# Patient Record
Sex: MALE | Race: WHITE | Employment: OTHER | URBAN - METROPOLITAN AREA
[De-identification: names, ages, dates, MRNs, and addresses within clinical notes are randomized per-mention and may not be internally consistent; named-entity substitution may affect disease eponyms.]

---

## 2017-01-03 ENCOUNTER — NURSE ONLY (OUTPATIENT)
Dept: DERMATOLOGY | Age: 57
End: 2017-01-03

## 2017-01-03 DIAGNOSIS — L28.1 PRURIGO NODULARIS: Primary | ICD-10-CM

## 2017-01-03 PROCEDURE — 96900 ACTINOTHERAPY UV LIGHT: CPT | Performed by: DERMATOLOGY

## 2017-01-04 ENCOUNTER — TELEPHONE (OUTPATIENT)
Dept: DERMATOLOGY | Age: 57
End: 2017-01-04

## 2017-01-18 ENCOUNTER — NURSE ONLY (OUTPATIENT)
Dept: DERMATOLOGY | Age: 57
End: 2017-01-18

## 2017-01-18 DIAGNOSIS — L28.1 PRURIGO NODULARIS: Primary | ICD-10-CM

## 2017-01-18 PROCEDURE — 96900 ACTINOTHERAPY UV LIGHT: CPT | Performed by: DERMATOLOGY

## 2017-01-20 ENCOUNTER — NURSE ONLY (OUTPATIENT)
Dept: DERMATOLOGY | Age: 57
End: 2017-01-20

## 2017-01-20 DIAGNOSIS — L28.1 PRURIGO NODULARIS: Primary | ICD-10-CM

## 2017-01-20 PROCEDURE — 96900 ACTINOTHERAPY UV LIGHT: CPT | Performed by: DERMATOLOGY

## 2017-01-23 ENCOUNTER — NURSE ONLY (OUTPATIENT)
Dept: DERMATOLOGY | Age: 57
End: 2017-01-23

## 2017-01-23 DIAGNOSIS — L28.1 PRURIGO NODULARIS: Primary | ICD-10-CM

## 2017-01-23 PROCEDURE — 96900 ACTINOTHERAPY UV LIGHT: CPT | Performed by: DERMATOLOGY

## 2017-01-27 ENCOUNTER — NURSE ONLY (OUTPATIENT)
Dept: DERMATOLOGY | Age: 57
End: 2017-01-27

## 2017-01-27 DIAGNOSIS — L28.1 PRURIGO NODULARIS: Primary | ICD-10-CM

## 2017-01-30 ENCOUNTER — TELEPHONE (OUTPATIENT)
Dept: DERMATOLOGY | Age: 57
End: 2017-01-30

## 2017-02-09 ENCOUNTER — NURSE ONLY (OUTPATIENT)
Dept: DERMATOLOGY | Age: 57
End: 2017-02-09

## 2017-02-09 ENCOUNTER — OFFICE VISIT (OUTPATIENT)
Dept: DERMATOLOGY | Age: 57
End: 2017-02-09

## 2017-02-09 DIAGNOSIS — L29.9 PRURITIC CONDITION: ICD-10-CM

## 2017-02-09 DIAGNOSIS — L28.1 PRURIGO NODULARIS: Primary | ICD-10-CM

## 2017-02-09 PROCEDURE — 99213 OFFICE O/P EST LOW 20 MIN: CPT | Performed by: DERMATOLOGY

## 2017-02-09 PROCEDURE — 96900 ACTINOTHERAPY UV LIGHT: CPT | Performed by: DERMATOLOGY

## 2017-02-13 ENCOUNTER — NURSE ONLY (OUTPATIENT)
Dept: DERMATOLOGY | Age: 57
End: 2017-02-13

## 2017-02-13 DIAGNOSIS — L28.1 PRURIGO NODULARIS: Primary | ICD-10-CM

## 2017-02-13 PROCEDURE — 96900 ACTINOTHERAPY UV LIGHT: CPT | Performed by: DERMATOLOGY

## 2017-02-15 ENCOUNTER — NURSE ONLY (OUTPATIENT)
Dept: DERMATOLOGY | Age: 57
End: 2017-02-15

## 2017-02-15 DIAGNOSIS — L28.1 PRURIGO NODULARIS: Primary | ICD-10-CM

## 2017-02-15 PROCEDURE — 96900 ACTINOTHERAPY UV LIGHT: CPT | Performed by: DERMATOLOGY

## 2017-02-17 ENCOUNTER — NURSE ONLY (OUTPATIENT)
Dept: DERMATOLOGY | Age: 57
End: 2017-02-17

## 2017-02-17 DIAGNOSIS — L28.1 PRURIGO NODULARIS: Primary | ICD-10-CM

## 2017-02-17 PROCEDURE — 96900 ACTINOTHERAPY UV LIGHT: CPT | Performed by: DERMATOLOGY

## 2017-02-20 ENCOUNTER — NURSE ONLY (OUTPATIENT)
Dept: DERMATOLOGY | Age: 57
End: 2017-02-20

## 2017-02-20 DIAGNOSIS — L28.1 PRURIGO NODULARIS: Primary | ICD-10-CM

## 2017-02-20 PROCEDURE — 96900 ACTINOTHERAPY UV LIGHT: CPT | Performed by: DERMATOLOGY

## 2017-02-22 ENCOUNTER — NURSE ONLY (OUTPATIENT)
Dept: DERMATOLOGY | Age: 57
End: 2017-02-22

## 2017-02-22 DIAGNOSIS — L28.1 PRURIGO NODULARIS: Primary | ICD-10-CM

## 2017-02-22 PROCEDURE — 96900 ACTINOTHERAPY UV LIGHT: CPT | Performed by: DERMATOLOGY

## 2017-02-24 ENCOUNTER — NURSE ONLY (OUTPATIENT)
Dept: DERMATOLOGY | Age: 57
End: 2017-02-24

## 2017-02-24 DIAGNOSIS — L28.1 PRURIGO NODULARIS: Primary | ICD-10-CM

## 2017-02-24 PROCEDURE — 96900 ACTINOTHERAPY UV LIGHT: CPT | Performed by: DERMATOLOGY

## 2017-02-27 ENCOUNTER — NURSE ONLY (OUTPATIENT)
Dept: DERMATOLOGY | Age: 57
End: 2017-02-27

## 2017-02-27 DIAGNOSIS — L28.1 PRURIGO NODULARIS: Primary | ICD-10-CM

## 2017-02-27 PROCEDURE — 96900 ACTINOTHERAPY UV LIGHT: CPT | Performed by: DERMATOLOGY

## 2017-03-06 ENCOUNTER — NURSE ONLY (OUTPATIENT)
Dept: DERMATOLOGY | Age: 57
End: 2017-03-06

## 2017-03-06 DIAGNOSIS — L28.1 PRURIGO NODULARIS: Primary | ICD-10-CM

## 2017-03-06 PROCEDURE — 96900 ACTINOTHERAPY UV LIGHT: CPT | Performed by: DERMATOLOGY

## 2017-03-08 ENCOUNTER — NURSE ONLY (OUTPATIENT)
Dept: DERMATOLOGY | Age: 57
End: 2017-03-08

## 2017-03-08 DIAGNOSIS — L28.1 PRURIGO NODULARIS: Primary | ICD-10-CM

## 2017-03-08 PROCEDURE — 96900 ACTINOTHERAPY UV LIGHT: CPT | Performed by: DERMATOLOGY

## 2017-03-10 ENCOUNTER — NURSE ONLY (OUTPATIENT)
Dept: DERMATOLOGY | Age: 57
End: 2017-03-10

## 2017-03-10 DIAGNOSIS — L28.1 PRURIGO NODULARIS: Primary | ICD-10-CM

## 2017-03-10 PROCEDURE — 96900 ACTINOTHERAPY UV LIGHT: CPT | Performed by: DERMATOLOGY

## 2017-03-15 ENCOUNTER — NURSE ONLY (OUTPATIENT)
Dept: DERMATOLOGY | Age: 57
End: 2017-03-15

## 2017-03-15 ENCOUNTER — TELEPHONE (OUTPATIENT)
Dept: DERMATOLOGY | Age: 57
End: 2017-03-15

## 2017-03-15 DIAGNOSIS — L30.9 DERMATITIS: Primary | ICD-10-CM

## 2017-03-15 PROCEDURE — 96900 ACTINOTHERAPY UV LIGHT: CPT | Performed by: DERMATOLOGY

## 2017-03-22 ENCOUNTER — NURSE ONLY (OUTPATIENT)
Dept: DERMATOLOGY | Age: 57
End: 2017-03-22

## 2017-03-22 DIAGNOSIS — L28.1 PRURIGO NODULARIS: Primary | ICD-10-CM

## 2017-03-22 PROCEDURE — 96900 ACTINOTHERAPY UV LIGHT: CPT | Performed by: DERMATOLOGY

## 2017-03-27 ENCOUNTER — NURSE ONLY (OUTPATIENT)
Dept: DERMATOLOGY | Age: 57
End: 2017-03-27

## 2017-03-27 DIAGNOSIS — L28.1 PRURIGO NODULARIS: Primary | ICD-10-CM

## 2017-03-27 PROCEDURE — 96900 ACTINOTHERAPY UV LIGHT: CPT | Performed by: DERMATOLOGY

## 2017-03-31 ENCOUNTER — NURSE ONLY (OUTPATIENT)
Dept: DERMATOLOGY | Age: 57
End: 2017-03-31

## 2017-03-31 DIAGNOSIS — L28.1 PRURIGO NODULARIS: Primary | ICD-10-CM

## 2017-03-31 PROCEDURE — 96900 ACTINOTHERAPY UV LIGHT: CPT | Performed by: DERMATOLOGY

## 2017-04-03 ENCOUNTER — NURSE ONLY (OUTPATIENT)
Dept: DERMATOLOGY | Age: 57
End: 2017-04-03

## 2017-04-03 DIAGNOSIS — L28.1 PRURIGO NODULARIS: Primary | ICD-10-CM

## 2017-04-03 PROCEDURE — 96900 ACTINOTHERAPY UV LIGHT: CPT | Performed by: DERMATOLOGY

## 2017-04-05 ENCOUNTER — NURSE ONLY (OUTPATIENT)
Dept: DERMATOLOGY | Age: 57
End: 2017-04-05

## 2017-04-05 DIAGNOSIS — L28.1 PRURIGO NODULARIS: Primary | ICD-10-CM

## 2017-04-05 PROCEDURE — 96900 ACTINOTHERAPY UV LIGHT: CPT | Performed by: DERMATOLOGY

## 2017-04-12 ENCOUNTER — NURSE ONLY (OUTPATIENT)
Dept: DERMATOLOGY | Age: 57
End: 2017-04-12

## 2017-04-12 DIAGNOSIS — L28.1 PRURIGO NODULARIS: Primary | ICD-10-CM

## 2017-04-12 PROCEDURE — 96900 ACTINOTHERAPY UV LIGHT: CPT | Performed by: DERMATOLOGY

## 2017-04-17 ENCOUNTER — NURSE ONLY (OUTPATIENT)
Dept: DERMATOLOGY | Age: 57
End: 2017-04-17

## 2017-04-17 DIAGNOSIS — L30.9 DERMATITIS: Primary | ICD-10-CM

## 2017-04-17 PROCEDURE — 96900 ACTINOTHERAPY UV LIGHT: CPT | Performed by: DERMATOLOGY

## 2017-04-24 ENCOUNTER — NURSE ONLY (OUTPATIENT)
Dept: DERMATOLOGY | Age: 57
End: 2017-04-24

## 2017-04-24 DIAGNOSIS — L28.1 PRURIGO NODULARIS: Primary | ICD-10-CM

## 2017-04-24 PROCEDURE — 96900 ACTINOTHERAPY UV LIGHT: CPT | Performed by: DERMATOLOGY

## 2017-04-26 ENCOUNTER — NURSE ONLY (OUTPATIENT)
Dept: DERMATOLOGY | Age: 57
End: 2017-04-26

## 2017-04-26 DIAGNOSIS — L28.1 PRURIGO NODULARIS: Primary | ICD-10-CM

## 2017-04-26 PROCEDURE — 96900 ACTINOTHERAPY UV LIGHT: CPT | Performed by: DERMATOLOGY

## 2017-05-08 ENCOUNTER — NURSE ONLY (OUTPATIENT)
Dept: DERMATOLOGY | Age: 57
End: 2017-05-08

## 2017-05-08 DIAGNOSIS — L28.1 PRURIGO NODULARIS: Primary | ICD-10-CM

## 2017-05-08 PROCEDURE — 96900 ACTINOTHERAPY UV LIGHT: CPT | Performed by: DERMATOLOGY

## 2017-05-10 ENCOUNTER — NURSE ONLY (OUTPATIENT)
Dept: DERMATOLOGY | Age: 57
End: 2017-05-10

## 2017-05-10 DIAGNOSIS — L28.1 PRURIGO NODULARIS: Primary | ICD-10-CM

## 2017-05-10 PROCEDURE — 96900 ACTINOTHERAPY UV LIGHT: CPT | Performed by: DERMATOLOGY

## 2017-05-12 ENCOUNTER — NURSE ONLY (OUTPATIENT)
Dept: DERMATOLOGY | Age: 57
End: 2017-05-12

## 2017-05-12 DIAGNOSIS — L28.1 PRURIGO NODULARIS: Primary | ICD-10-CM

## 2017-05-12 PROCEDURE — 96900 ACTINOTHERAPY UV LIGHT: CPT | Performed by: DERMATOLOGY

## 2017-05-15 ENCOUNTER — OFFICE VISIT (OUTPATIENT)
Dept: DERMATOLOGY | Age: 57
End: 2017-05-15

## 2017-05-15 DIAGNOSIS — L28.1 PRURIGO NODULARIS: Primary | ICD-10-CM

## 2017-05-15 DIAGNOSIS — L29.9 ITCH: ICD-10-CM

## 2017-05-15 PROCEDURE — 99213 OFFICE O/P EST LOW 20 MIN: CPT | Performed by: DERMATOLOGY

## 2017-05-22 ENCOUNTER — NURSE ONLY (OUTPATIENT)
Dept: DERMATOLOGY | Age: 57
End: 2017-05-22

## 2017-05-22 DIAGNOSIS — L28.1 PRURIGO NODULARIS: Primary | ICD-10-CM

## 2017-05-22 PROCEDURE — 96900 ACTINOTHERAPY UV LIGHT: CPT | Performed by: DERMATOLOGY

## 2017-05-30 ENCOUNTER — NURSE ONLY (OUTPATIENT)
Dept: DERMATOLOGY | Age: 57
End: 2017-05-30

## 2017-05-30 DIAGNOSIS — L28.1 PRURIGO NODULARIS: Primary | ICD-10-CM

## 2017-05-30 PROCEDURE — 96900 ACTINOTHERAPY UV LIGHT: CPT | Performed by: DERMATOLOGY

## 2017-06-05 ENCOUNTER — NURSE ONLY (OUTPATIENT)
Dept: DERMATOLOGY | Age: 57
End: 2017-06-05

## 2017-06-05 DIAGNOSIS — L28.1 PRURIGO NODULARIS: Primary | ICD-10-CM

## 2017-06-05 PROCEDURE — 96900 ACTINOTHERAPY UV LIGHT: CPT | Performed by: DERMATOLOGY

## 2017-06-07 ENCOUNTER — NURSE ONLY (OUTPATIENT)
Dept: DERMATOLOGY | Age: 57
End: 2017-06-07

## 2017-06-07 DIAGNOSIS — L28.1 PRURIGO NODULARIS: Primary | ICD-10-CM

## 2017-06-07 PROCEDURE — 96900 ACTINOTHERAPY UV LIGHT: CPT | Performed by: DERMATOLOGY

## 2017-06-14 ENCOUNTER — NURSE ONLY (OUTPATIENT)
Dept: DERMATOLOGY | Age: 57
End: 2017-06-14

## 2017-06-14 DIAGNOSIS — L28.1 PRURIGO NODULARIS: Primary | ICD-10-CM

## 2017-06-14 PROCEDURE — 96900 ACTINOTHERAPY UV LIGHT: CPT | Performed by: DERMATOLOGY

## 2017-06-16 ENCOUNTER — NURSE ONLY (OUTPATIENT)
Dept: DERMATOLOGY | Age: 57
End: 2017-06-16

## 2017-06-16 DIAGNOSIS — L28.1 PRURIGO NODULARIS: Primary | ICD-10-CM

## 2017-06-16 PROCEDURE — 96900 ACTINOTHERAPY UV LIGHT: CPT | Performed by: DERMATOLOGY

## 2017-06-19 ENCOUNTER — NURSE ONLY (OUTPATIENT)
Dept: DERMATOLOGY | Age: 57
End: 2017-06-19

## 2017-06-19 DIAGNOSIS — L28.1 PRURIGO NODULARIS: Primary | ICD-10-CM

## 2017-06-19 PROCEDURE — 96900 ACTINOTHERAPY UV LIGHT: CPT | Performed by: DERMATOLOGY

## 2017-06-26 ENCOUNTER — NURSE ONLY (OUTPATIENT)
Dept: DERMATOLOGY | Age: 57
End: 2017-06-26

## 2017-06-26 DIAGNOSIS — L28.1 PRURIGO NODULARIS: Primary | ICD-10-CM

## 2017-06-26 PROCEDURE — 96900 ACTINOTHERAPY UV LIGHT: CPT | Performed by: DERMATOLOGY

## 2017-07-07 ENCOUNTER — NURSE ONLY (OUTPATIENT)
Dept: DERMATOLOGY | Age: 57
End: 2017-07-07

## 2017-07-07 DIAGNOSIS — L28.1 PRURIGO NODULARIS: Primary | ICD-10-CM

## 2017-07-07 PROCEDURE — 96900 ACTINOTHERAPY UV LIGHT: CPT | Performed by: DERMATOLOGY

## 2017-07-10 ENCOUNTER — NURSE ONLY (OUTPATIENT)
Dept: DERMATOLOGY | Age: 57
End: 2017-07-10

## 2017-07-10 DIAGNOSIS — L28.1 PRURIGO NODULARIS: Primary | ICD-10-CM

## 2017-07-10 PROCEDURE — 96900 ACTINOTHERAPY UV LIGHT: CPT | Performed by: DERMATOLOGY

## 2017-07-12 ENCOUNTER — NURSE ONLY (OUTPATIENT)
Dept: DERMATOLOGY | Age: 57
End: 2017-07-12

## 2017-07-12 DIAGNOSIS — L28.1 PRURIGO NODULARIS: Primary | ICD-10-CM

## 2017-07-12 PROCEDURE — 96900 ACTINOTHERAPY UV LIGHT: CPT | Performed by: DERMATOLOGY

## 2017-07-24 ENCOUNTER — NURSE ONLY (OUTPATIENT)
Dept: DERMATOLOGY | Age: 57
End: 2017-07-24

## 2017-07-24 DIAGNOSIS — L28.1 PRURIGO NODULARIS: Primary | ICD-10-CM

## 2017-07-24 PROCEDURE — 96900 ACTINOTHERAPY UV LIGHT: CPT | Performed by: DERMATOLOGY

## 2017-07-26 ENCOUNTER — NURSE ONLY (OUTPATIENT)
Dept: DERMATOLOGY | Age: 57
End: 2017-07-26

## 2017-07-26 DIAGNOSIS — L28.1 PRURIGO NODULARIS: Primary | ICD-10-CM

## 2017-07-26 PROCEDURE — 96900 ACTINOTHERAPY UV LIGHT: CPT | Performed by: DERMATOLOGY

## 2017-08-02 ENCOUNTER — NURSE ONLY (OUTPATIENT)
Dept: DERMATOLOGY | Age: 57
End: 2017-08-02

## 2017-08-02 DIAGNOSIS — L28.1 PRURIGO NODULARIS: Primary | ICD-10-CM

## 2017-08-02 PROCEDURE — 96900 ACTINOTHERAPY UV LIGHT: CPT | Performed by: DERMATOLOGY

## 2017-08-09 ENCOUNTER — NURSE ONLY (OUTPATIENT)
Dept: DERMATOLOGY | Age: 57
End: 2017-08-09

## 2017-08-09 DIAGNOSIS — L28.1 PRURIGO NODULARIS: Primary | ICD-10-CM

## 2017-08-09 PROCEDURE — 96900 ACTINOTHERAPY UV LIGHT: CPT | Performed by: DERMATOLOGY

## 2017-08-11 ENCOUNTER — NURSE ONLY (OUTPATIENT)
Dept: DERMATOLOGY | Age: 57
End: 2017-08-11

## 2017-08-11 DIAGNOSIS — L28.1 PRURIGO NODULARIS: Primary | ICD-10-CM

## 2017-08-11 PROCEDURE — 96900 ACTINOTHERAPY UV LIGHT: CPT | Performed by: DERMATOLOGY

## 2017-08-16 ENCOUNTER — NURSE ONLY (OUTPATIENT)
Dept: DERMATOLOGY | Age: 57
End: 2017-08-16

## 2017-08-16 DIAGNOSIS — L28.1 PRURIGO NODULARIS: Primary | ICD-10-CM

## 2017-08-16 PROCEDURE — 96900 ACTINOTHERAPY UV LIGHT: CPT | Performed by: DERMATOLOGY

## 2017-08-25 ENCOUNTER — NURSE ONLY (OUTPATIENT)
Dept: DERMATOLOGY | Age: 57
End: 2017-08-25

## 2017-08-25 DIAGNOSIS — L28.1 PRURIGO NODULARIS: Primary | ICD-10-CM

## 2017-08-25 PROCEDURE — 96900 ACTINOTHERAPY UV LIGHT: CPT | Performed by: DERMATOLOGY

## 2017-09-06 ENCOUNTER — NURSE ONLY (OUTPATIENT)
Dept: DERMATOLOGY | Age: 57
End: 2017-09-06

## 2017-09-06 DIAGNOSIS — L28.1 PRURIGO NODULARIS: Primary | ICD-10-CM

## 2017-09-06 PROCEDURE — 96900 ACTINOTHERAPY UV LIGHT: CPT | Performed by: DERMATOLOGY

## 2017-09-08 ENCOUNTER — NURSE ONLY (OUTPATIENT)
Dept: DERMATOLOGY | Age: 57
End: 2017-09-08

## 2017-09-08 DIAGNOSIS — L28.1 PRURIGO NODULARIS: Primary | ICD-10-CM

## 2017-09-08 PROCEDURE — 96900 ACTINOTHERAPY UV LIGHT: CPT | Performed by: DERMATOLOGY

## 2017-09-13 ENCOUNTER — NURSE ONLY (OUTPATIENT)
Dept: DERMATOLOGY | Age: 57
End: 2017-09-13

## 2017-09-13 ENCOUNTER — OFFICE VISIT (OUTPATIENT)
Dept: DERMATOLOGY | Age: 57
End: 2017-09-13

## 2017-09-13 DIAGNOSIS — L28.1 PRURIGO NODULARIS: Primary | ICD-10-CM

## 2017-09-13 DIAGNOSIS — L29.9 PRURITIC CONDITION: ICD-10-CM

## 2017-09-13 PROCEDURE — 96900 ACTINOTHERAPY UV LIGHT: CPT | Performed by: DERMATOLOGY

## 2017-09-13 PROCEDURE — 11900 INJECT SKIN LESIONS </W 7: CPT | Performed by: DERMATOLOGY

## 2017-09-13 PROCEDURE — 99213 OFFICE O/P EST LOW 20 MIN: CPT | Performed by: DERMATOLOGY

## 2017-09-13 RX ORDER — TRIAMCINOLONE ACETONIDE 40 MG/ML
40 INJECTION, SUSPENSION INTRA-ARTICULAR; INTRAMUSCULAR ONCE
Status: COMPLETED | OUTPATIENT
Start: 2017-09-13 | End: 2017-09-13

## 2017-09-13 RX ADMIN — TRIAMCINOLONE ACETONIDE 40 MG: 40 INJECTION, SUSPENSION INTRA-ARTICULAR; INTRAMUSCULAR at 11:05

## 2017-10-04 ENCOUNTER — NURSE ONLY (OUTPATIENT)
Dept: DERMATOLOGY | Age: 57
End: 2017-10-04

## 2017-10-04 DIAGNOSIS — L28.1 PRURIGO NODULARIS: Primary | ICD-10-CM

## 2017-10-04 PROCEDURE — 96900 ACTINOTHERAPY UV LIGHT: CPT | Performed by: DERMATOLOGY

## 2017-10-04 NOTE — PROGRESS NOTES
Pt here for NBUVB treatment, tolerating treatments well, no signs of burning. Pt shields face. Decrease dose per protocol.

## 2017-10-06 ENCOUNTER — NURSE ONLY (OUTPATIENT)
Dept: DERMATOLOGY | Age: 57
End: 2017-10-06

## 2017-10-06 DIAGNOSIS — L28.1 PRURIGO NODULARIS: Primary | ICD-10-CM

## 2017-10-06 PROCEDURE — 96900 ACTINOTHERAPY UV LIGHT: CPT | Performed by: DERMATOLOGY

## 2017-10-06 PROCEDURE — 99024 POSTOP FOLLOW-UP VISIT: CPT | Performed by: DERMATOLOGY

## 2017-10-11 ENCOUNTER — NURSE ONLY (OUTPATIENT)
Dept: DERMATOLOGY | Age: 57
End: 2017-10-11

## 2017-10-11 DIAGNOSIS — L28.1 PRURIGO NODULARIS: Primary | ICD-10-CM

## 2017-10-11 PROCEDURE — 96900 ACTINOTHERAPY UV LIGHT: CPT | Performed by: DERMATOLOGY

## 2017-10-11 NOTE — PROGRESS NOTES
Pt here for NBUVB treatment, tolerating treatments well, no signs of burning. Pt shields face, groin and abdomen. Hold dose per protocol.

## 2017-10-16 ENCOUNTER — NURSE ONLY (OUTPATIENT)
Dept: DERMATOLOGY | Age: 57
End: 2017-10-16

## 2017-10-16 DIAGNOSIS — L28.1 PRURIGO NODULARIS: Primary | ICD-10-CM

## 2017-10-16 PROCEDURE — 96900 ACTINOTHERAPY UV LIGHT: CPT | Performed by: DERMATOLOGY

## 2017-10-16 NOTE — PROGRESS NOTES
Pt here for NBUVB treatment, tolerating treatments well, no signs of burning. Pt shields face. Hold dose per protocol.

## 2017-10-27 ENCOUNTER — NURSE ONLY (OUTPATIENT)
Dept: DERMATOLOGY | Age: 57
End: 2017-10-27

## 2017-10-27 DIAGNOSIS — L28.1 PRURIGO NODULARIS: Primary | ICD-10-CM

## 2017-10-27 PROCEDURE — 96900 ACTINOTHERAPY UV LIGHT: CPT | Performed by: DERMATOLOGY

## 2017-11-01 ENCOUNTER — NURSE ONLY (OUTPATIENT)
Dept: DERMATOLOGY | Age: 57
End: 2017-11-01

## 2017-11-01 DIAGNOSIS — L28.1 PRURIGO NODULARIS: Primary | ICD-10-CM

## 2017-11-01 PROCEDURE — 96900 ACTINOTHERAPY UV LIGHT: CPT | Performed by: DERMATOLOGY

## 2017-11-01 NOTE — PROGRESS NOTES
Pt here for NBUVB treatment, tolerating treatments well, no signs of burning. Pt shields face, abdomen, and genitals. Increase dose per Dr. Antione Jane due to pt flaring. Pt was encouraged to come three times a week as ordered to help the treatments be more effective as well as increasing the time.  Pt expressed understanding and states he will come three times a week

## 2017-11-03 ENCOUNTER — NURSE ONLY (OUTPATIENT)
Dept: DERMATOLOGY | Age: 57
End: 2017-11-03

## 2017-11-03 DIAGNOSIS — L28.1 PRURIGO NODULARIS: Primary | ICD-10-CM

## 2017-11-03 PROCEDURE — 96900 ACTINOTHERAPY UV LIGHT: CPT | Performed by: DERMATOLOGY

## 2017-11-03 NOTE — PROGRESS NOTES
Pt here for NBUVB treatment, tolerating treatments well, no signs of burning. Pt shields face. Increase dose per protocol.

## 2017-11-06 ENCOUNTER — NURSE ONLY (OUTPATIENT)
Dept: DERMATOLOGY | Age: 57
End: 2017-11-06

## 2017-11-06 DIAGNOSIS — L28.1 PRURIGO NODULARIS: Primary | ICD-10-CM

## 2017-11-06 PROCEDURE — 96900 ACTINOTHERAPY UV LIGHT: CPT | Performed by: DERMATOLOGY

## 2017-11-06 NOTE — PROGRESS NOTES
Pt here for NBUVB treatment, tolerating treatments well, no signs of burning. Pt shields face. Increased dose per protocol.

## 2017-11-10 ENCOUNTER — NURSE ONLY (OUTPATIENT)
Dept: DERMATOLOGY | Age: 57
End: 2017-11-10

## 2017-11-10 DIAGNOSIS — L28.1 PRURIGO NODULARIS: Primary | ICD-10-CM

## 2017-11-10 PROCEDURE — 96900 ACTINOTHERAPY UV LIGHT: CPT | Performed by: DERMATOLOGY

## 2017-11-13 ENCOUNTER — NURSE ONLY (OUTPATIENT)
Dept: DERMATOLOGY | Age: 57
End: 2017-11-13

## 2017-11-13 DIAGNOSIS — L28.1 PRURIGO NODULARIS: Primary | ICD-10-CM

## 2017-11-13 PROCEDURE — 96900 ACTINOTHERAPY UV LIGHT: CPT | Performed by: DERMATOLOGY

## 2017-11-13 NOTE — PROGRESS NOTES
Pt here for NBUVB treatment, tolerating treatments well, no signs of burning. Pt shields face, groin and abdomen. Increase dose per protocol.

## 2017-11-17 ENCOUNTER — NURSE ONLY (OUTPATIENT)
Dept: DERMATOLOGY | Age: 57
End: 2017-11-17

## 2017-11-17 DIAGNOSIS — L28.1 PRURIGO NODULARIS: Primary | ICD-10-CM

## 2017-11-17 PROCEDURE — 96900 ACTINOTHERAPY UV LIGHT: CPT | Performed by: DERMATOLOGY

## 2017-11-20 ENCOUNTER — NURSE ONLY (OUTPATIENT)
Dept: DERMATOLOGY | Age: 57
End: 2017-11-20

## 2017-11-20 DIAGNOSIS — L28.1 PRURIGO NODULARIS: Primary | ICD-10-CM

## 2017-11-20 PROCEDURE — 96900 ACTINOTHERAPY UV LIGHT: CPT | Performed by: DERMATOLOGY

## 2017-11-22 ENCOUNTER — NURSE ONLY (OUTPATIENT)
Dept: DERMATOLOGY | Age: 57
End: 2017-11-22

## 2017-11-22 DIAGNOSIS — L28.1 PRURIGO NODULARIS: Primary | ICD-10-CM

## 2017-11-22 PROCEDURE — 96900 ACTINOTHERAPY UV LIGHT: CPT | Performed by: DERMATOLOGY

## 2017-11-29 ENCOUNTER — NURSE ONLY (OUTPATIENT)
Dept: DERMATOLOGY | Age: 57
End: 2017-11-29

## 2017-11-29 DIAGNOSIS — L28.1 PRURIGO NODULARIS: Primary | ICD-10-CM

## 2017-11-29 PROCEDURE — 96900 ACTINOTHERAPY UV LIGHT: CPT | Performed by: DERMATOLOGY

## 2017-12-01 ENCOUNTER — NURSE ONLY (OUTPATIENT)
Dept: DERMATOLOGY | Age: 57
End: 2017-12-01

## 2017-12-01 DIAGNOSIS — L28.1 PRURIGO NODULARIS: Primary | ICD-10-CM

## 2017-12-01 PROCEDURE — 96900 ACTINOTHERAPY UV LIGHT: CPT | Performed by: DERMATOLOGY

## 2017-12-04 ENCOUNTER — NURSE ONLY (OUTPATIENT)
Dept: DERMATOLOGY | Age: 57
End: 2017-12-04

## 2017-12-04 DIAGNOSIS — L28.1 PRURIGO NODULARIS: Primary | ICD-10-CM

## 2017-12-04 PROCEDURE — 96900 ACTINOTHERAPY UV LIGHT: CPT | Performed by: DERMATOLOGY

## 2017-12-06 ENCOUNTER — NURSE ONLY (OUTPATIENT)
Dept: DERMATOLOGY | Age: 57
End: 2017-12-06

## 2017-12-06 DIAGNOSIS — L28.1 PRURIGO NODULARIS: Primary | ICD-10-CM

## 2017-12-06 PROCEDURE — 96900 ACTINOTHERAPY UV LIGHT: CPT | Performed by: DERMATOLOGY

## 2017-12-11 ENCOUNTER — NURSE ONLY (OUTPATIENT)
Dept: DERMATOLOGY | Age: 57
End: 2017-12-11

## 2017-12-11 ENCOUNTER — OFFICE VISIT (OUTPATIENT)
Dept: DERMATOLOGY | Age: 57
End: 2017-12-11

## 2017-12-11 DIAGNOSIS — L29.9 ITCH: ICD-10-CM

## 2017-12-11 DIAGNOSIS — L28.1 PRURIGO NODULARIS: Primary | ICD-10-CM

## 2017-12-11 PROCEDURE — 11901 INJECT SKIN LESIONS >7: CPT | Performed by: DERMATOLOGY

## 2017-12-11 PROCEDURE — G8427 DOCREV CUR MEDS BY ELIG CLIN: HCPCS | Performed by: DERMATOLOGY

## 2017-12-11 PROCEDURE — 99213 OFFICE O/P EST LOW 20 MIN: CPT | Performed by: DERMATOLOGY

## 2017-12-11 PROCEDURE — 96900 ACTINOTHERAPY UV LIGHT: CPT | Performed by: DERMATOLOGY

## 2017-12-11 PROCEDURE — 3017F COLORECTAL CA SCREEN DOC REV: CPT | Performed by: DERMATOLOGY

## 2017-12-11 PROCEDURE — G8484 FLU IMMUNIZE NO ADMIN: HCPCS | Performed by: DERMATOLOGY

## 2017-12-11 PROCEDURE — G8421 BMI NOT CALCULATED: HCPCS | Performed by: DERMATOLOGY

## 2017-12-11 PROCEDURE — 1036F TOBACCO NON-USER: CPT | Performed by: DERMATOLOGY

## 2017-12-11 RX ORDER — CLOBETASOL PROPIONATE 0.5 MG/G
OINTMENT TOPICAL
Qty: 120 G | Refills: 6 | Status: SHIPPED | OUTPATIENT
Start: 2017-12-11 | End: 2018-05-02 | Stop reason: SDUPTHER

## 2017-12-11 NOTE — PROGRESS NOTES
[facility]) Dermatology  Glenny Scales M.D.  [phone]       Khalif Fish  1960    65 y.o. male     Date of Visit: 12/11/2017    Chief Complaint:   Chief Complaint   Patient presents with    Follow-up     UVB        I was asked to see this patient by Dr. Diaz ref. provider found. History of Present Illness:  1. Patient presents today for follow-up of narrowband UVB phototherapy. Currently being treated 2 times per week. Has not had difficulty with burning. Upper back and shoulders flaring-worse since his last visit with multiple open excoriated papules. Had previously been well controlled. Is using clobetasol with minimal improvement. Review of Systems:  Constitutional: Reports general sense of well-being       Past Medical History, Surgical History, Family History, Medications and Allergies reviewed. Social History:   Social History     Social History    Marital status: Single     Spouse name: N/A    Number of children: N/A    Years of education: N/A     Occupational History    Not on file. Social History Main Topics    Smoking status: Former Smoker    Smokeless tobacco: Never Used    Alcohol use No    Drug use: Yes     Types: Marijuana    Sexual activity: No     Other Topics Concern    Not on file     Social History Narrative    No narrative on file       Physical Examination       -General: Well-appearing, NAD  Limited exam-upper torso multiple open excoriated papules. Old scarring. Assessment and Plan     1. Prurigo nodularis - -IL kenalog 10 mg/ml; total 1.5 ml to 19 lesion(s). Edu re: atrophy, dyspigmentation. .Phototherapy Orders for NBUVB:     Frequency of treatments: 3 times per week.     Dose: Start at current dose     Increase by: 5% each treatment     Extra to the extremities: No      Shielding: Face, groin     Max dose: 1500 mJ/cm2     2. Itch - Continue narrowband UVB phototherapy. Recheck 6-8 weeks-intralesional Kenalog if not improved.   Refill of

## 2017-12-18 ENCOUNTER — NURSE ONLY (OUTPATIENT)
Dept: DERMATOLOGY | Age: 57
End: 2017-12-18

## 2017-12-18 ENCOUNTER — TELEPHONE (OUTPATIENT)
Dept: DERMATOLOGY | Age: 57
End: 2017-12-18

## 2017-12-18 DIAGNOSIS — L40.9 PSORIASIS: Primary | ICD-10-CM

## 2017-12-18 PROCEDURE — 96900 ACTINOTHERAPY UV LIGHT: CPT | Performed by: DERMATOLOGY

## 2017-12-18 NOTE — PROGRESS NOTES
Pt here for NBUVB treatment, tolerating treatments well, no signs of burning. Pt shields face and groin. Increase dose per protocol.

## 2017-12-20 ENCOUNTER — NURSE ONLY (OUTPATIENT)
Dept: DERMATOLOGY | Age: 57
End: 2017-12-20

## 2017-12-20 ENCOUNTER — OFFICE VISIT (OUTPATIENT)
Dept: DERMATOLOGY | Age: 57
End: 2017-12-20

## 2017-12-20 DIAGNOSIS — L28.1 PRURIGO NODULARIS: Primary | ICD-10-CM

## 2017-12-20 DIAGNOSIS — M25.522 LEFT ELBOW PAIN: Primary | ICD-10-CM

## 2017-12-20 PROCEDURE — G8427 DOCREV CUR MEDS BY ELIG CLIN: HCPCS | Performed by: DERMATOLOGY

## 2017-12-20 PROCEDURE — 3017F COLORECTAL CA SCREEN DOC REV: CPT | Performed by: DERMATOLOGY

## 2017-12-20 PROCEDURE — 96900 ACTINOTHERAPY UV LIGHT: CPT | Performed by: DERMATOLOGY

## 2017-12-20 PROCEDURE — G8484 FLU IMMUNIZE NO ADMIN: HCPCS | Performed by: DERMATOLOGY

## 2017-12-20 PROCEDURE — 1036F TOBACCO NON-USER: CPT | Performed by: DERMATOLOGY

## 2017-12-20 PROCEDURE — 99212 OFFICE O/P EST SF 10 MIN: CPT | Performed by: DERMATOLOGY

## 2017-12-20 PROCEDURE — G8421 BMI NOT CALCULATED: HCPCS | Performed by: DERMATOLOGY

## 2017-12-20 NOTE — PROGRESS NOTES
[facility]) Dermatology  Jeannine Cameron M.D.  [phone]       Jose Christianson  1960    65 y.o. male     Date of Visit: 12/20/2017    Chief Complaint:   Chief Complaint   Patient presents with    Skin Lesion     left elbow        I was asked to see this patient by Dr. Diaz ref. provider found. History of Present Illness:  1. Patient presents today for evaluation of pain in his left elbow. Developed about 4 days ago. Tender. Edematous. No preceding injury. Review of Systems:  Constitutional: Reports general sense of well-being       Past Medical History, Surgical History, Family History, Medications and Allergies reviewed. Social History:   Social History     Social History    Marital status: Single     Spouse name: N/A    Number of children: N/A    Years of education: N/A     Occupational History    Not on file. Social History Main Topics    Smoking status: Former Smoker    Smokeless tobacco: Never Used    Alcohol use No    Drug use: Yes     Types: Marijuana    Sexual activity: No     Other Topics Concern    Not on file     Social History Narrative    No narrative on file       Physical Examination       -General: Well-appearing, NAD  1. Erythematous, tender, edematous left elbow. Does seem to be a joint effusion. Mild erythema over the joint-no other skin rash or involvement    Assessment and Plan     1. Left elbow pain -Recommended he see his primary care doctor for further evaluation and diagnosis.

## 2017-12-20 NOTE — Clinical Note
Dr. Doug Peter-  He came to me for elbow pain- I rec that he see you, hope that is OK!   Nico Mix (dermatology)

## 2018-01-05 ENCOUNTER — NURSE ONLY (OUTPATIENT)
Dept: DERMATOLOGY | Age: 58
End: 2018-01-05

## 2018-01-05 DIAGNOSIS — L28.1 PRURIGO NODULARIS: Primary | ICD-10-CM

## 2018-01-05 PROCEDURE — 96900 ACTINOTHERAPY UV LIGHT: CPT | Performed by: DERMATOLOGY

## 2018-01-10 ENCOUNTER — NURSE ONLY (OUTPATIENT)
Dept: DERMATOLOGY | Age: 58
End: 2018-01-10

## 2018-01-10 DIAGNOSIS — L28.1 PRURIGO NODULARIS: Primary | ICD-10-CM

## 2018-01-10 PROCEDURE — 96900 ACTINOTHERAPY UV LIGHT: CPT | Performed by: DERMATOLOGY

## 2018-01-17 ENCOUNTER — NURSE ONLY (OUTPATIENT)
Dept: DERMATOLOGY | Age: 58
End: 2018-01-17

## 2018-01-17 DIAGNOSIS — L28.1 PRURIGO NODULARIS: Primary | ICD-10-CM

## 2018-01-17 PROCEDURE — 96900 ACTINOTHERAPY UV LIGHT: CPT | Performed by: DERMATOLOGY

## 2018-01-19 ENCOUNTER — NURSE ONLY (OUTPATIENT)
Dept: DERMATOLOGY | Age: 58
End: 2018-01-19

## 2018-01-19 DIAGNOSIS — L28.1 PRURIGO NODULARIS: Primary | ICD-10-CM

## 2018-01-19 PROCEDURE — 96900 ACTINOTHERAPY UV LIGHT: CPT | Performed by: DERMATOLOGY

## 2018-01-22 ENCOUNTER — NURSE ONLY (OUTPATIENT)
Dept: DERMATOLOGY | Age: 58
End: 2018-01-22

## 2018-01-22 DIAGNOSIS — L28.1 PRURIGO NODULARIS: Primary | ICD-10-CM

## 2018-01-22 PROCEDURE — 96900 ACTINOTHERAPY UV LIGHT: CPT | Performed by: DERMATOLOGY

## 2018-01-24 ENCOUNTER — NURSE ONLY (OUTPATIENT)
Dept: DERMATOLOGY | Age: 58
End: 2018-01-24

## 2018-01-24 DIAGNOSIS — L28.1 PRURIGO NODULARIS: Primary | ICD-10-CM

## 2018-01-24 PROCEDURE — 96900 ACTINOTHERAPY UV LIGHT: CPT | Performed by: DERMATOLOGY

## 2018-01-26 ENCOUNTER — NURSE ONLY (OUTPATIENT)
Dept: DERMATOLOGY | Age: 58
End: 2018-01-26

## 2018-01-26 DIAGNOSIS — L28.1 PRURIGO NODULARIS: Primary | ICD-10-CM

## 2018-01-26 PROCEDURE — 96900 ACTINOTHERAPY UV LIGHT: CPT | Performed by: DERMATOLOGY

## 2018-01-29 ENCOUNTER — NURSE ONLY (OUTPATIENT)
Dept: DERMATOLOGY | Age: 58
End: 2018-01-29

## 2018-01-29 DIAGNOSIS — L28.1 PRURIGO NODULARIS: Primary | ICD-10-CM

## 2018-01-29 PROCEDURE — 96900 ACTINOTHERAPY UV LIGHT: CPT | Performed by: DERMATOLOGY

## 2018-02-12 ENCOUNTER — NURSE ONLY (OUTPATIENT)
Dept: DERMATOLOGY | Age: 58
End: 2018-02-12

## 2018-02-12 DIAGNOSIS — L28.1 PRURIGO NODULARIS: Primary | ICD-10-CM

## 2018-02-12 PROCEDURE — 96900 ACTINOTHERAPY UV LIGHT: CPT | Performed by: DERMATOLOGY

## 2018-02-14 ENCOUNTER — NURSE ONLY (OUTPATIENT)
Dept: DERMATOLOGY | Age: 58
End: 2018-02-14

## 2018-02-14 DIAGNOSIS — L28.1 PRURIGO NODULARIS: Primary | ICD-10-CM

## 2018-02-14 PROCEDURE — 96900 ACTINOTHERAPY UV LIGHT: CPT | Performed by: DERMATOLOGY

## 2018-02-19 ENCOUNTER — NURSE ONLY (OUTPATIENT)
Dept: DERMATOLOGY | Age: 58
End: 2018-02-19

## 2018-02-19 DIAGNOSIS — L28.1 PRURIGO NODULARIS: Primary | ICD-10-CM

## 2018-02-19 PROCEDURE — 96900 ACTINOTHERAPY UV LIGHT: CPT | Performed by: DERMATOLOGY

## 2018-02-26 ENCOUNTER — NURSE ONLY (OUTPATIENT)
Dept: DERMATOLOGY | Age: 58
End: 2018-02-26

## 2018-02-26 DIAGNOSIS — L28.1 PRURIGO NODULARIS: Primary | ICD-10-CM

## 2018-02-26 PROCEDURE — 96900 ACTINOTHERAPY UV LIGHT: CPT | Performed by: DERMATOLOGY

## 2018-03-02 ENCOUNTER — NURSE ONLY (OUTPATIENT)
Dept: DERMATOLOGY | Age: 58
End: 2018-03-02

## 2018-03-02 DIAGNOSIS — L28.1 PRURIGO NODULARIS: Primary | ICD-10-CM

## 2018-03-02 PROCEDURE — 96900 ACTINOTHERAPY UV LIGHT: CPT | Performed by: DERMATOLOGY

## 2018-03-07 ENCOUNTER — NURSE ONLY (OUTPATIENT)
Dept: DERMATOLOGY | Age: 58
End: 2018-03-07

## 2018-03-07 DIAGNOSIS — L28.1 PRURIGO NODULARIS: Primary | ICD-10-CM

## 2018-03-07 PROCEDURE — 96900 ACTINOTHERAPY UV LIGHT: CPT | Performed by: DERMATOLOGY

## 2018-03-12 ENCOUNTER — OFFICE VISIT (OUTPATIENT)
Dept: DERMATOLOGY | Age: 58
End: 2018-03-12

## 2018-03-12 ENCOUNTER — NURSE ONLY (OUTPATIENT)
Dept: DERMATOLOGY | Age: 58
End: 2018-03-12

## 2018-03-12 DIAGNOSIS — L29.9 PRURITIC CONDITION: ICD-10-CM

## 2018-03-12 DIAGNOSIS — L28.1 PRURIGO NODULARIS: Primary | ICD-10-CM

## 2018-03-12 PROCEDURE — 1036F TOBACCO NON-USER: CPT | Performed by: DERMATOLOGY

## 2018-03-12 PROCEDURE — G8484 FLU IMMUNIZE NO ADMIN: HCPCS | Performed by: DERMATOLOGY

## 2018-03-12 PROCEDURE — G8421 BMI NOT CALCULATED: HCPCS | Performed by: DERMATOLOGY

## 2018-03-12 PROCEDURE — 96900 ACTINOTHERAPY UV LIGHT: CPT | Performed by: DERMATOLOGY

## 2018-03-12 PROCEDURE — 3017F COLORECTAL CA SCREEN DOC REV: CPT | Performed by: DERMATOLOGY

## 2018-03-12 PROCEDURE — G8427 DOCREV CUR MEDS BY ELIG CLIN: HCPCS | Performed by: DERMATOLOGY

## 2018-03-12 PROCEDURE — 99213 OFFICE O/P EST LOW 20 MIN: CPT | Performed by: DERMATOLOGY

## 2018-03-12 RX ORDER — PREDNISONE 10 MG/1
TABLET ORAL
Qty: 50 TABLET | Refills: 0 | Status: SHIPPED | OUTPATIENT
Start: 2018-03-12 | End: 2019-01-23

## 2018-03-12 NOTE — PROGRESS NOTES
[address]  Dermatology  Eduin Lucas M.D.  [phone]       Cristian Ni  1960    65 y.o. male     Date of Visit: 3/12/2018    Chief Complaint:   Chief Complaint   Patient presents with    Follow-up     UVB        I was asked to see this patient by Dr. Diaz ref. provider found. History of Present Illness:  1. Patient presents today for follow-up of pruritus and prurigo nodules. Had been fairly well controlled although had developed a few new prurigo nodules that were treated with intralesional Kenalog at his last visit. States that over the last few weeks as developed widespread involvement with more severe pruritus. Keeping him up at night. Multiple open sores. Much worse than he had been previously. Still undergoing narrowband UVB phototherapy 2 times per week. Using clobetasol ointment with minimal improvement. Started Benadryl. Review of Systems:  Constitutional: Reports general sense of well-being       Past Medical History, Surgical History, Family History, Medications and Allergies reviewed. Social History:   Social History     Social History    Marital status: Single     Spouse name: N/A    Number of children: N/A    Years of education: N/A     Occupational History    Not on file. Social History Main Topics    Smoking status: Former Smoker    Smokeless tobacco: Never Used    Alcohol use No    Drug use: Yes     Types: Marijuana    Sexual activity: No     Other Topics Concern    Not on file     Social History Narrative    No narrative on file       Physical Examination       -General: Well-appearing, NAD  Multiple open excoriations over his shoulders, upper back, lower legs. Old healing scars. Assessment and Plan     1. Prurigo nodularis -Flaring. 2. Pruritic condition -flaring. Prednisone taper 40 mg, 30 mg, 20 mg, 10 mg with 5 days of each. Reviewed side effects and contraindications.   Increase narrowband UVB phototherapy to 3 times per week until we can

## 2018-03-12 NOTE — Clinical Note
Increase narrowband UVB phototherapy to 3 times per week, current dose and increase at 5% as you have been doing

## 2018-03-14 ENCOUNTER — TELEPHONE (OUTPATIENT)
Dept: DERMATOLOGY | Age: 58
End: 2018-03-14

## 2018-03-14 NOTE — TELEPHONE ENCOUNTER
Spoke with patient- feeling hyperactive on prednisone, difficulty sleeping. Blood pressure within normal limits although elevated for patient. Heart rate elevated. No focal signs of infection-cough, sore throat, difficulty urinating. Would like to stay on prednisone because he has noticed good improvement of his pruritus. Reduce prednisone dose down to 20 mg and take early in the morning to see if this helps with sleep. Add Benadryl 50 mg p.o. q.h.s. He will let me know next week how he is doing.

## 2018-03-14 NOTE — TELEPHONE ENCOUNTER
Rojas Bishop left a voicemail stating that he is having a reaction to the prednisone. He said he can't sleep, he has been up for 19 hours, his BP is 131/96 and his heart rate is 103, he has a low temp of 99.

## 2018-03-16 ENCOUNTER — NURSE ONLY (OUTPATIENT)
Dept: DERMATOLOGY | Age: 58
End: 2018-03-16

## 2018-03-16 DIAGNOSIS — L28.1 PRURIGO NODULARIS: Primary | ICD-10-CM

## 2018-03-16 PROCEDURE — 96900 ACTINOTHERAPY UV LIGHT: CPT | Performed by: DERMATOLOGY

## 2018-03-19 ENCOUNTER — NURSE ONLY (OUTPATIENT)
Dept: DERMATOLOGY | Age: 58
End: 2018-03-19

## 2018-03-19 DIAGNOSIS — L28.1 PRURIGO NODULARIS: Primary | ICD-10-CM

## 2018-03-19 PROCEDURE — 96900 ACTINOTHERAPY UV LIGHT: CPT | Performed by: DERMATOLOGY

## 2018-03-30 ENCOUNTER — NURSE ONLY (OUTPATIENT)
Dept: DERMATOLOGY | Age: 58
End: 2018-03-30

## 2018-03-30 DIAGNOSIS — L28.1 PRURIGO NODULARIS: Primary | ICD-10-CM

## 2018-03-30 PROCEDURE — 96900 ACTINOTHERAPY UV LIGHT: CPT | Performed by: DERMATOLOGY

## 2018-04-02 ENCOUNTER — NURSE ONLY (OUTPATIENT)
Dept: DERMATOLOGY | Age: 58
End: 2018-04-02

## 2018-04-02 DIAGNOSIS — L28.1 PRURIGO NODULARIS: Primary | ICD-10-CM

## 2018-04-02 PROCEDURE — 96900 ACTINOTHERAPY UV LIGHT: CPT | Performed by: DERMATOLOGY

## 2018-04-04 ENCOUNTER — NURSE ONLY (OUTPATIENT)
Dept: DERMATOLOGY | Age: 58
End: 2018-04-04

## 2018-04-04 DIAGNOSIS — L28.1 PRURIGO NODULARIS: Primary | ICD-10-CM

## 2018-04-04 PROCEDURE — 96900 ACTINOTHERAPY UV LIGHT: CPT | Performed by: DERMATOLOGY

## 2018-04-13 ENCOUNTER — NURSE ONLY (OUTPATIENT)
Dept: DERMATOLOGY | Age: 58
End: 2018-04-13

## 2018-04-13 DIAGNOSIS — L28.1 PRURIGO NODULARIS: Primary | ICD-10-CM

## 2018-04-13 PROCEDURE — 96900 ACTINOTHERAPY UV LIGHT: CPT | Performed by: DERMATOLOGY

## 2018-04-18 ENCOUNTER — NURSE ONLY (OUTPATIENT)
Dept: DERMATOLOGY | Age: 58
End: 2018-04-18

## 2018-04-18 DIAGNOSIS — L28.1 PRURIGO NODULARIS: Primary | ICD-10-CM

## 2018-04-18 PROCEDURE — 96900 ACTINOTHERAPY UV LIGHT: CPT | Performed by: DERMATOLOGY

## 2018-04-23 ENCOUNTER — NURSE ONLY (OUTPATIENT)
Dept: DERMATOLOGY | Age: 58
End: 2018-04-23

## 2018-04-23 DIAGNOSIS — L28.1 PRURIGO NODULARIS: Primary | ICD-10-CM

## 2018-04-23 PROCEDURE — 96900 ACTINOTHERAPY UV LIGHT: CPT | Performed by: DERMATOLOGY

## 2018-04-30 ENCOUNTER — NURSE ONLY (OUTPATIENT)
Dept: DERMATOLOGY | Age: 58
End: 2018-04-30

## 2018-04-30 DIAGNOSIS — L28.1 PRURIGO NODULARIS: Primary | ICD-10-CM

## 2018-04-30 PROCEDURE — 96900 ACTINOTHERAPY UV LIGHT: CPT | Performed by: DERMATOLOGY

## 2018-05-02 ENCOUNTER — OFFICE VISIT (OUTPATIENT)
Dept: DERMATOLOGY | Age: 58
End: 2018-05-02

## 2018-05-02 ENCOUNTER — NURSE ONLY (OUTPATIENT)
Dept: DERMATOLOGY | Age: 58
End: 2018-05-02

## 2018-05-02 DIAGNOSIS — L28.1 PRURIGO NODULARIS: Primary | ICD-10-CM

## 2018-05-02 DIAGNOSIS — L29.9 ITCH: ICD-10-CM

## 2018-05-02 PROCEDURE — 3017F COLORECTAL CA SCREEN DOC REV: CPT | Performed by: DERMATOLOGY

## 2018-05-02 PROCEDURE — G8427 DOCREV CUR MEDS BY ELIG CLIN: HCPCS | Performed by: DERMATOLOGY

## 2018-05-02 PROCEDURE — 96900 ACTINOTHERAPY UV LIGHT: CPT | Performed by: DERMATOLOGY

## 2018-05-02 PROCEDURE — 11901 INJECT SKIN LESIONS >7: CPT | Performed by: DERMATOLOGY

## 2018-05-02 PROCEDURE — 1036F TOBACCO NON-USER: CPT | Performed by: DERMATOLOGY

## 2018-05-02 PROCEDURE — G8421 BMI NOT CALCULATED: HCPCS | Performed by: DERMATOLOGY

## 2018-05-02 PROCEDURE — 99213 OFFICE O/P EST LOW 20 MIN: CPT | Performed by: DERMATOLOGY

## 2018-05-02 RX ORDER — CLOBETASOL PROPIONATE 0.5 MG/G
OINTMENT TOPICAL
Qty: 120 G | Refills: 6 | Status: SHIPPED | OUTPATIENT
Start: 2018-05-02 | End: 2021-06-08

## 2018-05-02 RX ORDER — TRIAMCINOLONE ACETONIDE 40 MG/ML
40 INJECTION, SUSPENSION INTRA-ARTICULAR; INTRAMUSCULAR ONCE
Status: COMPLETED | OUTPATIENT
Start: 2018-05-02 | End: 2018-05-02

## 2018-05-02 RX ADMIN — TRIAMCINOLONE ACETONIDE 40 MG: 40 INJECTION, SUSPENSION INTRA-ARTICULAR; INTRAMUSCULAR at 14:23

## 2018-05-04 ENCOUNTER — NURSE ONLY (OUTPATIENT)
Dept: DERMATOLOGY | Age: 58
End: 2018-05-04

## 2018-05-04 DIAGNOSIS — L28.1 PRURIGO NODULARIS: Primary | ICD-10-CM

## 2018-05-04 PROCEDURE — 96900 ACTINOTHERAPY UV LIGHT: CPT | Performed by: DERMATOLOGY

## 2018-05-14 ENCOUNTER — NURSE ONLY (OUTPATIENT)
Dept: DERMATOLOGY | Age: 58
End: 2018-05-14

## 2018-05-14 DIAGNOSIS — L28.1 PRURIGO NODULARIS: Primary | ICD-10-CM

## 2018-05-14 PROCEDURE — 96900 ACTINOTHERAPY UV LIGHT: CPT | Performed by: DERMATOLOGY

## 2018-05-21 ENCOUNTER — NURSE ONLY (OUTPATIENT)
Dept: DERMATOLOGY | Age: 58
End: 2018-05-21

## 2018-05-21 DIAGNOSIS — L28.1 PRURIGO NODULARIS: Primary | ICD-10-CM

## 2018-05-21 PROCEDURE — 96900 ACTINOTHERAPY UV LIGHT: CPT | Performed by: DERMATOLOGY

## 2018-05-30 ENCOUNTER — NURSE ONLY (OUTPATIENT)
Dept: DERMATOLOGY | Age: 58
End: 2018-05-30

## 2018-05-30 DIAGNOSIS — L28.1 PRURIGO NODULARIS: Primary | ICD-10-CM

## 2018-05-30 PROCEDURE — 96900 ACTINOTHERAPY UV LIGHT: CPT | Performed by: DERMATOLOGY

## 2018-06-06 ENCOUNTER — NURSE ONLY (OUTPATIENT)
Dept: DERMATOLOGY | Age: 58
End: 2018-06-06

## 2018-06-06 DIAGNOSIS — L28.1 PRURIGO NODULARIS: Primary | ICD-10-CM

## 2018-06-06 PROCEDURE — 96900 ACTINOTHERAPY UV LIGHT: CPT | Performed by: DERMATOLOGY

## 2018-06-13 ENCOUNTER — NURSE ONLY (OUTPATIENT)
Dept: DERMATOLOGY | Age: 58
End: 2018-06-13

## 2018-06-13 DIAGNOSIS — L28.1 PRURIGO NODULARIS: Primary | ICD-10-CM

## 2018-06-13 PROCEDURE — 96900 ACTINOTHERAPY UV LIGHT: CPT | Performed by: DERMATOLOGY

## 2018-06-22 ENCOUNTER — NURSE ONLY (OUTPATIENT)
Dept: DERMATOLOGY | Age: 58
End: 2018-06-22

## 2018-06-22 DIAGNOSIS — L28.1 PRURIGO NODULARIS: Primary | ICD-10-CM

## 2018-06-22 PROCEDURE — 96900 ACTINOTHERAPY UV LIGHT: CPT | Performed by: DERMATOLOGY

## 2018-06-27 ENCOUNTER — NURSE ONLY (OUTPATIENT)
Dept: DERMATOLOGY | Age: 58
End: 2018-06-27

## 2018-06-27 DIAGNOSIS — L28.1 PRURIGO NODULARIS: Primary | ICD-10-CM

## 2018-06-27 PROCEDURE — 96900 ACTINOTHERAPY UV LIGHT: CPT | Performed by: DERMATOLOGY

## 2018-07-18 ENCOUNTER — NURSE ONLY (OUTPATIENT)
Dept: DERMATOLOGY | Age: 58
End: 2018-07-18

## 2018-07-18 DIAGNOSIS — L28.1 PRURIGO NODULARIS: Primary | ICD-10-CM

## 2018-07-18 PROCEDURE — 96900 ACTINOTHERAPY UV LIGHT: CPT | Performed by: DERMATOLOGY

## 2018-07-23 ENCOUNTER — NURSE ONLY (OUTPATIENT)
Dept: DERMATOLOGY | Age: 58
End: 2018-07-23

## 2018-07-23 DIAGNOSIS — L28.1 PRURIGO NODULARIS: Primary | ICD-10-CM

## 2018-07-23 PROCEDURE — 96900 ACTINOTHERAPY UV LIGHT: CPT | Performed by: DERMATOLOGY

## 2018-07-27 ENCOUNTER — NURSE ONLY (OUTPATIENT)
Dept: DERMATOLOGY | Age: 58
End: 2018-07-27

## 2018-07-27 DIAGNOSIS — L28.1 PRURIGO NODULARIS: Primary | ICD-10-CM

## 2018-07-27 PROCEDURE — 96900 ACTINOTHERAPY UV LIGHT: CPT | Performed by: DERMATOLOGY

## 2018-07-30 ENCOUNTER — NURSE ONLY (OUTPATIENT)
Dept: DERMATOLOGY | Age: 58
End: 2018-07-30

## 2018-07-30 DIAGNOSIS — L28.1 PRURIGO NODULARIS: Primary | ICD-10-CM

## 2018-07-30 PROCEDURE — 96900 ACTINOTHERAPY UV LIGHT: CPT | Performed by: DERMATOLOGY

## 2018-08-06 ENCOUNTER — NURSE ONLY (OUTPATIENT)
Dept: DERMATOLOGY | Age: 58
End: 2018-08-06

## 2018-08-06 DIAGNOSIS — L28.1 PRURIGO NODULARIS: Primary | ICD-10-CM

## 2018-08-06 PROCEDURE — 96900 ACTINOTHERAPY UV LIGHT: CPT | Performed by: DERMATOLOGY

## 2018-08-08 ENCOUNTER — NURSE ONLY (OUTPATIENT)
Dept: DERMATOLOGY | Age: 58
End: 2018-08-08

## 2018-08-08 DIAGNOSIS — L28.1 PRURIGO NODULARIS: Primary | ICD-10-CM

## 2018-08-08 PROCEDURE — 96900 ACTINOTHERAPY UV LIGHT: CPT | Performed by: DERMATOLOGY

## 2018-08-15 ENCOUNTER — NURSE ONLY (OUTPATIENT)
Dept: DERMATOLOGY | Age: 58
End: 2018-08-15

## 2018-08-15 DIAGNOSIS — L28.1 PRURIGO NODULARIS: Primary | ICD-10-CM

## 2018-08-15 PROCEDURE — 96900 ACTINOTHERAPY UV LIGHT: CPT | Performed by: DERMATOLOGY

## 2018-08-20 ENCOUNTER — NURSE ONLY (OUTPATIENT)
Dept: DERMATOLOGY | Age: 58
End: 2018-08-20

## 2018-08-20 DIAGNOSIS — L28.1 PRURIGO NODULARIS: Primary | ICD-10-CM

## 2018-08-20 PROCEDURE — 96900 ACTINOTHERAPY UV LIGHT: CPT | Performed by: DERMATOLOGY

## 2018-08-20 NOTE — PROGRESS NOTES
Pt here for NBUVB treatment, tolerating treatments well, no signs of burning. Pt shields face, groin, abdomen. Increase dose per protocol.

## 2018-08-27 ENCOUNTER — NURSE ONLY (OUTPATIENT)
Dept: DERMATOLOGY | Age: 58
End: 2018-08-27

## 2018-08-27 DIAGNOSIS — L28.1 PRURIGO NODULARIS: Primary | ICD-10-CM

## 2018-08-27 PROCEDURE — 96900 ACTINOTHERAPY UV LIGHT: CPT | Performed by: DERMATOLOGY

## 2018-08-29 ENCOUNTER — NURSE ONLY (OUTPATIENT)
Dept: DERMATOLOGY | Age: 58
End: 2018-08-29

## 2018-08-29 DIAGNOSIS — L28.1 PRURIGO NODULARIS: Primary | ICD-10-CM

## 2018-08-29 PROCEDURE — 96900 ACTINOTHERAPY UV LIGHT: CPT | Performed by: DERMATOLOGY

## 2018-09-12 ENCOUNTER — NURSE ONLY (OUTPATIENT)
Dept: DERMATOLOGY | Age: 58
End: 2018-09-12

## 2018-09-12 DIAGNOSIS — L28.1 PRURIGO NODULARIS: Primary | ICD-10-CM

## 2018-09-12 PROCEDURE — 96900 ACTINOTHERAPY UV LIGHT: CPT | Performed by: DERMATOLOGY

## 2018-09-14 ENCOUNTER — NURSE ONLY (OUTPATIENT)
Dept: DERMATOLOGY | Age: 58
End: 2018-09-14

## 2018-09-14 DIAGNOSIS — L28.1 PRURIGO NODULARIS: Primary | ICD-10-CM

## 2018-09-14 PROCEDURE — 96900 ACTINOTHERAPY UV LIGHT: CPT | Performed by: DERMATOLOGY

## 2018-10-01 ENCOUNTER — OFFICE VISIT (OUTPATIENT)
Dept: DERMATOLOGY | Age: 58
End: 2018-10-01
Payer: MEDICAID

## 2018-10-01 ENCOUNTER — NURSE ONLY (OUTPATIENT)
Dept: DERMATOLOGY | Age: 58
End: 2018-10-01
Payer: MEDICAID

## 2018-10-01 DIAGNOSIS — L28.1 PRURIGO NODULARIS: Primary | ICD-10-CM

## 2018-10-01 DIAGNOSIS — L57.0 KERATOSIS, ACTINIC: ICD-10-CM

## 2018-10-01 DIAGNOSIS — B35.3 TINEA PEDIS OF BOTH FEET: ICD-10-CM

## 2018-10-01 PROCEDURE — G8421 BMI NOT CALCULATED: HCPCS | Performed by: DERMATOLOGY

## 2018-10-01 PROCEDURE — 99213 OFFICE O/P EST LOW 20 MIN: CPT | Performed by: DERMATOLOGY

## 2018-10-01 PROCEDURE — 1036F TOBACCO NON-USER: CPT | Performed by: DERMATOLOGY

## 2018-10-01 PROCEDURE — G8427 DOCREV CUR MEDS BY ELIG CLIN: HCPCS | Performed by: DERMATOLOGY

## 2018-10-01 PROCEDURE — 3017F COLORECTAL CA SCREEN DOC REV: CPT | Performed by: DERMATOLOGY

## 2018-10-01 PROCEDURE — G8484 FLU IMMUNIZE NO ADMIN: HCPCS | Performed by: DERMATOLOGY

## 2018-10-01 PROCEDURE — 17000 DESTRUCT PREMALG LESION: CPT | Performed by: DERMATOLOGY

## 2018-10-01 PROCEDURE — 96900 ACTINOTHERAPY UV LIGHT: CPT | Performed by: DERMATOLOGY

## 2018-10-04 ENCOUNTER — TELEPHONE (OUTPATIENT)
Dept: DERMATOLOGY | Age: 58
End: 2018-10-04

## 2018-10-05 ENCOUNTER — NURSE ONLY (OUTPATIENT)
Dept: DERMATOLOGY | Age: 58
End: 2018-10-05
Payer: MEDICAID

## 2018-10-05 DIAGNOSIS — L40.9 PSORIASIS: ICD-10-CM

## 2018-10-05 PROCEDURE — 96900 ACTINOTHERAPY UV LIGHT: CPT | Performed by: DERMATOLOGY

## 2018-10-17 ENCOUNTER — NURSE ONLY (OUTPATIENT)
Dept: DERMATOLOGY | Age: 58
End: 2018-10-17
Payer: MEDICAID

## 2018-10-17 DIAGNOSIS — L28.1 PRURIGO NODULARIS: Primary | ICD-10-CM

## 2018-10-17 PROCEDURE — 96900 ACTINOTHERAPY UV LIGHT: CPT | Performed by: DERMATOLOGY

## 2018-10-19 ENCOUNTER — NURSE ONLY (OUTPATIENT)
Dept: DERMATOLOGY | Age: 58
End: 2018-10-19
Payer: MEDICAID

## 2018-10-19 DIAGNOSIS — L28.1 PRURIGO NODULARIS: Primary | ICD-10-CM

## 2018-10-19 PROCEDURE — 96900 ACTINOTHERAPY UV LIGHT: CPT | Performed by: DERMATOLOGY

## 2018-10-31 ENCOUNTER — NURSE ONLY (OUTPATIENT)
Dept: DERMATOLOGY | Age: 58
End: 2018-10-31
Payer: MEDICAID

## 2018-10-31 DIAGNOSIS — L28.1 PRURIGO NODULARIS: Primary | ICD-10-CM

## 2018-10-31 PROCEDURE — 96900 ACTINOTHERAPY UV LIGHT: CPT | Performed by: DERMATOLOGY

## 2018-11-02 ENCOUNTER — NURSE ONLY (OUTPATIENT)
Dept: DERMATOLOGY | Age: 58
End: 2018-11-02
Payer: MEDICAID

## 2018-11-02 DIAGNOSIS — L28.1 PRURIGO NODULARIS: Primary | ICD-10-CM

## 2018-11-02 PROCEDURE — 96900 ACTINOTHERAPY UV LIGHT: CPT | Performed by: DERMATOLOGY

## 2018-11-02 NOTE — PROGRESS NOTES
Pt here for NBUVB treatment, tolerating treatments well, no signs of burning. Pt shields face and groin. Hold dose per protocol.
Pupils equal, round and reactive to light

## 2018-11-09 ENCOUNTER — NURSE ONLY (OUTPATIENT)
Dept: DERMATOLOGY | Age: 58
End: 2018-11-09
Payer: MEDICAID

## 2018-11-09 DIAGNOSIS — L28.1 PRURIGO NODULARIS: Primary | ICD-10-CM

## 2018-11-09 PROCEDURE — 96900 ACTINOTHERAPY UV LIGHT: CPT | Performed by: DERMATOLOGY

## 2018-11-14 ENCOUNTER — NURSE ONLY (OUTPATIENT)
Dept: DERMATOLOGY | Age: 58
End: 2018-11-14
Payer: MEDICAID

## 2018-11-14 DIAGNOSIS — L28.1 PRURIGO NODULARIS: Primary | ICD-10-CM

## 2018-11-14 PROCEDURE — 96900 ACTINOTHERAPY UV LIGHT: CPT | Performed by: DERMATOLOGY

## 2018-11-19 ENCOUNTER — NURSE ONLY (OUTPATIENT)
Dept: DERMATOLOGY | Age: 58
End: 2018-11-19
Payer: MEDICAID

## 2018-11-19 DIAGNOSIS — L28.1 PRURIGO NODULARIS: Primary | ICD-10-CM

## 2018-11-19 PROCEDURE — 96900 ACTINOTHERAPY UV LIGHT: CPT | Performed by: DERMATOLOGY

## 2018-11-21 ENCOUNTER — NURSE ONLY (OUTPATIENT)
Dept: DERMATOLOGY | Age: 58
End: 2018-11-21
Payer: MEDICAID

## 2018-11-21 DIAGNOSIS — L28.1 PRURIGO NODULARIS: Primary | ICD-10-CM

## 2018-11-21 PROCEDURE — 96900 ACTINOTHERAPY UV LIGHT: CPT | Performed by: DERMATOLOGY

## 2018-12-03 ENCOUNTER — NURSE ONLY (OUTPATIENT)
Dept: DERMATOLOGY | Age: 58
End: 2018-12-03
Payer: MEDICAID

## 2018-12-03 DIAGNOSIS — L28.1 PRURIGO NODULARIS: Primary | ICD-10-CM

## 2018-12-03 PROCEDURE — 96900 ACTINOTHERAPY UV LIGHT: CPT | Performed by: DERMATOLOGY

## 2018-12-07 ENCOUNTER — NURSE ONLY (OUTPATIENT)
Dept: DERMATOLOGY | Age: 58
End: 2018-12-07
Payer: MEDICAID

## 2018-12-07 DIAGNOSIS — L40.9 PSORIASIS: ICD-10-CM

## 2018-12-07 PROCEDURE — 96900 ACTINOTHERAPY UV LIGHT: CPT | Performed by: DERMATOLOGY

## 2018-12-12 ENCOUNTER — NURSE ONLY (OUTPATIENT)
Dept: DERMATOLOGY | Age: 58
End: 2018-12-12
Payer: MEDICAID

## 2018-12-12 DIAGNOSIS — L28.1 PRURIGO NODULARIS: Primary | ICD-10-CM

## 2018-12-12 PROCEDURE — 96900 ACTINOTHERAPY UV LIGHT: CPT | Performed by: DERMATOLOGY

## 2018-12-14 NOTE — PROGRESS NOTES
Pt here for NBUVB treatment, tolerating treatments well, no signs of burning. Pt shields face. Hold dose per protocol. (0) independent

## 2018-12-17 ENCOUNTER — NURSE ONLY (OUTPATIENT)
Dept: DERMATOLOGY | Age: 58
End: 2018-12-17
Payer: MEDICAID

## 2018-12-17 DIAGNOSIS — L28.1 PRURIGO NODULARIS: Primary | ICD-10-CM

## 2018-12-17 PROCEDURE — 96900 ACTINOTHERAPY UV LIGHT: CPT | Performed by: DERMATOLOGY

## 2019-01-02 ENCOUNTER — NURSE ONLY (OUTPATIENT)
Dept: DERMATOLOGY | Age: 59
End: 2019-01-02
Payer: MEDICAID

## 2019-01-02 ENCOUNTER — OFFICE VISIT (OUTPATIENT)
Dept: DERMATOLOGY | Age: 59
End: 2019-01-02
Payer: MEDICAID

## 2019-01-02 DIAGNOSIS — L28.1 PRURIGO NODULARIS: Primary | ICD-10-CM

## 2019-01-02 DIAGNOSIS — L29.9 PRURITIC CONDITION: ICD-10-CM

## 2019-01-02 PROCEDURE — 3017F COLORECTAL CA SCREEN DOC REV: CPT | Performed by: DERMATOLOGY

## 2019-01-02 PROCEDURE — G8421 BMI NOT CALCULATED: HCPCS | Performed by: DERMATOLOGY

## 2019-01-02 PROCEDURE — 1036F TOBACCO NON-USER: CPT | Performed by: DERMATOLOGY

## 2019-01-02 PROCEDURE — 99213 OFFICE O/P EST LOW 20 MIN: CPT | Performed by: DERMATOLOGY

## 2019-01-02 PROCEDURE — 96900 ACTINOTHERAPY UV LIGHT: CPT | Performed by: DERMATOLOGY

## 2019-01-02 PROCEDURE — G8427 DOCREV CUR MEDS BY ELIG CLIN: HCPCS | Performed by: DERMATOLOGY

## 2019-01-02 PROCEDURE — G8484 FLU IMMUNIZE NO ADMIN: HCPCS | Performed by: DERMATOLOGY

## 2019-01-14 ENCOUNTER — NURSE ONLY (OUTPATIENT)
Dept: DERMATOLOGY | Age: 59
End: 2019-01-14
Payer: MEDICAID

## 2019-01-14 DIAGNOSIS — L28.1 PRURIGO NODULARIS: Primary | ICD-10-CM

## 2019-01-14 PROCEDURE — 96900 ACTINOTHERAPY UV LIGHT: CPT | Performed by: DERMATOLOGY

## 2019-01-16 ENCOUNTER — NURSE ONLY (OUTPATIENT)
Dept: DERMATOLOGY | Age: 59
End: 2019-01-16
Payer: MEDICAID

## 2019-01-16 DIAGNOSIS — L28.1 PRURIGO NODULARIS: Primary | ICD-10-CM

## 2019-01-16 PROCEDURE — 96900 ACTINOTHERAPY UV LIGHT: CPT | Performed by: DERMATOLOGY

## 2019-01-23 ENCOUNTER — OFFICE VISIT (OUTPATIENT)
Dept: DERMATOLOGY | Age: 59
End: 2019-01-23
Payer: MEDICAID

## 2019-01-23 ENCOUNTER — NURSE ONLY (OUTPATIENT)
Dept: DERMATOLOGY | Age: 59
End: 2019-01-23
Payer: MEDICAID

## 2019-01-23 DIAGNOSIS — L28.1 PRURIGO NODULARIS: Primary | ICD-10-CM

## 2019-01-23 DIAGNOSIS — L29.9 PRURITIC CONDITION: ICD-10-CM

## 2019-01-23 PROCEDURE — 99213 OFFICE O/P EST LOW 20 MIN: CPT | Performed by: DERMATOLOGY

## 2019-01-23 PROCEDURE — G8427 DOCREV CUR MEDS BY ELIG CLIN: HCPCS | Performed by: DERMATOLOGY

## 2019-01-23 PROCEDURE — 3017F COLORECTAL CA SCREEN DOC REV: CPT | Performed by: DERMATOLOGY

## 2019-01-23 PROCEDURE — G8484 FLU IMMUNIZE NO ADMIN: HCPCS | Performed by: DERMATOLOGY

## 2019-01-23 PROCEDURE — 96900 ACTINOTHERAPY UV LIGHT: CPT | Performed by: DERMATOLOGY

## 2019-01-23 PROCEDURE — 1036F TOBACCO NON-USER: CPT | Performed by: DERMATOLOGY

## 2019-01-23 PROCEDURE — G8421 BMI NOT CALCULATED: HCPCS | Performed by: DERMATOLOGY

## 2019-01-23 RX ORDER — PREDNISONE 10 MG/1
TABLET ORAL
Qty: 16 TABLET | Refills: 0 | Status: SHIPPED | OUTPATIENT
Start: 2019-01-23 | End: 2019-04-29 | Stop reason: SDUPTHER

## 2019-02-04 ENCOUNTER — NURSE ONLY (OUTPATIENT)
Dept: DERMATOLOGY | Age: 59
End: 2019-02-04
Payer: MEDICAID

## 2019-02-04 DIAGNOSIS — L28.1 PRURIGO NODULARIS: Primary | ICD-10-CM

## 2019-02-04 PROCEDURE — 96900 ACTINOTHERAPY UV LIGHT: CPT | Performed by: DERMATOLOGY

## 2019-02-11 ENCOUNTER — NURSE ONLY (OUTPATIENT)
Dept: DERMATOLOGY | Age: 59
End: 2019-02-11
Payer: MEDICAID

## 2019-02-11 DIAGNOSIS — L28.1 PRURIGO NODULARIS: Primary | ICD-10-CM

## 2019-02-11 PROCEDURE — 96900 ACTINOTHERAPY UV LIGHT: CPT | Performed by: DERMATOLOGY

## 2019-02-13 ENCOUNTER — NURSE ONLY (OUTPATIENT)
Dept: DERMATOLOGY | Age: 59
End: 2019-02-13
Payer: MEDICAID

## 2019-02-13 DIAGNOSIS — L28.1 PRURIGO NODULARIS: Primary | ICD-10-CM

## 2019-02-13 PROCEDURE — 96900 ACTINOTHERAPY UV LIGHT: CPT | Performed by: DERMATOLOGY

## 2019-02-18 ENCOUNTER — NURSE ONLY (OUTPATIENT)
Dept: DERMATOLOGY | Age: 59
End: 2019-02-18
Payer: MEDICAID

## 2019-02-18 DIAGNOSIS — L28.1 PRURIGO NODULARIS: Primary | ICD-10-CM

## 2019-02-18 PROCEDURE — 96900 ACTINOTHERAPY UV LIGHT: CPT | Performed by: DERMATOLOGY

## 2019-02-25 ENCOUNTER — NURSE ONLY (OUTPATIENT)
Dept: DERMATOLOGY | Age: 59
End: 2019-02-25
Payer: MEDICAID

## 2019-02-25 DIAGNOSIS — L28.1 PRURIGO NODULARIS: Primary | ICD-10-CM

## 2019-02-25 PROCEDURE — 96900 ACTINOTHERAPY UV LIGHT: CPT | Performed by: DERMATOLOGY

## 2019-03-01 ENCOUNTER — NURSE ONLY (OUTPATIENT)
Dept: DERMATOLOGY | Age: 59
End: 2019-03-01
Payer: MEDICAID

## 2019-03-01 DIAGNOSIS — L28.1 PRURIGO NODULARIS: Primary | ICD-10-CM

## 2019-03-01 PROCEDURE — 96900 ACTINOTHERAPY UV LIGHT: CPT | Performed by: DERMATOLOGY

## 2019-04-01 ENCOUNTER — OFFICE VISIT (OUTPATIENT)
Dept: DERMATOLOGY | Age: 59
End: 2019-04-01
Payer: MEDICAID

## 2019-04-01 ENCOUNTER — NURSE ONLY (OUTPATIENT)
Dept: DERMATOLOGY | Age: 59
End: 2019-04-01
Payer: MEDICAID

## 2019-04-01 DIAGNOSIS — L28.1 PRURIGO NODULARIS: Primary | ICD-10-CM

## 2019-04-01 PROCEDURE — G8427 DOCREV CUR MEDS BY ELIG CLIN: HCPCS | Performed by: DERMATOLOGY

## 2019-04-01 PROCEDURE — 1036F TOBACCO NON-USER: CPT | Performed by: DERMATOLOGY

## 2019-04-01 PROCEDURE — 3017F COLORECTAL CA SCREEN DOC REV: CPT | Performed by: DERMATOLOGY

## 2019-04-01 PROCEDURE — 96900 ACTINOTHERAPY UV LIGHT: CPT | Performed by: DERMATOLOGY

## 2019-04-01 PROCEDURE — G8421 BMI NOT CALCULATED: HCPCS | Performed by: DERMATOLOGY

## 2019-04-01 PROCEDURE — 99213 OFFICE O/P EST LOW 20 MIN: CPT | Performed by: DERMATOLOGY

## 2019-04-01 NOTE — PROGRESS NOTES
[facility]) Dermatology  Margaret Rajput M.D.  [phone]       [facility]  1960    63 y.o. male     Date of Visit: 4/1/2019    Chief Complaint:   Chief Complaint   Patient presents with    Follow-up     UVB         I was asked to see this patient by Dr. Diaz ref. provider found. History of Present Illness:  1. Patient presents today for follow-up of pruritus/prurigo nodules. He states that his polycythemia vera is still being managed by phlebotomy at the [place]. Has newly been diagnosed with heart failure. Has had an extensive medical workup with multiple doctors appointments and his girlfriend is also being treated with chemotherapy for cancer-has not been able to come for light therapy in the last month. That he's flaring on his shoulders and arms. Using clobetasol topically and covering new areas of involvement with a Band-Aid to prevent excoriation. Notes that this does result in healing. It is also taking Zyrtec, which he tolerates without sedation. Did take prednisone after his last appointment-tolerates 10 mg but he breaks 10 mg into half when he wakes up in the morning and half at lunch time. This was helpful with his pruritus. He does think that he would be able to come light therapy 2 days per week and is interested in proceeding. Review of Systems:  Constitutional: Reports general sense of well-being       Past Medical History, Surgical History, Family History, Medications and Allergies reviewed.     Social History:   Social History     Socioeconomic History    Marital status: Single     Spouse name: Not on file    Number of children: Not on file    Years of education: Not on file    Highest education level: Not on file   Occupational History    Not on file   Social Needs    Financial resource strain: Not on file    Food insecurity:     Worry: Not on file     Inability: Not on file    Transportation needs:     Medical: Not on file     Non-medical: Not on file   Tobacco Use  Smoking status: Former Smoker    Smokeless tobacco: Never Used   Substance and Sexual Activity    Alcohol use: No    Drug use: Yes     Types: Marijuana    Sexual activity: Never   Lifestyle    Physical activity:     Days per week: Not on file     Minutes per session: Not on file    Stress: Not on file   Relationships    Social connections:     Talks on phone: Not on file     Gets together: Not on file     Attends Tenriism service: Not on file     Active member of club or organization: Not on file     Attends meetings of clubs or organizations: Not on file     Relationship status: Not on file    Intimate partner violence:     Fear of current or ex partner: Not on file     Emotionally abused: Not on file     Physically abused: Not on file     Forced sexual activity: Not on file   Other Topics Concern    Not on file   Social History Narrative    Not on file       Physical Examination       -General: Well-appearing, NAD  1. Multiple open excoriations over his arms, shoulders. Assessment and Plan     1. Prurigo nodularis /pruritus-restart narrowband UVB phototherapy but reduce dose down to previous dose as he has not had treatment in the last month. Increase by 5% as tolerated. Continue other medical evaluation-treatment of polycythemia vera may also help pruritus. Continue clobetasol and Zyrtec. Phototherapy Orders for NBUVB:    Frequency of treatments: 2 times per week.     Dose: Start at last tolerated dose without erythema-end of January    Increase by: 5% as tolerated    Extra to the extremities: No     Shielding: Face    Max dose: 2000 mJ/cm2

## 2019-04-01 NOTE — PROGRESS NOTES
Pt here for NBUVB treatment, tolerating treatments well, no signs of burning. Pt shields face.   Reduced dose per Dr. Esperanza Kaminski

## 2019-04-05 ENCOUNTER — NURSE ONLY (OUTPATIENT)
Dept: DERMATOLOGY | Age: 59
End: 2019-04-05
Payer: MEDICAID

## 2019-04-05 DIAGNOSIS — L28.1 PRURIGO NODULARIS: Primary | ICD-10-CM

## 2019-04-05 PROCEDURE — 96900 ACTINOTHERAPY UV LIGHT: CPT | Performed by: DERMATOLOGY

## 2019-04-08 ENCOUNTER — NURSE ONLY (OUTPATIENT)
Dept: DERMATOLOGY | Age: 59
End: 2019-04-08
Payer: MEDICAID

## 2019-04-08 DIAGNOSIS — L28.1 PRURIGO NODULARIS: Primary | ICD-10-CM

## 2019-04-08 PROCEDURE — 96900 ACTINOTHERAPY UV LIGHT: CPT | Performed by: DERMATOLOGY

## 2019-04-12 ENCOUNTER — NURSE ONLY (OUTPATIENT)
Dept: DERMATOLOGY | Age: 59
End: 2019-04-12
Payer: MEDICAID

## 2019-04-12 DIAGNOSIS — L28.1 PRURIGO NODULARIS: Primary | ICD-10-CM

## 2019-04-12 PROCEDURE — 96900 ACTINOTHERAPY UV LIGHT: CPT | Performed by: DERMATOLOGY

## 2019-04-15 ENCOUNTER — NURSE ONLY (OUTPATIENT)
Dept: DERMATOLOGY | Age: 59
End: 2019-04-15
Payer: MEDICAID

## 2019-04-15 DIAGNOSIS — L28.1 PRURIGO NODULARIS: Primary | ICD-10-CM

## 2019-04-15 PROCEDURE — 96900 ACTINOTHERAPY UV LIGHT: CPT | Performed by: DERMATOLOGY

## 2019-04-29 ENCOUNTER — TELEPHONE (OUTPATIENT)
Dept: DERMATOLOGY | Age: 59
End: 2019-04-29

## 2019-04-29 RX ORDER — PREDNISONE 10 MG/1
TABLET ORAL
Qty: 16 TABLET | Refills: 0 | Status: SHIPPED | OUTPATIENT
Start: 2019-04-29 | End: 2019-06-05 | Stop reason: SDUPTHER

## 2019-04-29 NOTE — TELEPHONE ENCOUNTER
Voicemail message received 4/29/19    Leanne, patient missed our call, he has cleared his voicemail out and would like us to try to call him back.

## 2019-04-29 NOTE — TELEPHONE ENCOUNTER
Pt c/b [phone]  Pt states:   - having a bad break out   - asking refill medication  Medication Prednisone   [address]  Please call to discuss thanks

## 2019-04-30 ENCOUNTER — NURSE ONLY (OUTPATIENT)
Dept: DERMATOLOGY | Age: 59
End: 2019-04-30
Payer: MEDICAID

## 2019-04-30 DIAGNOSIS — L28.1 PRURIGO NODULARIS: Primary | ICD-10-CM

## 2019-04-30 PROCEDURE — 96900 ACTINOTHERAPY UV LIGHT: CPT | Performed by: DERMATOLOGY

## 2019-05-03 ENCOUNTER — NURSE ONLY (OUTPATIENT)
Dept: DERMATOLOGY | Age: 59
End: 2019-05-03
Payer: MEDICAID

## 2019-05-03 DIAGNOSIS — L28.1 PRURIGO NODULARIS: Primary | ICD-10-CM

## 2019-05-03 PROCEDURE — 96900 ACTINOTHERAPY UV LIGHT: CPT | Performed by: DERMATOLOGY

## 2019-05-03 NOTE — PROGRESS NOTES
Pt here for NBUVB treatment, tolerating treatments well, no signs of burning. Pt shields face. Held dose due re calibration.

## 2019-05-10 ENCOUNTER — NURSE ONLY (OUTPATIENT)
Dept: DERMATOLOGY | Age: 59
End: 2019-05-10
Payer: MEDICAID

## 2019-05-10 DIAGNOSIS — L28.1 PRURIGO NODULARIS: Primary | ICD-10-CM

## 2019-05-10 PROCEDURE — 96900 ACTINOTHERAPY UV LIGHT: CPT | Performed by: DERMATOLOGY

## 2019-05-10 NOTE — PROGRESS NOTES
Pt here for NBUVB treatment, tolerating treatments well, no signs of burning. Pt shields face. Held dose per protocol.

## 2019-05-17 ENCOUNTER — NURSE ONLY (OUTPATIENT)
Dept: DERMATOLOGY | Age: 59
End: 2019-05-17
Payer: MEDICAID

## 2019-05-17 DIAGNOSIS — L28.1 PRURIGO NODULARIS: Primary | ICD-10-CM

## 2019-05-17 PROCEDURE — 96900 ACTINOTHERAPY UV LIGHT: CPT | Performed by: DERMATOLOGY

## 2019-05-31 ENCOUNTER — NURSE ONLY (OUTPATIENT)
Dept: DERMATOLOGY | Age: 59
End: 2019-05-31
Payer: MEDICAID

## 2019-05-31 DIAGNOSIS — L28.1 PRURIGO NODULARIS: Primary | ICD-10-CM

## 2019-05-31 PROCEDURE — 96900 ACTINOTHERAPY UV LIGHT: CPT | Performed by: DERMATOLOGY

## 2019-05-31 NOTE — PATIENT INSTRUCTIONS
Pt here for NBUVB treatment, tolerating treatments well, no signs of burning. Pt shields face. 352 mj/cm2 dose per protocol.

## 2019-06-05 ENCOUNTER — NURSE ONLY (OUTPATIENT)
Dept: DERMATOLOGY | Age: 59
End: 2019-06-05
Payer: MEDICAID

## 2019-06-05 ENCOUNTER — OFFICE VISIT (OUTPATIENT)
Dept: DERMATOLOGY | Age: 59
End: 2019-06-05
Payer: MEDICAID

## 2019-06-05 DIAGNOSIS — L28.1 PRURIGO NODULARIS: Primary | ICD-10-CM

## 2019-06-05 DIAGNOSIS — L29.9 GENERALIZED PRURITUS: ICD-10-CM

## 2019-06-05 PROCEDURE — 3017F COLORECTAL CA SCREEN DOC REV: CPT | Performed by: DERMATOLOGY

## 2019-06-05 PROCEDURE — G8421 BMI NOT CALCULATED: HCPCS | Performed by: DERMATOLOGY

## 2019-06-05 PROCEDURE — 99213 OFFICE O/P EST LOW 20 MIN: CPT | Performed by: DERMATOLOGY

## 2019-06-05 PROCEDURE — 96900 ACTINOTHERAPY UV LIGHT: CPT | Performed by: DERMATOLOGY

## 2019-06-05 PROCEDURE — 1036F TOBACCO NON-USER: CPT | Performed by: DERMATOLOGY

## 2019-06-05 PROCEDURE — G8427 DOCREV CUR MEDS BY ELIG CLIN: HCPCS | Performed by: DERMATOLOGY

## 2019-06-05 RX ORDER — PREDNISONE 10 MG/1
TABLET ORAL
Qty: 16 TABLET | Refills: 0 | Status: SHIPPED | OUTPATIENT
Start: 2019-06-05 | End: 2019-09-11 | Stop reason: ALTCHOICE

## 2019-06-05 NOTE — PROGRESS NOTES
[facility]) Dermatology  Lucero Mcghee M.D.  [phone]       Sarah Bolton  1960    63 y.o. male     Date of Visit: 6/5/2019    Chief Complaint:   Chief Complaint   Patient presents with    Follow-up     UVB f/u        I was asked to see this patient by Dr. Diaz ref. provider found. History of Present Illness:  1. Patient presents today for follow-up of generalized pruritus/prurigo nodules-still being followed at the [facility] for polycythemia vera. Is still being managed with phlebotomy. Has had an extensive cardiac and pulmonary workup. Has had quite a bit of stress recently-his girlfriend was diagnosed with pancreatic cancer, underwent a Whipple, and has recently developed a recurrence with metastatic disease. She is on chemotherapy. He called because he was flaring about one month ago-was given prednisone 10 mg p.o. q. day for 16 days. Also was on prednisone in January. Is normally on narrowband UVB phototherapy-did calm 2 times per week in April but reduced down to 1 time per week in May due to his girlfriend illness. Has noted that he flares when he goes down to 1 day per week. States that he improved after prednisone and is not to pruritic currently although has multiple open areas of excoriations over his upper back and shoulders. Previous treatments have included intralesional Kenalog, oral prednisone-tolerates only 10 mg per day due to flare of anxiety, narrowband UVB phototherapy, multiple topicals. Review of Systems:  Constitutional: Reports general sense of well-being       Past Medical History, Surgical History, Family History, Medications and Allergies reviewed.     Social History:   Social History     Socioeconomic History    Marital status: Single     Spouse name: Not on file    Number of children: Not on file    Years of education: Not on file    Highest education level: Not on file   Occupational History    Not on file   Social Needs    Financial resource strain: Not on file    Food insecurity:     Worry: Not on file     Inability: Not on file    Transportation needs:     Medical: Not on file     Non-medical: Not on file   Tobacco Use    Smoking status: Former Smoker    Smokeless tobacco: Never Used   Substance and Sexual Activity    Alcohol use: No    Drug use: Yes     Types: Marijuana    Sexual activity: Never   Lifestyle    Physical activity:     Days per week: Not on file     Minutes per session: Not on file    Stress: Not on file   Relationships    Social connections:     Talks on phone: Not on file     Gets together: Not on file     Attends Confucianism service: Not on file     Active member of club or organization: Not on file     Attends meetings of clubs or organizations: Not on file     Relationship status: Not on file    Intimate partner violence:     Fear of current or ex partner: Not on file     Emotionally abused: Not on file     Physically abused: Not on file     Forced sexual activity: Not on file   Other Topics Concern    Not on file   Social History Narrative    Not on file       Physical Examination       -General: Well-appearing, NAD  Well-developed well-nourished. Multiple open excoriated papules and raised papules with intact skin markings over his chest, shoulders, upper back. Remainder of his torso relatively clear with more scattered lesions over his arms. Legs not examined      Assessment and Plan     1. Prurigo nodularis - clobetasol ointment b.i.d. p.r.n. flares. Keep areas of involvement covered to avoid scratching. I did give him a prescription for prednisone 10 mg p.o. q. day for 2 weeks if he has a more severe flare given that he is unable to attend narrowband UVB phototherapy regularly due to his girlfriend illness    2. Generalized pruritus - Phototherapy Orders for NBUVB:    Frequency of treatments: 3 times per week-as tolerated per patient's schedule     Dose:Continue current dose     Increase by: 5% each treatment.     Extra to

## 2019-06-10 ENCOUNTER — NURSE ONLY (OUTPATIENT)
Dept: DERMATOLOGY | Age: 59
End: 2019-06-10
Payer: MEDICAID

## 2019-06-10 DIAGNOSIS — L28.1 PRURIGO NODULARIS: Primary | ICD-10-CM

## 2019-06-10 PROCEDURE — 96900 ACTINOTHERAPY UV LIGHT: CPT | Performed by: DERMATOLOGY

## 2019-06-21 ENCOUNTER — NURSE ONLY (OUTPATIENT)
Dept: DERMATOLOGY | Age: 59
End: 2019-06-21
Payer: MEDICAID

## 2019-06-21 DIAGNOSIS — L28.1 PRURIGO NODULARIS: Primary | ICD-10-CM

## 2019-06-21 PROCEDURE — 96900 ACTINOTHERAPY UV LIGHT: CPT | Performed by: DERMATOLOGY

## 2019-06-21 NOTE — PROGRESS NOTES
Pt here for NBUVB treatment, tolerating treatments well, no signs of burning. Pt shields face. Decreased dose per protocol.

## 2019-06-24 ENCOUNTER — NURSE ONLY (OUTPATIENT)
Dept: DERMATOLOGY | Age: 59
End: 2019-06-24
Payer: MEDICAID

## 2019-06-24 DIAGNOSIS — L29.9 PRURITIC CONDITION: Primary | ICD-10-CM

## 2019-06-24 PROCEDURE — 96900 ACTINOTHERAPY UV LIGHT: CPT | Performed by: DERMATOLOGY

## 2019-06-24 NOTE — PROGRESS NOTES
Pt here for NBUVB treatment, tolerating treatments well, no signs of burning. Pt shields face. Icreased dose per protocol.

## 2019-07-02 ENCOUNTER — NURSE ONLY (OUTPATIENT)
Dept: DERMATOLOGY | Age: 59
End: 2019-07-02
Payer: MEDICAID

## 2019-07-02 ENCOUNTER — TELEPHONE (OUTPATIENT)
Dept: DERMATOLOGY | Age: 59
End: 2019-07-02

## 2019-07-02 DIAGNOSIS — L29.9 PRURITIC CONDITION: Primary | ICD-10-CM

## 2019-07-02 PROCEDURE — 96900 ACTINOTHERAPY UV LIGHT: CPT | Performed by: DERMATOLOGY

## 2019-08-19 ENCOUNTER — NURSE ONLY (OUTPATIENT)
Dept: DERMATOLOGY | Age: 59
End: 2019-08-19
Payer: MEDICAID

## 2019-08-19 DIAGNOSIS — L28.1 PRURIGO NODULARIS: Primary | ICD-10-CM

## 2019-08-19 PROCEDURE — 96900 ACTINOTHERAPY UV LIGHT: CPT | Performed by: DERMATOLOGY

## 2019-08-23 ENCOUNTER — NURSE ONLY (OUTPATIENT)
Dept: DERMATOLOGY | Age: 59
End: 2019-08-23
Payer: MEDICAID

## 2019-08-23 ENCOUNTER — TELEPHONE (OUTPATIENT)
Dept: DERMATOLOGY | Age: 59
End: 2019-08-23

## 2019-08-23 DIAGNOSIS — L28.1 PRURIGO NODULARIS: Primary | ICD-10-CM

## 2019-08-23 PROCEDURE — 96900 ACTINOTHERAPY UV LIGHT: CPT | Performed by: DERMATOLOGY

## 2019-08-23 NOTE — TELEPHONE ENCOUNTER
Patient here for UVB therapy today and states he has bilateral open sores with clear secretions, has MD appoint scheduled 9/11/2019 @ 2:45, but would like to be seen sooner if possible.

## 2019-09-06 ENCOUNTER — NURSE ONLY (OUTPATIENT)
Dept: DERMATOLOGY | Age: 59
End: 2019-09-06
Payer: MEDICAID

## 2019-09-06 DIAGNOSIS — L28.1 PRURIGO NODULARIS: Primary | ICD-10-CM

## 2019-09-06 PROCEDURE — 96900 ACTINOTHERAPY UV LIGHT: CPT | Performed by: DERMATOLOGY

## 2019-09-06 NOTE — PROGRESS NOTES
Pt here for NBUVB treatment, tolerating treatments well, no signs of burning. Pt shields face. Decreased dose due to missed treatments per protocol.

## 2019-09-11 ENCOUNTER — OFFICE VISIT (OUTPATIENT)
Dept: DERMATOLOGY | Age: 59
End: 2019-09-11
Payer: MEDICAID

## 2019-09-11 DIAGNOSIS — L28.1 PRURIGO NODULARIS: ICD-10-CM

## 2019-09-11 DIAGNOSIS — L73.9 FOLLICULITIS: Primary | ICD-10-CM

## 2019-09-11 DIAGNOSIS — L29.9 GENERALIZED PRURITUS: ICD-10-CM

## 2019-09-11 PROCEDURE — 1036F TOBACCO NON-USER: CPT | Performed by: DERMATOLOGY

## 2019-09-11 PROCEDURE — 3017F COLORECTAL CA SCREEN DOC REV: CPT | Performed by: DERMATOLOGY

## 2019-09-11 PROCEDURE — G8427 DOCREV CUR MEDS BY ELIG CLIN: HCPCS | Performed by: DERMATOLOGY

## 2019-09-11 PROCEDURE — 99213 OFFICE O/P EST LOW 20 MIN: CPT | Performed by: DERMATOLOGY

## 2019-09-11 PROCEDURE — G8421 BMI NOT CALCULATED: HCPCS | Performed by: DERMATOLOGY

## 2019-09-11 RX ORDER — DOXYCYCLINE HYCLATE 100 MG
TABLET ORAL
Qty: 28 TABLET | Refills: 0 | Status: SHIPPED | OUTPATIENT
Start: 2019-09-11 | End: 2019-09-17

## 2019-09-14 ENCOUNTER — HOSPITAL ENCOUNTER (EMERGENCY)
Age: 59
Discharge: HOME | End: 2019-09-15
Payer: MEDICAID

## 2019-09-14 ENCOUNTER — APPOINTMENT (OUTPATIENT)
Dept: GENERAL RADIOLOGY | Age: 59
End: 2019-09-14
Payer: MEDICAID

## 2019-09-14 DIAGNOSIS — R07.9 CHEST PAIN, UNSPECIFIED TYPE: Primary | ICD-10-CM

## 2019-09-14 DIAGNOSIS — F41.9 ANXIETY DISORDER, UNSPECIFIED TYPE: ICD-10-CM

## 2019-09-14 LAB
A/G RATIO: 1.1 (ref 1.1–2.2)
ALBUMIN SERPL-MCNC: 3.7 G/DL (ref 3.4–5)
ALP BLD-CCNC: 55 U/L (ref 40–129)
ALT SERPL-CCNC: 14 U/L (ref 10–40)
ANION GAP SERPL CALCULATED.3IONS-SCNC: 15 MMOL/L (ref 3–16)
AST SERPL-CCNC: 21 U/L (ref 15–37)
BASOPHILS ABSOLUTE: 0.1 K/UL (ref 0–0.2)
BASOPHILS RELATIVE PERCENT: 0.9 %
BILIRUB SERPL-MCNC: 0.8 MG/DL (ref 0–1)
BUN BLDV-MCNC: 10 MG/DL (ref 7–20)
CALCIUM SERPL-MCNC: 9 MG/DL (ref 8.3–10.6)
CHLORIDE BLD-SCNC: 100 MMOL/L (ref 99–110)
CO2: 20 MMOL/L (ref 21–32)
CREAT SERPL-MCNC: 0.8 MG/DL (ref 0.9–1.3)
EOSINOPHILS ABSOLUTE: 0.2 K/UL (ref 0–0.6)
EOSINOPHILS RELATIVE PERCENT: 2.5 %
GFR AFRICAN AMERICAN: >60
GFR NON-AFRICAN AMERICAN: >60
GLOBULIN: 3.3 G/DL
GLUCOSE BLD-MCNC: 150 MG/DL (ref 70–99)
HCT VFR BLD CALC: 50.8 % (ref 40.5–52.5)
HEMOGLOBIN: 17.1 G/DL (ref 13.5–17.5)
LYMPHOCYTES ABSOLUTE: 1.4 K/UL (ref 1–5.1)
LYMPHOCYTES RELATIVE PERCENT: 15.7 %
MCH RBC QN AUTO: 30.8 PG (ref 26–34)
MCHC RBC AUTO-ENTMCNC: 33.7 G/DL (ref 31–36)
MCV RBC AUTO: 91.3 FL (ref 80–100)
MONOCYTES ABSOLUTE: 0.8 K/UL (ref 0–1.3)
MONOCYTES RELATIVE PERCENT: 9.7 %
NEUTROPHILS ABSOLUTE: 6.2 K/UL (ref 1.7–7.7)
NEUTROPHILS RELATIVE PERCENT: 71.2 %
PDW BLD-RTO: 15.6 % (ref 12.4–15.4)
PLATELET # BLD: 196 K/UL (ref 135–450)
PMV BLD AUTO: 8.4 FL (ref 5–10.5)
POTASSIUM SERPL-SCNC: 3.8 MMOL/L (ref 3.5–5.1)
RBC # BLD: 5.56 M/UL (ref 4.2–5.9)
SODIUM BLD-SCNC: 135 MMOL/L (ref 136–145)
TOTAL PROTEIN: 7 G/DL (ref 6.4–8.2)
TROPONIN: <0.01 NG/ML
WBC # BLD: 8.7 K/UL (ref 4–11)

## 2019-09-14 PROCEDURE — 99285 EMERGENCY DEPT VISIT HI MDM: CPT

## 2019-09-14 PROCEDURE — 93005 ELECTROCARDIOGRAM TRACING: CPT | Performed by: EMERGENCY MEDICINE

## 2019-09-14 PROCEDURE — 71046 X-RAY EXAM CHEST 2 VIEWS: CPT

## 2019-09-14 PROCEDURE — 85379 FIBRIN DEGRADATION QUANT: CPT

## 2019-09-14 PROCEDURE — 36415 COLL VENOUS BLD VENIPUNCTURE: CPT

## 2019-09-14 PROCEDURE — 83880 ASSAY OF NATRIURETIC PEPTIDE: CPT

## 2019-09-14 PROCEDURE — 84484 ASSAY OF TROPONIN QUANT: CPT

## 2019-09-14 PROCEDURE — 85025 COMPLETE CBC W/AUTO DIFF WBC: CPT

## 2019-09-14 PROCEDURE — 6370000000 HC RX 637 (ALT 250 FOR IP): Performed by: PHYSICIAN ASSISTANT

## 2019-09-14 PROCEDURE — 80053 COMPREHEN METABOLIC PANEL: CPT

## 2019-09-14 RX ORDER — ASPIRIN 81 MG/1
81 TABLET, CHEWABLE ORAL ONCE
Status: COMPLETED | OUTPATIENT
Start: 2019-09-14 | End: 2019-09-14

## 2019-09-14 RX ADMIN — ASPIRIN 81 MG 81 MG: 81 TABLET ORAL at 22:58

## 2019-09-14 ASSESSMENT — PAIN SCALES - GENERAL
PAINLEVEL_OUTOF10: 0
PAINLEVEL_OUTOF10: 4

## 2019-09-14 ASSESSMENT — PAIN DESCRIPTION - ORIENTATION: ORIENTATION: RIGHT

## 2019-09-14 ASSESSMENT — PAIN DESCRIPTION - ONSET: ONSET: ON-GOING

## 2019-09-14 ASSESSMENT — PAIN DESCRIPTION - PROGRESSION: CLINICAL_PROGRESSION: NOT CHANGED

## 2019-09-14 ASSESSMENT — PAIN DESCRIPTION - LOCATION: LOCATION: CHEST

## 2019-09-14 ASSESSMENT — PAIN DESCRIPTION - DESCRIPTORS: DESCRIPTORS: SHARP

## 2019-09-14 ASSESSMENT — PAIN - FUNCTIONAL ASSESSMENT: PAIN_FUNCTIONAL_ASSESSMENT: ACTIVITIES ARE NOT PREVENTED

## 2019-09-14 ASSESSMENT — PAIN DESCRIPTION - FREQUENCY: FREQUENCY: CONTINUOUS

## 2019-09-14 ASSESSMENT — PAIN DESCRIPTION - PAIN TYPE: TYPE: ACUTE PAIN

## 2019-09-15 ENCOUNTER — APPOINTMENT (OUTPATIENT)
Dept: CT IMAGING | Age: 59
End: 2019-09-15
Payer: MEDICAID

## 2019-09-15 VITALS
WEIGHT: 161.38 LBS | TEMPERATURE: 98.2 F | RESPIRATION RATE: 11 BRPM | HEIGHT: 73 IN | DIASTOLIC BLOOD PRESSURE: 90 MMHG | BODY MASS INDEX: 21.39 KG/M2 | HEART RATE: 64 BPM | SYSTOLIC BLOOD PRESSURE: 134 MMHG | OXYGEN SATURATION: 98 %

## 2019-09-15 LAB
D DIMER: 286 NG/ML DDU (ref 0–229)
EKG ATRIAL RATE: 326 BPM
EKG DIAGNOSIS: NORMAL
EKG Q-T INTERVAL: 392 MS
EKG QRS DURATION: 70 MS
EKG QTC CALCULATION (BAZETT): 435 MS
EKG R AXIS: -26 DEGREES
EKG T AXIS: 46 DEGREES
EKG VENTRICULAR RATE: 74 BPM
GRAM STAIN RESULT: NORMAL
PRO-BNP: 212 PG/ML (ref 0–124)
WOUND/ABSCESS: NORMAL

## 2019-09-15 PROCEDURE — 93010 ELECTROCARDIOGRAM REPORT: CPT | Performed by: INTERNAL MEDICINE

## 2019-09-15 PROCEDURE — 71260 CT THORAX DX C+: CPT

## 2019-09-15 PROCEDURE — 6360000004 HC RX CONTRAST MEDICATION: Performed by: PHYSICIAN ASSISTANT

## 2019-09-15 RX ADMIN — IOPAMIDOL 75 ML: 755 INJECTION, SOLUTION INTRAVENOUS at 00:25

## 2019-09-15 ASSESSMENT — PAIN SCALES - GENERAL: PAINLEVEL_OUTOF10: 0

## 2019-09-15 NOTE — ED PROVIDER NOTES
resulting in shortness of breath and hard to breathe. Patient took a Xanax at 9 PM and symptoms gradually resolved. He is asymptomatic here in the emergency department. I did obtain laboratory studies do to the right chest pain and difficulty with breathing with known history of lung cancer and family history of PE.  D-dimer elevated. Chest CTA negative. The patient's EKG showed no acute pathology. The patient troponin not elevated. The patient's CBC and CMP are normal with exception of a slightly elevated blood sugar 150. The patient and family all expressed understanding of the diagnosis and the treatment plan. Patient [facility] FINAL IMPRESSION      1. Chest pain, unspecified type    2. Anxiety disorder, unspecified type          DISPOSITION/PLAN   DISPOSITION Decision To Discharge 09/15/2019 12:50:17 AM      PATIENT REFERREDTO:  [facility]    Schedule an appointment as soon as possible for a visit in 3 days      [facility]  [address] [facility]  [phone]  Go to   If symptoms worsen      DISCHARGE MEDICATIONS:  New Prescriptions    No medications on file       DISCONTINUED MEDICATIONS:  Discontinued Medications    No medications on file              (Please note that portions ofthis note were completed with a voice recognition program.  Efforts were made to edit the dictations but occasionally words are mis-transcribed. )    Cesario Burgos PA-C (electronically signed)           Cesario Burgos PA-C  09/15/19 [id number]

## 2019-09-17 ENCOUNTER — TELEPHONE (OUTPATIENT)
Dept: DERMATOLOGY | Age: 59
End: 2019-09-17

## 2019-10-14 ENCOUNTER — HOSPITAL ENCOUNTER (EMERGENCY)
Age: 59
Discharge: HOME | End: 2019-10-14
Payer: MEDICAID

## 2019-10-14 ENCOUNTER — APPOINTMENT (OUTPATIENT)
Dept: GENERAL RADIOLOGY | Age: 59
End: 2019-10-14
Payer: MEDICAID

## 2019-10-14 VITALS
RESPIRATION RATE: 18 BRPM | BODY MASS INDEX: 21.81 KG/M2 | OXYGEN SATURATION: 96 % | SYSTOLIC BLOOD PRESSURE: 111 MMHG | WEIGHT: 165.34 LBS | DIASTOLIC BLOOD PRESSURE: 81 MMHG | TEMPERATURE: 97.1 F | HEART RATE: 90 BPM

## 2019-10-14 DIAGNOSIS — S93.601A SPRAIN OF RIGHT FOOT, INITIAL ENCOUNTER: Primary | ICD-10-CM

## 2019-10-14 PROCEDURE — 73630 X-RAY EXAM OF FOOT: CPT

## 2019-10-14 PROCEDURE — 99283 EMERGENCY DEPT VISIT LOW MDM: CPT

## 2019-10-14 PROCEDURE — 73610 X-RAY EXAM OF ANKLE: CPT

## 2019-10-14 ASSESSMENT — PAIN SCALES - GENERAL
PAINLEVEL_OUTOF10: 8
PAINLEVEL_OUTOF10: 8

## 2019-10-14 ASSESSMENT — PAIN DESCRIPTION - ORIENTATION: ORIENTATION: RIGHT

## 2019-10-14 ASSESSMENT — PAIN DESCRIPTION - PAIN TYPE: TYPE: ACUTE PAIN

## 2019-10-14 ASSESSMENT — PAIN DESCRIPTION - PROGRESSION: CLINICAL_PROGRESSION: NOT CHANGED

## 2019-10-14 ASSESSMENT — PAIN DESCRIPTION - ONSET: ONSET: ON-GOING

## 2019-10-14 ASSESSMENT — PAIN DESCRIPTION - DESCRIPTORS: DESCRIPTORS: SHARP;CONSTANT

## 2019-10-14 ASSESSMENT — PAIN DESCRIPTION - LOCATION: LOCATION: FOOT

## 2019-10-14 ASSESSMENT — ENCOUNTER SYMPTOMS
VOMITING: 0
NAUSEA: 0

## 2019-10-14 ASSESSMENT — PAIN DESCRIPTION - FREQUENCY: FREQUENCY: CONTINUOUS

## 2019-11-18 ENCOUNTER — OFFICE VISIT (OUTPATIENT)
Dept: DERMATOLOGY | Age: 59
End: 2019-11-18
Payer: MEDICAID

## 2019-11-18 DIAGNOSIS — L29.9 PRURITIC CONDITION: Primary | ICD-10-CM

## 2019-11-18 PROCEDURE — 3017F COLORECTAL CA SCREEN DOC REV: CPT | Performed by: DERMATOLOGY

## 2019-11-18 PROCEDURE — 99213 OFFICE O/P EST LOW 20 MIN: CPT | Performed by: DERMATOLOGY

## 2019-11-18 PROCEDURE — G8420 CALC BMI NORM PARAMETERS: HCPCS | Performed by: DERMATOLOGY

## 2019-11-18 PROCEDURE — 1036F TOBACCO NON-USER: CPT | Performed by: DERMATOLOGY

## 2019-11-18 PROCEDURE — G8427 DOCREV CUR MEDS BY ELIG CLIN: HCPCS | Performed by: DERMATOLOGY

## 2019-11-18 PROCEDURE — G8484 FLU IMMUNIZE NO ADMIN: HCPCS | Performed by: DERMATOLOGY

## 2020-07-19 ENCOUNTER — APPOINTMENT (OUTPATIENT)
Dept: CT IMAGING | Age: 60
End: 2020-07-19
Payer: MEDICAID

## 2020-07-19 ENCOUNTER — APPOINTMENT (OUTPATIENT)
Dept: GENERAL RADIOLOGY | Age: 60
End: 2020-07-19
Payer: MEDICAID

## 2020-07-19 ENCOUNTER — HOSPITAL ENCOUNTER (EMERGENCY)
Age: 60
Discharge: HOME | End: 2020-07-19
Payer: MEDICAID

## 2020-07-19 VITALS
SYSTOLIC BLOOD PRESSURE: 136 MMHG | HEIGHT: 72 IN | DIASTOLIC BLOOD PRESSURE: 99 MMHG | HEART RATE: 85 BPM | TEMPERATURE: 98.1 F | OXYGEN SATURATION: 96 % | RESPIRATION RATE: 16 BRPM | BODY MASS INDEX: 22.5 KG/M2 | WEIGHT: 166.13 LBS

## 2020-07-19 PROCEDURE — 72100 X-RAY EXAM L-S SPINE 2/3 VWS: CPT

## 2020-07-19 PROCEDURE — 73552 X-RAY EXAM OF FEMUR 2/>: CPT

## 2020-07-19 PROCEDURE — 72192 CT PELVIS W/O DYE: CPT

## 2020-07-19 PROCEDURE — 6370000000 HC RX 637 (ALT 250 FOR IP): Performed by: NURSE PRACTITIONER

## 2020-07-19 PROCEDURE — 73560 X-RAY EXAM OF KNEE 1 OR 2: CPT

## 2020-07-19 PROCEDURE — 96372 THER/PROPH/DIAG INJ SC/IM: CPT

## 2020-07-19 PROCEDURE — 6360000002 HC RX W HCPCS: Performed by: NURSE PRACTITIONER

## 2020-07-19 PROCEDURE — 99283 EMERGENCY DEPT VISIT LOW MDM: CPT

## 2020-07-19 RX ORDER — MORPHINE SULFATE 10 MG/ML
6 INJECTION, SOLUTION INTRAMUSCULAR; INTRAVENOUS ONCE
Status: COMPLETED | OUTPATIENT
Start: 2020-07-19 | End: 2020-07-19

## 2020-07-19 RX ORDER — METHOCARBAMOL 500 MG/1
1000 TABLET, FILM COATED ORAL ONCE
Status: COMPLETED | OUTPATIENT
Start: 2020-07-19 | End: 2020-07-19

## 2020-07-19 RX ORDER — HYDROCODONE BITARTRATE AND ACETAMINOPHEN 5; 325 MG/1; MG/1
1 TABLET ORAL EVERY 6 HOURS PRN
Qty: 20 TABLET | Refills: 0 | Status: SHIPPED | OUTPATIENT
Start: 2020-07-19 | End: 2020-07-24

## 2020-07-19 RX ORDER — ACETAMINOPHEN 500 MG
1000 TABLET ORAL ONCE
Status: COMPLETED | OUTPATIENT
Start: 2020-07-19 | End: 2020-07-19

## 2020-07-19 RX ORDER — METHOCARBAMOL 500 MG/1
500 TABLET, FILM COATED ORAL 4 TIMES DAILY
Qty: 40 TABLET | Refills: 0 | Status: SHIPPED | OUTPATIENT
Start: 2020-07-19 | End: 2020-07-29

## 2020-07-19 RX ADMIN — METHOCARBAMOL TABLETS 1000 MG: 500 TABLET, COATED ORAL at 20:51

## 2020-07-19 RX ADMIN — MORPHINE SULFATE 6 MG: 10 INJECTION, SOLUTION INTRAMUSCULAR; INTRAVENOUS at 22:19

## 2020-07-19 RX ADMIN — ACETAMINOPHEN 1000 MG: 500 TABLET, FILM COATED ORAL at 20:51

## 2020-07-19 ASSESSMENT — PAIN SCALES - GENERAL
PAINLEVEL_OUTOF10: 10
PAINLEVEL_OUTOF10: 5
PAINLEVEL_OUTOF10: 10

## 2020-07-19 ASSESSMENT — PAIN DESCRIPTION - DESCRIPTORS
DESCRIPTORS_3: ACHING;SHARP
DESCRIPTORS_3: ACHING;SHARP
DESCRIPTORS: ACHING
DESCRIPTORS: ACHING;SHARP
DESCRIPTORS_2: ACHING;SHARP
DESCRIPTORS_4: ACHING;SHARP
DESCRIPTORS: SHARP;ACHING
DESCRIPTORS_2: ACHING;SHARP
DESCRIPTORS_4: ACHING;SHARP

## 2020-07-19 ASSESSMENT — PAIN DESCRIPTION - INTENSITY
RATING_3: 5
RATING_2: 6
RATING_2: 6
RATING_3: 5
RATING_4: 7
RATING_4: 7

## 2020-07-19 ASSESSMENT — PAIN DESCRIPTION - PROGRESSION
CLINICAL_PROGRESSION: GRADUALLY IMPROVING
CLINICAL_PROGRESSION_3: GRADUALLY WORSENING
CLINICAL_PROGRESSION: NOT CHANGED
CLINICAL_PROGRESSION_4: GRADUALLY WORSENING
CLINICAL_PROGRESSION_2: NOT CHANGED
CLINICAL_PROGRESSION_4: NOT CHANGED
CLINICAL_PROGRESSION_3: NOT CHANGED
CLINICAL_PROGRESSION_2: GRADUALLY WORSENING
CLINICAL_PROGRESSION: GRADUALLY WORSENING

## 2020-07-19 ASSESSMENT — PAIN DESCRIPTION - FREQUENCY
FREQUENCY: CONTINUOUS
FREQUENCY: CONTINUOUS
FREQUENCY_4: CONTINUOUS
FREQUENCY: CONTINUOUS
FREQUENCY_4: CONTINUOUS

## 2020-07-19 ASSESSMENT — PAIN DESCRIPTION - PAIN TYPE
TYPE_2: ACUTE PAIN
TYPE: ACUTE PAIN
TYPE_3: ACUTE PAIN
TYPE: ACUTE PAIN
TYPE_4: ACUTE PAIN
TYPE: ACUTE PAIN
TYPE_3: ACUTE PAIN
TYPE_4: ACUTE PAIN
TYPE_2: ACUTE PAIN

## 2020-07-19 ASSESSMENT — PAIN DESCRIPTION - ORIENTATION
ORIENTATION: RIGHT
ORIENTATION: RIGHT
ORIENTATION_2: RIGHT
ORIENTATION: RIGHT
ORIENTATION_3: LEFT
ORIENTATION_2: RIGHT
ORIENTATION_3: LEFT
ORIENTATION_4: MID;LOWER
ORIENTATION_4: MID;LOWER

## 2020-07-19 ASSESSMENT — PAIN DESCRIPTION - LOCATION
LOCATION_3: KNEE
LOCATION_2: LEG
LOCATION: GROIN
LOCATION_2: LEG
LOCATION_4: BACK
LOCATION: GROIN
LOCATION_4: BACK
LOCATION: GROIN
LOCATION_3: KNEE

## 2020-07-19 ASSESSMENT — ENCOUNTER SYMPTOMS
GASTROINTESTINAL NEGATIVE: 1
RESPIRATORY NEGATIVE: 1

## 2020-07-19 ASSESSMENT — PAIN DESCRIPTION - ONSET
ONSET: ON-GOING
ONSET_2: SUDDEN
ONSET_4: ON-GOING
ONSET: SUDDEN
ONSET_3: SUDDEN
ONSET_3: ON-GOING
ONSET: ON-GOING
ONSET_4: SUDDEN
ONSET_2: SUDDEN

## 2020-07-19 ASSESSMENT — PAIN - FUNCTIONAL ASSESSMENT
PAIN_FUNCTIONAL_ASSESSMENT: PREVENTS OR INTERFERES SOME ACTIVE ACTIVITIES AND ADLS

## 2020-07-19 ASSESSMENT — PAIN DESCRIPTION - DURATION
DURATION_3: CONTINUOUS
DURATION_2: CONTINUOUS
DURATION_3: CONTINUOUS
DURATION_2: CONTINUOUS

## 2020-07-20 ENCOUNTER — TELEPHONE (OUTPATIENT)
Dept: ORTHOPEDIC SURGERY | Age: 60
End: 2020-07-20

## 2020-07-20 NOTE — TELEPHONE ENCOUNTER
PATIENT WAS SEEN @ [facility] ER/ FX R INFERIOR PUBIC RAMUS. NEED APPOINTMENT ASAP / REF TO DR. Nickie Bond

## 2020-07-20 NOTE — TELEPHONE ENCOUNTER
LISSETTE for patient I was calling to schedule him an appt with Dr. Dany Anne for consult following ER visit.

## 2020-07-20 NOTE — ED PROVIDER NOTES
[facility]  eMERGENCY dEPARTMENT eNCOUnter    Pt Name: @@  MRN: [id number]  Birthdate1960  Date of evaluation: 7/19/2020  Provider: CHRISTY Pepe CNP     Evaluated by the advanced practice provider    [address] [place] [address]       Chief Complaint   Patient presents with    Knee Pain     pt states a 500lb tire fell on his lower body c/o groin, right leg, lower back and left knee pain     Groin Pain         HISTORY OF PRESENT ILLNESS  (Location/Symptom, Timing/Onset, Context/Setting, Quality, Duration, Modifying Factors, Severity.)   Alex Agudelo is a 62 y.o. male who presents to the emergencydepartment complaining of right-sided pelvic pain, left femur pain, bilateral knee pain. Patient states that he was removing an industrial sized tire himself. Weighs anywhere between 500 to 600 pounds. He states it popped off piece of equipment that he was working on before he expected it. When it rolled off it knocked him to the ground. He states his knees got, tangled up underneath him and now he has right-sided pelvic pain, feels like his groin has been strained and pain in both knees and left upper leg. Patient reports that it took him about 15 minutes to wiggle out from underneath it on his own. He was able to get up and walk but he states it is now becoming painful to do so. He has some chronic back pain issues and he feels like this is flared his back pain. Incident occurred approximately an hour prior to arrival.      Nursing Notes were reviewed and I agree. REVIEW OF SYSTEMS    (2-9 systems for level 4, 10 or more for level 5)     Review of Systems   Constitutional: Positive for activity change. HENT: Negative. Respiratory: Negative. Cardiovascular: Negative. Gastrointestinal: Negative. Musculoskeletal: Positive for arthralgias. Neurological: Negative. Hematological: Negative. Psychiatric/Behavioral: Negative.     All other systems reviewed and are negative. Except as noted above the remainder of the review of systems was reviewed and negative. PAST MEDICAL HISTORY         Diagnosis Date    Anxiety     Cancer ([facility] [address]     Chronic back pain     Erectile dysfunction     Headache(784.0)     Osteoarthritis     Restless legs syndrome     Urinary incontinence        SURGICAL HISTORY           Procedure Laterality Date    TONSILLECTOMY Bilateral        CURRENT MEDICATIONS       Discharge Medication List as of 7/19/2020 10:56 PM      CONTINUE these medications which have NOT CHANGED    Details   econazole nitrate 1 % cream Apply twice daily to feet, Disp-85 g, R-4, Normal      clobetasol (TEMOVATE) 0.05 % ointment Apply to back twice daily for 3-4 weeks, Disp-120 g, R-6, Normal      ALPRAZolam (XANAX) 0.5 MG tablet Take 0.5 mg by mouth nightly as needed for Sleep (PRN as needed per PT)      ondansetron (ZOFRAN-ODT) 4 MG disintegrating tablet Take 4 mg by mouth every 8 hours as needed for Nausea or Vomiting.      terazosin (HYTRIN) 1 MG capsule Take 1 mg by mouth nightly. ALLERGIES     Zoloft [sertraline hcl] and Doxycycline    FAMILY HISTORY           Problem Relation Age of Onset    Heart Disease Mother     High Blood Pressure Mother     Heart Disease Father     High Blood Pressure Father      Family Status   Relation Name Status    Mother  (Not Specified)    Father  (Not Specified)        SOCIAL HISTORY      reports that he has quit smoking. He has never used smokeless tobacco. He reports current drug use. Drug: Marijuana. He reports that he does not drink alcohol. PHYSICAL EXAM    (up to 7 for level 4, 8 or more for level 5)      ED Triage Vitals [07/19/20 2002]   BP Temp Temp Source Pulse Resp SpO2 Height Weight   (!) 132/99 98 °F (36.7 °C) Oral 102 18 100 % 6' (1.829 m) 166 lb 2 oz (75.4 kg)       Physical Exam  Vitals signs and nursing note reviewed.    Constitutional:       General: He is not in acute distress. Appearance: Normal appearance. He is not ill-appearing, toxic-appearing or diaphoretic. HENT:      Head: Normocephalic. Mouth/Throat:      Mouth: Mucous membranes are moist.      Pharynx: Oropharynx is clear. Eyes:      Extraocular Movements: Extraocular movements intact. Pupils: Pupils are equal, round, and reactive to light. Cardiovascular:      Rate and Rhythm: Normal rate. Pulses: Normal pulses. Femoral pulses are 2+ on the right side and 2+ on the left side. Dorsalis pedis pulses are 2+ on the right side and 2+ on the left side. Pulmonary:      Effort: Pulmonary effort is normal.   Musculoskeletal:      Cervical back: Normal.      Thoracic back: Normal.      Lumbar back: Normal.        Legs:    Skin:     General: Skin is warm. Capillary Refill: Capillary refill takes less than 2 seconds. Neurological:      General: No focal deficit present. Mental Status: He is alert and oriented to person, place, and time. DIAGNOSTIC RESULTS     RADIOLOGY:   Non-plain film images such as CT, Ultrasound and MRI are read by the radiologist. Plain radiographic images are visualized and preliminarily interpreted by CHRISTY Santiago CNP with the below findings:        Interpretation per the Radiologist below, if available at the time of this note:    CT PELVIS WO CONTRAST Additional Contrast? None   Final Result   Comminuted mildly displaced right inferior pubic ramus fracture. XR LUMBAR SPINE (2-3 VIEWS)   Final Result   Chronic L1 compression fracture. No acute osseous abnormality appreciated. XR KNEE RIGHT (1-2 VIEWS)   Final Result   1. No acute osseous abnormality of the right knee. 2. Remote MCL sprain. 3. Chronic Osgood-Schlatter disease. XR KNEE LEFT (1-2 VIEWS)   Final Result   1. No acute osseous abnormality of the left femur. 2. No acute osseous abnormality of the left knee.          XR FEMUR LEFT (MIN 2 VIEWS)   Final Result   1. No acute osseous abnormality of the left femur. 2. No acute osseous abnormality of the left knee. LABS:  Labs Reviewed - No data to display    All other labs were within normal range or not returned as of this dictation. EMERGENCY DEPARTMENT COURSE and DIFFERENTIAL DIAGNOSIS/MDM:   Vitals:    Vitals:    07/19/20 2216 07/19/20 2232 07/19/20 2259 07/19/20 2300   BP: (!) 132/97 (!) 150/103 (!) 136/99 (!) 136/99   Pulse:    85   Resp:    16   Temp:    98.1 °F (36.7 °C)   TempSrc:    Oral   SpO2:    96%   Weight:       Height:         Medications   acetaminophen (TYLENOL) tablet 1,000 mg (1,000 mg Oral Given 7/19/20 2051)   methocarbamol (ROBAXIN) tablet 1,000 mg (1,000 mg Oral Given 7/19/20 2051)   morphine injection 6 mg (6 mg Intramuscular Given 7/19/20 2219)       MDM    Differential diagnosis: Right groin strain, right groin contusion, hip contusion bilaterally, bilateral knee contusion, knee strain sprain bilaterally, lumbar strain    Patient was seen and evaluated per myself.  [facility] present available for consultation as needed. On clinical exam the patient is hemodynamically stable awake alert and oriented. No evidence of neurological deficits. No evidence of cauda equina. There is no shortening or angulation of lower extremities. He has tenderness over the right iliac crest.  Symphysis pubis is midline without crepitance or step-offs. He has tenderness in the left quadricep without evidence of deformity. He has range of motion of both knees flexion extension however pain is present medially and laterally. Patellas are midline without laxity. There are no open wounds. Positive strong bilateral femoral and dorsalis pedis pulses. Abdomen is nontender. No evidence of diastases. No evidence of palpable masses. No evidence of ecchymosis. Thoracic and lumbar spine is midline without crepitance or step-offs.   Some mild tenderness of the lumbar spine.  Upper extremities and chest without injury. Lumbar series indicates a chronic L1 compression fracture. No new changes. Bilateral knee x-rays negative for any evidence of acute bony abnormality. Osgood-Rivers noted syndrome noted. 2300: CT pelvis indicates a closed comminuted mildly displaced right inferior pubic rami fracture. I consulted with Dr. Kristie Marcum, orthopedic. Reviewed the diagnostic study results. This is a nonoperative scenario, weightbearing as tolerated. Follow-up outpatient basis. Patient can be safely discharged home. He was given additional pain medication here in the emergency department. He will be given a prescription for a walker but he thinks that he has 1 at home. He will follow-up with orthopedics. He will be provided pain medication and muscle relaxants. Plan of care and diagnostic study results were discussed with this patient fully. He verbalized understanding and he is discharged home in good condition. Pain level reported at discharge 5/10. This is an improvement from 10/10 on arrival.  Patient's blood pressure at discharge 136/99, pulse 85. This dictation was performed with a verbal recognition program (DRAGON) and it was checked for errors. It is possible that there are still dictated errors within this office note. If so, please bring any errors to my attention for an addendum. All efforts were made to ensure that this office note is accurate. PROCEDURES:  Procedures    FINAL IMPRESSION      1.  Closed fracture of right inferior pubic ramus, initial encounter ([facility] [address]    2. Closed compression fracture of first lumbar vertebra, initial encounter [facility])          DISPOSITION/PLAN   DISPOSITION        PATIENT REFERRED TO:  Jeffery Reid MD  [address] [address]  [phone]    Schedule an appointment as soon as possible for a visit       Melodie Fung MD  [address] [address]  [phone]    Schedule an appointment as soon as possible for a visit in 1 day  orthopedic referral      DISCHARGE MEDICATIONS:  Discharge Medication List as of 7/19/2020 10:56 PM      START taking these medications    Details   methocarbamol (ROBAXIN) 500 MG tablet Take 1 tablet by mouth 4 times daily for 10 days, Disp-40 tablet,R-0Print      HYDROcodone-acetaminophen (NORCO) 5-325 MG per tablet Take 1 tablet by mouth every 6 hours as needed for Pain for up to 5 days. Intended supply: 5 days.  Take lowest dose possible to manage pain, Disp-20 tablet,R-0Print             (Please note that portions of this note werecompleted with a voice recognition program.  Efforts were made to edit the dictations but occasionally words are mis-transcribed.)    Sallie Morris, CHRISTY - CNP Fredi Lesch, APRN - CNP  07/19/20 0947       Fredi Lesch, APRN - RODRIGUE  07/22/20 4423

## 2020-07-21 ENCOUNTER — OFFICE VISIT (OUTPATIENT)
Dept: ORTHOPEDIC SURGERY | Age: 60
End: 2020-07-21
Payer: MEDICAID

## 2020-07-21 VITALS — BODY MASS INDEX: 22.48 KG/M2 | RESPIRATION RATE: 16 BRPM | WEIGHT: 166 LBS | HEIGHT: 72 IN | TEMPERATURE: 98.1 F

## 2020-07-21 PROCEDURE — G8427 DOCREV CUR MEDS BY ELIG CLIN: HCPCS | Performed by: ORTHOPAEDIC SURGERY

## 2020-07-21 PROCEDURE — 99203 OFFICE O/P NEW LOW 30 MIN: CPT | Performed by: ORTHOPAEDIC SURGERY

## 2020-07-21 PROCEDURE — 3017F COLORECTAL CA SCREEN DOC REV: CPT | Performed by: ORTHOPAEDIC SURGERY

## 2020-07-21 PROCEDURE — G8420 CALC BMI NORM PARAMETERS: HCPCS | Performed by: ORTHOPAEDIC SURGERY

## 2020-07-21 PROCEDURE — 1036F TOBACCO NON-USER: CPT | Performed by: ORTHOPAEDIC SURGERY

## 2020-07-21 RX ORDER — ONDANSETRON 4 MG/1
4 TABLET, FILM COATED ORAL EVERY 6 HOURS PRN
Qty: 20 TABLET | Refills: 1 | Status: SHIPPED | OUTPATIENT
Start: 2020-07-21 | End: 2021-06-08

## 2020-07-21 RX ORDER — METHYLPREDNISOLONE 4 MG/1
TABLET ORAL
Qty: 1 KIT | Refills: 0 | Status: SHIPPED | OUTPATIENT
Start: 2020-07-21 | End: 2020-07-27

## 2020-07-21 NOTE — PROGRESS NOTES
ORTHOPAEDIC NEW PATIENT NOTE    Chief Complaint   Patient presents with    Fracture     fx from ed DOI 7-       HPI  62 y.o. male seen for evaluation of right pelvic injury    Onset 2 days ago 7/19/2020  Injury/trauma - tractor tire rolled on top of him   >500 lbs  History of symptoms - hx of spine compression fx L1  Pain is located right buttock region  Worse with pressure, WB  Better with rest, pain meds  Associated with N/A    Denies N/T    I have reviewed and discussed the below pain assessment findings with the patient. Pain Assessment  Location of Pain: Pelvis  Location Modifiers: Right  Severity of Pain: 10  Quality of Pain: Sharp, Aching  Duration of Pain: Persistent  Frequency of Pain: Constant  Date Pain First Started: 07/19/20  Aggravating Factors: Stairs, Walking, Standing, Squatting  Limiting Behavior: Yes  Relieving Factors: Other (Comment)  Result of Injury: Yes  Work-Related Injury: No  Are there other pain locations you wish to document?: No    Review of Systems  I have read over the ROS from the Patient History Form dated on 7/21/2020  Pertinent positives include skin condition, back pain, squamous cell  Rest of 13 point ROS otherwise negative except per HPI, and scanned into the patient's chart under the Media tab. Allergies   Allergen Reactions    Zoloft [Sertraline Hcl]      diarrhea and stomach distress per PT    Doxycycline Rash        Current Outpatient Medications   Medication Sig Dispense Refill    ondansetron (ZOFRAN) 4 MG tablet Take 1 tablet by mouth every 6 hours as needed for Nausea or Vomiting 20 tablet 1    methylPREDNISolone (MEDROL, CALVIN,) 4 MG tablet Take as directed on kit 1 kit 0    methocarbamol (ROBAXIN) 500 MG tablet Take 1 tablet by mouth 4 times daily for 10 days 40 tablet 0    HYDROcodone-acetaminophen (NORCO) 5-325 MG per tablet Take 1 tablet by mouth every 6 hours as needed for Pain for up to 5 days. Intended supply: 5 days.  Take lowest dose possible to manage pain 20 tablet 0    econazole nitrate 1 % cream Apply twice daily to feet 85 g 4    clobetasol (TEMOVATE) 0.05 % ointment Apply to back twice daily for 3-4 weeks 120 g 6    ALPRAZolam (XANAX) 0.5 MG tablet Take 0.5 mg by mouth nightly as needed for Sleep (PRN as needed per PT)      ondansetron (ZOFRAN-ODT) 4 MG disintegrating tablet Take 4 mg by mouth every 8 hours as needed for Nausea or Vomiting.  terazosin (HYTRIN) 1 MG capsule Take 1 mg by mouth nightly. No current facility-administered medications for this visit.         Past Medical History:   Diagnosis Date    Anxiety     Cancer ([facility] [address]     Chronic back pain     Erectile dysfunction     Headache(784.0)     Osteoarthritis     Restless legs syndrome     Urinary incontinence         Past Surgical History:   Procedure Laterality Date    TONSILLECTOMY Bilateral        Family History   Problem Relation Age of Onset    Heart Disease Mother     High Blood Pressure Mother     Heart Disease Father     High Blood Pressure Father        Social History     Socioeconomic History    Marital status: Single     Spouse name: Not on file    Number of children: Not on file    Years of education: Not on file    Highest education level: Not on file   Occupational History    Not on file   Social Needs    Financial resource strain: Not on file    Food insecurity     Worry: Not on file     Inability: Not on file    Transportation needs     Medical: Not on file     Non-medical: Not on file   Tobacco Use    Smoking status: Former Smoker    Smokeless tobacco: Never Used   Substance and Sexual Activity    Alcohol use: No    Drug use: Yes     Types: Marijuana    Sexual activity: Never   Lifestyle    Physical activity     Days per week: Not on file     Minutes per session: Not on file    Stress: Not on file   Relationships    Social connections     Talks on phone: Not on file     Gets together: Not on file     Attends Spiritism service: Not on file     Active member of club or organization: Not on file     Attends meetings of clubs or organizations: Not on file     Relationship status: Not on file    Intimate partner violence     Fear of current or ex partner: Not on file     Emotionally abused: Not on file     Physically abused: Not on file     Forced sexual activity: Not on file   Other Topics Concern    Not on file   Social History Narrative    Not on file        Vitals:    07/21/20 0804   Resp: 16   Temp: 98.1 °F (36.7 °C)   TempSrc: Temporal   Weight: 166 lb (75.3 kg)   Height: 6' (1.829 m)       Physical Exam  Constitutional - well-groomed, well-nourished, Body mass index is 22.51 kg/m². Psychiatric - pleasant,  normal mood & affect, oriented to place, person, and situation  Cardiovascular - RRR, negative peripheral edema, no varicosities, dorsalis pedis pulse 2+  Respiratory - respirations unlabored, on room air  Gastrointestinal - abdomen soft NDNT  Skin - no rashes, wounds, or lesions seen  Neck/Spine - no radicular pain with SLR; no TTP cervical, thoracic, lumbar spine  Neurological - SILT SP/DP/T/sural/saphenous nerve distributions; EHL/TA/GS intact  Pelvis - no TTP hips   Negative log roll bilaterally   Stable to AP and lateral compression  Knees - no effusion bilaterally   Full ROM    Imaging:  Images were personally reviewed by myself and discussed with the patient  Narrative    EXAMINATION:    CT OF THE PELVIS WITHOUT CONTRAST 7/19/2020 8:36 pm         TECHNIQUE:    CT of the pelvis was performed without the administration of intravenous    contrast.  Multiplanar reformatted images are provided for review.  Dose    modulation, iterative reconstruction, and/or weight based adjustment of the    mA/kV was utilized to reduce the radiation dose to as low as reasonably    achievable.         COMPARISON:    None.         HISTORY:    ORDERING SYSTEM PROVIDED HISTORY: injury    TECHNOLOGIST PROVIDED HISTORY:    Reason for exam:->injury Additional Contrast?->None    Reason for Exam: (pt states a 500lb tire fell on his lower body c/o groin,    right leg, lower back and left knee pain )    Acuity: Acute         FINDINGS:    Bones: Comminuted mildly displaced right inferior pubic ramus fracture.  No    correlate right pubic ring or acetabular fracture identified.  Remaining    pelvis and hips are intact.  Intact lower lumbar spine.         Soft tissues: Mild swelling along the soft tissues and musculature    surrounding the fracture.  No pelvic hematoma or free fluid.  Colonic    diverticulosis.           Impression    Comminuted mildly displaced right inferior pubic ramus fracture.             Narrative    EXAMINATION:    2 XRAY VIEWS OF THE LEFT KNEE; 4 XRAY VIEWS OF THE LEFT FEMUR         7/19/2020 8:25 pm         COMPARISON:    None.         HISTORY:    ORDERING SYSTEM PROVIDED HISTORY: injury    TECHNOLOGIST PROVIDED HISTORY:    Reason for exam:->injury    Reason for Exam: injury    Acuity: Acute    Type of Exam: Initial         Initial evaluation for acute traumatic pain in the left lower extremity         FINDINGS:    Left femur: No acute fracture or dislocation.  No acute periosteal reaction. Joint alignment and joint spaces are maintained.  No erosions are present.         Left knee: No acute fracture or dislocation.  Joint spaces are maintained. No joint effusion or erosion is present.              Impression    1. No acute osseous abnormality of the left femur. 2. No acute osseous abnormality of the left knee.           Impression    1. No acute osseous abnormality of the right knee. 2. Remote MCL sprain. 3. Chronic Osgood-Schlatter disease.             Assessment & Plan:  62 y.o. male who presents with    Diagnosis Orders   1. Closed fracture of right inferior pubic ramus, initial encounter ([facility] [address]         No orders of the defined types were placed in this encounter.     Reviewed injury and imaging with the patient    Injuries could've been more severe given injury mechanism  Luckily, only right inferior pubic ramus fracture    Conservative treatment  Rest, ice, activity modification  Advance RLE WBAT  Symptoms should gradually improve over the next couple of weeks    He reports pain med given to him in ED makes him nauseous  zofran and medrol rxs given to him    Ambar Rodriguez

## 2021-05-30 ENCOUNTER — HOSPITAL ENCOUNTER (EMERGENCY)
Age: 61
Discharge: HOME | End: 2021-05-30
Attending: EMERGENCY MEDICINE
Payer: MEDICAID

## 2021-05-30 ENCOUNTER — APPOINTMENT (OUTPATIENT)
Dept: CT IMAGING | Age: 61
End: 2021-05-30
Payer: MEDICAID

## 2021-05-30 VITALS
HEART RATE: 100 BPM | WEIGHT: 148.37 LBS | HEIGHT: 72 IN | OXYGEN SATURATION: 96 % | DIASTOLIC BLOOD PRESSURE: 67 MMHG | RESPIRATION RATE: 18 BRPM | SYSTOLIC BLOOD PRESSURE: 112 MMHG | TEMPERATURE: 98.3 F | BODY MASS INDEX: 20.1 KG/M2

## 2021-05-30 DIAGNOSIS — K57.32 RECTOSIGMOID DIVERTICULITIS: Primary | ICD-10-CM

## 2021-05-30 DIAGNOSIS — K80.20 CALCULUS OF GALLBLADDER WITHOUT CHOLECYSTITIS WITHOUT OBSTRUCTION: ICD-10-CM

## 2021-05-30 LAB
A/G RATIO: 1.2 (ref 1.1–2.2)
ALBUMIN SERPL-MCNC: 3.8 G/DL (ref 3.4–5)
ALP BLD-CCNC: 56 U/L (ref 40–129)
ALT SERPL-CCNC: 18 U/L (ref 10–40)
ANION GAP SERPL CALCULATED.3IONS-SCNC: 12 MMOL/L (ref 3–16)
AST SERPL-CCNC: 22 U/L (ref 15–37)
BASOPHILS ABSOLUTE: 0 K/UL (ref 0–0.2)
BASOPHILS RELATIVE PERCENT: 0.3 %
BILIRUB SERPL-MCNC: 1.2 MG/DL (ref 0–1)
BILIRUBIN URINE: NEGATIVE
BLOOD, URINE: NEGATIVE
BUN BLDV-MCNC: 9 MG/DL (ref 7–20)
CALCIUM SERPL-MCNC: 8.7 MG/DL (ref 8.3–10.6)
CHLORIDE BLD-SCNC: 98 MMOL/L (ref 99–110)
CLARITY: CLEAR
CO2: 22 MMOL/L (ref 21–32)
COLOR: ABNORMAL
CREAT SERPL-MCNC: 0.9 MG/DL (ref 0.9–1.3)
EOSINOPHILS ABSOLUTE: 0 K/UL (ref 0–0.6)
EOSINOPHILS RELATIVE PERCENT: 0.1 %
EPITHELIAL CELLS, UA: 1 /HPF (ref 0–5)
GFR AFRICAN AMERICAN: >60
GFR NON-AFRICAN AMERICAN: >60
GLOBULIN: 3.2 G/DL
GLUCOSE BLD-MCNC: 179 MG/DL (ref 70–99)
GLUCOSE URINE: NEGATIVE MG/DL
HCT VFR BLD CALC: 55.6 % (ref 40.5–52.5)
HEMOGLOBIN: 18.5 G/DL (ref 13.5–17.5)
HYALINE CASTS: 2 /LPF (ref 0–8)
KETONES, URINE: NEGATIVE MG/DL
LACTIC ACID: 1.4 MMOL/L (ref 0.4–2)
LEUKOCYTE ESTERASE, URINE: NEGATIVE
LYMPHOCYTES ABSOLUTE: 0.7 K/UL (ref 1–5.1)
LYMPHOCYTES RELATIVE PERCENT: 4.1 %
MCH RBC QN AUTO: 31.8 PG (ref 26–34)
MCHC RBC AUTO-ENTMCNC: 33.2 G/DL (ref 31–36)
MCV RBC AUTO: 95.8 FL (ref 80–100)
MICROSCOPIC EXAMINATION: YES
MONOCYTES ABSOLUTE: 1.1 K/UL (ref 0–1.3)
MONOCYTES RELATIVE PERCENT: 6.6 %
NEUTROPHILS ABSOLUTE: 15.1 K/UL (ref 1.7–7.7)
NEUTROPHILS RELATIVE PERCENT: 88.9 %
NITRITE, URINE: NEGATIVE
PDW BLD-RTO: 15 % (ref 12.4–15.4)
PH UA: 7 (ref 5–8)
PLATELET # BLD: 142 K/UL (ref 135–450)
PMV BLD AUTO: 8.3 FL (ref 5–10.5)
POTASSIUM SERPL-SCNC: 4 MMOL/L (ref 3.5–5.1)
PROTEIN UA: ABNORMAL MG/DL
RBC # BLD: 5.8 M/UL (ref 4.2–5.9)
RBC UA: 1 /HPF (ref 0–4)
SARS-COV-2, NAAT: NOT DETECTED
SODIUM BLD-SCNC: 132 MMOL/L (ref 136–145)
SPECIFIC GRAVITY UA: 1.01 (ref 1–1.03)
TOTAL PROTEIN: 7 G/DL (ref 6.4–8.2)
URINE REFLEX TO CULTURE: ABNORMAL
URINE TYPE: ABNORMAL
UROBILINOGEN, URINE: 1 E.U./DL
WBC # BLD: 16.9 K/UL (ref 4–11)
WBC UA: 1 /HPF (ref 0–5)

## 2021-05-30 PROCEDURE — 36415 COLL VENOUS BLD VENIPUNCTURE: CPT

## 2021-05-30 PROCEDURE — 74177 CT ABD & PELVIS W/CONTRAST: CPT

## 2021-05-30 PROCEDURE — 87635 SARS-COV-2 COVID-19 AMP PRB: CPT

## 2021-05-30 PROCEDURE — 85025 COMPLETE CBC W/AUTO DIFF WBC: CPT

## 2021-05-30 PROCEDURE — 99285 EMERGENCY DEPT VISIT HI MDM: CPT

## 2021-05-30 PROCEDURE — 87040 BLOOD CULTURE FOR BACTERIA: CPT

## 2021-05-30 PROCEDURE — 80053 COMPREHEN METABOLIC PANEL: CPT

## 2021-05-30 PROCEDURE — 6360000004 HC RX CONTRAST MEDICATION: Performed by: NURSE PRACTITIONER

## 2021-05-30 PROCEDURE — 81001 URINALYSIS AUTO W/SCOPE: CPT

## 2021-05-30 PROCEDURE — 83605 ASSAY OF LACTIC ACID: CPT

## 2021-05-30 PROCEDURE — 2580000003 HC RX 258: Performed by: NURSE PRACTITIONER

## 2021-05-30 PROCEDURE — 6370000000 HC RX 637 (ALT 250 FOR IP): Performed by: NURSE PRACTITIONER

## 2021-05-30 RX ORDER — AMOXICILLIN AND CLAVULANATE POTASSIUM 875; 125 MG/1; MG/1
1 TABLET, FILM COATED ORAL 2 TIMES DAILY
Qty: 20 TABLET | Refills: 0 | Status: ON HOLD | OUTPATIENT
Start: 2021-05-30 | End: 2021-06-13 | Stop reason: HOSPADM

## 2021-05-30 RX ORDER — ONDANSETRON 4 MG/1
4 TABLET, ORALLY DISINTEGRATING ORAL ONCE
Status: COMPLETED | OUTPATIENT
Start: 2021-05-30 | End: 2021-05-30

## 2021-05-30 RX ORDER — AMOXICILLIN AND CLAVULANATE POTASSIUM 875; 125 MG/1; MG/1
1 TABLET, FILM COATED ORAL 2 TIMES DAILY
Qty: 14 TABLET | Refills: 0 | Status: SHIPPED | OUTPATIENT
Start: 2021-05-30 | End: 2021-05-30 | Stop reason: SDUPTHER

## 2021-05-30 RX ORDER — AMOXICILLIN AND CLAVULANATE POTASSIUM 875; 125 MG/1; MG/1
1 TABLET, FILM COATED ORAL ONCE
Status: COMPLETED | OUTPATIENT
Start: 2021-05-30 | End: 2021-05-30

## 2021-05-30 RX ORDER — ONDANSETRON 4 MG/1
4-8 TABLET, ORALLY DISINTEGRATING ORAL EVERY 12 HOURS PRN
Qty: 12 TABLET | Refills: 0 | Status: SHIPPED | OUTPATIENT
Start: 2021-05-30

## 2021-05-30 RX ORDER — 0.9 % SODIUM CHLORIDE 0.9 %
1000 INTRAVENOUS SOLUTION INTRAVENOUS ONCE
Status: COMPLETED | OUTPATIENT
Start: 2021-05-30 | End: 2021-05-30

## 2021-05-30 RX ORDER — HYDROCODONE BITARTRATE AND ACETAMINOPHEN 5; 325 MG/1; MG/1
1 TABLET ORAL EVERY 8 HOURS PRN
Qty: 9 TABLET | Refills: 0 | Status: SHIPPED | OUTPATIENT
Start: 2021-05-30 | End: 2021-06-02

## 2021-05-30 RX ADMIN — IOPAMIDOL 75 ML: 755 INJECTION, SOLUTION INTRAVENOUS at 15:50

## 2021-05-30 RX ADMIN — ONDANSETRON 4 MG: 4 TABLET, ORALLY DISINTEGRATING ORAL at 17:50

## 2021-05-30 RX ADMIN — AMOXICILLIN AND CLAVULANATE POTASSIUM 1 TABLET: 875; 125 TABLET, FILM COATED ORAL at 17:50

## 2021-05-30 RX ADMIN — SODIUM CHLORIDE 1000 ML: 9 INJECTION, SOLUTION INTRAVENOUS at 16:04

## 2021-05-30 ASSESSMENT — PAIN SCALES - GENERAL
PAINLEVEL_OUTOF10: 3
PAINLEVEL_OUTOF10: 3
PAINLEVEL_OUTOF10: 0

## 2021-05-30 ASSESSMENT — PAIN DESCRIPTION - LOCATION: LOCATION: GROIN

## 2021-05-30 NOTE — ED PROVIDER NOTES
[facility] Emergency Department    CHIEF COMPLAINT  Fever (pt states that he started with muscle aches all over his body and then started running a fever. pt. states that he is having \"serious fatigue\" and then started having pain in the left side of his groin. pt. states that it is very painful to urinate or have BM. )      SHARED SERVICE VISIT  I have seen and evaluated this patient with my supervising physician, Dr. Valerie Garduno. HISTORY OF PRESENT ILLNESS  Michael Knowles is a 62 y.o. male who presents to the ED complaining of a 1 day history of nausea, vomiting x2, fever (102.5 last p.m.), anorexia, chills, sweats, fatigue, muscle aches, groin pain, and difficulty with urination/decreased output and bowel movements. He denies neck pain/stiffness, testicular pain, chest pain, dizziness, presyncope, shortness of breath, cough, back pain, or other complaints. No other complaints, modifying factors or associated symptoms. Nursing notes reviewed.    Past Medical History:   Diagnosis Date    Anxiety     Cancer ([facility] [address]     Chronic back pain     Erectile dysfunction     Headache(784.0)     Osteoarthritis     Restless legs syndrome     Urinary incontinence      Past Surgical History:   Procedure Laterality Date    TONSILLECTOMY Bilateral      Family History   Problem Relation Age of Onset    Heart Disease Mother     High Blood Pressure Mother     Heart Disease Father     High Blood Pressure Father      Social History     Socioeconomic History    Marital status: Single     Spouse name: Not on file    Number of children: Not on file    Years of education: Not on file    Highest education level: Not on file   Occupational History    Not on file   Tobacco Use    Smoking status: Former Smoker    Smokeless tobacco: Never Used   Vaping Use    Vaping Use: Never used   Substance and Sexual Activity    Alcohol use: No    Drug use: Yes     Types: Marijuana    Sexual activity: Never Other Topics Concern    Not on file   Social History Narrative    Not on file     Social Determinants of Health     Financial Resource Strain:     Difficulty of Paying Living Expenses:    Food Insecurity:     Worried About Running Out of Food in the Last Year:     [address] in the Last Year:    Transportation Needs:     Lack of Transportation (Medical):  Lack of Transportation (Non-Medical):    Physical Activity:     Days of Exercise per Week:     Minutes of Exercise per Session:    Stress:     Feeling of Stress :    Social Connections:     Frequency of Communication with Friends and Family:     Frequency of Social Gatherings with Friends and Family:     Attends Gnosticist Services:     Active Member of Clubs or Organizations:     Attends Club or Organization Meetings:     Marital Status:    Intimate Partner Violence:     Fear of Current or Ex-Partner:     Emotionally Abused:     Physically Abused:     Sexually Abused:      No current facility-administered medications for this encounter. Current Outpatient Medications   Medication Sig Dispense Refill    ondansetron (ZOFRAN) 4 MG tablet Take 1 tablet by mouth every 6 hours as needed for Nausea or Vomiting 20 tablet 1    econazole nitrate 1 % cream Apply twice daily to feet 85 g 4    clobetasol (TEMOVATE) 0.05 % ointment Apply to back twice daily for 3-4 weeks 120 g 6    ALPRAZolam (XANAX) 0.5 MG tablet Take 0.5 mg by mouth nightly as needed for Sleep (PRN as needed per PT)      ondansetron (ZOFRAN-ODT) 4 MG disintegrating tablet Take 4 mg by mouth every 8 hours as needed for Nausea or Vomiting.  terazosin (HYTRIN) 1 MG capsule Take 1 mg by mouth nightly.        Allergies   Allergen Reactions    Zoloft [Sertraline Hcl]      diarrhea and stomach distress per PT    Doxycycline Rash       REVIEW OF SYSTEMS  10 systems reviewed, pertinent positives per HPI otherwise noted to be negative    PHYSICAL EXAM  /67   Pulse 100 Temp 98.3 °F (36.8 °C)   Resp 18   Ht 6' (1.829 m)   Wt 148 lb 5.9 oz (67.3 kg)   SpO2 96%   BMI 20.12 kg/m²   GENERAL APPEARANCE: Awake and alert. Cooperative.  + Distress. Non-- toxic in appearance. HEAD: Normocephalic. Atraumatic. EYES: PERRL. EOM's grossly intact. ENT: Mucous membranes are moist.   NECK: Supple. Full ROM. There is no tenderness upon palpation. HEART: RRR. No murmurs, rubs, or gallops.  + S1-S2  LUNGS: Respirations unlabored. CTAB. Good air exchange. Speaking comfortably in full sentences. ABDOMEN: Soft. Non-distended.  + Lower abdominal/suprapubic tenderness > on left. No guarding or rebound.  + Bowel sounds x 4 quadrants. No masses. No organomegaly. EXTREMITIES: No peripheral edema. Moves all extremities equally. All extremities neurovascularly intact. SKIN: Warm and dry. No acute rashes. NEUROLOGICAL: Alert and oriented. CN's 2-12 intact. No gross facial drooping. Strength 5/5, sensation intact. PSYCHIATRIC: Normal mood and affect. RADIOLOGY  No results found. ED COURSE  Patient did not require analgesia while in emergency department. Triage vitals stable but hypotensive at 112/67 mmHg. A septic workup was initiated including blood culture #1, blood culture #2, lactic acid, urinalysis reflex to culture, microscopic urinalysis, COVID-19-rapid, CBC, CMP  CT abdomen pelvis. Labs Ordered:  I have reviewed and interpreted all of the currently available lab results from this visit:  Results for orders placed or performed during the hospital encounter of 05/30/21   COVID-19, Rapid    Specimen: Nasopharyngeal Swab   Result Value Ref Range    SARS-CoV-2, NAAT Not Detected Not Detected   Culture, Blood 1    Specimen: Blood   Result Value Ref Range    Blood Culture, Routine       No Growth to date. Any change in status will be called. Culture, Blood 2    Specimen: Blood   Result Value Ref Range    Culture, Blood 2       No Growth to date.   Any change in status will be called.    CBC Auto Differential   Result Value Ref Range    WBC 16.9 (H) 4.0 - 11.0 K/uL    RBC 5.80 4.20 - 5.90 M/uL    Hemoglobin 18.5 (H) 13.5 - 17.5 g/dL    Hematocrit 55.6 (H) 40.5 - 52.5 %    MCV 95.8 80.0 - 100.0 fL    MCH 31.8 26.0 - 34.0 pg    MCHC 33.2 31.0 - 36.0 g/dL    RDW 15.0 12.4 - 15.4 %    Platelets 357 394 - 309 K/uL    MPV 8.3 5.0 - 10.5 fL    Neutrophils % 88.9 %    Lymphocytes % 4.1 %    Monocytes % 6.6 %    Eosinophils % 0.1 %    Basophils % 0.3 %    Neutrophils Absolute 15.1 (H) 1.7 - 7.7 K/uL    Lymphocytes Absolute 0.7 (L) 1.0 - 5.1 K/uL    Monocytes Absolute 1.1 0.0 - 1.3 K/uL    Eosinophils Absolute 0.0 0.0 - 0.6 K/uL    Basophils Absolute 0.0 0.0 - 0.2 K/uL   Comprehensive Metabolic Panel   Result Value Ref Range    Sodium 132 (L) 136 - 145 mmol/L    Potassium 4.0 3.5 - 5.1 mmol/L    Chloride 98 (L) 99 - 110 mmol/L    CO2 22 21 - 32 mmol/L    Anion Gap 12 3 - 16    Glucose 179 (H) 70 - 99 mg/dL    BUN 9 7 - 20 mg/dL    CREATININE 0.9 0.9 - 1.3 mg/dL    GFR Non-African American >60 >60    GFR African American >60 >60    Calcium 8.7 8.3 - 10.6 mg/dL    Total Protein 7.0 6.4 - 8.2 g/dL    Albumin 3.8 3.4 - 5.0 g/dL    Albumin/Globulin Ratio 1.2 1.1 - 2.2    Total Bilirubin 1.2 (H) 0.0 - 1.0 mg/dL    Alkaline Phosphatase 56 40 - 129 U/L    ALT 18 10 - 40 U/L    AST 22 15 - 37 U/L    Globulin 3.2 g/dL   Urinalysis Reflex to Culture    Specimen: Urine, clean catch   Result Value Ref Range    Color, UA DK YELLOW Straw/Yellow    Clarity, UA Clear Clear    Glucose, Ur Negative Negative mg/dL    Bilirubin Urine Negative Negative    Ketones, Urine Negative Negative mg/dL    Specific Gravity, UA 1.012 1.005 - 1.030    Blood, Urine Negative Negative    pH, UA 7.0 5.0 - 8.0    Protein, UA TRACE (A) Negative mg/dL    Urobilinogen, Urine 1.0 <2.0 E.U./dL    Nitrite, Urine Negative Negative    Leukocyte Esterase, Urine Negative Negative    Microscopic Examination YES     Urine Type Other Peritoneum/Retroperitoneum: There is no free air, free fluid or intraperitoneal inflammatory change. There are several mildly enlarged celiac axis/lopez hepatis lymph nodes ranging in size up to 2.4 cm in diameter. Bones/Soft Tissues: There is no acute fracture or aggressive osseous lesion. 1. Mild acute uncomplicated rectosigmoid diverticulitis. 2. Cholelithiasis. 3. Pancreatic atrophy versus partial agenesis. 4. Mild nonspecific celiac axis/lopez hepatis lymph node enlargement. Short-term CT follow-up in 3-4 months and/or PET scan recommended for further evaluation. Reevaluation:  Status post hydration patient endorses that he feels much better. He is resting comfortably on stretcher with eyes open with stable vital signs. A discussion was had with Mr. Martinez regarding rectosigmoid diverticulitis, calculus of gallbladder, and intention to discharge. Risk management discussed and shared decision making had with patient and/or surrogate. All questions were answered. Patient will follow up with his PCP and gastroenterology for further evaluation/treatment. All questions answered. Patient will return to ED for new/worsening symptoms. Patient is agreeable with this plan. Patient was sent home with a prescription for Augmentin, Norco, and Zofran. CRITICAL CARE TIME  0 minutes of critical care time spent not including separately billable procedures. MDM  Patient presents to the emergency department with fever, fatigue, nausea, and vomiting.   Alternate diagnoses are less likely based on history and physical. Considered kidney stone, pyelonephritis, UTI, appendicitis, bowel obstruction, hernia, gastritis/gastroenteritis, pancreatitis, cholecystitis, hepatitis, constipation, IBS, IBD, sepsis, COVID-19, among others are less likely based on history and physical.          Work-up reveals:  (Abnormal labs and imaging noted otherwise  normal)    Blood culture #1: Pending    Blood culture #2: Pending    Lactic acid: 1.2    Urinalysis reflex to culture: Protein trace, otherwise unremarkable    Microscopic urinalysis: COVID-19, rapid: Not detected    CMP: Mild hyponatremia at 132 and [email or web address] He was hydrated with a liter of normal saline for repletion and induction of diuresis following IV contrast; glucose 179, total bilirubin 1.2, otherwise unremarkable    CBC: There is leukocytosis with [email or web address], no anemia with hemoglobin 18.5, hematocrit 55.6, neutrophils absolute at 15.1, lymphocytes absolute 0.7, otherwise unremarkable    CT abdomen pelvis: Mild acute uncomplicated rectosigmoid diverticulitis. Cholelithiasis. Pancreatic atrophy versus partial agenesis. Mild nonspecific celiac axis/lopez hepatitis lymph node enlargement. Short-term CT follow-up in 3-4 months and/or PET scan recommended for further evaluation. Interventions: Patient was given a liter bolus of normal saline to replete sodium and chloride, address for soft blood pressure, and induce diuresis; he was given Zofran for nausea, and received his first dose of Augmentin for diverticulitis while here in the emergency department. Patient is not septic and is tolerating p.o. Therefore:    My attending physician and I feel that the patient is safe and appropriate for discharge to home with close outpatient follow-up with PCP and gastroenterology in the next 1-2 days. Patient will be treated with Augmentin, Norco, and Zofran and is instructed to return to the emergency department immediately for new or worsening symptoms including, but not limited to, inability to tolerate food or drink, developing fever, chills, sweats, seeing blood in your vomit or stool, increased pain, inability to urinate, increased abdominal pain, or other concerns. Patient verbalizes understanding and is agreeable with plan for discharge and follow-up.         Clinical Impression:  Rectosigmoid diverticulitis  Calculus of gallbladder without cholecystitis without obstruction      DISPOSITION  Discharged        Radha Maravilla CNP   Acute Care [facility]    This chart was created using Dragon dictation. Every effort was made by myself to ensure accuracy, however due to limitations of this technology errors may be present.       Hannah Sanchez, CHRISTY - CNP  06/01/21 [address] [facility], CHRISTY - CNP  06/01/21 [id number]

## 2021-05-31 NOTE — ED PROVIDER NOTES
I independently performed a history and physical on Tammy. All diagnostic, treatment, and disposition decisions were made by myself in conjunction with the advanced practice provider. Briefly, this is a 62 y.o. male here for abdominal pain, most severe in left lower quadrant. Associated with muscle aches and subjective fevers. Patient also reports pain with bowel movements and urination. On exam, patient afebrile and nontoxic. No distress. Heart RRR. Lungs CTAB. Abdomen soft, nondistended, mild tenderness to palpation in LLQ, no rebound, no rigidity, no guarding. No RLQ tenderness, negative Rovsing. No upper abdominal tenderness. Screenings            MDM    Patient afebrile and nontoxic. No distress, overall he is well appearing. No peritoneal signs on abdominal exam, no focal findings to suggest acute cholecystitis or appendicitis and clinically felt highly unlikely. UA not indicative of infection. CT scan with acute diverticulitis, no evidence of obstruction, abscess or perforation. Patient's tachycardia resolved with IV fluids, heart rate in the 80's on my evaluation. Tolerating PO fluids and medications in the ED. Shared decision making held with patient regarding risks versus benefits of admission, patient is felt appropriate for initial outpatient management and he is agreeable to this plan. Feels comfortable returning to home and will be able to return easily should symptoms worsen. Will start on Augmentin. Importance of close PCP follow up and strict return precautions discussed.      Patient Referrals:  Paige Ventura MD  [address] [facility]  [address] [facility] [phone]    In 1 day      [facility] Emergency Department  [address] [address]  [phone]  Go to   As needed    Charlotte Reyes, [address] [place] [address]  [phone]    Call in 2 days        Discharge Medications:  Discharge Medication List as of 5/30/2021  5:46 PM      START taking these medications    Details   HYDROcodone-acetaminophen (NORCO) 5-325 MG per tablet Take 1 tablet by mouth every 8 hours as needed for Pain (For severe pain) for up to 3 days. Intended supply: 3 days. Take lowest dose possible to manage pain, Disp-9 tablet, R-0Print      amoxicillin-clavulanate (AUGMENTIN) 875-125 MG per tablet Take 1 tablet by mouth 2 times daily for 10 days, Disp-14 tablet, R-0Print             FINAL IMPRESSION  1. Rectosigmoid diverticulitis    2. Calculus of gallbladder without cholecystitis without obstruction            For further details of [facility] emergency department encounter, please see documentation by advanced practice provider, Rick Casiano NP.     Pedro Porter DO (electronically signed)  Attending Emergency Physician       Pedro Porter DO  05/31/21 2184

## 2021-06-03 LAB
BLOOD CULTURE, ROUTINE: NORMAL
CULTURE, BLOOD 2: NORMAL

## 2021-06-08 ENCOUNTER — HOSPITAL ENCOUNTER (INPATIENT)
Age: 61
LOS: 5 days | Discharge: HOME | DRG: 244 | End: 2021-06-13
Attending: SURGERY | Admitting: SURGERY
Payer: MEDICAID

## 2021-06-08 ENCOUNTER — APPOINTMENT (OUTPATIENT)
Dept: CT IMAGING | Age: 61
DRG: 244 | End: 2021-06-08
Payer: MEDICAID

## 2021-06-08 ENCOUNTER — APPOINTMENT (OUTPATIENT)
Dept: ULTRASOUND IMAGING | Age: 61
DRG: 244 | End: 2021-06-08
Payer: MEDICAID

## 2021-06-08 DIAGNOSIS — K57.80 DIVERTICULITIS OF INTESTINE WITH ABSCESS WITHOUT BLEEDING, UNSPECIFIED PART OF INTESTINAL TRACT: Primary | ICD-10-CM

## 2021-06-08 PROBLEM — K57.92 DIVERTICULITIS: Status: ACTIVE | Noted: 2021-06-08

## 2021-06-08 LAB
A/G RATIO: 0.9 (ref 1.1–2.2)
ALBUMIN SERPL-MCNC: 3.2 G/DL (ref 3.4–5)
ALP BLD-CCNC: 185 U/L (ref 40–129)
ALT SERPL-CCNC: 31 U/L (ref 10–40)
ANION GAP SERPL CALCULATED.3IONS-SCNC: 6 MMOL/L (ref 3–16)
AST SERPL-CCNC: 41 U/L (ref 15–37)
BASOPHILS ABSOLUTE: 0.1 K/UL (ref 0–0.2)
BASOPHILS RELATIVE PERCENT: 0.9 %
BILIRUB SERPL-MCNC: 0.6 MG/DL (ref 0–1)
BILIRUBIN URINE: NEGATIVE
BLOOD, URINE: NEGATIVE
BUN BLDV-MCNC: 7 MG/DL (ref 7–20)
CALCIUM SERPL-MCNC: 8.5 MG/DL (ref 8.3–10.6)
CHLORIDE BLD-SCNC: 95 MMOL/L (ref 99–110)
CLARITY: CLEAR
CO2: 29 MMOL/L (ref 21–32)
COLOR: ABNORMAL
CREAT SERPL-MCNC: 0.8 MG/DL (ref 0.8–1.3)
EOSINOPHILS ABSOLUTE: 0.1 K/UL (ref 0–0.6)
EOSINOPHILS RELATIVE PERCENT: 0.8 %
EPITHELIAL CELLS, UA: 1 /HPF (ref 0–5)
GFR AFRICAN AMERICAN: >60
GFR NON-AFRICAN AMERICAN: >60
GLOBULIN: 3.6 G/DL
GLUCOSE BLD-MCNC: 102 MG/DL (ref 70–99)
GLUCOSE URINE: NEGATIVE MG/DL
HCT VFR BLD CALC: 49 % (ref 40.5–52.5)
HEMOGLOBIN: 16.6 G/DL (ref 13.5–17.5)
HYALINE CASTS: 10 /LPF (ref 0–8)
KETONES, URINE: NEGATIVE MG/DL
LEUKOCYTE ESTERASE, URINE: NEGATIVE
LIPASE: 15 U/L (ref 13–60)
LYMPHOCYTES ABSOLUTE: 1.1 K/UL (ref 1–5.1)
LYMPHOCYTES RELATIVE PERCENT: 7 %
MCH RBC QN AUTO: 32.3 PG (ref 26–34)
MCHC RBC AUTO-ENTMCNC: 33.8 G/DL (ref 31–36)
MCV RBC AUTO: 95.5 FL (ref 80–100)
MICROSCOPIC EXAMINATION: YES
MONOCYTES ABSOLUTE: 1.4 K/UL (ref 0–1.3)
MONOCYTES RELATIVE PERCENT: 9.2 %
NEUTROPHILS ABSOLUTE: 12.3 K/UL (ref 1.7–7.7)
NEUTROPHILS RELATIVE PERCENT: 82.1 %
NITRITE, URINE: NEGATIVE
PDW BLD-RTO: 14.5 % (ref 12.4–15.4)
PH UA: 6.5 (ref 5–8)
PLATELET # BLD: 349 K/UL (ref 135–450)
PMV BLD AUTO: 7.6 FL (ref 5–10.5)
POTASSIUM REFLEX MAGNESIUM: 3.9 MMOL/L (ref 3.5–5.1)
PROTEIN UA: ABNORMAL MG/DL
RBC # BLD: 5.13 M/UL (ref 4.2–5.9)
RBC UA: 1 /HPF (ref 0–4)
SODIUM BLD-SCNC: 130 MMOL/L (ref 136–145)
SPECIFIC GRAVITY UA: 1.01 (ref 1–1.03)
TOTAL PROTEIN: 6.8 G/DL (ref 6.4–8.2)
URINE REFLEX TO CULTURE: ABNORMAL
URINE TYPE: ABNORMAL
UROBILINOGEN, URINE: 1 E.U./DL
WBC # BLD: 15 K/UL (ref 4–11)
WBC UA: 2 /HPF (ref 0–5)

## 2021-06-08 PROCEDURE — 99222 1ST HOSP IP/OBS MODERATE 55: CPT | Performed by: SURGERY

## 2021-06-08 PROCEDURE — 6360000002 HC RX W HCPCS: Performed by: NURSE PRACTITIONER

## 2021-06-08 PROCEDURE — 94150 VITAL CAPACITY TEST: CPT

## 2021-06-08 PROCEDURE — 36415 COLL VENOUS BLD VENIPUNCTURE: CPT

## 2021-06-08 PROCEDURE — 80053 COMPREHEN METABOLIC PANEL: CPT

## 2021-06-08 PROCEDURE — 6360000004 HC RX CONTRAST MEDICATION: Performed by: PHYSICIAN ASSISTANT

## 2021-06-08 PROCEDURE — 96374 THER/PROPH/DIAG INJ IV PUSH: CPT

## 2021-06-08 PROCEDURE — 76870 US EXAM SCROTUM: CPT

## 2021-06-08 PROCEDURE — 83690 ASSAY OF LIPASE: CPT

## 2021-06-08 PROCEDURE — 74177 CT ABD & PELVIS W/CONTRAST: CPT

## 2021-06-08 PROCEDURE — 85025 COMPLETE CBC W/AUTO DIFF WBC: CPT

## 2021-06-08 PROCEDURE — 96375 TX/PRO/DX INJ NEW DRUG ADDON: CPT

## 2021-06-08 PROCEDURE — 81001 URINALYSIS AUTO W/SCOPE: CPT

## 2021-06-08 PROCEDURE — C9113 INJ PANTOPRAZOLE SODIUM, VIA: HCPCS | Performed by: NURSE PRACTITIONER

## 2021-06-08 PROCEDURE — 1200000000 HC SEMI PRIVATE

## 2021-06-08 PROCEDURE — 2580000003 HC RX 258: Performed by: PHYSICIAN ASSISTANT

## 2021-06-08 PROCEDURE — 2580000003 HC RX 258: Performed by: NURSE PRACTITIONER

## 2021-06-08 PROCEDURE — 99284 EMERGENCY DEPT VISIT MOD MDM: CPT

## 2021-06-08 PROCEDURE — 6370000000 HC RX 637 (ALT 250 FOR IP): Performed by: NURSE PRACTITIONER

## 2021-06-08 PROCEDURE — 6360000002 HC RX W HCPCS: Performed by: PHYSICIAN ASSISTANT

## 2021-06-08 RX ORDER — ACETAMINOPHEN 325 MG/1
650 TABLET ORAL EVERY 4 HOURS PRN
Status: DISCONTINUED | OUTPATIENT
Start: 2021-06-08 | End: 2021-06-13 | Stop reason: HOSPADM

## 2021-06-08 RX ORDER — ALPRAZOLAM 0.5 MG/1
0.5 TABLET ORAL NIGHTLY PRN
Status: DISCONTINUED | OUTPATIENT
Start: 2021-06-08 | End: 2021-06-13 | Stop reason: HOSPADM

## 2021-06-08 RX ORDER — LEVOTHYROXINE SODIUM 0.03 MG/1
25 TABLET ORAL DAILY
COMMUNITY

## 2021-06-08 RX ORDER — ONDANSETRON 2 MG/ML
4 INJECTION INTRAMUSCULAR; INTRAVENOUS ONCE
Status: COMPLETED | OUTPATIENT
Start: 2021-06-08 | End: 2021-06-08

## 2021-06-08 RX ORDER — ONDANSETRON 4 MG/1
4 TABLET, ORALLY DISINTEGRATING ORAL EVERY 8 HOURS PRN
Status: DISCONTINUED | OUTPATIENT
Start: 2021-06-08 | End: 2021-06-13 | Stop reason: HOSPADM

## 2021-06-08 RX ORDER — OXYCODONE HYDROCHLORIDE 5 MG/1
5 TABLET ORAL EVERY 4 HOURS PRN
Status: DISCONTINUED | OUTPATIENT
Start: 2021-06-08 | End: 2021-06-13 | Stop reason: HOSPADM

## 2021-06-08 RX ORDER — PANTOPRAZOLE SODIUM 40 MG/10ML
40 INJECTION, POWDER, LYOPHILIZED, FOR SOLUTION INTRAVENOUS DAILY
Status: DISCONTINUED | OUTPATIENT
Start: 2021-06-08 | End: 2021-06-13 | Stop reason: HOSPADM

## 2021-06-08 RX ORDER — POTASSIUM CHLORIDE 20 MEQ/1
40 TABLET, EXTENDED RELEASE ORAL PRN
Status: DISCONTINUED | OUTPATIENT
Start: 2021-06-08 | End: 2021-06-13 | Stop reason: HOSPADM

## 2021-06-08 RX ORDER — SODIUM CHLORIDE 9 MG/ML
10 INJECTION INTRAVENOUS DAILY
Status: DISCONTINUED | OUTPATIENT
Start: 2021-06-08 | End: 2021-06-13 | Stop reason: HOSPADM

## 2021-06-08 RX ORDER — MORPHINE SULFATE 2 MG/ML
2 INJECTION, SOLUTION INTRAMUSCULAR; INTRAVENOUS
Status: DISCONTINUED | OUTPATIENT
Start: 2021-06-08 | End: 2021-06-13 | Stop reason: HOSPADM

## 2021-06-08 RX ORDER — METFORMIN HYDROCHLORIDE 500 MG/1
500 TABLET, EXTENDED RELEASE ORAL
COMMUNITY
End: 2021-06-08

## 2021-06-08 RX ORDER — SODIUM CHLORIDE 9 MG/ML
25 INJECTION, SOLUTION INTRAVENOUS PRN
Status: DISCONTINUED | OUTPATIENT
Start: 2021-06-08 | End: 2021-06-13 | Stop reason: HOSPADM

## 2021-06-08 RX ORDER — OXYCODONE HYDROCHLORIDE 10 MG/1
10 TABLET ORAL EVERY 4 HOURS PRN
Status: DISCONTINUED | OUTPATIENT
Start: 2021-06-08 | End: 2021-06-13 | Stop reason: HOSPADM

## 2021-06-08 RX ORDER — POTASSIUM CHLORIDE 7.45 MG/ML
10 INJECTION INTRAVENOUS PRN
Status: DISCONTINUED | OUTPATIENT
Start: 2021-06-08 | End: 2021-06-13 | Stop reason: HOSPADM

## 2021-06-08 RX ORDER — ONDANSETRON 2 MG/ML
4 INJECTION INTRAMUSCULAR; INTRAVENOUS EVERY 6 HOURS PRN
Status: DISCONTINUED | OUTPATIENT
Start: 2021-06-08 | End: 2021-06-13 | Stop reason: HOSPADM

## 2021-06-08 RX ORDER — DOXAZOSIN 2 MG/1
1 TABLET ORAL DAILY
Status: DISCONTINUED | OUTPATIENT
Start: 2021-06-08 | End: 2021-06-08

## 2021-06-08 RX ORDER — 0.9 % SODIUM CHLORIDE 0.9 %
1000 INTRAVENOUS SOLUTION INTRAVENOUS ONCE
Status: COMPLETED | OUTPATIENT
Start: 2021-06-08 | End: 2021-06-08

## 2021-06-08 RX ORDER — ATORVASTATIN CALCIUM 20 MG/1
20 TABLET, FILM COATED ORAL DAILY
COMMUNITY
End: 2021-06-08

## 2021-06-08 RX ORDER — MORPHINE SULFATE 4 MG/ML
4 INJECTION, SOLUTION INTRAMUSCULAR; INTRAVENOUS
Status: DISCONTINUED | OUTPATIENT
Start: 2021-06-08 | End: 2021-06-13 | Stop reason: HOSPADM

## 2021-06-08 RX ORDER — MORPHINE SULFATE 4 MG/ML
4 INJECTION, SOLUTION INTRAMUSCULAR; INTRAVENOUS ONCE
Status: COMPLETED | OUTPATIENT
Start: 2021-06-08 | End: 2021-06-08

## 2021-06-08 RX ORDER — SODIUM CHLORIDE 9 MG/ML
INJECTION, SOLUTION INTRAVENOUS CONTINUOUS
Status: DISCONTINUED | OUTPATIENT
Start: 2021-06-08 | End: 2021-06-13 | Stop reason: HOSPADM

## 2021-06-08 RX ORDER — SODIUM CHLORIDE 0.9 % (FLUSH) 0.9 %
5-40 SYRINGE (ML) INJECTION EVERY 12 HOURS SCHEDULED
Status: DISCONTINUED | OUTPATIENT
Start: 2021-06-08 | End: 2021-06-13 | Stop reason: HOSPADM

## 2021-06-08 RX ORDER — SODIUM CHLORIDE 0.9 % (FLUSH) 0.9 %
10 SYRINGE (ML) INJECTION PRN
Status: DISCONTINUED | OUTPATIENT
Start: 2021-06-08 | End: 2021-06-13 | Stop reason: HOSPADM

## 2021-06-08 RX ORDER — ALBUTEROL SULFATE 90 UG/1
2 AEROSOL, METERED RESPIRATORY (INHALATION) 4 TIMES DAILY PRN
COMMUNITY

## 2021-06-08 RX ADMIN — IOPAMIDOL 75 ML: 755 INJECTION, SOLUTION INTRAVENOUS at 12:48

## 2021-06-08 RX ADMIN — PIPERACILLIN AND TAZOBACTAM 3375 MG: 3; .375 INJECTION, POWDER, LYOPHILIZED, FOR SOLUTION INTRAVENOUS at 14:34

## 2021-06-08 RX ADMIN — Medication 10 ML: at 20:00

## 2021-06-08 RX ADMIN — SODIUM CHLORIDE 1000 ML: 9 INJECTION, SOLUTION INTRAVENOUS at 13:05

## 2021-06-08 RX ADMIN — PIPERACILLIN AND TAZOBACTAM 3375 MG: 3; .375 INJECTION, POWDER, LYOPHILIZED, FOR SOLUTION INTRAVENOUS at 20:00

## 2021-06-08 RX ADMIN — SODIUM CHLORIDE: 9 INJECTION, SOLUTION INTRAVENOUS at 16:18

## 2021-06-08 RX ADMIN — ONDANSETRON 4 MG: 2 INJECTION INTRAMUSCULAR; INTRAVENOUS at 13:06

## 2021-06-08 RX ADMIN — OXYCODONE HYDROCHLORIDE 10 MG: 10 TABLET ORAL at 19:59

## 2021-06-08 RX ADMIN — Medication 10 ML: at 16:20

## 2021-06-08 RX ADMIN — ENOXAPARIN SODIUM 40 MG: 40 INJECTION SUBCUTANEOUS at 20:00

## 2021-06-08 RX ADMIN — MORPHINE SULFATE 4 MG: 4 INJECTION, SOLUTION INTRAMUSCULAR; INTRAVENOUS at 13:07

## 2021-06-08 RX ADMIN — PANTOPRAZOLE SODIUM 40 MG: 40 INJECTION, POWDER, FOR SOLUTION INTRAVENOUS at 19:59

## 2021-06-08 ASSESSMENT — PAIN DESCRIPTION - ORIENTATION
ORIENTATION: LEFT;LOWER
ORIENTATION: MID

## 2021-06-08 ASSESSMENT — ENCOUNTER SYMPTOMS
NAUSEA: 1
BACK PAIN: 0
FACIAL SWELLING: 0
EYE REDNESS: 0
ABDOMINAL PAIN: 1
SHORTNESS OF BREATH: 0
VOMITING: 1
APNEA: 0
EYE DISCHARGE: 0
CHOKING: 0

## 2021-06-08 ASSESSMENT — PAIN DESCRIPTION - PAIN TYPE
TYPE: ACUTE PAIN

## 2021-06-08 ASSESSMENT — PAIN - FUNCTIONAL ASSESSMENT: PAIN_FUNCTIONAL_ASSESSMENT: ACTIVITIES ARE NOT PREVENTED

## 2021-06-08 ASSESSMENT — PAIN DESCRIPTION - LOCATION
LOCATION: ABDOMEN

## 2021-06-08 ASSESSMENT — PAIN SCALES - GENERAL
PAINLEVEL_OUTOF10: 8
PAINLEVEL_OUTOF10: 0
PAINLEVEL_OUTOF10: 2
PAINLEVEL_OUTOF10: 8
PAINLEVEL_OUTOF10: 2
PAINLEVEL_OUTOF10: 10

## 2021-06-08 ASSESSMENT — PAIN DESCRIPTION - FREQUENCY: FREQUENCY: CONTINUOUS

## 2021-06-08 ASSESSMENT — PAIN DESCRIPTION - PROGRESSION: CLINICAL_PROGRESSION: NOT CHANGED

## 2021-06-08 ASSESSMENT — PAIN DESCRIPTION - DESCRIPTORS
DESCRIPTORS: SHARP
DESCRIPTORS: ACHING
DESCRIPTORS: ACHING;CRAMPING;DISCOMFORT
DESCRIPTORS: ACHING

## 2021-06-08 ASSESSMENT — PAIN DESCRIPTION - ONSET: ONSET: ON-GOING

## 2021-06-08 NOTE — ED TRIAGE NOTES
Pt arrives to the ER via EMS with complaints of lower left abdominal pain. Pt states it started on 5/29 when he was diagnosed with gall stones. Pt states he tried to follow up with specialist but unable to get a hold of them. Pt states the pain has not decreased since then. Pt has been unable to eat for 10 days, occasionally having a protein shake. Pt has 20 gauge IV in L forearm, 500mL of fluids given in route. NAD noted, respirations even and easy, skin warm and dry.

## 2021-06-08 NOTE — H&P
[address] Surgery        [phone]        Surgery History and Physical      Pt Name: Louie Hebert  MRN: [id number]  YOB: 1960  Date of evaluation: 6/8/2021  Primary Care Physician: Torres Giraldo MD    Chief Complaint   Patient presents with    Abdominal Pain     LLQ, started on 5/29, was diagnosed with gall stones, states pain has not got any better, unable to get into specialist he states        Assessment/Plan   Acute diverticulitis with intramural abscess  -admit for IV antibiotics, IVF  -serial abdominal exams, monitor WBC 15.   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 Presented to ED 5/30 with c/o groin pain. Workup at that time showed mild acute uncomplicated rectosigmoid diverticulitis, and incidentally cholelithiasis. Was sent home on augmentin, without improvement in testicular or LLQ and pelvic abdominal pain and when he called his PCP they advised to come to ER. Repeat CT today showed worsening of his diverticulitis with several small intramural fluid collections suspicious for abscess without perforation. US scrotum and testes unremarkable. Last BM loose last 1-2 days. Poor PO intake x 10 days, able to tolerate liquids. Colonoscopy 4-5 years ago, done at [place]. Hx of feeding tube d/t squamous cell at tongue base. Past Medical History   has a past medical history of Anxiety, Cancer ([address] Chronic back pain, Erectile dysfunction, Headache(784.0), Osteoarthritis, Restless legs syndrome, and Urinary incontinence. Past Surgical History   has a past surgical history that includes Tonsillectomy (Bilateral). Medications  Prior to Admission medications    Medication Sig Start Date End Date Taking?  Authorizing Provider   ondansetron (ZOFRAN ODT) 4 MG disintegrating tablet Take 1-2 tablets by mouth every 12 hours as needed for Nausea May Sub regular tablet (non-ODT) if insurance does not cover ODT. 5/30/21   [place], APRN - CNP   amoxicillin-clavulanate (AUGMENTIN) 875-125 MG per tablet Take 1 tablet by mouth 2 times daily for 10 days 5/30/21 6/9/21  [facility] CNP   ondansetron (ZOFRAN) 4 MG tablet Take 1 tablet by mouth every 6 hours as needed for Nausea or Vomiting 7/21/20   Hortencia Ponce MD   econazole nitrate 1 % cream Apply twice daily to feet 10/1/18   Dottie Ye MD   clobetasol (TEMOVATE) 0.05 % ointment Apply to back twice daily for 3-4 weeks 5/2/18   Dottie Ye MD   ALPRAZolam Sourav Mix) 0.5 MG tablet Take 0.5 mg by mouth nightly as needed for Sleep (PRN as needed per PT)    Historical Provider, MD   terazosin (HYTRIN) 1 MG capsule Take 1 mg by mouth nightly. Historical Provider, MD    Scheduled Meds:  Continuous Infusions:  PRN Meds:. Allergies  is allergic to zoloft [sertraline hcl] and doxycycline. Family History  Reviewed, non contribtory  family history includes Heart Disease in his father and mother; High Blood Pressure in his father and mother. Social History  Reviewed, non contributory   reports that he has quit smoking. He has never used smokeless tobacco. He reports current drug use. Drug: Marijuana. He reports that he does not drink alcohol. Review of Systems:  12 point review of systems was reviewed and negative except for that listed in HPI    OBJECTIVE:   VITALS:  oral temperature is 97.8 °F (36.6 °C). His blood pressure is 138/83 and his pulse is 97. His respiration is 22 and oxygen saturation is 93%. CONSTITUTIONAL: Alert and oriented times 3, no acute distress and cooperative to examination with proper mood and affect. SKIN: Skin color, texture, turgor normal. No rashes or lesions. LYMPH: no cervical nodes, no inguinal nodes  HEENT: Head is normocephalic, atraumatic. EOMI, PERRLA.   NECK: Supple, symmetrical, trachea midline, no adenopathy, thyroid symmetric, not enlarged and no tenderness, skin normal.  CHEST/LUNGS: chest symmetric with normal A/P diameter, normal respiratory rate and rhythm, lungs clear to auscultation without wheezes, rales or rhonchi. No accessory muscle use. CARDIOVASCULAR: Heart sounds are normal.  Regular rate and rhythm without murmur, gallop or rub. ABDOMEN: Soft, moderate LLQ and suprapubic tenderness without peritonitis. RECTAL: deferred, not clinically indicated. Non distended. Scar c/w old feeding tube site mid upper abdomen. No hernias or masses. NEUROLOGIC: There are no focalizing motor or sensory deficits. CN II-XII are grossly intact. EXTREMITIES: no cyanosis, no clubbing and no edema. LABS:     Recent Labs     06/08/21  1208 06/08/21  1223   WBC 15.0*  --    HGB 16.6  --    HCT 49.0  --      --    *  --    K 3.9  --    CL 95*  --    CO2 29  --    BUN 7  --    CREATININE 0.8  --    CALCIUM 8.5  --    AST 41*  --    ALT 31  --    BILITOT 0.6  --    NITRU  --  Negative   COLORU  --  DK YELLOW     Recent Labs     06/08/21  1208   ALKPHOS 185*   ALT 31   AST 41*   BILITOT 0.6   LABALBU 3.2*   LIPASE 15.0         RADIOLOGY:   I have personally reviewed the following films:  US SCROTUM AND TESTICLES   Final Result   Unremarkable testicular ultrasound with normal Doppler flow. CT ABDOMEN PELVIS W IV CONTRAST Additional Contrast? None   Preliminary Result   1. Progressive inflammatory changes in the rectosigmoid colon, likely   diverticulitis. Several small rounded intramural abscess collections are   suspected. No evidence of perforation. 2. Cholelithiasis with gallbladder wall thickening and mild infiltration of   the pericholecystic fat. Findings are similar on the previous study. Consider sonographic correlation. 3. No other acute findings within the abdomen or pelvis. Stable adenopathy   in the upper abdomen as outlined previously.       RECOMMENDATIONS:   Recommend continued imaging follow-up of the colonic pathology. Enteric   contrast is recommended for any follow-up imaging studies. Thank you for the interesting evaluation. Further recommendations to follow. EDUCATION  Patient educated about the following as pertinent to medical/surgical problems: Disease Process, Medications, Smoking Cessation, Oxygenation, Incentive Spirometry and Deep Breath and Cough, Diabetes, Hyperlipidemia, Smoking Cessation, Nutrition, Exercise and Hypertension    Electronically signed by CHRISTY Ibrahim CNP on 6/8/2021 at 2:07 PM    The note may have been completed using Dragon voice recognition transcription. Every effort was made to ensure accuracy; however, inadvertent transcription errors may be present despite my best efforts to edit errors. Agree with above note. The patient was personally seen and examined. Josesito Santos is a 61 yo male with hx of squamous cell carcinoma of the tongue and hx of PEG who presents with a 2 week history of lower abdominal and left testicular pain. He came to the ED on 5/30 and was found to have uncomplicated sigmoid diverticulitis. He was started on augmentin as an outpatient. He has continued to have fevers with subsequent worsening of his lower abdominal pain. No nausea or vomiting, but he has only had liquids for the past few days. Had a colonoscopy within the past 5 years at the [place] per the patient.     NAD, appears stated age  Normal respiratory effort, no accessory muscle use  Abd soft, ND, minimal LLQ tenderness, no peritonitis  Ext: no c/c/e    WBC 15  Cr 0.8    CT abd/pelvis personally reviewed, showing rectosigmoid diverticulitis with intramural abscesses     A/P: 61 yo male with persistent sigmoid diverticulitis, intramural abscesses    Patient failed outpatient oral antibiotics  Will admit for IV antibiotics  Hopefully will be able to avoid surgery  Will need outpatient colonoscopy in 6-8 weeks    Selvin Chambers MD

## 2021-06-08 NOTE — PROGRESS NOTES
Pt alert and oriented. No c/o pain at this time. Pt enc to cough, deep breath, and call for assistance when needed. Call light with in reach. Will cont to monitor. Electronically signed by Masha Wolf RN on 6/8/2021 at 4:42 PM

## 2021-06-08 NOTE — ED PROVIDER NOTES
1 tablet by mouth 2 times daily for 10 days    ATORVASTATIN (LIPITOR) 20 MG TABLET    Take 20 mg by mouth daily    LEVOTHYROXINE (SYNTHROID) 25 MCG TABLET    Take 25 mcg by mouth Daily    METFORMIN (GLUCOPHAGE-XR) 500 MG EXTENDED RELEASE TABLET    Take 500 mg by mouth daily (with breakfast)    ONDANSETRON (ZOFRAN ODT) 4 MG DISINTEGRATING TABLET    Take 1-2 tablets by mouth every 12 hours as needed for Nausea May Sub regular tablet (non-ODT) if insurance does not cover ODT. Review of Systems:  (2-9 systems needed)  Review of Systems   Constitutional: Negative for chills and fever. HENT: Negative for congestion, facial swelling and sneezing. Eyes: Negative for discharge and redness. Respiratory: Negative for apnea, choking and shortness of breath. Cardiovascular: Negative for chest pain. Gastrointestinal: Positive for abdominal pain, nausea and vomiting. Genitourinary: Negative for dysuria. Musculoskeletal: Negative for back pain, neck pain and neck stiffness. Neurological: Negative for dizziness, tremors, seizures and headaches. All other systems reviewed and are negative. Physical Exam:  Physical Exam  Vitals and nursing note reviewed. Constitutional:       Appearance: He is well-developed. He is not diaphoretic. HENT:      Head: Normocephalic and atraumatic. Nose: Nose normal.      Mouth/Throat:      Mouth: Mucous membranes are moist.      Pharynx: Oropharynx is clear. Eyes:      General:         Right eye: No discharge. Left eye: No discharge. Extraocular Movements: Extraocular movements intact. Conjunctiva/sclera: Conjunctivae normal.      Pupils: Pupils are equal, round, and reactive to light. Cardiovascular:      Rate and Rhythm: Normal rate and regular rhythm. Heart sounds: Normal heart sounds. No murmur heard. No friction rub. No gallop. Pulmonary:      Effort: Pulmonary effort is normal. No respiratory distress.       Breath sounds: Normal breath sounds. No wheezing or rales. Chest:      Chest wall: No tenderness. Abdominal:      General: Abdomen is flat. Bowel sounds are normal. There is no distension. Palpations: Abdomen is soft. There is no mass. Tenderness: There is abdominal tenderness. There is no guarding or rebound. Musculoskeletal:         General: Normal range of motion. Cervical back: Normal range of motion and neck supple. Skin:     General: Skin is warm and dry. Neurological:      Mental Status: He is alert and oriented to person, place, and time.    Psychiatric:         Behavior: Behavior normal.         MEDICAL DECISION MAKING    Vitals:    Vitals:    06/08/21 1316 06/08/21 1332 06/08/21 1346 06/08/21 1401   BP: 132/83 138/83 122/78 125/77   Pulse: 92 97 101 97   Resp: 20 22 24 21   Temp:       TempSrc:       SpO2: 93% 93% 94% 90%       LABS:  Labs Reviewed   CBC WITH AUTO DIFFERENTIAL - Abnormal; Notable for the following components:       Result Value    WBC 15.0 (*)     Neutrophils Absolute 12.3 (*)     Monocytes Absolute 1.4 (*)     All other components within normal limits    Narrative:     Performed at:  [facility]  [address] [facility] [address] [address]   Phone [phone]   COMPREHENSIVE METABOLIC PANEL W/ REFLEX TO MG FOR LOW K - Abnormal; Notable for the following components:    Sodium 130 (*)     Chloride 95 (*)     Glucose 102 (*)     Albumin 3.2 (*)     Albumin/Globulin Ratio 0.9 (*)     Alkaline Phosphatase 185 (*)     AST 41 (*)     All other components within normal limits    Narrative:     Performed at:  [facility]  [address] [facility] [address] [address]   Phone [phone]   URINE RT REFLEX TO CULTURE - Abnormal; Notable for the following components:    Protein, UA TRACE (*)     All other components within normal limits    Narrative:     Performed at:  [facility]  [place] [address] abdomen and pelvis was performed with the administration of intravenous contrast. Multiplanar reformatted images are provided for review. Dose modulation, iterative reconstruction, and/or weight based adjustment of the mA/kV was utilized to reduce the radiation dose to as low as reasonably achievable. COMPARISON: None. HISTORY: ORDERING SYSTEM PROVIDED HISTORY: abd tenderness/lower TECHNOLOGIST PROVIDED HISTORY: Additional Contrast?->None Reason for exam:->abd tenderness/lower Decision Support Exception - unselect if not a suspected or confirmed emergency medical condition->Emergency Medical Condition (MA) Reason for Exam: LLQ pain, fever, fatigue. tongue cancer/rad/chomo Acuity: Acute Type of Exam: Initial FINDINGS: Lower Chest: There is no consolidation or effusion. Organs: The liver exhibits mild diffuse fatty infiltration. Multiple tiny gallstones are present within the gallbladder. There is severe atrophy and or agenesis of the body/tail of the pancreas. The remainder of the solid abdominal organs are unremarkable. GI/Bowel: There is no bowel dilatation, there are extensive diverticular changes demonstrated throughout the rectosigmoid region with a short segment of abnormal circumferential wall thickening and mild surrounding inflammatory change within the rectosigmoid junction. There is no bowel dilatation or obstruction. The appendix is normal. Pelvis: The bladder and prostate are unremarkable. Peritoneum/Retroperitoneum: There is no free air, free fluid or intraperitoneal inflammatory change. There are several mildly enlarged celiac axis/lopez hepatis lymph nodes ranging in size up to 2.4 cm in diameter. Bones/Soft Tissues: There is no acute fracture or aggressive osseous lesion. 1. Mild acute uncomplicated rectosigmoid diverticulitis. 2. Cholelithiasis. 3. Pancreatic atrophy versus partial agenesis. 4. Mild nonspecific celiac axis/lopez hepatis lymph node enlargement.  Short-term CT follow-up in the Augmentin is gotten worse. I did place a call out to general surgery. Spoke with Karina Diaz the midlevel for the surgical group who states she wanted me to start him on Zosyn and she will place orders for admission. Discussed this with the patient he was okay with being admitted. He will be admitted in stable condition. The patient tolerated their visit well. I evaluated the patient. The physician was available for consultation as needed. The patient and / or the family were informed of the results of any tests, a time was given to answer questions, a plan was proposed and they agreed with plan. CLINICAL IMPRESSION:  1. Diverticulitis of intestine with abscess without bleeding, unspecified part of intestinal tract        DISPOSITION Decision To Admit 06/08/2021 02:28:50 PM      PATIENT REFERRED TO:  No follow-up provider specified. DISCHARGE MEDICATIONS:  New Prescriptions    No medications on file       DISCONTINUED MEDICATIONS:  Discontinued Medications    CLOBETASOL (TEMOVATE) 0.05 % OINTMENT    Apply to back twice daily for 3-4 weeks    ECONAZOLE NITRATE 1 % CREAM    Apply twice daily to feet    ONDANSETRON (ZOFRAN) 4 MG TABLET    Take 1 tablet by mouth every 6 hours as needed for Nausea or Vomiting    TERAZOSIN (HYTRIN) 1 MG CAPSULE    Take 1 mg by mouth nightly.               (Please note the MDM and HPI sections of this note were completed with a voice recognition program.  Efforts were made to edit the dictations but occasionally words are mis-transcribed.)    Electronically signed, Lorine Nissen, PA-C,          Lorine Nissen, PA-C  06/08/21 6880

## 2021-06-08 NOTE — ED NOTES
Bed: B-10  Expected date: 6/8/21  Expected time: 11:09 AM  Means of arrival: [facility]  Comments:  [id number] Tay Phillips RN  06/08/21 1116

## 2021-06-08 NOTE — PROGRESS NOTES
Medication Reconciliation    List of medications patient is currently taking is complete. Source of information: 1. Conversation with patient at bedside                                      2. EPIC records                                       3. OARRS report     Allergies  Zoloft [sertraline hcl] and Doxycycline     Notes regarding home medications:   1. Patient received his morning home medications prior to arrival to the emergency department today.   2. Patient no longer takes Atorvastatin or Metformin - removed from medication list.    Te Snell [facility], PharmD, BCPS  6/8/2021 2:37 PM

## 2021-06-08 NOTE — Clinical Note
Patient Class: Inpatient [101]   REQUIRED: Diagnosis: Diverticulitis [313595]   Estimated Length of Stay: Estimated stay of more than 2 midnights   Admitting Provider: Magdalena Mustafa [[id number]]

## 2021-06-09 LAB
ANION GAP SERPL CALCULATED.3IONS-SCNC: 13 MMOL/L (ref 3–16)
BASOPHILS ABSOLUTE: 0.1 K/UL (ref 0–0.2)
BASOPHILS RELATIVE PERCENT: 0.7 %
BUN BLDV-MCNC: 5 MG/DL (ref 7–20)
CALCIUM SERPL-MCNC: 8 MG/DL (ref 8.3–10.6)
CHLORIDE BLD-SCNC: 100 MMOL/L (ref 99–110)
CO2: 23 MMOL/L (ref 21–32)
CREAT SERPL-MCNC: 0.7 MG/DL (ref 0.8–1.3)
EOSINOPHILS ABSOLUTE: 0.2 K/UL (ref 0–0.6)
EOSINOPHILS RELATIVE PERCENT: 1.3 %
GFR AFRICAN AMERICAN: >60
GFR NON-AFRICAN AMERICAN: >60
GLUCOSE BLD-MCNC: 108 MG/DL (ref 70–99)
HCT VFR BLD CALC: 47 % (ref 40.5–52.5)
HEMOGLOBIN: 15.8 G/DL (ref 13.5–17.5)
LYMPHOCYTES ABSOLUTE: 1.2 K/UL (ref 1–5.1)
LYMPHOCYTES RELATIVE PERCENT: 8.6 %
MCH RBC QN AUTO: 32.2 PG (ref 26–34)
MCHC RBC AUTO-ENTMCNC: 33.7 G/DL (ref 31–36)
MCV RBC AUTO: 95.6 FL (ref 80–100)
MONOCYTES ABSOLUTE: 1.2 K/UL (ref 0–1.3)
MONOCYTES RELATIVE PERCENT: 8.7 %
NEUTROPHILS ABSOLUTE: 11.4 K/UL (ref 1.7–7.7)
NEUTROPHILS RELATIVE PERCENT: 80.7 %
PDW BLD-RTO: 14.5 % (ref 12.4–15.4)
PLATELET # BLD: 370 K/UL (ref 135–450)
PMV BLD AUTO: 7.8 FL (ref 5–10.5)
POTASSIUM REFLEX MAGNESIUM: 4.4 MMOL/L (ref 3.5–5.1)
RBC # BLD: 4.91 M/UL (ref 4.2–5.9)
SODIUM BLD-SCNC: 136 MMOL/L (ref 136–145)
WBC # BLD: 14.1 K/UL (ref 4–11)

## 2021-06-09 PROCEDURE — 6370000000 HC RX 637 (ALT 250 FOR IP): Performed by: SPECIALIST

## 2021-06-09 PROCEDURE — 6360000002 HC RX W HCPCS: Performed by: SPECIALIST

## 2021-06-09 PROCEDURE — 99232 SBSQ HOSP IP/OBS MODERATE 35: CPT | Performed by: SURGERY

## 2021-06-09 PROCEDURE — 2500000003 HC RX 250 WO HCPCS: Performed by: SPECIALIST

## 2021-06-09 PROCEDURE — 2580000003 HC RX 258: Performed by: NURSE PRACTITIONER

## 2021-06-09 PROCEDURE — 94761 N-INVAS EAR/PLS OXIMETRY MLT: CPT

## 2021-06-09 PROCEDURE — 1200000000 HC SEMI PRIVATE

## 2021-06-09 PROCEDURE — 6360000002 HC RX W HCPCS: Performed by: NURSE PRACTITIONER

## 2021-06-09 PROCEDURE — 6370000000 HC RX 637 (ALT 250 FOR IP): Performed by: NURSE PRACTITIONER

## 2021-06-09 PROCEDURE — APPNB30 APP NON BILLABLE TIME 0-30 MINS: Performed by: PHYSICIAN ASSISTANT

## 2021-06-09 PROCEDURE — 80048 BASIC METABOLIC PNL TOTAL CA: CPT

## 2021-06-09 PROCEDURE — 85025 COMPLETE CBC W/AUTO DIFF WBC: CPT

## 2021-06-09 PROCEDURE — APPSS15 APP SPLIT SHARED TIME 0-15 MINUTES: Performed by: PHYSICIAN ASSISTANT

## 2021-06-09 PROCEDURE — C9113 INJ PANTOPRAZOLE SODIUM, VIA: HCPCS | Performed by: NURSE PRACTITIONER

## 2021-06-09 RX ORDER — ATORVASTATIN CALCIUM 20 MG/1
20 TABLET, FILM COATED ORAL DAILY
Status: DISCONTINUED | OUTPATIENT
Start: 2021-06-09 | End: 2021-06-13 | Stop reason: HOSPADM

## 2021-06-09 RX ORDER — LEVOTHYROXINE SODIUM 0.03 MG/1
25 TABLET ORAL DAILY
Status: DISCONTINUED | OUTPATIENT
Start: 2021-06-09 | End: 2021-06-13 | Stop reason: HOSPADM

## 2021-06-09 RX ORDER — POLYETHYLENE GLYCOL 3350 17 G/17G
17 POWDER, FOR SOLUTION ORAL DAILY
Status: DISCONTINUED | OUTPATIENT
Start: 2021-06-09 | End: 2021-06-13 | Stop reason: HOSPADM

## 2021-06-09 RX ORDER — ALPRAZOLAM 0.5 MG/1
0.5 TABLET ORAL 2 TIMES DAILY PRN
Status: DISCONTINUED | OUTPATIENT
Start: 2021-06-09 | End: 2021-06-13 | Stop reason: HOSPADM

## 2021-06-09 RX ORDER — ALBUTEROL SULFATE 90 UG/1
2 AEROSOL, METERED RESPIRATORY (INHALATION) 4 TIMES DAILY PRN
Status: DISCONTINUED | OUTPATIENT
Start: 2021-06-09 | End: 2021-06-13 | Stop reason: HOSPADM

## 2021-06-09 RX ORDER — CIPROFLOXACIN 2 MG/ML
400 INJECTION, SOLUTION INTRAVENOUS EVERY 12 HOURS
Status: DISCONTINUED | OUTPATIENT
Start: 2021-06-09 | End: 2021-06-13 | Stop reason: HOSPADM

## 2021-06-09 RX ORDER — DOCUSATE SODIUM 100 MG/1
100 CAPSULE, LIQUID FILLED ORAL 2 TIMES DAILY
Status: DISCONTINUED | OUTPATIENT
Start: 2021-06-09 | End: 2021-06-13 | Stop reason: HOSPADM

## 2021-06-09 RX ADMIN — LEVOTHYROXINE SODIUM 25 MCG: 0.03 TABLET ORAL at 08:41

## 2021-06-09 RX ADMIN — OXYCODONE HYDROCHLORIDE 10 MG: 10 TABLET ORAL at 22:00

## 2021-06-09 RX ADMIN — ACETAMINOPHEN 650 MG: 325 TABLET ORAL at 22:00

## 2021-06-09 RX ADMIN — SODIUM CHLORIDE: 9 INJECTION, SOLUTION INTRAVENOUS at 02:18

## 2021-06-09 RX ADMIN — SODIUM CHLORIDE: 9 INJECTION, SOLUTION INTRAVENOUS at 16:27

## 2021-06-09 RX ADMIN — ENOXAPARIN SODIUM 40 MG: 40 INJECTION SUBCUTANEOUS at 22:01

## 2021-06-09 RX ADMIN — Medication 10 ML: at 08:42

## 2021-06-09 RX ADMIN — MORPHINE SULFATE 4 MG: 4 INJECTION, SOLUTION INTRAMUSCULAR; INTRAVENOUS at 08:41

## 2021-06-09 RX ADMIN — CIPROFLOXACIN 400 MG: 2 INJECTION, SOLUTION INTRAVENOUS at 10:18

## 2021-06-09 RX ADMIN — METRONIDAZOLE 500 MG: 500 INJECTION, SOLUTION INTRAVENOUS at 16:19

## 2021-06-09 RX ADMIN — OXYCODONE HYDROCHLORIDE 10 MG: 10 TABLET ORAL at 16:21

## 2021-06-09 RX ADMIN — OXYCODONE HYDROCHLORIDE 10 MG: 10 TABLET ORAL at 02:17

## 2021-06-09 RX ADMIN — DOCUSATE SODIUM 100 MG: 100 CAPSULE, LIQUID FILLED ORAL at 22:01

## 2021-06-09 RX ADMIN — PANTOPRAZOLE SODIUM 40 MG: 40 INJECTION, POWDER, FOR SOLUTION INTRAVENOUS at 08:41

## 2021-06-09 RX ADMIN — METRONIDAZOLE 500 MG: 500 INJECTION, SOLUTION INTRAVENOUS at 23:37

## 2021-06-09 RX ADMIN — METRONIDAZOLE 500 MG: 500 INJECTION, SOLUTION INTRAVENOUS at 08:41

## 2021-06-09 RX ADMIN — MORPHINE SULFATE 4 MG: 4 INJECTION, SOLUTION INTRAMUSCULAR; INTRAVENOUS at 04:58

## 2021-06-09 RX ADMIN — ATORVASTATIN CALCIUM 20 MG: 20 TABLET, FILM COATED ORAL at 08:41

## 2021-06-09 RX ADMIN — PIPERACILLIN AND TAZOBACTAM 3375 MG: 3; .375 INJECTION, POWDER, LYOPHILIZED, FOR SOLUTION INTRAVENOUS at 04:52

## 2021-06-09 RX ADMIN — CIPROFLOXACIN 400 MG: 2 INJECTION, SOLUTION INTRAVENOUS at 22:00

## 2021-06-09 ASSESSMENT — PAIN DESCRIPTION - ORIENTATION
ORIENTATION: MID
ORIENTATION: MID
ORIENTATION: MID;LOWER

## 2021-06-09 ASSESSMENT — PAIN DESCRIPTION - PAIN TYPE
TYPE: ACUTE PAIN

## 2021-06-09 ASSESSMENT — PAIN DESCRIPTION - LOCATION
LOCATION: ABDOMEN
LOCATION: ABDOMEN
LOCATION: ABDOMEN;GROIN
LOCATION: ABDOMEN
LOCATION: ABDOMEN

## 2021-06-09 ASSESSMENT — PAIN DESCRIPTION - FREQUENCY
FREQUENCY: CONTINUOUS

## 2021-06-09 ASSESSMENT — PAIN SCALES - GENERAL
PAINLEVEL_OUTOF10: 8
PAINLEVEL_OUTOF10: 8
PAINLEVEL_OUTOF10: 7
PAINLEVEL_OUTOF10: 5
PAINLEVEL_OUTOF10: 8
PAINLEVEL_OUTOF10: 9

## 2021-06-09 ASSESSMENT — PAIN DESCRIPTION - ONSET
ONSET: ON-GOING

## 2021-06-09 ASSESSMENT — PAIN DESCRIPTION - PROGRESSION
CLINICAL_PROGRESSION: NOT CHANGED

## 2021-06-09 ASSESSMENT — PAIN DESCRIPTION - DESCRIPTORS
DESCRIPTORS: ACHING;CRAMPING;DISCOMFORT
DESCRIPTORS: ACHING;DISCOMFORT
DESCRIPTORS: ACHING;DISCOMFORT;CRAMPING

## 2021-06-09 ASSESSMENT — PAIN - FUNCTIONAL ASSESSMENT
PAIN_FUNCTIONAL_ASSESSMENT: ACTIVITIES ARE NOT PREVENTED

## 2021-06-09 NOTE — PROGRESS NOTES
General and Vascular Surgery                                                           Daily Progress Note                                                             Sandor Haas PA-C     Pt Name: Mariana Ba Record Number: [id number]  Date of Birth 1960   Today's Date: 6/9/2021    Chief Complaint: Abdominal pain    ASSESSMENT/PLAN  1. LLQ abdominal pain slightly improved. He denies having or ever having RUQ pain (gallstones were seen on the CT, but not causing any problems at this time)  2. Acute diverticulitis with intramural abscess  3. +flatulence. Denies any BM's  4. Denies any nausea or emesis  5. Leukocytosis improving: WBC count 15.0 --> 14.1  6. IV antibiotics, IVF  7. Tolerating a clear liquid diet. Will advance to full liquids as tolerated     EDUCATION  Patient educated about their illness/diagnosis, stated above, and all questions answered. We discussed the importance of nutrition, medications they are taking, and healthy lifestyle. [address] has improved from yesterday. Pain is well controlled. He has no nausea and no vomiting. He has passed flatus and has not had a bowel movement. He is tolerating thin liquids. Current activity is ad edmundo    OBJECTIVE  VITALS:  height is 6' (1.829 m) and weight is 140 lb 3.4 oz (63.6 kg). His oral temperature is 99.6 °F (37.6 °C). His blood pressure is 105/75 and his pulse is 88. His respiration is 18 and oxygen saturation is 96%. VITALS:  /75   Pulse 88   Temp 99.6 °F (37.6 °C) (Oral)   Resp 18   Ht 6' (1.829 m)   Wt 140 lb 3.4 oz (63.6 kg)   SpO2 96%   BMI 19.02 kg/m²   GENERAL: alert, cooperative, no distress  ABDOMEN: tenderness present- LLQ,  without rebound and guarding and distention present-minimal  I/O last 3 completed shifts: In: 3153.8 [P.O.:360; I.V.:1693.8; IV Piggyback:1100]  Out: -   No intake/output data recorded.     LABS  Recent Labs

## 2021-06-09 NOTE — H&P
H & P dictated  [phone]  Acute L L Q abd pain,  Acute diverticulitis, with bowell wall abscess,  Cholelithiasis,  Leucocytosis,  Hx of throat ca in remission for 10 yrs,  On Ciprol,Flagyl, cont home med, DVT profilaxis,  Surgery consult done,.\dr Karina Diaz

## 2021-06-09 NOTE — H&P
[address] Victorina Thomas [address]                              HISTORY AND PHYSICAL    PATIENT NAME: Thong Galloway                       :        1960  MED REC NO:   [id number]                          ROOM:       [id number]  ACCOUNT NO:   [de-identified]                           ADMIT DATE: 2021  PROVIDER:     Eve Anders MD    ATTENDING PROVIDER:  Eve Anders MD    CHIEF COMPLAINT:  Severe left lower quadrant abdominal pain. HISTORY OF PRESENT ILLNESS:  This 36-year-old male patient started  having abdominal pain, initially went to the [facility] and was  started on Augmentin, had some kind of improvement. However, his pain  is getting worse. The patient also had some right groin pain and left  lower quadrant abdominal pain has gotten worse. The patient called the  office and I recommended to come to the emergency room for further  evaluation and treatment. The patient was found to have acute  diverticulitis with a low-grade temperature. The patient started on  antibiotic therapy, was seen also by Surgery. Ultrasound of the scrotum  was unremarkable. PAST MEDICAL HISTORY:  Significant for throat cancer, status post XRT,  in remission since , now is 10 years in remission. The patient also  has chronic anxiety, hyperlipidemia. ALLERGIES:  ZOLOFT and DOXYCYCLINE. MEDICATIONS:  See the reconciliation sheet. No current facility-administered medications for this encounter.      Current Outpatient Medications   Medication Sig Dispense Refill    ciprofloxacin (CIPRO) 500 MG tablet Take 1 tablet by mouth 2 times daily for 10 days 20 tablet 0    metroNIDAZOLE (FLAGYL) 500 MG tablet Take 1 tablet by mouth 3 times daily for 10 days 30 tablet 0    pantoprazole (PROTONIX) 40 MG tablet Take 1 tablet by mouth every morning (before breakfast) 30 tablet 1    albuterol sulfate HFA (VENTOLIN HFA) 108 (90 Base) MCG/ACT inhaler Inhale 2 puffs into the lungs 4 times daily as needed for Wheezing or Shortness of Breath      levothyroxine (SYNTHROID) 25 MCG tablet Take 25 mcg by mouth Daily      ondansetron (ZOFRAN ODT) 4 MG disintegrating tablet Take 1-2 tablets by mouth every 12 hours as needed for Nausea May Sub regular tablet (non-ODT) if insurance does not cover ODT. 12 tablet 0    ALPRAZolam (XANAX) 0.5 MG tablet Take 0.5 mg by mouth 2 times daily as needed for Sleep or Anxiety. FAMILY HISTORY:  Noncontributory. SOCIAL HISTORY:  The patient is , has children. History of  smoking. No drinking. REVIEW OF SYSTEMS:  No headache. No visual symptom. No swallowing  problem. No ear pain. No sore throat. No chest pain. No palpitation. No nausea or vomiting. Had some severe left lower quadrant abdominal  pain. Some groin pain which has improved. No chills but has low-grade  temperature, is also better. No leg swelling. No blood in the stool. PHYSICAL EXAMINATION:  VITAL SIGNS:  The patient's blood pressure was 127/73, respiratory rate  was 24, pulse 90, temperature 97.8, the patient weighed 142 pounds,  saturation was 95 on room air. GENERAL:  The patient is alert and oriented, well developed, well  nourished, not in apparent distress. SKIN:  Warm and dry. HEENT:  Head:  Normocephalic, atraumatic. Pupils are equal, both sides,  reactive to light and accommodation. Ears, Nose, and Throat: Within  normal limits. Mucous membranes are moist.  NECK:  Supple. No JVD. No lymphadenopathy. No thyromegaly. LUNGS:  Clear bilaterally. No wheezing. HEART:  S1, S2.  Regular rhythm. ABDOMEN:  Left lower quadrant abdominal pain. Bowel sounds present. No  guarding. No testicular mass. EXTREMITIES:  No edema. No cyanosis. No clubbing. LABORATORY STUDIES:  WBC count was 14.1, hemoglobin 15.8, hematocrit 47,  platelet count 226.   Sodium was 130, potassium 3.9, chloride 95, CO2 of 29, BUN 7, creatinine 0.8, glucose was 102. The patient's LFTs were  essentially normal.    The patient had CT scan of the abdomen which showed rectosigmoid area  progressive inflammatory changes, diverticulitis, small intramural  abscess, cholelithiasis with some gallbladder wall thickening. No  pericholecystic fluid. Pelvis was fine. No adenopathy. ASSESSMENT:  Acute abdominal pain, acute diverticulitis with small bowel  abscess, cholelithiasis, anxiety, history of throat cancer,  leukocytosis. PLAN:  Consider changing antibiotic to Cipro and Flagyl. The patient  was seen by Surgery. We are going to follow up the ultrasound later. Resume some home medication. Followup labs. The patient is on DVT  prophylaxis also. I spent more than 30 minutes on face-to-face evaluation with the  patient. I discussed the management and findings with the patient and  nursing staff.         Refugio Mcclure MD    D: 06/09/2021 7:38:06       T: 06/09/2021 12:25:28     [email or web address]  Job#: [id number]     Doc#: [id number]    CC:

## 2021-06-09 NOTE — PROGRESS NOTES
Pt alert and oriented. Pt c/o pain 8/10 at this time. Pt medicated per orders. Pt enc to cough, deep breath, and call for assistance when needed. Fall precautions in place. Call light with in reach. Will cont to monitor.

## 2021-06-09 NOTE — CARE COORDINATION
INITIAL CASE MANAGEMENT ASSESSMENT    Reviewed chart, met with patient to assess possible discharge needs. Explained Case Management role/services. Living Situation: confirmed address, lives with his significant other Hiwot, [address]    ADLs: independent     DME: none    PT/OT Recs: n/a     Active Services: none reported     Transportation: active , family to transport home at [place]      Medications: Pharmacy: [facility] in [facility], no issues obtaining or taking medications     PCP: confirmed Alexandria Garcia MD     PLAN/COMMENTS: Plan is home with family, denies discharge needs    SW/CM provided contact information for patient or family to call with any questions. SW/CM will follow and assist as needed.     Nickie HIRSCH, LISW-S, Social Work  [phone]    Electronically signed by JOYCELYN Gamble, LSW on 6/9/2021 at 10:41 AM

## 2021-06-10 PROBLEM — E44.0 MODERATE MALNUTRITION (HCC): Chronic | Status: ACTIVE | Noted: 2021-06-10

## 2021-06-10 LAB
ANION GAP SERPL CALCULATED.3IONS-SCNC: 8 MMOL/L (ref 3–16)
BUN BLDV-MCNC: 6 MG/DL (ref 7–20)
CALCIUM SERPL-MCNC: 8 MG/DL (ref 8.3–10.6)
CHLORIDE BLD-SCNC: 100 MMOL/L (ref 99–110)
CO2: 27 MMOL/L (ref 21–32)
CREAT SERPL-MCNC: 0.7 MG/DL (ref 0.8–1.3)
GFR AFRICAN AMERICAN: >60
GFR NON-AFRICAN AMERICAN: >60
GLUCOSE BLD-MCNC: 144 MG/DL (ref 70–99)
HCT VFR BLD CALC: 44.7 % (ref 40.5–52.5)
HEMOGLOBIN: 15 G/DL (ref 13.5–17.5)
MCH RBC QN AUTO: 32.2 PG (ref 26–34)
MCHC RBC AUTO-ENTMCNC: 33.6 G/DL (ref 31–36)
MCV RBC AUTO: 95.9 FL (ref 80–100)
PDW BLD-RTO: 14.5 % (ref 12.4–15.4)
PLATELET # BLD: 389 K/UL (ref 135–450)
PMV BLD AUTO: 8 FL (ref 5–10.5)
POTASSIUM SERPL-SCNC: 3.9 MMOL/L (ref 3.5–5.1)
RBC # BLD: 4.67 M/UL (ref 4.2–5.9)
SODIUM BLD-SCNC: 135 MMOL/L (ref 136–145)
WBC # BLD: 10.5 K/UL (ref 4–11)

## 2021-06-10 PROCEDURE — C9113 INJ PANTOPRAZOLE SODIUM, VIA: HCPCS | Performed by: NURSE PRACTITIONER

## 2021-06-10 PROCEDURE — 97530 THERAPEUTIC ACTIVITIES: CPT

## 2021-06-10 PROCEDURE — 6360000002 HC RX W HCPCS: Performed by: NURSE PRACTITIONER

## 2021-06-10 PROCEDURE — 85027 COMPLETE CBC AUTOMATED: CPT

## 2021-06-10 PROCEDURE — 6360000002 HC RX W HCPCS: Performed by: SPECIALIST

## 2021-06-10 PROCEDURE — 6370000000 HC RX 637 (ALT 250 FOR IP): Performed by: NURSE PRACTITIONER

## 2021-06-10 PROCEDURE — 80048 BASIC METABOLIC PNL TOTAL CA: CPT

## 2021-06-10 PROCEDURE — 99232 SBSQ HOSP IP/OBS MODERATE 35: CPT | Performed by: SURGERY

## 2021-06-10 PROCEDURE — 36591 DRAW BLOOD OFF VENOUS DEVICE: CPT

## 2021-06-10 PROCEDURE — 2580000003 HC RX 258: Performed by: NURSE PRACTITIONER

## 2021-06-10 PROCEDURE — 6370000000 HC RX 637 (ALT 250 FOR IP): Performed by: SPECIALIST

## 2021-06-10 PROCEDURE — 2500000003 HC RX 250 WO HCPCS: Performed by: SPECIALIST

## 2021-06-10 PROCEDURE — 97161 PT EVAL LOW COMPLEX 20 MIN: CPT

## 2021-06-10 PROCEDURE — 1200000000 HC SEMI PRIVATE

## 2021-06-10 PROCEDURE — 94760 N-INVAS EAR/PLS OXIMETRY 1: CPT

## 2021-06-10 PROCEDURE — 97166 OT EVAL MOD COMPLEX 45 MIN: CPT

## 2021-06-10 RX ADMIN — OXYCODONE HYDROCHLORIDE 10 MG: 10 TABLET ORAL at 09:49

## 2021-06-10 RX ADMIN — ONDANSETRON 4 MG: 2 INJECTION INTRAMUSCULAR; INTRAVENOUS at 23:29

## 2021-06-10 RX ADMIN — METRONIDAZOLE 500 MG: 500 INJECTION, SOLUTION INTRAVENOUS at 23:32

## 2021-06-10 RX ADMIN — METRONIDAZOLE 500 MG: 500 INJECTION, SOLUTION INTRAVENOUS at 08:38

## 2021-06-10 RX ADMIN — ENOXAPARIN SODIUM 40 MG: 40 INJECTION SUBCUTANEOUS at 19:50

## 2021-06-10 RX ADMIN — MORPHINE SULFATE 4 MG: 4 INJECTION, SOLUTION INTRAMUSCULAR; INTRAVENOUS at 17:55

## 2021-06-10 RX ADMIN — LEVOTHYROXINE SODIUM 25 MCG: 0.03 TABLET ORAL at 06:23

## 2021-06-10 RX ADMIN — PANTOPRAZOLE SODIUM 40 MG: 40 INJECTION, POWDER, FOR SOLUTION INTRAVENOUS at 08:38

## 2021-06-10 RX ADMIN — CIPROFLOXACIN 400 MG: 2 INJECTION, SOLUTION INTRAVENOUS at 08:38

## 2021-06-10 RX ADMIN — OXYCODONE HYDROCHLORIDE 10 MG: 10 TABLET ORAL at 04:46

## 2021-06-10 RX ADMIN — OXYCODONE HYDROCHLORIDE 10 MG: 10 TABLET ORAL at 15:58

## 2021-06-10 RX ADMIN — ATORVASTATIN CALCIUM 20 MG: 20 TABLET, FILM COATED ORAL at 08:38

## 2021-06-10 RX ADMIN — METRONIDAZOLE 500 MG: 500 INJECTION, SOLUTION INTRAVENOUS at 15:56

## 2021-06-10 RX ADMIN — CIPROFLOXACIN 400 MG: 2 INJECTION, SOLUTION INTRAVENOUS at 19:48

## 2021-06-10 RX ADMIN — ACETAMINOPHEN 650 MG: 325 TABLET ORAL at 23:36

## 2021-06-10 RX ADMIN — SODIUM CHLORIDE: 9 INJECTION, SOLUTION INTRAVENOUS at 04:47

## 2021-06-10 RX ADMIN — Medication 10 ML: at 08:40

## 2021-06-10 ASSESSMENT — PAIN DESCRIPTION - PROGRESSION
CLINICAL_PROGRESSION: GRADUALLY WORSENING
CLINICAL_PROGRESSION: NOT CHANGED
CLINICAL_PROGRESSION: GRADUALLY IMPROVING
CLINICAL_PROGRESSION: NOT CHANGED

## 2021-06-10 ASSESSMENT — PAIN SCALES - GENERAL
PAINLEVEL_OUTOF10: 8
PAINLEVEL_OUTOF10: 3
PAINLEVEL_OUTOF10: 8
PAINLEVEL_OUTOF10: 8
PAINLEVEL_OUTOF10: 7
PAINLEVEL_OUTOF10: 0
PAINLEVEL_OUTOF10: 3
PAINLEVEL_OUTOF10: 4

## 2021-06-10 ASSESSMENT — PAIN - FUNCTIONAL ASSESSMENT
PAIN_FUNCTIONAL_ASSESSMENT: PREVENTS OR INTERFERES SOME ACTIVE ACTIVITIES AND ADLS
PAIN_FUNCTIONAL_ASSESSMENT: ACTIVITIES ARE NOT PREVENTED
PAIN_FUNCTIONAL_ASSESSMENT: PREVENTS OR INTERFERES SOME ACTIVE ACTIVITIES AND ADLS

## 2021-06-10 ASSESSMENT — PAIN DESCRIPTION - PAIN TYPE
TYPE: ACUTE PAIN

## 2021-06-10 ASSESSMENT — PAIN DESCRIPTION - ORIENTATION
ORIENTATION: LOWER;MID
ORIENTATION: LOWER;MID
ORIENTATION: MID;LOWER
ORIENTATION: ANTERIOR
ORIENTATION: MID;LOWER
ORIENTATION: LOWER;MID

## 2021-06-10 ASSESSMENT — PAIN DESCRIPTION - FREQUENCY
FREQUENCY: CONTINUOUS
FREQUENCY: INTERMITTENT

## 2021-06-10 ASSESSMENT — PAIN DESCRIPTION - ONSET
ONSET: ON-GOING
ONSET: AWAKENED FROM SLEEP

## 2021-06-10 ASSESSMENT — PAIN DESCRIPTION - LOCATION
LOCATION: HEAD
LOCATION: ABDOMEN;GROIN
LOCATION: ABDOMEN;GROIN
LOCATION: ABDOMEN

## 2021-06-10 ASSESSMENT — PAIN DESCRIPTION - DESCRIPTORS
DESCRIPTORS: ACHING;DISCOMFORT
DESCRIPTORS: HEADACHE
DESCRIPTORS: ACHING;DISCOMFORT

## 2021-06-10 NOTE — PROGRESS NOTES
Physical Therapy    Facility/Department: [address] MED SURG  Initial Assessment and Discharge    NAME: Nicole Massey  : 1960  MRN: [id number]    Date of Service: 6/10/2021    Discharge Recommendations:  Home independently        Assessment   Assessment: Pt is 62 y.o. male who presented to the ED on 21 with c/o groin pain, found to have mild acute uncomplicated rectosigmoid diverticulitis, and incidentally cholelithiasis. Pt came back on 21 with worsening pain. Pt currently functioning at his independent baseline. Anticipate return home independently. Prognosis: Good  Decision Making: Low Complexity  History: see below  Exam: see below  Clinical Presentation: stable  PT Education: PT Role;Plan of Care  No Skilled PT: Independent with functional mobility   REQUIRES PT FOLLOW UP: No  Activity Tolerance  Activity Tolerance: Patient Tolerated treatment well       Patient Diagnosis(es): The encounter diagnosis was Diverticulitis of intestine with abscess without bleeding, unspecified part of intestinal tract. has a past medical history of Anxiety, Cancer ([address] Chronic back pain, Erectile dysfunction, Headache(784.0), Osteoarthritis, Restless legs syndrome, and Urinary incontinence. has a past surgical history that includes Tonsillectomy (Bilateral). Restrictions  Restrictions/Precautions  Restrictions/Precautions: Up Ad Radha     Vision/Hearing  Vision: Within Functional Limits  Hearing: Within functional limits       Subjective  General  Chart Reviewed: Yes  Patient assessed for rehabilitation services?: Yes  Additional Pertinent Hx: Pt is 62 y.o. male who presented to the ED on 21 with c/o groin pain, found to have mild acute uncomplicated rectosigmoid diverticulitis, and incidentally cholelithiasis. Pt came back on 21 with worsening pain.   Response To Previous Treatment: Not applicable  Family / Caregiver Present: No  Referring Practitioner: Rasta Fung MD  Referral Date : 06/10/21  Diagnosis: diverticulitis  Follows Commands: Within Functional Limits  Subjective  Subjective: Pt is agreeable to PT. Reports pain 5/10. Orientation  Orientation  Overall Orientation Status: Within Functional Limits     Social/Functional History  Social/Functional History  Lives With: Friend(s)  Type of Home: House  Home Layout: Two level, Laundry in basement  Home Access: Stairs to enter with rails  Entrance Stairs - Number of Steps: 5  Entrance Stairs - Rails: Left  Bathroom Shower/Tub: Tub/Shower unit  Bathroom Toilet: Standard  Bathroom Equipment: Grab bars in shower  Home Equipment: 4 wheeled walker  ADL Assistance: Independent  Homemaking Assistance: Independent  Ambulation Assistance: Independent  Transfer Assistance: Independent  Active : Yes  Mode of Transportation: Car  Occupation: On disability  Leisure & Hobbies: ride motorcycle; mess around in garage     [address]  Overall Cognitive Status: WFL    Objective  Strength RLE  Strength RLE: WNL  Strength LLE  Strength LLE: WNL  Motor Control  Gross Motor?: WFL     Bed mobility  Supine to Sit: Independent  Sit to Supine: Unable to assess (in recliner at end of session)  Comment: Linens changed per pt request  Transfers  Sit to Stand: Independent  Stand to sit:  Independent  Ambulation  Ambulation?: Yes  Ambulation 1  Surface: level tile  Device: No Device  Assistance: Independent  Gait Deviations: None  Distance: 40'  Comments: pt managing IV pole  Stairs/Curb  Stairs?: No     Balance  Posture: Good  Sitting - Static: Good  Sitting - Dynamic: Good  Standing - Static: Good  Standing - Dynamic: Good        Plan   Safety Devices  Type of devices: Call light within reach, Left in chair, Nurse notified      AM-PAC Score  AM-PAC Inpatient Mobility Raw Score : 24 (06/10/21 0944)  AM-PAC Inpatient T-Scale Score : 61.14 (06/10/21 0944)  Mobility Inpatient CMS 0-100% Score: 0 (06/10/21 0944)  Mobility Inpatient CMS G-Code Modifier : [facility] (06/10/21 0944)            Therapy Time   Individual Concurrent Group Co-treatment   Time In 0915         Time Out 0935         Minutes 20         Timed Code Treatment Minutes: 10 Minutes       Margoth Patterson PT

## 2021-06-10 NOTE — PROGRESS NOTES
Patient alert and oriented, VSS, sitting up in bed finishing breakfast. Patient states he has not had anything to eat in several days and this was the first meal, patient states he has tolerated well so far. Patient denies n/v, diarrhea, SOB, states pain in OK right now 3/10 in his abdomen. Patient states no BM in a long time, states he is passing gas and feels he will have a BM today, refused glycolax and colace. Patient denies further needs at this time. Bed in lowest position, non-slip socks on. Patient is UAL and tolerating well, call light within reach. Will continue to monitor pt needs.

## 2021-06-10 NOTE — PROGRESS NOTES
General and Vascular Surgery                                                           Daily Progress Note                                                             Rajat Welch MD     Pt Name: Mariana Ba Record Number: [id number]  Date of Birth 1960   Today's Date: 6/10/2021    Chief Complaint: Abdominal pain    ASSESSMENT/PLAN  1. LLQ abdominal pain slightly improved. He denies having or ever having RUQ pain (gallstones were seen on the CT, but not causing any problems at this time)  2. Acute diverticulitis with intramural abscess  3. Tolerating full liquids. +flatulence. Denies any BM's. Advance to low fiber diet  4. Denies any nausea or emesis  5. Leukocytosis improving: WBC count 15.0 --> 14.1->10.5  6. IV antibiotics, IVF       EDUCATION  Patient educated about their illness/diagnosis, stated above, and all questions answered. We discussed the importance of nutrition, medications they are taking, and healthy lifestyle. [address] has improved from yesterday. Pain is well controlled. He has no nausea and no vomiting. He has passed flatus and has not had a bowel movement. He is tolerating full liquids. Current activity is ad edmundo    OBJECTIVE  VITALS:  height is 6' (1.829 m) and weight is 143 lb 8.3 oz (65.1 kg). His oral temperature is 98.8 °F (37.1 °C). His blood pressure is 119/80 and his pulse is 86. His respiration is 18 and oxygen saturation is 95%. VITALS:  /80   Pulse 86   Temp 98.8 °F (37.1 °C) (Oral)   Resp 18   Ht 6' (1.829 m)   Wt 143 lb 8.3 oz (65.1 kg)   SpO2 95%   BMI 19.46 kg/m²   GENERAL: alert, cooperative, no distress  PULMONARY: normal respiratory effort, no accessory muscle use  CARDIAC: RRR  ABDOMEN: soft, minimal suprapubic tenderness, ND  EXT: no c/c/e  I/O last 3 completed shifts: In: 2150.4 [I.V.:2150.4]  Out: -   No intake/output data recorded.     LABS  Recent Labs 06/08/21  1208 06/08/21  1223 06/10/21  0935   WBC 15.0*  --  10.5   HGB 16.6  --  15.0   HCT 49.0  --  44.7     --  389   *  --  135*   K 3.9  --  3.9   CL 95*  --  100   CO2 29  --  27   BUN 7  --  6*   CREATININE 0.8  --  0.7*   CALCIUM 8.5  --  8.0*   AST 41*  --   --    ALT 31  --   --    BILITOT 0.6  --   --    NITRU  --  Negative  --    COLORU  --  DK YELLOW  --      CBC:   Lab Results   Component Value Date    WBC 10.5 06/10/2021    RBC 4.67 06/10/2021    HGB 15.0 06/10/2021    HCT 44.7 06/10/2021    MCV 95.9 06/10/2021    MCH 32.2 06/10/2021    MCHC 33.6 06/10/2021    RDW 14.5 06/10/2021     06/10/2021    MPV 8.0 06/10/2021     CMP:    Lab Results   Component Value Date     06/10/2021    K 3.9 06/10/2021    K 4.4 06/09/2021     06/10/2021    CO2 27 06/10/2021    BUN 6 06/10/2021    CREATININE 0.7 06/10/2021    GFRAA >60 06/10/2021    AGRATIO 0.9 06/08/2021    LABGLOM >60 06/10/2021    GLUCOSE 144 06/10/2021    PROT 6.8 06/08/2021    LABALBU 3.2 06/08/2021    CALCIUM 8.0 06/10/2021    BILITOT 0.6 06/08/2021    ALKPHOS 185 06/08/2021    AST 41 06/08/2021    ALT 31 06/08/2021         Daniela Bishop MD  Electronically signed 6/10/2021 at 12:10 PM

## 2021-06-10 NOTE — PROGRESS NOTES
Occupational Therapy   Occupational Therapy Initial Assessment/Discharge  Date: 6/10/2021   Patient Name: Flavia Castillo  MRN: [id number]     : 1960    Date of Service: 6/10/2021    Discharge Recommendations:  Home with assist PRN     Flavia Castillo scored a 24/24 on the  [address] form. At this time, no further OT is recommended upon discharge due to pt nearing baseline. Recommend patient returns to prior setting with prior services. Assessment   Assessment: Pt is a 61 yo M admitted w/ acute diverticulitis. PTA, pt lives w/ his S/O and was completely independent. This date, pt appears to be functioning near his baseline. He completed bed mobility, fxl transfers, and fxl mobility independently. Anticipate he would be independent w/ ADLs. Will d/c pt from acute OT at this time. Anticipate safe return home w/ assist PRN when medically stable. Prognosis: Good  Decision Making: Low Complexity  History: see above  Exam: fxl transfers, fxl mob, bed mobility, ADLs  Assistance / Modification: none  OT Education: OT Role  REQUIRES OT FOLLOW UP: No  Activity Tolerance  Activity Tolerance: Patient Tolerated treatment well  Safety Devices  Safety Devices in place: Yes  Type of devices: Left in chair;Call light within reach           Patient Diagnosis(es): The encounter diagnosis was Diverticulitis of intestine with abscess without bleeding, unspecified part of intestinal tract. has a past medical history of Anxiety, Cancer ([address] Chronic back pain, Erectile dysfunction, Headache(784.0), Osteoarthritis, Restless legs syndrome, and Urinary incontinence. has a past surgical history that includes Tonsillectomy (Bilateral).            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 Presented to ED 5/30 with c/o groin pain. Workup at that time showed mild acute uncomplicated rectosigmoid diverticulitis, and incidentally cholelithiasis. Was sent home on augmentin, without improvement in testicular or LLQ and pelvic abdominal pain and when he called his PCP they advised to come to ER. Repeat CT today showed worsening of his diverticulitis with several small intramural fluid collections suspicious for abscess without perforation. US scrotum and testes unremarkable. Last BM loose last 1-2 days. Poor PO intake x 10 days, able to tolerate liquids. Colonoscopy 4-5 years ago, done at [place]. Hx of feeding tube d/t squamous cell at tongue base. \"  Family / Caregiver Present: No  Referring Practitioner: Rasta Fung MD  Diagnosis: Acute diverticulitis with intramural abscess  Subjective  Subjective: Pt met b/s for OT eval/tx w/ PT. Pt in bed, agreeable to therapy. Pt denied pain  Pain Assessment  Pain Assessment: 0-10  Pain Level: 3  Patient's Stated Pain Goal: No pain  Pain Type: Acute pain  Pain Location: Abdomen;Groin  Pain Orientation: Mid;Lower  Pain Descriptors: Aching;Discomfort  Pain Frequency: Continuous  Pain Onset: On-going  Clinical Progression: Not changed  Functional Pain Assessment: Activities are not prevented  Non-Pharmaceutical Pain Intervention(s): Repositioned; Rest  Vital Signs  Resp: 18  Oxygen Therapy  SpO2: 95 %  Pulse Oximeter Device Mode: Intermittent  Pulse Oximeter Device Location: Finger  O2 Device: None (Room air)  Social/Functional History  Social/Functional History  Lives With: Friend(s)  Type of Home: House  Home Layout: Two level, Laundry in basement  Home Access: Stairs to enter with rails  Entrance Stairs - Number of Steps: 5  Entrance Stairs - Rails: Left  Bathroom Shower/Tub: Tub/Shower unit  Bathroom Toilet: Standard  Bathroom Equipment: Grab bars in shower  Home Equipment: 4 wheeled walker  ADL Assistance: Independent  Homemaking Assistance: Independent  Ambulation Assistance: Independent  Transfer Assistance: Independent  Active : Yes  Mode of Transportation: Car  Occupation: On [address]: ride motorcycle; mess around in garage       Objective   Vision: Within Functional Limits  Hearing: Within functional limits    Orientation  Overall Orientation Status: Within Functional Limits     Balance  Sitting Balance: Independent  Standing Balance: Independent  Functional Mobility  Functional - Mobility Device: No device (Pt pushed IV pole)  Activity: Other  Assist Level: Independent  Functional Mobility Comments: no LOB  ADL  Additional Comments: Pt declined ADLs, reports he is waiting to take a shower. Anticipate he would be independent based on ROM, strength, endurance this session  Tone RUE  RUE Tone: Normotonic  Tone LUE  LUE Tone: Normotonic  Coordination  Movements Are Fluid And Coordinated: Yes     Bed mobility  Supine to Sit: Independent  Transfers  Sit to stand: Independent  Stand to sit:  Independent     Cognition  Overall Cognitive Status: WFL                 LUE AROM (degrees)  LUE AROM : WFL  Left Hand AROM (degrees)  Left Hand AROM: WFL  RUE AROM (degrees)  RUE AROM : WFL  Right Hand AROM (degrees)  Right Hand AROM: WFL  LUE Strength  Gross LUE Strength: WFL  RUE Strength  Gross RUE Strength: WFL        Plan   Plan  Times per week: none, d/c from OT      AM-PAC Score  AM-PAC Inpatient Daily Activity Raw Score: 24 (06/10/21 0939)  AM-PAC Inpatient ADL T-Scale Score : 57.54 (06/10/21 0939)  ADL Inpatient CMS 0-100% Score: 0 (06/10/21 0939)  ADL Inpatient CMS G-Code Modifier : [facility] (06/10/21 4417)    Goals  Short term goals  Time Frame for Short term goals: No acute care OT goals identified, pt at baseline function  Patient Goals   Patient goals : to go home       Therapy Time   Individual Concurrent Group Co-treatment   Time In 0915         Time Out 9812 Minutes [address] [place], [place] [address]

## 2021-06-10 NOTE — PROGRESS NOTES
Department of Internal Medicine  General Internal Medicine  Attending Progress Note      SUBJECTIVE:  Pt is doing better, less abd pain,but still present, had low grade temp.     OBJECTIVE      Medications    Current Facility-Administered Medications: albuterol sulfate  (90 Base) MCG/ACT inhaler 2 puff, 2 puff, Inhalation, 4x Daily PRN  atorvastatin (LIPITOR) tablet 20 mg, 20 mg, Oral, Daily  levothyroxine (SYNTHROID) tablet 25 mcg, 25 mcg, Oral, Daily  ciprofloxacin (CIPRO) IVPB 400 mg, 400 mg, Intravenous, Q12H  metronidazole (FLAGYL) 500 mg in NaCl 100 mL IVPB premix, 500 mg, Intravenous, Q8H  ALPRAZolam (XANAX) tablet 0.5 mg, 0.5 mg, Oral, BID PRN  docusate sodium (COLACE) capsule 100 mg, 100 mg, Oral, BID  polyethylene glycol (GLYCOLAX) packet 17 g, 17 g, Oral, Daily  ALPRAZolam (XANAX) tablet 0.5 mg, 0.5 mg, Oral, Nightly PRN  0.9 % sodium chloride infusion, , Intravenous, Continuous  sodium chloride flush 0.9 % injection 5-40 mL, 5-40 mL, Intravenous, 2 times per day  sodium chloride flush 0.9 % injection 10 mL, 10 mL, Intravenous, PRN  0.9 % sodium chloride infusion, 25 mL, Intravenous, PRN  potassium chloride (KLOR-CON M) extended release tablet 40 mEq, 40 mEq, Oral, PRN **OR** potassium bicarb-citric acid (EFFER-K) effervescent tablet 40 mEq, 40 mEq, Oral, PRN **OR** potassium chloride 10 mEq/100 mL IVPB (Peripheral Line), 10 mEq, Intravenous, PRN  morphine (PF) injection 2 mg, 2 mg, Intravenous, Q2H PRN **OR** morphine (PF) injection 4 mg, 4 mg, Intravenous, Q2H PRN  pantoprazole (PROTONIX) injection 40 mg, 40 mg, Intravenous, Daily **AND** sodium chloride (PF) 0.9 % injection 10 mL, 10 mL, Intravenous, Daily  ondansetron (ZOFRAN-ODT) disintegrating tablet 4 mg, 4 mg, Oral, Q8H PRN **OR** ondansetron (ZOFRAN) injection 4 mg, 4 mg, Intravenous, Q6H PRN  enoxaparin (LOVENOX) injection 40 mg, 40 mg, Subcutaneous, Nightly  oxyCODONE (ROXICODONE) immediate release tablet 5 mg, 5 mg, Oral, Q4H PRN **OR** oxyCODONE HCl (OXY-IR) immediate release tablet 10 mg, 10 mg, Oral, Q4H PRN  acetaminophen (TYLENOL) tablet 650 mg, 650 mg, Oral, Q4H PRN  Physical    VITALS:  /80   Pulse 86   Temp 98.8 °F (37.1 °C) (Oral)   Resp 18   Ht 6' (1.829 m)   Wt 143 lb 8.3 oz (65.1 kg)   SpO2 95%   BMI 19.46 kg/m²   CONSTITUTIONAL:  awake, alert, cooperative, no apparent distress, and appears stated age  LUNGS:  No increased work of breathing, good air exchange, clear to auscultation bilaterally, no crackles or wheezing  CARDIOVASCULAR:  Normal apical impulse, regular rate and rhythm, normal S1 and S2, no S3 or S4, and no murmur noted  ABDOMEN:  Mild-mod L L Q abd pain,  + BS  MUSCULOSKELETAL:  There is no redness, warmth, or swelling of the joints. Full range of motion noted. Motor strength is 5 out of 5 all extremities bilaterally. Tone is normal.  Data    CBC:   Lab Results   Component Value Date    WBC 14.1 06/09/2021    RBC 4.91 06/09/2021    HGB 15.8 06/09/2021    HCT 47.0 06/09/2021    MCV 95.6 06/09/2021    MCH 32.2 06/09/2021    MCHC 33.7 06/09/2021    RDW 14.5 06/09/2021     06/09/2021    MPV 7.8 06/09/2021     BMP:    Lab Results   Component Value Date     06/09/2021    K 4.4 06/09/2021     06/09/2021    CO2 23 06/09/2021    BUN 5 06/09/2021    LABALBU 3.2 06/08/2021    CREATININE 0.7 06/09/2021    CALCIUM 8.0 06/09/2021    GFRAA >60 06/09/2021    LABGLOM >60 06/09/2021    GLUCOSE 108 06/09/2021       ASSESSMENT AND PLAN      Active Problems:     Acute L L Q abd pain,  Acute diverticulitis, with bowell wall abscess, cont antibiotic therapy, f/u labs, f/u by surgery, no surgical intervention so far.   Cholelithiasis, stable,   Leucocytosis, improving,  Hx of throat ca in remission for 10 yrs,  On Ciprol,Flagyl, cont home med, DVT profilaxis,  Advanced diet, PT/OT  Surgery consult done,.\dr Oh Blocker

## 2021-06-10 NOTE — PLAN OF CARE
Problem: Falls - Risk of:  Goal: Will remain free from falls  Description: Will remain free from falls  6/10/2021 1100 by Jordin Bonilla RN  Outcome: Ongoing  6/10/2021 0217 by Marlena Bianchi RN  Outcome: Ongoing  Goal: Absence of physical injury  Description: Absence of physical injury  6/10/2021 1100 by Jordin Bonilla RN  Outcome: Ongoing  6/10/2021 0217 by Marlena Bianchi RN  Outcome: Ongoing     Problem: Pain:  Description: Pain management should include both nonpharmacologic and pharmacologic interventions.   Goal: Pain level will decrease  Description: Pain level will decrease  6/10/2021 1100 by Jordin Bonilla RN  Outcome: Ongoing  6/10/2021 0217 by Marlena Bianchi RN  Outcome: Ongoing  Goal: Control of acute pain  Description: Control of acute pain  6/10/2021 1100 by Jordin Bonilla RN  Outcome: Ongoing  6/10/2021 0217 by Marlena Bianchi RN  Outcome: Ongoing  Goal: Control of chronic pain  Description: Control of chronic pain  6/10/2021 1100 by Jordin Bonilla RN  Outcome: Ongoing  6/10/2021 0217 by Marlena Bianchi RN  Outcome: Ongoing

## 2021-06-10 NOTE — PROGRESS NOTES
Comprehensive Nutrition Assessment    Type and Reason for Visit:  Initial, Positive Nutrition Screen    Nutrition Recommendations/Plan:   - Continue low fiber diet  - Continue Ensure Enlive TID  - Monitor tolerance to diet advancement     Nutrition Assessment:  Positive nutrition screen. Pt with moderate malnutrition status. RD observed wasting in the clavicals and orbital region. No significant wt loss over the past year per EMR. Pt reports poor appetite. Pt tolerating full liquid diet with no n/v. Changed to low fiber today although pt has not tried yet. Receiving Ensure Enlive TID. Pt states liking ONS and drinking them daily at home. Will continue ONS and monitor for tolerance to diet advancement. Malnutrition Assessment:  Malnutrition Status: Moderate malnutrition    Context:  Chronic Illness     Findings of the 6 clinical characteristics of malnutrition:  Energy Intake:  Mild decrease in energy intake (Comment)  Weight Loss:  No significant weight loss     Body Fat Loss:  1 - Mild body fat loss Orbital   Muscle Mass Loss:  1 - Mild muscle mass loss Clavicles (pectoralis & deltoids)  Fluid Accumulation:  No significant fluid accumulation     Strength:  Not Performed    Estimated Daily Nutrient Needs:  Energy (kcal):  8129-8512 (25-30kcal/65kg); Weight Used for Energy Requirements:  Current     Protein (g):  78-98 (1.2-1.5g/65kg); Weight Used for Protein Requirements:  Current        Fluid (ml/day): 1 ml/kcal      Nutrition Related Findings:  +BM 5/29. No edema. +5L. Wounds:  None       Current Nutrition Therapies:    Adult Oral Nutrition Supplement; Standard High Calorie/High Protein Oral Supplement  ADULT DIET; Regular; Low Fiber    Anthropometric Measures:  · Height: 6' (182.9 cm)  · Current Body Weight: 143 lb (64.9 kg)   · Admission Body Weight: 142 lb (64.4 kg)    · Ideal Body Weight: 178 lbs; % Ideal Body Weight 80.3 %   · BMI: 19.4  · BMI Categories: Normal Weight (BMI 18.5-24. 9) Nutrition Diagnosis:   · Moderate malnutrition related to inadequate protein-energy intake as evidenced by mild muscle loss, mild loss of subcutaneous fat, poor intake prior to admission    Nutrition Interventions:   Food and/or Nutrient Delivery:  Continue Current Diet, Continue Oral Nutrition Supplement  Nutrition Education/Counseling:  Education not indicated   Coordination of Nutrition Care:  Continue to monitor while inpatient    Goals: Tolerate diet with intakes greater than 50%       Nutrition Monitoring and Evaluation:   Behavioral-Environmental Outcomes:  None Identified   Food/Nutrient Intake Outcomes:  Food and Nutrient Intake, Diet Advancement/Tolerance, Supplement Intake  Physical Signs/Symptoms Outcomes:  GI Status, Constipation, Nausea or Vomiting, Nutrition Focused Physical Findings, Weight     Discharge Planning:     Too soon to determine     Electronically signed by Katya Ling RD, LD on 6/10/21 at 2:09 PM EDT    Contact: [phone]

## 2021-06-11 LAB
ANION GAP SERPL CALCULATED.3IONS-SCNC: 8 MMOL/L (ref 3–16)
BASOPHILS ABSOLUTE: 0.1 K/UL (ref 0–0.2)
BASOPHILS RELATIVE PERCENT: 0.9 %
BUN BLDV-MCNC: 4 MG/DL (ref 7–20)
CALCIUM SERPL-MCNC: 7.5 MG/DL (ref 8.3–10.6)
CHLORIDE BLD-SCNC: 101 MMOL/L (ref 99–110)
CO2: 27 MMOL/L (ref 21–32)
CREAT SERPL-MCNC: 0.6 MG/DL (ref 0.8–1.3)
EOSINOPHILS ABSOLUTE: 0.2 K/UL (ref 0–0.6)
EOSINOPHILS RELATIVE PERCENT: 2 %
GFR AFRICAN AMERICAN: >60
GFR NON-AFRICAN AMERICAN: >60
GLUCOSE BLD-MCNC: 136 MG/DL (ref 70–99)
HCT VFR BLD CALC: 43.2 % (ref 40.5–52.5)
HEMOGLOBIN: 14.7 G/DL (ref 13.5–17.5)
LYMPHOCYTES ABSOLUTE: 0.9 K/UL (ref 1–5.1)
LYMPHOCYTES RELATIVE PERCENT: 10.8 %
MAGNESIUM: 1.8 MG/DL (ref 1.8–2.4)
MCH RBC QN AUTO: 32.2 PG (ref 26–34)
MCHC RBC AUTO-ENTMCNC: 34 G/DL (ref 31–36)
MCV RBC AUTO: 94.7 FL (ref 80–100)
MONOCYTES ABSOLUTE: 1.2 K/UL (ref 0–1.3)
MONOCYTES RELATIVE PERCENT: 14.2 %
NEUTROPHILS ABSOLUTE: 6.1 K/UL (ref 1.7–7.7)
NEUTROPHILS RELATIVE PERCENT: 72.1 %
PDW BLD-RTO: 14.2 % (ref 12.4–15.4)
PHOSPHORUS: 2.3 MG/DL (ref 2.5–4.9)
PLATELET # BLD: 399 K/UL (ref 135–450)
PMV BLD AUTO: 7.3 FL (ref 5–10.5)
POTASSIUM SERPL-SCNC: 3.6 MMOL/L (ref 3.5–5.1)
RBC # BLD: 4.56 M/UL (ref 4.2–5.9)
SODIUM BLD-SCNC: 136 MMOL/L (ref 136–145)
WBC # BLD: 8.5 K/UL (ref 4–11)

## 2021-06-11 PROCEDURE — APPNB15 APP NON BILLABLE TIME 0-15 MINS: Performed by: NURSE PRACTITIONER

## 2021-06-11 PROCEDURE — 99232 SBSQ HOSP IP/OBS MODERATE 35: CPT | Performed by: SURGERY

## 2021-06-11 PROCEDURE — 6370000000 HC RX 637 (ALT 250 FOR IP): Performed by: NURSE PRACTITIONER

## 2021-06-11 PROCEDURE — 6360000002 HC RX W HCPCS: Performed by: SPECIALIST

## 2021-06-11 PROCEDURE — 2580000003 HC RX 258: Performed by: NURSE PRACTITIONER

## 2021-06-11 PROCEDURE — APPSS15 APP SPLIT SHARED TIME 0-15 MINUTES: Performed by: NURSE PRACTITIONER

## 2021-06-11 PROCEDURE — 84100 ASSAY OF PHOSPHORUS: CPT

## 2021-06-11 PROCEDURE — 6370000000 HC RX 637 (ALT 250 FOR IP): Performed by: SPECIALIST

## 2021-06-11 PROCEDURE — 83735 ASSAY OF MAGNESIUM: CPT

## 2021-06-11 PROCEDURE — 80048 BASIC METABOLIC PNL TOTAL CA: CPT

## 2021-06-11 PROCEDURE — 6360000002 HC RX W HCPCS: Performed by: NURSE PRACTITIONER

## 2021-06-11 PROCEDURE — 1200000000 HC SEMI PRIVATE

## 2021-06-11 PROCEDURE — C9113 INJ PANTOPRAZOLE SODIUM, VIA: HCPCS | Performed by: NURSE PRACTITIONER

## 2021-06-11 PROCEDURE — 2500000003 HC RX 250 WO HCPCS: Performed by: SPECIALIST

## 2021-06-11 PROCEDURE — 85025 COMPLETE CBC W/AUTO DIFF WBC: CPT

## 2021-06-11 RX ORDER — BUTALBITAL, ACETAMINOPHEN AND CAFFEINE 50; 325; 40 MG/1; MG/1; MG/1
1 TABLET ORAL EVERY 4 HOURS PRN
Status: DISCONTINUED | OUTPATIENT
Start: 2021-06-11 | End: 2021-06-13 | Stop reason: HOSPADM

## 2021-06-11 RX ADMIN — SODIUM CHLORIDE: 9 INJECTION, SOLUTION INTRAVENOUS at 06:07

## 2021-06-11 RX ADMIN — DOCUSATE SODIUM 100 MG: 100 CAPSULE, LIQUID FILLED ORAL at 20:49

## 2021-06-11 RX ADMIN — LEVOTHYROXINE SODIUM 25 MCG: 0.03 TABLET ORAL at 06:07

## 2021-06-11 RX ADMIN — Medication 10 ML: at 08:17

## 2021-06-11 RX ADMIN — PANTOPRAZOLE SODIUM 40 MG: 40 INJECTION, POWDER, FOR SOLUTION INTRAVENOUS at 08:15

## 2021-06-11 RX ADMIN — OXYCODONE HYDROCHLORIDE 10 MG: 10 TABLET ORAL at 08:22

## 2021-06-11 RX ADMIN — METRONIDAZOLE 500 MG: 500 INJECTION, SOLUTION INTRAVENOUS at 16:05

## 2021-06-11 RX ADMIN — ATORVASTATIN CALCIUM 20 MG: 20 TABLET, FILM COATED ORAL at 08:15

## 2021-06-11 RX ADMIN — CIPROFLOXACIN 400 MG: 2 INJECTION, SOLUTION INTRAVENOUS at 08:13

## 2021-06-11 RX ADMIN — OXYCODONE HYDROCHLORIDE 5 MG: 5 TABLET ORAL at 20:49

## 2021-06-11 RX ADMIN — ENOXAPARIN SODIUM 40 MG: 40 INJECTION SUBCUTANEOUS at 20:49

## 2021-06-11 RX ADMIN — METRONIDAZOLE 500 MG: 500 INJECTION, SOLUTION INTRAVENOUS at 09:33

## 2021-06-11 RX ADMIN — BUTALBITAL, ACETAMINOPHEN, AND CAFFEINE 1 TABLET: 50; 325; 40 TABLET ORAL at 21:56

## 2021-06-11 RX ADMIN — BUTALBITAL, ACETAMINOPHEN, AND CAFFEINE 1 TABLET: 50; 325; 40 TABLET ORAL at 12:27

## 2021-06-11 RX ADMIN — CIPROFLOXACIN 400 MG: 2 INJECTION, SOLUTION INTRAVENOUS at 20:49

## 2021-06-11 RX ADMIN — SODIUM CHLORIDE: 9 INJECTION, SOLUTION INTRAVENOUS at 20:49

## 2021-06-11 ASSESSMENT — PAIN DESCRIPTION - PROGRESSION
CLINICAL_PROGRESSION: NOT CHANGED

## 2021-06-11 ASSESSMENT — PAIN DESCRIPTION - PAIN TYPE
TYPE: ACUTE PAIN

## 2021-06-11 ASSESSMENT — PAIN - FUNCTIONAL ASSESSMENT
PAIN_FUNCTIONAL_ASSESSMENT: PREVENTS OR INTERFERES SOME ACTIVE ACTIVITIES AND ADLS

## 2021-06-11 ASSESSMENT — PAIN DESCRIPTION - DESCRIPTORS
DESCRIPTORS: HEADACHE
DESCRIPTORS: ACHING
DESCRIPTORS: HEADACHE
DESCRIPTORS: HEADACHE

## 2021-06-11 ASSESSMENT — PAIN SCALES - GENERAL
PAINLEVEL_OUTOF10: 9
PAINLEVEL_OUTOF10: 8
PAINLEVEL_OUTOF10: 6
PAINLEVEL_OUTOF10: 2

## 2021-06-11 ASSESSMENT — PAIN DESCRIPTION - LOCATION
LOCATION: HEAD
LOCATION: ABDOMEN;HEAD
LOCATION: HEAD
LOCATION: HEAD

## 2021-06-11 ASSESSMENT — PAIN DESCRIPTION - ONSET
ONSET: ON-GOING

## 2021-06-11 ASSESSMENT — PAIN DESCRIPTION - FREQUENCY
FREQUENCY: CONTINUOUS

## 2021-06-11 NOTE — PROGRESS NOTES
Medication counted and confirmed with patient at bedside with Shakira Samuel RN. 48 pink, oval tablets in 1 container. Labeled alprazolam 0.5 mg. Pharmacy and charge RN notified. Pt left in [facility] with pt's label on.

## 2021-06-11 NOTE — PLAN OF CARE
Problem: Falls - Risk of:  Goal: Will remain free from falls  6/11/2021 0045 by Sissy Boss RN  Outcome: Ongoing     Problem: Falls - Risk of:  Goal: Absence of physical injury  6/11/2021 0045 by Sissy Boss RN  Outcome: Ongoing     Problem: Pain:  Goal: Pain level will decrease  6/11/2021 0045 by Sissy Boss RN  Outcome: Ongoing     Problem: Pain:  Goal: Control of acute pain  6/11/2021 0045 by Sissy Boss RN  Outcome: Ongoing     Problem: Pain:  Goal: Control of chronic pain  6/11/2021 0045 by Sissy Boss RN  Outcome: Ongoing     Problem: Nutrition  Goal: Optimal nutrition therapy  6/11/2021 0045 by Sissy Boss RN  Outcome: Ongoing

## 2021-06-11 NOTE — PLAN OF CARE
Problem: Falls - Risk of:  Goal: Will remain free from falls  Description: Will remain free from falls  6/11/2021 1031 by Kirk Velez RN  Outcome: Ongoing  6/11/2021 0045 by Olga Broussard RN  Outcome: Ongoing  Goal: Absence of physical injury  Description: Absence of physical injury  6/11/2021 1031 by Kirk Velez RN  Outcome: Ongoing  6/11/2021 0045 by Olga Broussard RN  Outcome: Ongoing     Problem: Pain:  Description: Pain management should include both nonpharmacologic and pharmacologic interventions.   Goal: Pain level will decrease  Description: Pain level will decrease  6/11/2021 1031 by Kirk Velez RN  Outcome: Ongoing  6/11/2021 0045 by Olga Broussard RN  Outcome: Ongoing  Goal: Control of acute pain  Description: Control of acute pain  6/11/2021 1031 by Kirk Velez RN  Outcome: Ongoing  6/11/2021 0045 by Olga Broussard RN  Outcome: Ongoing  Goal: Control of chronic pain  Description: Control of chronic pain  6/11/2021 1031 by Kirk Velez RN  Outcome: Ongoing  6/11/2021 0045 by Olga Broussard RN  Outcome: Ongoing     Problem: Nutrition  Goal: Optimal nutrition therapy  6/11/2021 1031 by Kirk Velez RN  Outcome: Ongoing  6/11/2021 0045 by Olga Broussard RN  Outcome: Ongoing

## 2021-06-11 NOTE — PROGRESS NOTES
General and Vascular Surgery                                                           Daily Progress Note                                                             CHRISTY Barton - CNP     Pt Name: Mariana Frias [facility] Record Number: [id number]  Date of Birth 1960   Today's Date: 6/11/2021    Chief Complaint: Abdominal pain    ASSESSMENT/PLAN  Diverticulitis with intramural abscess  1. LLQ abdominal pain slightly improved. He denies having or ever having RUQ pain (gallstones were seen on the CT, but not causing any problems at this time)  2. Tolerating low fiber diet. +flatulence. Small BM 6/10.   3. Denies any nausea or emesis  4. Leukocytosis improving: WBC count 15.0 --> 14.1->10.5-->8.5  5. IV antibiotics, IVF       EDUCATION  Patient educated about their illness/diagnosis, stated above, and all questions answered. We discussed the importance of nutrition, medications they are taking, and healthy lifestyle. [address] is slowly improving. Left lower quadrant pain improved, but has a headache this morning. Pain is well controlled. He has no nausea and no vomiting. He has passed flatus and has had a bowel movement. He is tolerating low fiber diet. Current activity is ad edmundo    OBJECTIVE  VITALS:  height is 6' (1.829 m) and weight is 143 lb 8.3 oz (65.1 kg). His oral temperature is 97.9 °F (36.6 °C). His blood pressure is 123/84 and his pulse is 81. His respiration is 18 and oxygen saturation is 92%. VITALS:  /84   Pulse 81   Temp 97.9 °F (36.6 °C) (Oral)   Resp 18   Ht 6' (1.829 m)   Wt 143 lb 8.3 oz (65.1 kg)   SpO2 92%   BMI 19.46 kg/m²   GENERAL: alert, cooperative, no distress  PULMONARY: normal respiratory effort, no accessory muscle use  CARDIAC: RRR  ABDOMEN: soft, improving left lower quadrant tenderness without peritonitis, ND  EXT: no c/c/e  I/O last 3 completed shifts:   In: 2380 [P.O.:1080; I.V.:1000; IV Piggyback:300]  Out: 1000 [Urine:1000]  I/O this shift:  In: 1120 [P.O.:120; I.V.:1000]  Out: -     LABS  Recent Labs     06/11/21 0613   WBC 8.5   HGB 14.7   HCT 43.2         K 3.6      CO2 27   BUN 4*   CREATININE 0.6*   MG 1.80   PHOS 2.3*   CALCIUM 7.5*     CBC:   Lab Results   Component Value Date    WBC 8.5 06/11/2021    RBC 4.56 06/11/2021    HGB 14.7 06/11/2021    HCT 43.2 06/11/2021    MCV 94.7 06/11/2021    MCH 32.2 06/11/2021    MCHC 34.0 06/11/2021    RDW 14.2 06/11/2021     06/11/2021    MPV 7.3 06/11/2021     CMP:    Lab Results   Component Value Date     06/11/2021    K 3.6 06/11/2021    K 4.4 06/09/2021     06/11/2021    CO2 27 06/11/2021    BUN 4 06/11/2021    CREATININE 0.6 06/11/2021    GFRAA >60 06/11/2021    AGRATIO 0.9 06/08/2021    LABGLOM >60 06/11/2021    GLUCOSE 136 06/11/2021    PROT 6.8 06/08/2021    LABALBU 3.2 06/08/2021    CALCIUM 7.5 06/11/2021    BILITOT 0.6 06/08/2021    ALKPHOS 185 06/08/2021    AST 41 06/08/2021    ALT 31 06/08/2021         CHRISTY He - CNP  Electronically signed 6/11/2021 at 12:41 PM      Surgery Staff  I have examined this patient and read and agree with the note by CHRISTY Recio Asp from today. Slowly improving.   Mild pain    Mildly tender LLQ  Leukocytosis resolved    Improving diverticulitis with abscess  Continue abx  Diet as tolerated  Potential D/C over weekend if tolerates diet advancement    Electronically signed by Donald Tse MD on 6/11/2021 at 3:01 PM

## 2021-06-11 NOTE — PROGRESS NOTES
Department of Internal Medicine  General Internal Medicine  Attending Progress Note      SUBJECTIVE:  Pt is anxious, but doing OK, has some nausea and HA last night, better this am. Afebrile, tolerating diet OK,    OBJECTIVE      Medications    Current Facility-Administered Medications: albuterol sulfate  (90 Base) MCG/ACT inhaler 2 puff, 2 puff, Inhalation, 4x Daily PRN  atorvastatin (LIPITOR) tablet 20 mg, 20 mg, Oral, Daily  levothyroxine (SYNTHROID) tablet 25 mcg, 25 mcg, Oral, Daily  ciprofloxacin (CIPRO) IVPB 400 mg, 400 mg, Intravenous, Q12H  metronidazole (FLAGYL) 500 mg in NaCl 100 mL IVPB premix, 500 mg, Intravenous, Q8H  ALPRAZolam (XANAX) tablet 0.5 mg, 0.5 mg, Oral, BID PRN  docusate sodium (COLACE) capsule 100 mg, 100 mg, Oral, BID  polyethylene glycol (GLYCOLAX) packet 17 g, 17 g, Oral, Daily  ALPRAZolam (XANAX) tablet 0.5 mg, 0.5 mg, Oral, Nightly PRN  0.9 % sodium chloride infusion, , Intravenous, Continuous  sodium chloride flush 0.9 % injection 5-40 mL, 5-40 mL, Intravenous, 2 times per day  sodium chloride flush 0.9 % injection 10 mL, 10 mL, Intravenous, PRN  0.9 % sodium chloride infusion, 25 mL, Intravenous, PRN  potassium chloride (KLOR-CON M) extended release tablet 40 mEq, 40 mEq, Oral, PRN **OR** potassium bicarb-citric acid (EFFER-K) effervescent tablet 40 mEq, 40 mEq, Oral, PRN **OR** potassium chloride 10 mEq/100 mL IVPB (Peripheral Line), 10 mEq, Intravenous, PRN  morphine (PF) injection 2 mg, 2 mg, Intravenous, Q2H PRN **OR** morphine (PF) injection 4 mg, 4 mg, Intravenous, Q2H PRN  pantoprazole (PROTONIX) injection 40 mg, 40 mg, Intravenous, Daily **AND** sodium chloride (PF) 0.9 % injection 10 mL, 10 mL, Intravenous, Daily  ondansetron (ZOFRAN-ODT) disintegrating tablet 4 mg, 4 mg, Oral, Q8H PRN **OR** ondansetron (ZOFRAN) injection 4 mg, 4 mg, Intravenous, Q6H PRN  enoxaparin (LOVENOX) injection 40 mg, 40 mg, Subcutaneous, Nightly  oxyCODONE (ROXICODONE) immediate release tablet 5 mg, 5 mg, Oral, Q4H PRN **OR** oxyCODONE HCl (OXY-IR) immediate release tablet 10 mg, 10 mg, Oral, Q4H PRN  acetaminophen (TYLENOL) tablet 650 mg, 650 mg, Oral, Q4H PRN  Physical    VITALS:  /84   Pulse 81   Temp 97.9 °F (36.6 °C) (Oral)   Resp 18   Ht 6' (1.829 m)   Wt 143 lb 8.3 oz (65.1 kg)   SpO2 92%   BMI 19.46 kg/m²   CONSTITUTIONAL:  awake, alert, cooperative, no apparent distress, and appears stated age  LUNGS:  No increased work of breathing, good air exchange, clear to auscultation bilaterally, no crackles or wheezing  CARDIOVASCULAR:  Normal apical impulse, regular rate and rhythm, normal S1 and S2, no S3 or S4, and no murmur noted  ABDOMEN:  No scars, normal bowel sounds, soft, non-distended, non-tender, no masses palpated, no hepatosplenomegally  Data    CBC:   Lab Results   Component Value Date    WBC 8.5 06/11/2021    RBC 4.56 06/11/2021    HGB 14.7 06/11/2021    HCT 43.2 06/11/2021    MCV 94.7 06/11/2021    MCH 32.2 06/11/2021    MCHC 34.0 06/11/2021    RDW 14.2 06/11/2021     06/11/2021    MPV 7.3 06/11/2021     BMP:    Lab Results   Component Value Date     06/10/2021    K 3.9 06/10/2021    K 4.4 06/09/2021     06/10/2021    CO2 27 06/10/2021    BUN 6 06/10/2021    LABALBU 3.2 06/08/2021    CREATININE 0.7 06/10/2021    CALCIUM 8.0 06/10/2021    GFRAA >60 06/10/2021    LABGLOM >60 06/10/2021    GLUCOSE 144 06/10/2021       ASSESSMENT AND PLAN      Active Problems:     Acute L L Q abd pain,  Acute diverticulitis, with bowell wall abscess, cont antibiotic therapy, f/u labs, f/u by surgery, no surgical intervention so far.   Cholelithiasis, stable,   Leucocytosis, improving,  SIMONA on alprazolam,  Hx of throat ca in remission for 10 yrs,  On Ciprol,Flagyl, cont home med, DVT profilaxis,  Advanced diet, PT/OT  Surgery consult done,.\dr Ivan Bojorquez

## 2021-06-12 PROCEDURE — 1200000000 HC SEMI PRIVATE

## 2021-06-12 PROCEDURE — C9113 INJ PANTOPRAZOLE SODIUM, VIA: HCPCS | Performed by: NURSE PRACTITIONER

## 2021-06-12 PROCEDURE — 6370000000 HC RX 637 (ALT 250 FOR IP): Performed by: NURSE PRACTITIONER

## 2021-06-12 PROCEDURE — 94760 N-INVAS EAR/PLS OXIMETRY 1: CPT

## 2021-06-12 PROCEDURE — 99232 SBSQ HOSP IP/OBS MODERATE 35: CPT | Performed by: INTERNAL MEDICINE

## 2021-06-12 PROCEDURE — 2500000003 HC RX 250 WO HCPCS: Performed by: SPECIALIST

## 2021-06-12 PROCEDURE — 6360000002 HC RX W HCPCS: Performed by: SPECIALIST

## 2021-06-12 PROCEDURE — 6370000000 HC RX 637 (ALT 250 FOR IP): Performed by: SPECIALIST

## 2021-06-12 PROCEDURE — 6360000002 HC RX W HCPCS: Performed by: NURSE PRACTITIONER

## 2021-06-12 PROCEDURE — 99232 SBSQ HOSP IP/OBS MODERATE 35: CPT | Performed by: SURGERY

## 2021-06-12 PROCEDURE — 2580000003 HC RX 258: Performed by: NURSE PRACTITIONER

## 2021-06-12 RX ADMIN — Medication 10 ML: at 09:37

## 2021-06-12 RX ADMIN — ENOXAPARIN SODIUM 40 MG: 40 INJECTION SUBCUTANEOUS at 21:17

## 2021-06-12 RX ADMIN — CIPROFLOXACIN 400 MG: 2 INJECTION, SOLUTION INTRAVENOUS at 09:36

## 2021-06-12 RX ADMIN — ATORVASTATIN CALCIUM 20 MG: 20 TABLET, FILM COATED ORAL at 09:36

## 2021-06-12 RX ADMIN — METRONIDAZOLE 500 MG: 500 INJECTION, SOLUTION INTRAVENOUS at 23:58

## 2021-06-12 RX ADMIN — METRONIDAZOLE 500 MG: 500 INJECTION, SOLUTION INTRAVENOUS at 00:25

## 2021-06-12 RX ADMIN — DOCUSATE SODIUM 100 MG: 100 CAPSULE, LIQUID FILLED ORAL at 09:36

## 2021-06-12 RX ADMIN — ALPRAZOLAM 0.5 MG: 0.5 TABLET ORAL at 23:58

## 2021-06-12 RX ADMIN — METRONIDAZOLE 500 MG: 500 INJECTION, SOLUTION INTRAVENOUS at 16:29

## 2021-06-12 RX ADMIN — PANTOPRAZOLE SODIUM 40 MG: 40 INJECTION, POWDER, FOR SOLUTION INTRAVENOUS at 09:36

## 2021-06-12 RX ADMIN — METRONIDAZOLE 500 MG: 500 INJECTION, SOLUTION INTRAVENOUS at 11:33

## 2021-06-12 RX ADMIN — CIPROFLOXACIN 400 MG: 2 INJECTION, SOLUTION INTRAVENOUS at 21:17

## 2021-06-12 RX ADMIN — DOCUSATE SODIUM 100 MG: 100 CAPSULE, LIQUID FILLED ORAL at 21:17

## 2021-06-12 RX ADMIN — SODIUM CHLORIDE: 9 INJECTION, SOLUTION INTRAVENOUS at 21:17

## 2021-06-12 RX ADMIN — OXYCODONE HYDROCHLORIDE 10 MG: 10 TABLET ORAL at 04:13

## 2021-06-12 RX ADMIN — LEVOTHYROXINE SODIUM 25 MCG: 0.03 TABLET ORAL at 06:35

## 2021-06-12 RX ADMIN — POLYETHYLENE GLYCOL 3350 17 G: 17 POWDER, FOR SOLUTION ORAL at 09:37

## 2021-06-12 RX ADMIN — ALPRAZOLAM 0.5 MG: 0.5 TABLET ORAL at 00:29

## 2021-06-12 ASSESSMENT — PAIN SCALES - GENERAL
PAINLEVEL_OUTOF10: 0
PAINLEVEL_OUTOF10: 8
PAINLEVEL_OUTOF10: 0
PAINLEVEL_OUTOF10: 0

## 2021-06-12 ASSESSMENT — PAIN DESCRIPTION - PAIN TYPE: TYPE: ACUTE PAIN

## 2021-06-12 ASSESSMENT — PAIN DESCRIPTION - LOCATION: LOCATION: ABDOMEN;HEAD

## 2021-06-12 NOTE — PLAN OF CARE
Problem: Falls - Risk of:  Goal: Will remain free from falls  Description: Will remain free from falls  6/12/2021 1212 by Malik Bateman RN  Outcome: Ongoing  Note: Pt free from falls this shift. Non skid socks provided. Pt educated use of call light as needed for assistance. Call light always within reach. Pt able and agreeable to contact for safety appropriately. Problem: Falls - Risk of:  Goal: Absence of physical injury  Description: Absence of physical injury  6/12/2021 1212 by Malik Bateman RN  Outcome: Ongoing     Problem: Pain:  Goal: Pain level will decrease  Description: Pain level will decrease  6/12/2021 1212 by Malik Bateman RN  Outcome: Ongoing  Note: Pt able to express presence and absence of pain using numerical pain scale. Pt pain is managed by PRN analgesics as ordered by MD. Pain reassess after each interventions. Will continue to monitor as needed.      6/12/2021 0037 by Samantha De Luna RN  Outcome: Ongoing     Problem: Pain:  Goal: Control of acute pain  Description: Control of acute pain  6/12/2021 0037 by Samantha De Luna RN  Outcome: Ongoing     Problem: Nutrition  Goal: Optimal nutrition therapy  6/12/2021 1212 by Malik Bateman RN  Outcome: Ongoing  6/12/2021 0037 by Samantha De Luna RN  Outcome: Ongoing

## 2021-06-12 NOTE — PROGRESS NOTES
Pt awake in bed. Pt denies any abdominal pain, nausea and vomiting. Pt's scheduled meds are given as ordered. Pt denies other needs at present. Call light and item need in reach. Electronically signed by Court Lewis RN on 6/12/2021 at 12:12 PM

## 2021-06-12 NOTE — PROGRESS NOTES
Hospitalist Progress Note  6/12/2021 8:52 AM  Subjective:   Admit Date: 6/8/2021  PCP: Shelby Hanna MD  Interval History: pt feels better  Denies any other complaints  Chart reviewed. Diet: Adult Oral Nutrition Supplement; Standard High Calorie/High Protein Oral Supplement  ADULT DIET; Regular; Low Fiber  Medications:   Scheduled Meds:   atorvastatin  20 mg Oral Daily    levothyroxine  25 mcg Oral Daily    ciprofloxacin  400 mg Intravenous Q12H    metroNIDAZOLE  500 mg Intravenous Q8H    docusate sodium  100 mg Oral BID    polyethylene glycol  17 g Oral Daily    sodium chloride flush  5-40 mL Intravenous 2 times per day    pantoprazole  40 mg Intravenous Daily    And    sodium chloride (PF)  10 mL Intravenous Daily    enoxaparin  40 mg Subcutaneous Nightly     Continuous Infusions:   sodium chloride 50 mL/hr at 06/11/21 2049    sodium chloride       CBC:   Recent Labs     06/10/21  0935 06/11/21 0613   WBC 10.5 8.5   HGB 15.0 14.7    399     BMP:    Recent Labs     06/10/21  0935 06/11/21 0613   * 136   K 3.9 3.6    101   CO2 27 27   BUN 6* 4*   CREATININE 0.7* 0.6*   GLUCOSE 144* 136*     Hepatic: No results for input(s): AST, ALT, ALB, BILITOT, ALKPHOS in the last 72 hours. Troponin: No results for input(s): TROPONINI in the last 72 hours. BNP: No results for input(s): BNP in the last 72 hours. Lipids: No results for input(s): CHOL, HDL in the last 72 hours. Invalid input(s): LDLCALCU  INR: No results for input(s): INR in the last 72 hours. Objective:   Vitals: /70   Pulse 77   Temp 98.7 °F (37.1 °C) (Oral)   Resp 16   Ht 6' (1.829 m)   Wt 145 lb 8.1 oz (66 kg)   SpO2 95%   BMI 19.73 kg/m²   General appearance: alert,awake,oriented x 3 and cooperative with exam  HEENT: Head: Normal, normocephalic, atraumatic, anicteric, pupils are reactive to light. Dry mucous membrane.   Neck: no adenopathy, no carotid bruit, supple, symmetrical, trachea midline and thyroid not enlarged, symmetric, no tenderness/mass/nodules  Lungs: clear to auscultation bilaterally  Heart: regular rate and rhythm, S1, S2 normal, no murmur, click, rub or gallop  Abdomen: soft, non-tender; bowel sounds normal; no masses,  no organomegaly  Extremities: extremities normal, atraumatic, no cyanosis or edema  Neurologic: Mental status: Alert, oriented, thought content appropriate    Assessment and Plan:   1. Diverticulitis- on iv abx  2. Patient Active Problem List:     H/O tongue cancer     Rash and nonspecific skin eruption     Urinary incontinence     Elevated WBC count     Abdominal cramping     Mucous in stools     Diverticulitis     Moderate malnutrition (HCC)     Diverticulitis of intestine with abscess without bleeding    Pt is stable. Discussed with staff and pt about the plan. See the orders for further plan. Will proceed further acc to pt's progress.   Time spent with management of the pt is-20 minutes      Electronically signed by: Paris Rodriguez MD, on 6/12/2021, at 8:52 AM

## 2021-06-12 NOTE — PROGRESS NOTES
I.V.:1000]  Out: -   No intake/output data recorded. LABS  Recent Labs     06/11/21 0613   WBC 8.5   HGB 14.7   HCT 43.2         K 3.6      CO2 27   BUN 4*   CREATININE 0.6*   MG 1.80   PHOS 2.3*   CALCIUM 7.5*     CBC:   Lab Results   Component Value Date    WBC 8.5 06/11/2021    RBC 4.56 06/11/2021    HGB 14.7 06/11/2021    HCT 43.2 06/11/2021    MCV 94.7 06/11/2021    MCH 32.2 06/11/2021    MCHC 34.0 06/11/2021    RDW 14.2 06/11/2021     06/11/2021    MPV 7.3 06/11/2021     CMP:    Lab Results   Component Value Date     06/11/2021    K 3.6 06/11/2021    K 4.4 06/09/2021     06/11/2021    CO2 27 06/11/2021    BUN 4 06/11/2021    CREATININE 0.6 06/11/2021    GFRAA >60 06/11/2021    AGRATIO 0.9 06/08/2021    LABGLOM >60 06/11/2021    GLUCOSE 136 06/11/2021    PROT 6.8 06/08/2021    LABALBU 3.2 06/08/2021    CALCIUM 7.5 06/11/2021    BILITOT 0.6 06/08/2021    ALKPHOS 185 06/08/2021    AST 41 06/08/2021    ALT 31 06/08/2021         CHRISTY Long - CNP  Electronically signed 6/12/2021 at 8:46 AM      Surgery Staff  I have examined this patient and read and agree with the note by CHRISTY Jones from today.   Feeling better \"slowly but surely\"  Tolerated PO last night with minimal cramping    Mildly tender LLQ  Labs normalized    Diverticulitis with intramural abscess  Continue IV abx another day  Potential D/C tomorrow    Electronically signed by Daniel Valente MD on 6/12/2021 at 10:44 AM

## 2021-06-12 NOTE — PROGRESS NOTES
Physician Progress Note      PATIENT:               Lili Mcginnis  CSN #:                  [id number]  :                       1960  ADMIT DATE:       2021 11:18 AM  DISCH DATE:  RESPONDING  PROVIDER #:        Snehal Vegas MD          QUERY TEXT:    Patient admitted with diverticulitis. Per dietician, meets criteria for   moderate malnutrition. If possible, please document in progress notes and   discharge summary if you are evaluating and /or treating any of the following: The medical record reflects the following:  Risk Factors: acute on chronic illness  Clinical Indicators: Per dietician: Moderate malnutrition related to   inadequate protein-energy intake as evidenced by mild muscle loss, mild loss   of subcutaneous fat, poor intake prior to admission  ? Treatment: dietary consult, nutritional supplements    Thank you,  Joseph Flood RN, BSN, CCDS  [email or web address]  Options provided:  -- Protein calorie malnutrition mild  -- Protein calorie malnutrition moderate  -- Protein calorie malnutrition severe  -- Underweight with BMI 19.46  -- Other - I will add my own diagnosis  -- Disagree - Not applicable / Not valid  -- Disagree - Clinically unable to determine / Unknown  -- Refer to Clinical Documentation Reviewer    PROVIDER RESPONSE TEXT:    This patient has moderate protein calorie malnutrition.     Query created by: Haydee Castillo on 2021 10:09 AM      Electronically signed by:  Snehal Vegas MD 2021 8:33 PM

## 2021-06-12 NOTE — PROGRESS NOTES
Per pt, ate lunch and dinner with ease. Now feels very full, abdomen tender, no BM, active bowel sounds. Pain to abdomen exacerbated with ambulation. Headache still persisting, pt rated 9/10 on pain scale prior to giving prn medication, satisfied with pain control.

## 2021-06-13 VITALS
HEART RATE: 74 BPM | SYSTOLIC BLOOD PRESSURE: 134 MMHG | TEMPERATURE: 98.4 F | OXYGEN SATURATION: 94 % | WEIGHT: 143.52 LBS | HEIGHT: 72 IN | RESPIRATION RATE: 18 BRPM | DIASTOLIC BLOOD PRESSURE: 87 MMHG | BODY MASS INDEX: 19.44 KG/M2

## 2021-06-13 LAB
A/G RATIO: 0.8 (ref 1.1–2.2)
ALBUMIN SERPL-MCNC: 2.7 G/DL (ref 3.4–5)
ALP BLD-CCNC: 271 U/L (ref 40–129)
ALT SERPL-CCNC: 29 U/L (ref 10–40)
ANION GAP SERPL CALCULATED.3IONS-SCNC: 10 MMOL/L (ref 3–16)
AST SERPL-CCNC: 35 U/L (ref 15–37)
BILIRUB SERPL-MCNC: 0.4 MG/DL (ref 0–1)
BUN BLDV-MCNC: 3 MG/DL (ref 7–20)
CALCIUM SERPL-MCNC: 8.3 MG/DL (ref 8.3–10.6)
CHLORIDE BLD-SCNC: 101 MMOL/L (ref 99–110)
CO2: 26 MMOL/L (ref 21–32)
CREAT SERPL-MCNC: 0.6 MG/DL (ref 0.8–1.3)
GFR AFRICAN AMERICAN: >60
GFR NON-AFRICAN AMERICAN: >60
GLOBULIN: 3.6 G/DL
GLUCOSE BLD-MCNC: 133 MG/DL (ref 70–99)
HCT VFR BLD CALC: 46.7 % (ref 40.5–52.5)
HEMOGLOBIN: 15.6 G/DL (ref 13.5–17.5)
MCH RBC QN AUTO: 31.7 PG (ref 26–34)
MCHC RBC AUTO-ENTMCNC: 33.5 G/DL (ref 31–36)
MCV RBC AUTO: 94.8 FL (ref 80–100)
PDW BLD-RTO: 14.6 % (ref 12.4–15.4)
PLATELET # BLD: 493 K/UL (ref 135–450)
PMV BLD AUTO: 7.5 FL (ref 5–10.5)
POTASSIUM SERPL-SCNC: 4 MMOL/L (ref 3.5–5.1)
RBC # BLD: 4.92 M/UL (ref 4.2–5.9)
SODIUM BLD-SCNC: 137 MMOL/L (ref 136–145)
TOTAL PROTEIN: 6.3 G/DL (ref 6.4–8.2)
WBC # BLD: 6.8 K/UL (ref 4–11)

## 2021-06-13 PROCEDURE — 6360000002 HC RX W HCPCS: Performed by: NURSE PRACTITIONER

## 2021-06-13 PROCEDURE — 2500000003 HC RX 250 WO HCPCS: Performed by: SPECIALIST

## 2021-06-13 PROCEDURE — C9113 INJ PANTOPRAZOLE SODIUM, VIA: HCPCS | Performed by: NURSE PRACTITIONER

## 2021-06-13 PROCEDURE — 2580000003 HC RX 258: Performed by: NURSE PRACTITIONER

## 2021-06-13 PROCEDURE — 99232 SBSQ HOSP IP/OBS MODERATE 35: CPT | Performed by: SURGERY

## 2021-06-13 PROCEDURE — 94760 N-INVAS EAR/PLS OXIMETRY 1: CPT

## 2021-06-13 PROCEDURE — 6370000000 HC RX 637 (ALT 250 FOR IP): Performed by: SPECIALIST

## 2021-06-13 PROCEDURE — 99238 HOSP IP/OBS DSCHRG MGMT 30/<: CPT | Performed by: INTERNAL MEDICINE

## 2021-06-13 PROCEDURE — 6360000002 HC RX W HCPCS: Performed by: SPECIALIST

## 2021-06-13 PROCEDURE — 6370000000 HC RX 637 (ALT 250 FOR IP): Performed by: NURSE PRACTITIONER

## 2021-06-13 PROCEDURE — 80053 COMPREHEN METABOLIC PANEL: CPT

## 2021-06-13 PROCEDURE — 85027 COMPLETE CBC AUTOMATED: CPT

## 2021-06-13 RX ORDER — PANTOPRAZOLE SODIUM 40 MG/1
40 TABLET, DELAYED RELEASE ORAL
Qty: 30 TABLET | Refills: 1 | Status: SHIPPED | OUTPATIENT
Start: 2021-06-13 | End: 2021-08-05

## 2021-06-13 RX ORDER — CIPROFLOXACIN 500 MG/1
500 TABLET, FILM COATED ORAL 2 TIMES DAILY
Qty: 20 TABLET | Refills: 0 | Status: SHIPPED | OUTPATIENT
Start: 2021-06-13 | End: 2021-06-23

## 2021-06-13 RX ORDER — METRONIDAZOLE 500 MG/1
500 TABLET ORAL 3 TIMES DAILY
Qty: 30 TABLET | Refills: 0 | Status: SHIPPED | OUTPATIENT
Start: 2021-06-13 | End: 2021-06-23

## 2021-06-13 RX ADMIN — POLYETHYLENE GLYCOL 3350 17 G: 17 POWDER, FOR SOLUTION ORAL at 07:44

## 2021-06-13 RX ADMIN — ATORVASTATIN CALCIUM 20 MG: 20 TABLET, FILM COATED ORAL at 07:44

## 2021-06-13 RX ADMIN — LEVOTHYROXINE SODIUM 25 MCG: 0.03 TABLET ORAL at 06:11

## 2021-06-13 RX ADMIN — DOCUSATE SODIUM 100 MG: 100 CAPSULE, LIQUID FILLED ORAL at 07:44

## 2021-06-13 RX ADMIN — PANTOPRAZOLE SODIUM 40 MG: 40 INJECTION, POWDER, FOR SOLUTION INTRAVENOUS at 07:43

## 2021-06-13 RX ADMIN — METRONIDAZOLE 500 MG: 500 INJECTION, SOLUTION INTRAVENOUS at 07:44

## 2021-06-13 RX ADMIN — Medication 10 ML: at 07:44

## 2021-06-13 RX ADMIN — CIPROFLOXACIN 400 MG: 2 INJECTION, SOLUTION INTRAVENOUS at 08:50

## 2021-06-13 ASSESSMENT — PAIN SCALES - GENERAL: PAINLEVEL_OUTOF10: 0

## 2021-06-13 NOTE — PROGRESS NOTES
General and Vascular Surgery                                                           Daily Progress Note                                                             Daniel Valente MD     Pt Name: Mariana Ba Record Number: [id number]  Date of Birth 1960   Today's Date: 6/13/2021    Chief Complaint: Abdominal pain    ASSESSMENT/PLAN  Diverticulitis with intramural abscess  1. LLQ abdominal pain resolved  -Tolerating regular PO without pain  +BMs  -OK to D/C on Cipro and flagyl for another 10 days. F/u Dr. Delgadillo 2 weeks         [address] is improved and wants to go home. Denies pain. Tolerating PO with BM    OBJECTIVE  VITALS:  height is 6' (1.829 m) and weight is 143 lb 8.3 oz (65.1 kg). His oral temperature is 98.4 °F (36.9 °C). His blood pressure is 134/87 and his pulse is 74. His respiration is 18 and oxygen saturation is 94%. VITALS:  /87   Pulse 74   Temp 98.4 °F (36.9 °C) (Oral)   Resp 18   Ht 6' (1.829 m)   Wt 143 lb 8.3 oz (65.1 kg)   SpO2 94%   BMI 19.46 kg/m²   GENERAL: alert, cooperative, no distress  PULMONARY: normal respiratory effort, no accessory muscle use  CARDIAC: RRR  ABDOMEN: soft,NTND  EXT: no c/c/e  I/O last 3 completed shifts: In: 8307 [P.O.:1080; I.V.:10]  Out: -   No intake/output data recorded.     LABS  Recent Labs     06/11/21  0613 06/13/21  0630   WBC 8.5 6.8   HGB 14.7 15.6   HCT 43.2 46.7    493*    137   K 3.6 4.0    101   CO2 27 26   BUN 4* 3*   CREATININE 0.6* 0.6*   MG 1.80  --    PHOS 2.3*  --    CALCIUM 7.5* 8.3   AST  --  35   ALT  --  29   BILITOT  --  0.4     CBC:   Lab Results   Component Value Date    WBC 6.8 06/13/2021    RBC 4.92 06/13/2021    HGB 15.6 06/13/2021    HCT 46.7 06/13/2021    MCV 94.8 06/13/2021    MCH 31.7 06/13/2021    MCHC 33.5 06/13/2021    RDW 14.6 06/13/2021     06/13/2021    MPV 7.5 06/13/2021     CMP:    Lab Results Component Value Date     06/13/2021    K 4.0 06/13/2021    K 4.4 06/09/2021     06/13/2021    CO2 26 06/13/2021    BUN 3 06/13/2021    CREATININE 0.6 06/13/2021    GFRAA >60 06/13/2021    AGRATIO 0.8 06/13/2021    LABGLOM >60 06/13/2021    GLUCOSE 133 06/13/2021    PROT 6.3 06/13/2021    LABALBU 2.7 06/13/2021    CALCIUM 8.3 06/13/2021    BILITOT 0.4 06/13/2021    ALKPHOS 271 06/13/2021    AST 35 06/13/2021    ALT 29 06/13/2021         Bar Berumen MD  Electronically signed 6/13/2021 at 8:28 AM

## 2021-06-13 NOTE — PLAN OF CARE
Problem: Falls - Risk of:  Goal: Will remain free from falls  Description: Will remain free from falls  6/13/2021 1035 by Elizabeth Miranda RN  Outcome: Ongoing  Note: Pt free from falls this shift. Non skid socks provided. Pt educated on use of call light as needed for assistance. Call light always within reach. Pt able and agreeable to contact for safety appropriately. Problem: Pain:  Goal: Pain level will decrease  Description: Pain level will decrease  6/13/2021 1035 by Elizabeth Miranda RN  Outcome: Ongoing  Note: Pt able to express presence and absence of pain using numerical pain scale. Pt pain is managed by PRN analgesics as ordered by MD. Pain reassess after each interventions. Will continue to monitor as needed.      6/12/2021 8940 by Myrna Spencer RN  Outcome: Ongoing

## 2021-06-13 NOTE — PLAN OF CARE
Problem: Falls - Risk of:  Goal: Will remain free from falls  Description: Will remain free from falls  6/12/2021 2348 by Aura Mcclendon RN  Outcome: Ongoing    Goal: Absence of physical injury  Description: Absence of physical injury  6/12/2021 2348 by Aura Mcclendon RN  Outcome: Ongoing     Problem: Pain:  Goal: Pain level will decrease  Description: Pain level will decrease  6/12/2021 2348 by Aura Mcclendon RN  Outcome: Ongoing    Goal: Control of acute pain  Description: Control of acute pain  Outcome: Ongoing  Goal: Control of chronic pain  Description: Control of chronic pain  Outcome: Ongoing     Problem: Nutrition  Goal: Optimal nutrition therapy  6/12/2021 2348 by Aura Mcclendon RN  Outcome: Ongoing

## 2021-06-13 NOTE — PROGRESS NOTES
All verbal and written discharge instructions given the pt at discharge regarding new meds and follow up appointment. Pt verbalized understandings.  Pt left chest port de-accessed at discharged Electronically signed by Louise Vazquez RN on 6/13/2021 at 11:34 AM

## 2021-06-13 NOTE — PROGRESS NOTES
Pt denies any abdominal pain, nausea, and vomiting. Pt have had BMS. Pt tolerating general diet . Pt denies other needs. Call light in reach. Electronically signed by Virginia Magdaleno RN on 6/13/2021 at 7:51 AM

## 2021-06-14 ENCOUNTER — OFFICE VISIT (OUTPATIENT)
Dept: SURGERY | Age: 61
End: 2021-06-14

## 2021-06-14 VITALS
DIASTOLIC BLOOD PRESSURE: 70 MMHG | SYSTOLIC BLOOD PRESSURE: 110 MMHG | WEIGHT: 143 LBS | HEIGHT: 72 IN | BODY MASS INDEX: 19.37 KG/M2

## 2021-06-14 DIAGNOSIS — K57.92 DIVERTICULITIS: Primary | ICD-10-CM

## 2021-06-14 PROCEDURE — 99999 PR OFFICE/OUTPT VISIT,PROCEDURE ONLY: CPT | Performed by: SURGERY

## 2021-06-14 RX ORDER — OXYCODONE HYDROCHLORIDE 5 MG/1
5 TABLET ORAL EVERY 6 HOURS PRN
Qty: 12 TABLET | Refills: 0 | Status: SHIPPED | OUTPATIENT
Start: 2021-06-14 | End: 2021-06-17

## 2021-06-14 NOTE — DISCHARGE SUMMARY
Hospitalist Discharge Summary   6/13/2021 8:31 PM  Subjective:   Admit Date: 6/8/2021  PCP: Lev Nielsen MD  Interval History: pt is ready for the discharge  Feels better  rxed for acute diverticulitis  Rest of the events as in progress notes and chart  Denies any other complaints  Chart reviewed. Diet: No diet orders on file  Medications:   Scheduled Meds:  Continuous Infusions:  CBC:   Recent Labs     06/11/21 0613 06/13/21  0630   WBC 8.5 6.8   HGB 14.7 15.6    493*     BMP:    Recent Labs     06/11/21 0613 06/13/21  0630    137   K 3.6 4.0    101   CO2 27 26   BUN 4* 3*   CREATININE 0.6* 0.6*   GLUCOSE 136* 133*     Hepatic:   Recent Labs     06/13/21  0630   AST 35   ALT 29   BILITOT 0.4   ALKPHOS 271*     Troponin: No results for input(s): TROPONINI in the last 72 hours. BNP: No results for input(s): BNP in the last 72 hours. Lipids: No results for input(s): CHOL, HDL in the last 72 hours. Invalid input(s): LDLCALCU  INR: No results for input(s): INR in the last 72 hours. Orders Placed This Encounter   Procedures    CT ABDOMEN PELVIS W IV CONTRAST Additional Contrast? None     Standing Status:   Standing     Number of Occurrences:   1     Order Specific Question:   Additional Contrast?     Answer:   None     Order Specific Question:   Reason for exam:     Answer:   Left lower quadrant abdominal pain.   History of diverticulitis     Order Specific Question:   Decision Support Exception - unselect if not a suspected or confirmed emergency medical condition     Answer:   Emergency Medical Condition (MA) [1]    US SCROTUM AND TESTICLES           Standing Status:   Standing     Number of Occurrences:   1     Order Specific Question:   Reason for exam:     Answer:   pain and swelling    CBC Auto Differential     Standing Status:   Standing     Number of Occurrences:   1    Comprehensive Metabolic Panel w/ Reflex to MG     Standing Status:   Standing     Number of Occurrences: 1    Lipase     Standing Status:   Standing     Number of Occurrences:   1    Urine, reflex to culture     Standing Status:   Standing     Number of Occurrences:   1    Microscopic Urinalysis     Standing Status:   Standing     Number of Occurrences:   1    Basic Metabolic Panel w/ Reflex to MG     Standing Status:   Standing     Number of Occurrences:   1    CBC auto differential     Standing Status:   Standing     Number of Occurrences:   1    Basic Metabolic Panel     Standing Status:   Standing     Number of Occurrences:   1    CBC     Standing Status:   Standing     Number of Occurrences:   1    CBC Auto Differential     Standing Status:   Standing     Number of Occurrences:   1    Basic Metabolic Panel     Standing Status:   Standing     Number of Occurrences:   1    Magnesium     Standing Status:   Standing     Number of Occurrences:   1    Phosphorus     Standing Status:   Standing     Number of Occurrences:   1    Comprehensive Metabolic Panel     Standing Status:   Standing     Number of Occurrences:   1    CBC     Standing Status:   Standing     Number of Occurrences:   1    Inpatient consult to General Surgery     Standing Status:   Standing     Number of Occurrences:   1     Order Specific Question:   Reason for Consult?      Answer:   Consult for diverticulitis with abscess    OT eval and treat     Standing Status:   Standing     Number of Occurrences:   1    PT eval and treat     Standing Status:   Standing     Number of Occurrences:   1    PATIENT STATUS (FROM ED OR OR/PROCEDURAL) Inpatient     Standing Status:   Standing     Number of Occurrences:   1     Order Specific Question:   Patient Class     Answer:   Inpatient [101]     Order Specific Question:   REQUIRED: Diagnosis     Answer:   Diverticulitis [944034]     Order Specific Question:   Estimated Length of Stay     Answer:   Estimated stay of more than 2 midnights     Order Specific Question:   Admitting Provider     Answer: Thomas Stern [[id number]]    Discharge patient     Standing Status:   Standing     Number of Occurrences:   1     Order Specific Question:   Discharge Disposition     Answer:   Home       No current facility-administered medications for this encounter. Current Outpatient Medications   Medication Sig Dispense Refill    ciprofloxacin (CIPRO) 500 MG tablet Take 1 tablet by mouth 2 times daily for 10 days 20 tablet 0    metroNIDAZOLE (FLAGYL) 500 MG tablet Take 1 tablet by mouth 3 times daily for 10 days 30 tablet 0    pantoprazole (PROTONIX) 40 MG tablet Take 1 tablet by mouth every morning (before breakfast) 30 tablet 1    albuterol sulfate HFA (VENTOLIN HFA) 108 (90 Base) MCG/ACT inhaler Inhale 2 puffs into the lungs 4 times daily as needed for Wheezing or Shortness of Breath      levothyroxine (SYNTHROID) 25 MCG tablet Take 25 mcg by mouth Daily      ondansetron (ZOFRAN ODT) 4 MG disintegrating tablet Take 1-2 tablets by mouth every 12 hours as needed for Nausea May Sub regular tablet (non-ODT) if insurance does not cover ODT. 12 tablet 0    ALPRAZolam (XANAX) 0.5 MG tablet Take 0.5 mg by mouth 2 times daily as needed for Sleep or Anxiety.           Orders Placed This Encounter   Medications    0.9 % sodium chloride bolus    ondansetron (ZOFRAN) injection 4 mg    morphine (PF) injection 4 mg    iopamidol (ISOVUE-370) 76 % injection 75 mL    DISCONTD: ALPRAZolam (XANAX) tablet 0.5 mg    DISCONTD: doxazosin (CARDURA) tablet 1 mg    DISCONTD: 0.9 % sodium chloride infusion    DISCONTD: sodium chloride flush 0.9 % injection 5-40 mL    DISCONTD: sodium chloride flush 0.9 % injection 10 mL    DISCONTD: 0.9 % sodium chloride infusion    DISCONTD: potassium chloride (KLOR-CON M) extended release tablet 40 mEq    DISCONTD: potassium bicarb-citric acid (EFFER-K) effervescent tablet 40 mEq    DISCONTD: potassium chloride 10 mEq/100 mL IVPB (Peripheral Line)    DISCONTD: morphine (PF) injection 2 mg    DISCONTD: morphine (PF) injection 4 mg    DISCONTD: pantoprazole (PROTONIX) injection 40 mg    DISCONTD: sodium chloride (PF) 0.9 % injection 10 mL    DISCONTD: ondansetron (ZOFRAN-ODT) disintegrating tablet 4 mg    DISCONTD: ondansetron (ZOFRAN) injection 4 mg    DISCONTD: enoxaparin (LOVENOX) injection 40 mg    DISCONTD: oxyCODONE (ROXICODONE) immediate release tablet 5 mg    DISCONTD: oxyCODONE HCl (OXY-IR) immediate release tablet 10 mg    DISCONTD: acetaminophen (TYLENOL) tablet 650 mg    DISCONTD: piperacillin-tazobactam (ZOSYN) 3,375 mg in dextrose 5 % 100 mL IVPB extended infusion (mini-bag)     Order Specific Question:   Antimicrobial Indications     Answer:   Intra-Abdominal Infection    piperacillin-tazobactam (ZOSYN) 3,375 mg in dextrose 5 % 50 mL IVPB (mini-bag)     Order Specific Question:   Antimicrobial Indications     Answer:   Intra-Abdominal Infection    DISCONTD: albuterol sulfate  (90 Base) MCG/ACT inhaler 2 puff     Order Specific Question:   Initiate RT Bronchodilator Protocol     Answer:    Yes    DISCONTD: atorvastatin (LIPITOR) tablet 20 mg    DISCONTD: levothyroxine (SYNTHROID) tablet 25 mcg    DISCONTD: ciprofloxacin (CIPRO) IVPB 400 mg     Order Specific Question:   Antimicrobial Indications     Answer:   Intra-Abdominal Infection    DISCONTD: metronidazole (FLAGYL) 500 mg in NaCl 100 mL IVPB premix     Order Specific Question:   Antimicrobial Indications     Answer:   Intra-Abdominal Infection    DISCONTD: ALPRAZolam (XANAX) tablet 0.5 mg    DISCONTD: docusate sodium (COLACE) capsule 100 mg    DISCONTD: polyethylene glycol (GLYCOLAX) packet 17 g    DISCONTD: butalbital-acetaminophen-caffeine (FIORICET, ESGIC) per tablet 1 tablet    DISCONTD: sodium phosphate 10.41 mmol in dextrose 5 % 250 mL IVPB    DISCONTD: sodium phosphate 20.82 mmol in dextrose 5 % 250 mL IVPB    ciprofloxacin (CIPRO) 500 MG tablet     Sig: Take 1 tablet by mouth 2 times daily for 10 days     Dispense:  20 tablet     Refill:  0    metroNIDAZOLE (FLAGYL) 500 MG tablet     Sig: Take 1 tablet by mouth 3 times daily for 10 days     Dispense:  30 tablet     Refill:  0    pantoprazole (PROTONIX) 40 MG tablet     Sig: Take 1 tablet by mouth every morning (before breakfast)     Dispense:  30 tablet     Refill:  1           Objective:   Vitals: /87   Pulse 74   Temp 98.4 °F (36.9 °C) (Oral)   Resp 18   Ht 6' (1.829 m)   Wt 143 lb 8.3 oz (65.1 kg)   SpO2 94%   BMI 19.46 kg/m²   General appearance: alert,awake,oriented x 3 and cooperative with exam  HEENT: Head: Normal, normocephalic, atraumatic, anicteric, pupils are reactive to light. Dry mucous membrane. Neck: no adenopathy, no carotid bruit, supple, symmetrical, trachea midline and thyroid not enlarged, symmetric, no tenderness/mass/nodules  Lungs: clear  Heart: regular rate and rhythm, S1, S2 normal, no murmur, click, rub or gallop  Abdomen: soft, non-tender; bowel sounds normal; no masses,  no organomegaly  Extremities: extremities normal, Neurologic: Mental status: Alert, oriented, thought content appropriate    Assessment and Plan:   Diverticulitis-d/c on po abx    Patient Active Problem List:     H/O tongue cancer     Rash and nonspecific skin eruption     Urinary incontinence     Elevated WBC count     Abdominal cramping     Mucous in stools     Diverticulitis     Moderate malnutrition (HCC)     Diverticulitis of intestine with abscess without bleeding    Pt is stable. Discussed with staff and pt about the plan. See the orders for further plan. Will proceed further acc to pt's progress. Time spent with management of the pt is- 20 mts  Follow up in office in 1 wk. See the [address] [place] and Med rec forms  Follow up with specialists as instructed by them. Advised the pt to call me in case of questions or worsening of symptoms. Pt voiced understanding of the instructions.   Condition and prognosis is guarded      Electronically signed by: Figueroa Stark MD, on 6/13/2021, at 8:31 PM

## 2021-06-14 NOTE — PROGRESS NOTES
Marilia Valencia (:  1960) is a 62 y.o. male,Established patient, here for evaluation of the following chief complaint(s):  New Patient (Pt is here today, referred by the ER, for LLQ pain. )         ASSESSMENT/PLAN:  1. Diverticulitis  -     oxyCODONE (ROXICODONE) 5 MG immediate release tablet; Take 1 tablet by mouth every 6 hours as needed for Pain for up to 3 days. Intended supply: 3 days. Take lowest dose possible to manage pain, Disp-12 tablet, R-0Print      Follow up with Dr. Taqueria Jack as scheduled         Subjective   SUBJECTIVE/OBJECTIVE:  HPI  Seen last week in the hospital for diverticulitis. Discharge on antibiotics yesterday. Was feeling well then had worsening pain last night. Feels OK today but wants pain medication in case pain comes back. No tenderness on exam today. If pain returns will need repeat CT scan. Review of Systems       Objective   Physical Exam       Electronically signed by Glynn Vaca MD on 2021 at 3:02 PM        An electronic signature was used to authenticate this note.     --Glynn Vaca MD

## 2021-06-28 ENCOUNTER — OFFICE VISIT (OUTPATIENT)
Dept: SURGERY | Age: 61
End: 2021-06-28
Payer: MEDICAID

## 2021-06-28 ENCOUNTER — HOSPITAL ENCOUNTER (OUTPATIENT)
Dept: CT IMAGING | Age: 61
Discharge: HOME | End: 2021-06-28
Payer: MEDICAID

## 2021-06-28 VITALS
DIASTOLIC BLOOD PRESSURE: 81 MMHG | BODY MASS INDEX: 19.39 KG/M2 | HEART RATE: 103 BPM | WEIGHT: 143 LBS | SYSTOLIC BLOOD PRESSURE: 100 MMHG

## 2021-06-28 DIAGNOSIS — K57.92 DIVERTICULITIS: ICD-10-CM

## 2021-06-28 DIAGNOSIS — K57.92 DIVERTICULITIS: Primary | ICD-10-CM

## 2021-06-28 PROCEDURE — 1111F DSCHRG MED/CURRENT MED MERGE: CPT | Performed by: SURGERY

## 2021-06-28 PROCEDURE — 1036F TOBACCO NON-USER: CPT | Performed by: SURGERY

## 2021-06-28 PROCEDURE — 3017F COLORECTAL CA SCREEN DOC REV: CPT | Performed by: SURGERY

## 2021-06-28 PROCEDURE — G8427 DOCREV CUR MEDS BY ELIG CLIN: HCPCS | Performed by: SURGERY

## 2021-06-28 PROCEDURE — G8420 CALC BMI NORM PARAMETERS: HCPCS | Performed by: SURGERY

## 2021-06-28 PROCEDURE — 74177 CT ABD & PELVIS W/CONTRAST: CPT

## 2021-06-28 PROCEDURE — 6360000004 HC RX CONTRAST MEDICATION: Performed by: SURGERY

## 2021-06-28 PROCEDURE — 99213 OFFICE O/P EST LOW 20 MIN: CPT | Performed by: SURGERY

## 2021-06-28 RX ORDER — CIPROFLOXACIN 500 MG/1
500 TABLET, FILM COATED ORAL 2 TIMES DAILY
Qty: 28 TABLET | Refills: 0 | Status: SHIPPED | OUTPATIENT
Start: 2021-06-28 | End: 2021-07-08 | Stop reason: SDUPTHER

## 2021-06-28 RX ORDER — METRONIDAZOLE 500 MG/1
500 TABLET ORAL 3 TIMES DAILY
Qty: 52 TABLET | Refills: 0 | Status: SHIPPED | OUTPATIENT
Start: 2021-06-28 | End: 2021-07-08 | Stop reason: SDUPTHER

## 2021-06-28 RX ORDER — HYDROCODONE BITARTRATE AND ACETAMINOPHEN 5; 325 MG/1; MG/1
1 TABLET ORAL EVERY 4 HOURS PRN
Qty: 15 TABLET | Refills: 0 | Status: SHIPPED | OUTPATIENT
Start: 2021-06-28 | End: 2021-07-05

## 2021-06-28 RX ORDER — GREEN TEA/HOODIA GORDONII 315-12.5MG
1 CAPSULE ORAL DAILY
Qty: 30 TABLET | Refills: 0 | Status: SHIPPED | OUTPATIENT
Start: 2021-06-28 | End: 2021-07-28

## 2021-06-28 RX ADMIN — IOPAMIDOL 75 ML: 755 INJECTION, SOLUTION INTRAVENOUS at 16:17

## 2021-07-08 ENCOUNTER — OFFICE VISIT (OUTPATIENT)
Dept: SURGERY | Age: 61
End: 2021-07-08
Payer: MEDICAID

## 2021-07-08 VITALS — HEART RATE: 84 BPM | SYSTOLIC BLOOD PRESSURE: 129 MMHG | DIASTOLIC BLOOD PRESSURE: 90 MMHG

## 2021-07-08 DIAGNOSIS — K57.92 DIVERTICULITIS: ICD-10-CM

## 2021-07-08 PROCEDURE — G8420 CALC BMI NORM PARAMETERS: HCPCS | Performed by: SURGERY

## 2021-07-08 PROCEDURE — 1111F DSCHRG MED/CURRENT MED MERGE: CPT | Performed by: SURGERY

## 2021-07-08 PROCEDURE — 99213 OFFICE O/P EST LOW 20 MIN: CPT | Performed by: SURGERY

## 2021-07-08 PROCEDURE — 3017F COLORECTAL CA SCREEN DOC REV: CPT | Performed by: SURGERY

## 2021-07-08 PROCEDURE — G8427 DOCREV CUR MEDS BY ELIG CLIN: HCPCS | Performed by: SURGERY

## 2021-07-08 PROCEDURE — 1036F TOBACCO NON-USER: CPT | Performed by: SURGERY

## 2021-07-08 RX ORDER — CIPROFLOXACIN 500 MG/1
500 TABLET, FILM COATED ORAL 2 TIMES DAILY
Qty: 28 TABLET | Refills: 0 | Status: SHIPPED | OUTPATIENT
Start: 2021-07-08 | End: 2021-07-22

## 2021-07-08 RX ORDER — METRONIDAZOLE 500 MG/1
500 TABLET ORAL 3 TIMES DAILY
Qty: 52 TABLET | Refills: 0 | Status: SHIPPED | OUTPATIENT
Start: 2021-07-08 | End: 2021-07-22

## 2021-07-08 NOTE — PROGRESS NOTES
Established Patient Visit    [address] [facility]  [place] [facility]   Panchito Hoang MD    [address] [facility], [address] [place] [address] Drive  [place]  Phone: [phone]  Fax: [phone]   YOB: 1960    Date of Visit:  2021    No ref. provider found  Ana Workman MD    Subjective:     Becca Brice presents for a follow-up of his hospital stay for sigmoid diverticulitis with intramural abscesses. Overall he has been doing OK until he stopped oral antibiotics a few days ago. He has had some low grade fevers and a return of lower abdominal pain. He is tolerating a diet and has been having bowel movements. Allergies   Allergen Reactions    Zoloft [Sertraline Hcl]      diarrhea and stomach distress per PT    Doxycycline Rash     Outpatient Medications Marked as Taking for the 21 encounter (Office Visit) with Jeferson Zafar MD   Medication Sig Dispense Refill    ciprofloxacin (CIPRO) 500 MG tablet Take 1 tablet by mouth 2 times daily for 14 days 28 tablet 0    metroNIDAZOLE (FLAGYL) 500 MG tablet Take 1 tablet by mouth 3 times daily for 14 days 52 tablet 0    [] HYDROcodone-acetaminophen (NORCO) 5-325 MG per tablet Take 1 tablet by mouth every 4 hours as needed for Pain for up to 7 days. Intended supply: 3 days.  Take lowest dose possible to manage pain 15 tablet 0    Probiotic Acidophilus (FLORANEX) TABS Take 1 tablet by mouth daily 30 tablet 0    nystatin (MYCOSTATIN) 059875 UNIT/ML suspension Take 5 mLs by mouth 4 times daily 1 Bottle 1    pantoprazole (PROTONIX) 40 MG tablet Take 1 tablet by mouth every morning (before breakfast) 30 tablet 1    albuterol sulfate HFA (VENTOLIN HFA) 108 (90 Base) MCG/ACT inhaler Inhale 2 puffs into the lungs 4 times daily as needed for Wheezing or Shortness of Breath      levothyroxine (SYNTHROID) 25 MCG tablet Take 25 mcg by mouth Daily      ondansetron (ZOFRAN ODT) 4 MG disintegrating tablet Take 1-2 tablets by mouth every 12 hours as needed for Nausea May Sub regular tablet (non-ODT) if insurance does not cover ODT. 12 tablet 0    ALPRAZolam (XANAX) 0.5 MG tablet Take 0.5 mg by mouth 2 times daily as needed for Sleep or Anxiety. Vitals:    06/28/21 1323   BP: 100/81   Pulse: 103   Weight: 143 lb (64.9 kg)     Body mass index is 19.39 kg/m². Wt Readings from Last 3 Encounters:   06/28/21 143 lb (64.9 kg)   06/14/21 143 lb (64.9 kg)   06/13/21 143 lb 8.3 oz (65.1 kg)     BP Readings from Last 3 Encounters:   06/28/21 100/81   06/14/21 110/70   06/13/21 134/87          Objective:    CONSTITUTIONAL:  awake, alert, no apparent distress    LUNGS:  Resp easy and unlabored  ABDOMEN:  soft, non-distended, minimal suprapubic tenderness, voluntary guarding absent, no masses palpated and hernia absent  MUSCULOSKELETAL: No edema  NEUROLOGIC:  Mental Status Exam:  Level of Alertness:   awake  Orientation:   person, place, time  SKIN: no erythema or induration          ASSESSMENT:     Diagnosis Orders   1. Diverticulitis  ciprofloxacin (CIPRO) 500 MG tablet    metroNIDAZOLE (FLAGYL) 500 MG tablet    CT ABDOMEN PELVIS W IV CONTRAST Additional Contrast? None    HYDROcodone-acetaminophen (NORCO) 5-325 MG per tablet    Probiotic Acidophilus (FLORANEX) TABS         PLAN:    Arnol Ledbetter is doing up until stopping his antibiotics a few days ago, but is now having worsening lower abdominal pain. I am concerned that his diverticulitis and abscesses may have returned. Will restart on cipro/flagyl and obtain a stat CT abd/pelvis in case he needs perc drainage of an abscess. Will give norco for pain. Follow up will depend on CT imaging.     Electronically signed by Kerry Espinosa MD

## 2021-07-08 NOTE — Clinical Note
Benjamin Velasco,    I am taking care of Kimberley Felix for sigmoid diverticulitis with intramural abscesses. He is slowly improving. Last colonoscopy was greater than 5 years ago at the [facility]. He would like to establish care with a GI physician outside of the [facility]. Would you mind scoping him in 4-6 weeks? Thank you for taking care of Mr. Martinez with me.     Adrián Infante

## 2021-07-08 NOTE — PROGRESS NOTES
Established Patient Visit    [address]  [place] [facility]   Yassine Duncan MD    [address]  [place]  Phone: [phone]  Fax: [phone]   YOB: 1960    Date of Visit:  7/8/2021    No ref. provider found  Blessing aWllace MD    Subjective:     Niranjan Reed presents for a follow-up of his sigmoid diverticulitis. He continued to have lower abdominal pain that was significant until a few days ago. Now it is about a 1/10. Overall he has been doing well since his last visit. He has been eating/drinking without difficulty. He has not had any nausea and vomiting. His bowels are back to normal. He denies any fevers or chills. Allergies   Allergen Reactions    Zoloft [Sertraline Hcl]      diarrhea and stomach distress per PT    Doxycycline Rash     Outpatient Medications Marked as Taking for the 7/8/21 encounter (Office Visit) with Enrico Trujillo MD   Medication Sig Dispense Refill    metroNIDAZOLE (FLAGYL) 500 MG tablet Take 1 tablet by mouth 3 times daily for 14 days 52 tablet 0    ciprofloxacin (CIPRO) 500 MG tablet Take 1 tablet by mouth 2 times daily for 14 days 28 tablet 0    Probiotic Acidophilus (FLORANEX) TABS Take 1 tablet by mouth daily 30 tablet 0    nystatin (MYCOSTATIN) 316461 UNIT/ML suspension Take 5 mLs by mouth 4 times daily 1 Bottle 1    pantoprazole (PROTONIX) 40 MG tablet Take 1 tablet by mouth every morning (before breakfast) 30 tablet 1    albuterol sulfate HFA (VENTOLIN HFA) 108 (90 Base) MCG/ACT inhaler Inhale 2 puffs into the lungs 4 times daily as needed for Wheezing or Shortness of Breath      levothyroxine (SYNTHROID) 25 MCG tablet Take 25 mcg by mouth Daily      ondansetron (ZOFRAN ODT) 4 MG disintegrating tablet Take 1-2 tablets by mouth every 12 hours as needed for Nausea May Sub regular tablet (non-ODT) if insurance does not cover ODT.  12 tablet 0    ALPRAZolam (XANAX) 0.5 MG tablet Take 0.5 mg by mouth 2 times daily as needed for Sleep or Anxiety. Vitals:    07/08/21 1351   BP: (!) 129/90   Pulse: 84     There is no height or weight on file to calculate BMI. Wt Readings from Last 3 Encounters:   06/28/21 143 lb (64.9 kg)   06/14/21 143 lb (64.9 kg)   06/13/21 143 lb 8.3 oz (65.1 kg)     BP Readings from Last 3 Encounters:   07/08/21 (!) 129/90   06/28/21 100/81   06/14/21 110/70          Objective:    CONSTITUTIONAL:  awake, alert, no apparent distress    LUNGS:  Resp easy and unlabored  ABDOMEN:  soft, non-distended, minimal suprapubic tenderness, voluntary guarding absent, no masses palpated and hernia absent  MUSCULOSKELETAL: No edema  NEUROLOGIC:  Mental Status Exam:  Level of Alertness:   awake  Orientation:   person, place, time  SKIN: no erythema or induration        Imaging:  CT from 6/28/21 personally reviewed, showing:  Impression   Persistent inflammatory changes involving the mid sigmoid colon, however some   of the inflammatory changes and stated intramural abscess or fluid has   regressed.  The presence of continued inflammation implies incomplete   response to conservative antibiotic management.  The possibility of   underlying neoplasm is also not excluded in this type situation.       Cholelithiasis with gallbladder wall thickening and calcification, which is   chronic.  This could predispose to gallbladder malignancy.  Recommend   gallbladder ultrasound. ASSESSMENT:     Diagnosis Orders   1. Diverticulitis  metroNIDAZOLE (FLAGYL) 500 MG tablet    ciprofloxacin (CIPRO) 500 MG tablet    SASHA - Felipa Carrion MD, Gastroenterology, [facility]         PLAN:    [facility] is doing better. Hopefully antibiotics are allowing his diverticulitis to resolve. As he is still having pain, will continue cipro/flagyl for an additional two weeks. .  Will plan on having him follow up in 2 weeks.   He will need a colonoscopy in 4-6 weeks. Referral made to Dr. Mae Branch of GI.     Electronically signed by Meghann Fish MD on 7/8/2021 at 2:17 PM

## 2021-07-26 ENCOUNTER — OFFICE VISIT (OUTPATIENT)
Dept: SURGERY | Age: 61
End: 2021-07-26
Payer: MEDICAID

## 2021-07-26 VITALS
SYSTOLIC BLOOD PRESSURE: 127 MMHG | DIASTOLIC BLOOD PRESSURE: 87 MMHG | BODY MASS INDEX: 19.12 KG/M2 | WEIGHT: 141 LBS | HEART RATE: 101 BPM

## 2021-07-26 DIAGNOSIS — K57.92 DIVERTICULITIS: Primary | ICD-10-CM

## 2021-07-26 PROCEDURE — G8427 DOCREV CUR MEDS BY ELIG CLIN: HCPCS | Performed by: SURGERY

## 2021-07-26 PROCEDURE — G8420 CALC BMI NORM PARAMETERS: HCPCS | Performed by: SURGERY

## 2021-07-26 PROCEDURE — 1036F TOBACCO NON-USER: CPT | Performed by: SURGERY

## 2021-07-26 PROCEDURE — 3017F COLORECTAL CA SCREEN DOC REV: CPT | Performed by: SURGERY

## 2021-07-26 PROCEDURE — 99213 OFFICE O/P EST LOW 20 MIN: CPT | Performed by: SURGERY

## 2021-07-26 NOTE — PROGRESS NOTES
Established Patient Visit    [address] [facility]  [place] [facility]   Magalys Feliciano MD    [address]  [place]   Phone: [phone]  Fax: [phone]   YOB: 1960    Date of Visit:  7/26/2021    No ref. provider found  Jacques Ahn MD    Subjective:     Kailyn Espinal presents for a follow-up of his sigmoid diverticulitis. Overall he has been doing well since his last visit. He has been eating/drinking without difficulty. He has not had any nausea and vomiting. His bowels are moving daily with minimal pain at the time which resolves quickly. He denies any fevers or chills. Allergies   Allergen Reactions    Zoloft [Sertraline Hcl]      diarrhea and stomach distress per PT    Doxycycline Rash     Outpatient Medications Marked as Taking for the 7/26/21 encounter (Office Visit) with Cecelia Villarreal MD   Medication Sig Dispense Refill    Probiotic Acidophilus (FLORANEX) TABS Take 1 tablet by mouth daily 30 tablet 0    nystatin (MYCOSTATIN) 820783 UNIT/ML suspension Take 5 mLs by mouth 4 times daily 1 Bottle 1    pantoprazole (PROTONIX) 40 MG tablet Take 1 tablet by mouth every morning (before breakfast) 30 tablet 1    albuterol sulfate HFA (VENTOLIN HFA) 108 (90 Base) MCG/ACT inhaler Inhale 2 puffs into the lungs 4 times daily as needed for Wheezing or Shortness of Breath      levothyroxine (SYNTHROID) 25 MCG tablet Take 25 mcg by mouth Daily      ondansetron (ZOFRAN ODT) 4 MG disintegrating tablet Take 1-2 tablets by mouth every 12 hours as needed for Nausea May Sub regular tablet (non-ODT) if insurance does not cover ODT. 12 tablet 0    ALPRAZolam (XANAX) 0.5 MG tablet Take 0.5 mg by mouth 2 times daily as needed for Sleep or Anxiety. Vitals:    07/26/21 1339   BP: 127/87   Pulse: 101   Weight: 141 lb (64 kg)     Body mass index is 19.12 kg/m².      Wt Readings from Last 3 Encounters: 07/26/21 141 lb (64 kg)   06/28/21 143 lb (64.9 kg)   06/14/21 143 lb (64.9 kg)     BP Readings from Last 3 Encounters:   07/26/21 127/87   07/08/21 (!) 129/90   06/28/21 100/81          Objective:    CONSTITUTIONAL:  awake, alert, no apparent distress    LUNGS:  Resp easy and unlabored  ABDOMEN:  soft, non-distended, non-tender, voluntary guarding absent, no masses palpated and hernia absent  MUSCULOSKELETAL: No edema  NEUROLOGIC:  Mental Status Exam:  Level of Alertness:   awake  Orientation:   person, place, time  SKIN: no erythema or induration          ASSESSMENT:     Diagnosis Orders   1. Diverticulitis           PLAN:    June Duque is doing well. His abdominal pain has resolved, and he has had no worsening symptoms since stopping his cipro/flagyl from 3 days ago. He has a colonoscopy set up for next month, which I think is safe to perform. Will have him follow up prn.     Electronically signed by rBent Navarro MD on 7/26/2021 at 1:57 PM

## 2021-07-26 NOTE — Clinical Note
Hi Dr. Becky Salinas,    I just saw Mr. Adrian Pierre in the office for his diverticulitis. His symptoms have resolved and he is doing very well. He is going to get a colonoscopy next month with Dr. Leila Dakins. He is going to follow up with me as needed. Thank you for allowing me to take care of Mr. Martinez with you.     Becky Salinas

## 2021-07-29 ENCOUNTER — TELEPHONE (OUTPATIENT)
Dept: SURGERY | Age: 61
End: 2021-07-29

## 2021-07-29 NOTE — TELEPHONE ENCOUNTER
Calling again for the third time to get results from his scan and also is having recurrence of pain. Call with results and to advise on pain.

## 2021-08-03 DIAGNOSIS — R30.0 DYSURIA: Primary | ICD-10-CM

## 2021-08-03 DIAGNOSIS — R10.30 LOWER ABDOMINAL PAIN: ICD-10-CM

## 2021-08-03 NOTE — TELEPHONE ENCOUNTER
Wife called again stating patient is in severe pain and still has not received a telephone call regarding test results. Had an U/S performed at Dr. Elizabeth Sher office last Monday. Patient's wife offered to take him to ED, but he currently refuses.     Please contact patient @ [phone]

## 2021-08-04 ENCOUNTER — TELEPHONE (OUTPATIENT)
Dept: SURGERY | Age: 61
End: 2021-08-04

## 2021-08-04 NOTE — LETTER
Surgery Scheduling Form:      DEMOGRAPHICS:                                                                                                         .    Patient Name:  Kim Everett  Patient :  1960   Patient SS#:      Patient Phone:  [phone] (home)  Alt. Patient Phone:                     [facility] [id number]    PCP:  Joya Price MD  Insurance:  Payor: [address] / Plan: [facility] [employer] / Product Type: *No Product type* / Insurance ID Number:    Payor/Plan Subscr  Sex Relation Sub. Ins. ID Effective Group Num   1. [address] 1960 Male Self [id number]                                   [address]         DIAGNOSIS & PROCEDURE:                                                                                       .    Diagnosis:   Acute cholelithiasis   Operation:  Laparoscopic Cholecystectomy with Cholangiogram, Possible Open   Location: [facility]   Surgeon:    North Wong MD        [facility] INFORMATION:                                                                                    .    Surgeon's Scheduling Instruction:  elective  Requested Date: 8/10/2021    OR Time:           Patient Arrival Time:   OR Time Required:  60  Minutes  Anesthesia:  General  Equipment:                                                             SA Required:  Yes     Status:  Outpatient           Standard C-Arm:  Yes   PAT Required:   Yes                               Best Time to Call:  PM   Patient Requested to see PCP for Pre-op H & P:  No   Special Comments:     COVID TEST AM OF SURGERY                    North Wong MD    21 at 5:63 PM        PRE-CERTIFICATION INFORMATION:                                                                           .    Procedure:       CPT Code Modifier          18044

## 2021-08-04 NOTE — TELEPHONE ENCOUNTER
Dr. Refugio Bonilla is reccomending the Gallbladder be removed, does he need an office visit or can you just schedule the SX? Call to advise.

## 2021-08-05 RX ORDER — SITAGLIPTIN 100 MG/1
100 TABLET, FILM COATED ORAL DAILY
COMMUNITY
Start: 2021-08-04

## 2021-08-05 NOTE — PROGRESS NOTES
please wear simple loose fitting clothing to the hospital.  Please do not bring valuables. Do not wear any make-up or nail polish on your fingers or toes      For your safety, please do not wear any jewelry or body piercing's on the day of surgery. All jewelry must be removed. If you have dentures, they will be removed before going to operating room. For your convenience, we will provide you with a container. If you wear contact lenses or glasses, they will be removed, please bring a case for them. If you have a living will and a durable power of  for healthcare, please bring in a copy. As part of our patient safety program to minimize surgical site infections, we ask you to do the following:    · Please notify your surgeon if you develop any illness between         now and the  day of your surgery. · This includes a cough, cold, fever, sore throat, nausea,         or vomiting, and diarrhea, etc.  ·  Please notify your surgeon if you experience dizziness, shortness         of breath or blurred vision between now and the time of your surgery. Do not shave your operative site 96 hours prior to surgery. For face and neck surgery, men may use an electric razor 48 hours   prior to surgery. You may shower the night before surgery or the morning of   your surgery with an antibacterial soap. You will need to bring a photo ID and insurance card    [facility] has an onsite pharmacy, would you like to utilize our pharmacy     If you will be staying overnight and use a C-pap machine, please bring   your C-pap to hospital     Our goal is to provide you with excellent care, therefore, visitors will be limited to two(2) in the room at a time so that we may focus on providing this care for you. Please contact pre-admission testing if you have any further questions.                  [facility] phone number:  [phone] PAT fax number:  [phone]  Please note these are generalized instructions for all surgical cases, you may be provided with more specific instructions according to your surgery. Preoperative Screening for Elective Surgery/Invasive Procedures While COVID-19 present in the community     Have you tested positive or have been told to self-isolate for COVID-19 like symptoms within the past 28 days? n   Do you currently have any of the following symptoms? n  o Fever >100.0 F or 99.9 F in immunocompromised patients?n  o New onset cough, shortness of breath or difficulty breathing?n  o New onset sore throat, myalgia (muscle aches and pains), headache, loss of taste/smell or diarrhea?n   Have you had a potential exposure to COVID-19 within the past 14 days by:  o Close contact with a confirmed case?n  o Close contact with a healthcare worker,  or essential infrastructure worker (grocery store, [employer], gas station, public utilities or transportation)? y  o Do you reside in a congregate setting such as; skilled nursing facility, adult home, correctional facility, homeless shelter or other institutional setting?n  o Have you had recent travel to a known COVID-19 hotspot?n    Indicate if the patient has a positive screen by answering yes to one or more of the above questions. Patients who test positive or screen positive prior to surgery or on the day of surgery should be evaluated in conjunction with the surgeon/proceduralist/anesthesiologist to determine the urgency of the procedure.

## 2021-08-09 ENCOUNTER — ANESTHESIA EVENT (OUTPATIENT)
Dept: OPERATING ROOM | Age: 61
End: 2021-08-09
Payer: MEDICAID

## 2021-08-10 ENCOUNTER — ANESTHESIA (OUTPATIENT)
Dept: OPERATING ROOM | Age: 61
End: 2021-08-10
Payer: MEDICAID

## 2021-08-10 ENCOUNTER — APPOINTMENT (OUTPATIENT)
Dept: GENERAL RADIOLOGY | Age: 61
End: 2021-08-10
Attending: SURGERY
Payer: MEDICAID

## 2021-08-10 ENCOUNTER — HOSPITAL ENCOUNTER (OUTPATIENT)
Age: 61
Setting detail: OBSERVATION
Discharge: HOME | End: 2021-08-11
Attending: SURGERY | Admitting: SURGERY
Payer: MEDICAID

## 2021-08-10 VITALS
OXYGEN SATURATION: 100 % | RESPIRATION RATE: 25 BRPM | TEMPERATURE: 95.4 F | SYSTOLIC BLOOD PRESSURE: 171 MMHG | DIASTOLIC BLOOD PRESSURE: 97 MMHG

## 2021-08-10 DIAGNOSIS — K81.2 ACUTE ON CHRONIC CHOLECYSTITIS: Primary | ICD-10-CM

## 2021-08-10 DIAGNOSIS — K80.80 BILIARY CALCULUS OF OTHER SITE WITHOUT OBSTRUCTION: ICD-10-CM

## 2021-08-10 DIAGNOSIS — K82.8 PORCELAIN GALLBLADDER: ICD-10-CM

## 2021-08-10 LAB
ANION GAP SERPL CALCULATED.3IONS-SCNC: 10 MMOL/L (ref 3–16)
BUN BLDV-MCNC: 12 MG/DL (ref 7–20)
CALCIUM SERPL-MCNC: 7.9 MG/DL (ref 8.3–10.6)
CHLORIDE BLD-SCNC: 98 MMOL/L (ref 99–110)
CO2: 23 MMOL/L (ref 21–32)
CREAT SERPL-MCNC: 0.9 MG/DL (ref 0.8–1.3)
GFR AFRICAN AMERICAN: >60
GFR NON-AFRICAN AMERICAN: >60
GLUCOSE BLD-MCNC: 154 MG/DL (ref 70–99)
GLUCOSE BLD-MCNC: 169 MG/DL (ref 70–99)
GLUCOSE BLD-MCNC: 330 MG/DL (ref 70–99)
GLUCOSE BLD-MCNC: 348 MG/DL (ref 70–99)
PERFORMED ON: ABNORMAL
POTASSIUM SERPL-SCNC: 5.2 MMOL/L (ref 3.5–5.1)
SODIUM BLD-SCNC: 131 MMOL/L (ref 136–145)

## 2021-08-10 PROCEDURE — 2580000003 HC RX 258: Performed by: ANESTHESIOLOGY

## 2021-08-10 PROCEDURE — 80048 BASIC METABOLIC PNL TOTAL CA: CPT

## 2021-08-10 PROCEDURE — 6370000000 HC RX 637 (ALT 250 FOR IP): Performed by: SURGERY

## 2021-08-10 PROCEDURE — 6360000002 HC RX W HCPCS: Performed by: ANESTHESIOLOGY

## 2021-08-10 PROCEDURE — 6360000002 HC RX W HCPCS: Performed by: SURGERY

## 2021-08-10 PROCEDURE — 6360000002 HC RX W HCPCS

## 2021-08-10 PROCEDURE — 2580000003 HC RX 258: Performed by: SURGERY

## 2021-08-10 PROCEDURE — 74300 X-RAY BILE DUCTS/PANCREAS: CPT

## 2021-08-10 PROCEDURE — 94640 AIRWAY INHALATION TREATMENT: CPT

## 2021-08-10 PROCEDURE — 47563 LAPARO CHOLECYSTECTOMY/GRAPH: CPT | Performed by: SURGERY

## 2021-08-10 PROCEDURE — 94761 N-INVAS EAR/PLS OXIMETRY MLT: CPT

## 2021-08-10 PROCEDURE — 3600000014 HC SURGERY LEVEL 4 ADDTL 15MIN: Performed by: SURGERY

## 2021-08-10 PROCEDURE — 2500000003 HC RX 250 WO HCPCS

## 2021-08-10 PROCEDURE — 6370000000 HC RX 637 (ALT 250 FOR IP): Performed by: ANESTHESIOLOGY

## 2021-08-10 PROCEDURE — G0378 HOSPITAL OBSERVATION PER HR: HCPCS

## 2021-08-10 PROCEDURE — 96372 THER/PROPH/DIAG INJ SC/IM: CPT

## 2021-08-10 PROCEDURE — 3600000004 HC SURGERY LEVEL 4 BASE: Performed by: SURGERY

## 2021-08-10 PROCEDURE — 3700000000 HC ANESTHESIA ATTENDED CARE: Performed by: SURGERY

## 2021-08-10 PROCEDURE — 88304 TISSUE EXAM BY PATHOLOGIST: CPT

## 2021-08-10 PROCEDURE — 7100000001 HC PACU RECOVERY - ADDTL 15 MIN: Performed by: SURGERY

## 2021-08-10 PROCEDURE — 2500000003 HC RX 250 WO HCPCS: Performed by: SURGERY

## 2021-08-10 PROCEDURE — 3700000001 HC ADD 15 MINUTES (ANESTHESIA): Performed by: SURGERY

## 2021-08-10 PROCEDURE — 6360000004 HC RX CONTRAST MEDICATION: Performed by: SURGERY

## 2021-08-10 PROCEDURE — 36415 COLL VENOUS BLD VENIPUNCTURE: CPT

## 2021-08-10 PROCEDURE — 7100000000 HC PACU RECOVERY - FIRST 15 MIN: Performed by: SURGERY

## 2021-08-10 PROCEDURE — 2709999900 HC NON-CHARGEABLE SUPPLY: Performed by: SURGERY

## 2021-08-10 RX ORDER — LIDOCAINE HYDROCHLORIDE 20 MG/ML
INJECTION, SOLUTION EPIDURAL; INFILTRATION; INTRACAUDAL; PERINEURAL PRN
Status: DISCONTINUED | OUTPATIENT
Start: 2021-08-10 | End: 2021-08-10 | Stop reason: SDUPTHER

## 2021-08-10 RX ORDER — ONDANSETRON 2 MG/ML
4 INJECTION INTRAMUSCULAR; INTRAVENOUS EVERY 6 HOURS PRN
Status: DISCONTINUED | OUTPATIENT
Start: 2021-08-10 | End: 2021-08-11 | Stop reason: HOSPADM

## 2021-08-10 RX ORDER — MAGNESIUM SULFATE 1 G/100ML
1000 INJECTION INTRAVENOUS PRN
Status: DISCONTINUED | OUTPATIENT
Start: 2021-08-10 | End: 2021-08-11 | Stop reason: HOSPADM

## 2021-08-10 RX ORDER — LORAZEPAM 2 MG/ML
INJECTION INTRAMUSCULAR
Status: COMPLETED
Start: 2021-08-10 | End: 2021-08-10

## 2021-08-10 RX ORDER — ACETAMINOPHEN 325 MG/1
650 TABLET ORAL EVERY 6 HOURS
Status: DISCONTINUED | OUTPATIENT
Start: 2021-08-10 | End: 2021-08-11 | Stop reason: HOSPADM

## 2021-08-10 RX ORDER — SODIUM CHLORIDE 0.9 % (FLUSH) 0.9 %
5-40 SYRINGE (ML) INJECTION PRN
Status: DISCONTINUED | OUTPATIENT
Start: 2021-08-10 | End: 2021-08-10 | Stop reason: HOSPADM

## 2021-08-10 RX ORDER — FENTANYL CITRATE 50 UG/ML
50 INJECTION, SOLUTION INTRAMUSCULAR; INTRAVENOUS EVERY 5 MIN PRN
Status: DISCONTINUED | OUTPATIENT
Start: 2021-08-10 | End: 2021-08-10 | Stop reason: HOSPADM

## 2021-08-10 RX ORDER — MIDAZOLAM HYDROCHLORIDE 1 MG/ML
INJECTION INTRAMUSCULAR; INTRAVENOUS PRN
Status: DISCONTINUED | OUTPATIENT
Start: 2021-08-10 | End: 2021-08-10 | Stop reason: SDUPTHER

## 2021-08-10 RX ORDER — FENTANYL CITRATE 50 UG/ML
25 INJECTION, SOLUTION INTRAMUSCULAR; INTRAVENOUS EVERY 5 MIN PRN
Status: DISCONTINUED | OUTPATIENT
Start: 2021-08-10 | End: 2021-08-10 | Stop reason: HOSPADM

## 2021-08-10 RX ORDER — SODIUM CHLORIDE 9 MG/ML
INJECTION, SOLUTION INTRAVENOUS CONTINUOUS
Status: DISCONTINUED | OUTPATIENT
Start: 2021-08-10 | End: 2021-08-11 | Stop reason: HOSPADM

## 2021-08-10 RX ORDER — ONDANSETRON 2 MG/ML
INJECTION INTRAMUSCULAR; INTRAVENOUS PRN
Status: DISCONTINUED | OUTPATIENT
Start: 2021-08-10 | End: 2021-08-10 | Stop reason: SDUPTHER

## 2021-08-10 RX ORDER — POTASSIUM CHLORIDE 7.45 MG/ML
10 INJECTION INTRAVENOUS PRN
Status: DISCONTINUED | OUTPATIENT
Start: 2021-08-10 | End: 2021-08-11 | Stop reason: HOSPADM

## 2021-08-10 RX ORDER — MORPHINE SULFATE 4 MG/ML
4 INJECTION, SOLUTION INTRAMUSCULAR; INTRAVENOUS
Status: DISCONTINUED | OUTPATIENT
Start: 2021-08-10 | End: 2021-08-11 | Stop reason: HOSPADM

## 2021-08-10 RX ORDER — IBUPROFEN 400 MG/1
400 TABLET ORAL 4 TIMES DAILY PRN
Qty: 100 TABLET | Refills: 0 | Status: SHIPPED | OUTPATIENT
Start: 2021-08-10

## 2021-08-10 RX ORDER — DEXTROSE, SODIUM CHLORIDE, AND POTASSIUM CHLORIDE 5; .45; .15 G/100ML; G/100ML; G/100ML
INJECTION INTRAVENOUS CONTINUOUS
Status: DISCONTINUED | OUTPATIENT
Start: 2021-08-10 | End: 2021-08-10

## 2021-08-10 RX ORDER — DEXAMETHASONE SODIUM PHOSPHATE 4 MG/ML
INJECTION, SOLUTION INTRA-ARTICULAR; INTRALESIONAL; INTRAMUSCULAR; INTRAVENOUS; SOFT TISSUE PRN
Status: DISCONTINUED | OUTPATIENT
Start: 2021-08-10 | End: 2021-08-10 | Stop reason: SDUPTHER

## 2021-08-10 RX ORDER — MEPERIDINE HYDROCHLORIDE 25 MG/ML
12.5 INJECTION INTRAMUSCULAR; INTRAVENOUS; SUBCUTANEOUS
Status: DISCONTINUED | OUTPATIENT
Start: 2021-08-10 | End: 2021-08-10 | Stop reason: HOSPADM

## 2021-08-10 RX ORDER — ROCURONIUM BROMIDE 10 MG/ML
INJECTION, SOLUTION INTRAVENOUS PRN
Status: DISCONTINUED | OUTPATIENT
Start: 2021-08-10 | End: 2021-08-10 | Stop reason: SDUPTHER

## 2021-08-10 RX ORDER — ALBUTEROL SULFATE 90 UG/1
2 AEROSOL, METERED RESPIRATORY (INHALATION) 4 TIMES DAILY PRN
Status: DISCONTINUED | OUTPATIENT
Start: 2021-08-10 | End: 2021-08-11 | Stop reason: HOSPADM

## 2021-08-10 RX ORDER — METOCLOPRAMIDE HYDROCHLORIDE 5 MG/ML
INJECTION INTRAMUSCULAR; INTRAVENOUS PRN
Status: DISCONTINUED | OUTPATIENT
Start: 2021-08-10 | End: 2021-08-10 | Stop reason: SDUPTHER

## 2021-08-10 RX ORDER — ONDANSETRON 2 MG/ML
4 INJECTION INTRAMUSCULAR; INTRAVENOUS
Status: DISCONTINUED | OUTPATIENT
Start: 2021-08-10 | End: 2021-08-10 | Stop reason: HOSPADM

## 2021-08-10 RX ORDER — LEVOTHYROXINE SODIUM 0.03 MG/1
25 TABLET ORAL DAILY
Status: DISCONTINUED | OUTPATIENT
Start: 2021-08-11 | End: 2021-08-11 | Stop reason: HOSPADM

## 2021-08-10 RX ORDER — ONDANSETRON 4 MG/1
4 TABLET, ORALLY DISINTEGRATING ORAL EVERY 8 HOURS PRN
Status: DISCONTINUED | OUTPATIENT
Start: 2021-08-10 | End: 2021-08-11 | Stop reason: HOSPADM

## 2021-08-10 RX ORDER — PROMETHAZINE HYDROCHLORIDE 25 MG/ML
6.25 INJECTION, SOLUTION INTRAMUSCULAR; INTRAVENOUS
Status: DISCONTINUED | OUTPATIENT
Start: 2021-08-10 | End: 2021-08-10 | Stop reason: HOSPADM

## 2021-08-10 RX ORDER — APREPITANT 40 MG/1
40 CAPSULE ORAL ONCE
Status: COMPLETED | OUTPATIENT
Start: 2021-08-10 | End: 2021-08-10

## 2021-08-10 RX ORDER — OXYCODONE HYDROCHLORIDE 10 MG/1
10 TABLET ORAL EVERY 4 HOURS PRN
Status: DISCONTINUED | OUTPATIENT
Start: 2021-08-10 | End: 2021-08-11 | Stop reason: HOSPADM

## 2021-08-10 RX ORDER — MAGNESIUM HYDROXIDE 1200 MG/15ML
LIQUID ORAL CONTINUOUS PRN
Status: COMPLETED | OUTPATIENT
Start: 2021-08-10 | End: 2021-08-10

## 2021-08-10 RX ORDER — SODIUM CHLORIDE 0.9 % (FLUSH) 0.9 %
5-40 SYRINGE (ML) INJECTION EVERY 12 HOURS SCHEDULED
Status: DISCONTINUED | OUTPATIENT
Start: 2021-08-10 | End: 2021-08-10 | Stop reason: HOSPADM

## 2021-08-10 RX ORDER — SENNA AND DOCUSATE SODIUM 50; 8.6 MG/1; MG/1
1 TABLET, FILM COATED ORAL 2 TIMES DAILY
Status: DISCONTINUED | OUTPATIENT
Start: 2021-08-10 | End: 2021-08-11 | Stop reason: HOSPADM

## 2021-08-10 RX ORDER — HYDRALAZINE HYDROCHLORIDE 20 MG/ML
5 INJECTION INTRAMUSCULAR; INTRAVENOUS EVERY 10 MIN PRN
Status: DISCONTINUED | OUTPATIENT
Start: 2021-08-10 | End: 2021-08-10 | Stop reason: HOSPADM

## 2021-08-10 RX ORDER — IBUPROFEN 400 MG/1
400 TABLET ORAL
Status: DISCONTINUED | OUTPATIENT
Start: 2021-08-10 | End: 2021-08-11 | Stop reason: HOSPADM

## 2021-08-10 RX ORDER — IPRATROPIUM BROMIDE AND ALBUTEROL SULFATE 2.5; .5 MG/3ML; MG/3ML
1 SOLUTION RESPIRATORY (INHALATION)
Status: DISCONTINUED | OUTPATIENT
Start: 2021-08-10 | End: 2021-08-10 | Stop reason: HOSPADM

## 2021-08-10 RX ORDER — PROPOFOL 10 MG/ML
INJECTION, EMULSION INTRAVENOUS PRN
Status: DISCONTINUED | OUTPATIENT
Start: 2021-08-10 | End: 2021-08-10 | Stop reason: SDUPTHER

## 2021-08-10 RX ORDER — BUPIVACAINE HYDROCHLORIDE 5 MG/ML
INJECTION, SOLUTION EPIDURAL; INTRACAUDAL
Status: COMPLETED | OUTPATIENT
Start: 2021-08-10 | End: 2021-08-10

## 2021-08-10 RX ORDER — SODIUM CHLORIDE 9 MG/ML
25 INJECTION, SOLUTION INTRAVENOUS PRN
Status: DISCONTINUED | OUTPATIENT
Start: 2021-08-10 | End: 2021-08-11 | Stop reason: HOSPADM

## 2021-08-10 RX ORDER — SODIUM CHLORIDE 9 MG/ML
25 INJECTION, SOLUTION INTRAVENOUS PRN
Status: DISCONTINUED | OUTPATIENT
Start: 2021-08-10 | End: 2021-08-10 | Stop reason: HOSPADM

## 2021-08-10 RX ORDER — HYDROCODONE BITARTRATE AND ACETAMINOPHEN 5; 325 MG/1; MG/1
1 TABLET ORAL PRN
Status: DISCONTINUED | OUTPATIENT
Start: 2021-08-10 | End: 2021-08-10 | Stop reason: HOSPADM

## 2021-08-10 RX ORDER — ALPRAZOLAM 0.5 MG/1
0.5 TABLET ORAL 2 TIMES DAILY PRN
Status: DISCONTINUED | OUTPATIENT
Start: 2021-08-10 | End: 2021-08-11 | Stop reason: HOSPADM

## 2021-08-10 RX ORDER — OXYCODONE HYDROCHLORIDE 5 MG/1
5 TABLET ORAL EVERY 4 HOURS PRN
Status: DISCONTINUED | OUTPATIENT
Start: 2021-08-10 | End: 2021-08-11 | Stop reason: HOSPADM

## 2021-08-10 RX ORDER — HYDROCODONE BITARTRATE AND ACETAMINOPHEN 5; 325 MG/1; MG/1
2 TABLET ORAL PRN
Status: DISCONTINUED | OUTPATIENT
Start: 2021-08-10 | End: 2021-08-10 | Stop reason: HOSPADM

## 2021-08-10 RX ORDER — IBUPROFEN 400 MG/1
400 TABLET ORAL 4 TIMES DAILY PRN
Qty: 100 TABLET | Refills: 0 | Status: SHIPPED | OUTPATIENT
Start: 2021-08-10 | End: 2021-08-10 | Stop reason: SDUPTHER

## 2021-08-10 RX ORDER — SODIUM CHLORIDE 0.9 % (FLUSH) 0.9 %
5-40 SYRINGE (ML) INJECTION EVERY 12 HOURS SCHEDULED
Status: DISCONTINUED | OUTPATIENT
Start: 2021-08-10 | End: 2021-08-11 | Stop reason: HOSPADM

## 2021-08-10 RX ORDER — LORAZEPAM 2 MG/ML
1 INJECTION INTRAMUSCULAR ONCE
Status: COMPLETED | OUTPATIENT
Start: 2021-08-10 | End: 2021-08-10

## 2021-08-10 RX ORDER — OXYCODONE HYDROCHLORIDE 5 MG/1
5 TABLET ORAL EVERY 6 HOURS PRN
Qty: 28 TABLET | Refills: 0 | Status: SHIPPED | OUTPATIENT
Start: 2021-08-10 | End: 2021-08-17

## 2021-08-10 RX ORDER — SODIUM CHLORIDE 9 MG/ML
INJECTION, SOLUTION INTRAVENOUS CONTINUOUS
Status: DISCONTINUED | OUTPATIENT
Start: 2021-08-10 | End: 2021-08-10

## 2021-08-10 RX ORDER — DIPHENHYDRAMINE HYDROCHLORIDE 50 MG/ML
INJECTION INTRAMUSCULAR; INTRAVENOUS PRN
Status: DISCONTINUED | OUTPATIENT
Start: 2021-08-10 | End: 2021-08-10 | Stop reason: SDUPTHER

## 2021-08-10 RX ORDER — DOCUSATE SODIUM 100 MG/1
100 CAPSULE, LIQUID FILLED ORAL 2 TIMES DAILY PRN
Qty: 60 CAPSULE | Refills: 0 | Status: SHIPPED | OUTPATIENT
Start: 2021-08-10

## 2021-08-10 RX ORDER — KETOROLAC TROMETHAMINE 30 MG/ML
INJECTION, SOLUTION INTRAMUSCULAR; INTRAVENOUS PRN
Status: DISCONTINUED | OUTPATIENT
Start: 2021-08-10 | End: 2021-08-10 | Stop reason: SDUPTHER

## 2021-08-10 RX ORDER — SODIUM CHLORIDE 0.9 % (FLUSH) 0.9 %
5-40 SYRINGE (ML) INJECTION PRN
Status: DISCONTINUED | OUTPATIENT
Start: 2021-08-10 | End: 2021-08-11 | Stop reason: HOSPADM

## 2021-08-10 RX ORDER — MORPHINE SULFATE 2 MG/ML
2 INJECTION, SOLUTION INTRAMUSCULAR; INTRAVENOUS
Status: DISCONTINUED | OUTPATIENT
Start: 2021-08-10 | End: 2021-08-11 | Stop reason: HOSPADM

## 2021-08-10 RX ORDER — FENTANYL CITRATE 50 UG/ML
INJECTION, SOLUTION INTRAMUSCULAR; INTRAVENOUS PRN
Status: DISCONTINUED | OUTPATIENT
Start: 2021-08-10 | End: 2021-08-10 | Stop reason: SDUPTHER

## 2021-08-10 RX ADMIN — DEXAMETHASONE SODIUM PHOSPHATE 8 MG: 4 INJECTION, SOLUTION INTRAMUSCULAR; INTRAVENOUS at 09:50

## 2021-08-10 RX ADMIN — IBUPROFEN 400 MG: 400 TABLET ORAL at 21:07

## 2021-08-10 RX ADMIN — FENTANYL CITRATE 100 MCG: 50 INJECTION INTRAMUSCULAR; INTRAVENOUS at 09:40

## 2021-08-10 RX ADMIN — LORAZEPAM 1 MG: 2 INJECTION INTRAMUSCULAR at 11:05

## 2021-08-10 RX ADMIN — ROCURONIUM BROMIDE 50 MG: 10 INJECTION INTRAVENOUS at 09:40

## 2021-08-10 RX ADMIN — HYDROMORPHONE HYDROCHLORIDE 1 MG: 1 INJECTION, SOLUTION INTRAMUSCULAR; INTRAVENOUS; SUBCUTANEOUS at 10:06

## 2021-08-10 RX ADMIN — FAMOTIDINE 20 MG: 10 INJECTION, SOLUTION INTRAVENOUS at 09:30

## 2021-08-10 RX ADMIN — ENOXAPARIN SODIUM 40 MG: 40 INJECTION SUBCUTANEOUS at 21:09

## 2021-08-10 RX ADMIN — HYDROMORPHONE HYDROCHLORIDE 0.5 MG: 1 INJECTION, SOLUTION INTRAMUSCULAR; INTRAVENOUS; SUBCUTANEOUS at 11:10

## 2021-08-10 RX ADMIN — DOCUSATE SODIUM 50 MG AND SENNOSIDES 8.6 MG 1 TABLET: 8.6; 5 TABLET, FILM COATED ORAL at 21:07

## 2021-08-10 RX ADMIN — KETOROLAC TROMETHAMINE 30 MG: 30 INJECTION, SOLUTION INTRAMUSCULAR at 10:24

## 2021-08-10 RX ADMIN — ONDANSETRON 4 MG: 2 INJECTION INTRAMUSCULAR; INTRAVENOUS at 09:50

## 2021-08-10 RX ADMIN — SUGAMMADEX 200 MG: 100 INJECTION, SOLUTION INTRAVENOUS at 10:29

## 2021-08-10 RX ADMIN — FENTANYL CITRATE 50 MCG: 50 INJECTION, SOLUTION INTRAMUSCULAR; INTRAVENOUS at 11:54

## 2021-08-10 RX ADMIN — SODIUM CHLORIDE: 9 INJECTION, SOLUTION INTRAVENOUS at 09:13

## 2021-08-10 RX ADMIN — PROPOFOL 150 MG: 10 INJECTION, EMULSION INTRAVENOUS at 09:40

## 2021-08-10 RX ADMIN — ACETAMINOPHEN 650 MG: 325 TABLET ORAL at 21:08

## 2021-08-10 RX ADMIN — SODIUM CHLORIDE: 9 INJECTION, SOLUTION INTRAVENOUS at 09:30

## 2021-08-10 RX ADMIN — NYSTATIN 500000 UNITS: 100000 SUSPENSION ORAL at 21:07

## 2021-08-10 RX ADMIN — CEFAZOLIN 2000 MG: 10 INJECTION, POWDER, FOR SOLUTION INTRAVENOUS at 09:51

## 2021-08-10 RX ADMIN — MIDAZOLAM 2 MG: 1 INJECTION INTRAMUSCULAR; INTRAVENOUS at 09:30

## 2021-08-10 RX ADMIN — OXYCODONE HYDROCHLORIDE 10 MG: 10 TABLET ORAL at 21:07

## 2021-08-10 RX ADMIN — IPRATROPIUM BROMIDE AND ALBUTEROL SULFATE 1 AMPULE: .5; 3 SOLUTION RESPIRATORY (INHALATION) at 11:11

## 2021-08-10 RX ADMIN — APREPITANT 40 MG: 40 CAPSULE ORAL at 09:13

## 2021-08-10 RX ADMIN — METOCLOPRAMIDE 10 MG: 5 INJECTION, SOLUTION INTRAMUSCULAR; INTRAVENOUS at 10:25

## 2021-08-10 RX ADMIN — DIPHENHYDRAMINE HYDROCHLORIDE 6.25 MG: 50 INJECTION, SOLUTION INTRAMUSCULAR; INTRAVENOUS at 10:04

## 2021-08-10 RX ADMIN — LIDOCAINE HYDROCHLORIDE 60 MG: 20 INJECTION, SOLUTION EPIDURAL; INFILTRATION; INTRACAUDAL; PERINEURAL at 09:40

## 2021-08-10 RX ADMIN — LORAZEPAM 1 MG: 2 INJECTION INTRAMUSCULAR; INTRAVENOUS at 11:05

## 2021-08-10 RX ADMIN — FENTANYL CITRATE 50 MCG: 50 INJECTION, SOLUTION INTRAMUSCULAR; INTRAVENOUS at 11:32

## 2021-08-10 ASSESSMENT — PULMONARY FUNCTION TESTS
PIF_VALUE: 1
PIF_VALUE: 16
PIF_VALUE: 0
PIF_VALUE: 13
PIF_VALUE: 6
PIF_VALUE: 29
PIF_VALUE: 16
PIF_VALUE: 26
PIF_VALUE: 27
PIF_VALUE: 19
PIF_VALUE: 25
PIF_VALUE: 7
PIF_VALUE: 28
PIF_VALUE: 28
PIF_VALUE: 27
PIF_VALUE: 28
PIF_VALUE: 13
PIF_VALUE: 1
PIF_VALUE: 30
PIF_VALUE: 2
PIF_VALUE: 0
PIF_VALUE: 31
PIF_VALUE: 26
PIF_VALUE: 16
PIF_VALUE: 0
PIF_VALUE: 27
PIF_VALUE: 28
PIF_VALUE: 26
PIF_VALUE: 16
PIF_VALUE: 2
PIF_VALUE: 2
PIF_VALUE: 12
PIF_VALUE: 3
PIF_VALUE: 21
PIF_VALUE: 9
PIF_VALUE: 28
PIF_VALUE: 28
PIF_VALUE: 1
PIF_VALUE: 19
PIF_VALUE: 16
PIF_VALUE: 6
PIF_VALUE: 27
PIF_VALUE: 25
PIF_VALUE: 26
PIF_VALUE: 15
PIF_VALUE: 3
PIF_VALUE: 27
PIF_VALUE: 17
PIF_VALUE: 26
PIF_VALUE: 2
PIF_VALUE: 25
PIF_VALUE: 14
PIF_VALUE: 3
PIF_VALUE: 16
PIF_VALUE: 26
PIF_VALUE: 27
PIF_VALUE: 24
PIF_VALUE: 19
PIF_VALUE: 26
PIF_VALUE: 22
PIF_VALUE: 16
PIF_VALUE: 25
PIF_VALUE: 5
PIF_VALUE: 12
PIF_VALUE: 4
PIF_VALUE: 28
PIF_VALUE: 9
PIF_VALUE: 3
PIF_VALUE: 28
PIF_VALUE: 26
PIF_VALUE: 26
PIF_VALUE: 15
PIF_VALUE: 13
PIF_VALUE: 26

## 2021-08-10 ASSESSMENT — PAIN SCALES - GENERAL
PAINLEVEL_OUTOF10: 10
PAINLEVEL_OUTOF10: 8
PAINLEVEL_OUTOF10: 6
PAINLEVEL_OUTOF10: 9
PAINLEVEL_OUTOF10: 0

## 2021-08-10 ASSESSMENT — PAIN DESCRIPTION - ONSET
ONSET: ON-GOING

## 2021-08-10 ASSESSMENT — PAIN DESCRIPTION - DESCRIPTORS
DESCRIPTORS: DISCOMFORT
DESCRIPTORS: ACHING
DESCRIPTORS: DISCOMFORT

## 2021-08-10 ASSESSMENT — PAIN DESCRIPTION - PAIN TYPE
TYPE: SURGICAL PAIN
TYPE: SURGICAL PAIN
TYPE: SURGICAL PAIN;ACUTE PAIN

## 2021-08-10 ASSESSMENT — PAIN DESCRIPTION - FREQUENCY
FREQUENCY: CONTINUOUS

## 2021-08-10 ASSESSMENT — PAIN - FUNCTIONAL ASSESSMENT
PAIN_FUNCTIONAL_ASSESSMENT: PREVENTS OR INTERFERES WITH ALL ACTIVE AND SOME PASSIVE ACTIVITIES
PAIN_FUNCTIONAL_ASSESSMENT: PREVENTS OR INTERFERES WITH ALL ACTIVE AND SOME PASSIVE ACTIVITIES
PAIN_FUNCTIONAL_ASSESSMENT: 0-10
PAIN_FUNCTIONAL_ASSESSMENT: ACTIVITIES ARE NOT PREVENTED

## 2021-08-10 ASSESSMENT — PAIN DESCRIPTION - LOCATION
LOCATION: ABDOMEN

## 2021-08-10 ASSESSMENT — PAIN DESCRIPTION - PROGRESSION
CLINICAL_PROGRESSION: GRADUALLY IMPROVING
CLINICAL_PROGRESSION: GRADUALLY IMPROVING
CLINICAL_PROGRESSION: NOT CHANGED

## 2021-08-10 NOTE — PROGRESS NOTES
Pt arrived to room 4118 from PACU. Pt lethargic will arouse to voice. Pt c/o of abd pain but is tolerable. Pt VSS on 3L NC O2.  Pt denies any needs at this time. Bed is locked in lowest position with call light in reach and bed alarm in place.

## 2021-08-10 NOTE — PROGRESS NOTES
Dr. Crow Velazquez called to update on pt status and request admit orders per Dr. Yepez Sher recommendation.

## 2021-08-10 NOTE — ANESTHESIA POSTPROCEDURE EVALUATION
Department of Anesthesiology  Postprocedure Note    Patient: Kimberley Felix  MRN: [id number]  YOB: 1960  Date of evaluation: 8/10/2021  Time:  11:21 AM     Procedure Summary     Date: 08/10/21 Room / Location: [address]    Anesthesia Start: 7428 Anesthesia Stop: 9355    Procedure: LAPAROSCOPIC CHOLECYSTECTOMY WITH CHOLANGIOGRAM (N/A Abdomen) Diagnosis:       Biliary calculus of other site without obstruction      (ACUTE CHOLELITHIASIS)    Surgeons: Zakiya Yeh MD Responsible Provider: Cortez Bryant MD    Anesthesia Type: general ASA Status: 3          Anesthesia Type: general    Zac Phase I: Zac Score: 8    Zac Phase II:      Last vitals: Reviewed and per EMR flowsheets. Patient unable to arouse to satisfactory level to be discharged to home. Some N/V with Sp02 declining to high 80s. Consulted with Dr. Yeni Parish: patient to be admitted overnight for observation . Patient and spouse agree with plan and would like to proceed.     Anesthesia Post Evaluation    Patient location during evaluation: PACU  Patient participation: complete - patient participated  Level of consciousness: agitated and awake  Pain score: 3  Airway patency: patent  Nausea & Vomiting: nausea and vomiting  Complications: no  Cardiovascular status: blood pressure returned to baseline  Respiratory status: acceptable  Hydration status: euvolemic

## 2021-08-10 NOTE — PROGRESS NOTES
Blood glucose 330, Dr. Genaro Jackson updated. No new orders. Report called to receiving nurse Ryan Monroy pt ready to transfer up to room 4118. Wife (Yuki Matamoros) called and informed of room number.

## 2021-08-10 NOTE — ADDENDUM NOTE
Addendum  created 08/10/21 1122 by Thu Duenas MD    Attestation recorded in [address] [place] [address], [facility] 97 filed

## 2021-08-10 NOTE — OP NOTE
Laparoscopic Cholecystectomy Operative Report      Patient: June Duque MRN: [id number]     YOB: 1960  Age: 62 y.o. Sex: male        Primary Care Physician: Saira Porter MD         DATE OF OPERATION: 8/10/2021     PREOPERATIVE DIAGNOSIS: acute on chronic cholecystitis, porcelain gallbladder    POSTOPERATIVE DIAGNOSIS: same    PROCEDURE PERFORMED: Laparoscopic cholecystectomy with cholangiogram    SURGEON: Brent Navarro MD, MD    ANESTHESIA: General endotracheal    ASA CLASS: 3    DVT PROPHYLAXIS: bilateral SCDs    ANTIBIOTICS: ancef 2g IV preoperatively    INDICATIONS: June Duque has been treated for persistent diverticulitis with sigmoid colon intramural abscesses over the past two months. CT imaging at that time did show a porcelain gallbladder with thickened wall. After treatment of his diverticulitis with prolonged antibiotics, his sigmoid symptoms resolved. Then, approximately 2 weeks ago he began to have intermittent periumbilical pain. He had an ultrasound that showed gallstones and a thickened gallbladder wall, consistent with acute on chronic cholecystitis. Given the patient's imaging findings of porcelain gallbladder and symptoms consistent with cholecystitis, the patient was treated with antibiotics and he presented electively for removal after the risks, benefits and alternatives were discussed with him. Procedure Details:       After informed consent was obtained, the patient was brought to the operating room and placed on the table in the supine position. Preoperative antibiotics were given. Once under general endotracheal anesthesia, the abdomen was prepped and sterilely draped in the standard fashion. A time-out was performed for patient and procedure verification. A small vertical incision was made just at the umbilicus and a Veress needle inserted into peritoneal cavity. The abdomen was insufflated with carbon dioxide to a pressure of 15 mmHg.  A 5mm trocar was introduced and a camera placed. Under direct vision, a 10mm port was placed in the subxiphoid location and two 5 mm ports placed in the right upper quadrant. The dome of the gallbladder was grasped and elevated up and over the liver. The patient had little inflammation and no adhesions. We bluntly took down the peritoneum over the infundibulum of the gallbladder. The infundibulum was then grasped and retracted laterally exposing the triangle of Calot. We performed our dissection until the anatomy was very clear as we could see two and only two structures entering the gallbladder and completing our critical view of safety. A clip was placed on the gallbladder side of the cystic duct. A small stab incision was made just below the costal margin in the RUQ and the cholangiogram catheter was introduced. A ductotomy was made in the cystic duct and the catheter inserted. Cholangiogram showed contrast flow into the duodenum. Common bile duct, common hepatic duct, cystic duct and junction of the left and right hepatic ducts were well visualized. No filling defects were noted. The catheter was removed. The cystic duct was further cleared circumferentially was clipped proximally with 3 clips and divided. The artery was likewise identified, clipped and divided. The gallbladder was removed from the liver bed with cautery and removed through the epigastric port site using an endopouch. There were a few small branches of the cystic artery along the liver side of the gallbladder that were clipped during removal.  The abdomen was reinspected and found to have good hemostasis. The sigmoid colon was viewed at the end and did not appear inflamed. The epigastric trocar site fascia was closed with a figure of eight suture of 0-Vicryl using a laparoscopic suture passer. The trocars were withdrawn and the skin closed with 4-0 Vicryl. Dermabond was applied after injecting local anesthetic.   The patient was awoken and extubated without complication. All instrument counts were said to be correct.         Findings: normal cholangiogram    Estimated Blood Loss: less than 50 ml    Complications: none           Specimen: gallbladder and contents                  Electronically signed by Kristina Salazar MD on 8/10/2021 at 10:34 AM

## 2021-08-10 NOTE — H&P
Update History & Physical    The patient's History and Physical from Dr. Adriana Feldman today, August 10, 2021 was reviewed with the patient and I examined the patient. There was no change. The surgical site was confirmed by the patient and me. He has been having periumbilical pain intermittently over the past two weeks. Hi CT from 6/28 did show cholelithiasis with GB wall thickening along with porcelain gallbladder. RUQ US from 7/26/21 ordered by GI showed gallstones and gallbladder wall thickening, consistent with acute cholecystitis. His symptoms have slightly improved since starting the antibiotics last week. I discussed that his imaging findings are abnormal for his gallbladder and would recommend removal, but it is difficult to determine if his abdominal pain is from his gallbladder vs ongoing diverticulitis. Colonoscopy is planned for next week. Plan: The risks, benefits, expected outcome, and alternative to the recommended procedure have been discussed with the patient. Patient understands and wants to proceed with the procedure. Will proceed with laparoscopic cholecystectomy with cholangiogram, possible open. Ancef ordered. Bilateral SCDs.     Electronically signed by Delisa Huston MD on 8/10/2021 at 9:17 AM

## 2021-08-10 NOTE — PROGRESS NOTES
Pt arrived to PACU from OR. Pt sleeping on room air, awakens easily, restless. + exp wheezing noted. Dr. Nikki Lancaster at bedside. Abd soft. Incisions x 5 TAM with surgical glue noted.

## 2021-08-11 VITALS
HEIGHT: 72 IN | RESPIRATION RATE: 18 BRPM | DIASTOLIC BLOOD PRESSURE: 67 MMHG | BODY MASS INDEX: 19.14 KG/M2 | SYSTOLIC BLOOD PRESSURE: 111 MMHG | TEMPERATURE: 97.7 F | HEART RATE: 72 BPM | WEIGHT: 141.31 LBS | OXYGEN SATURATION: 93 %

## 2021-08-11 LAB
ANION GAP SERPL CALCULATED.3IONS-SCNC: 13 MMOL/L (ref 3–16)
BASOPHILS ABSOLUTE: 0 K/UL (ref 0–0.2)
BASOPHILS RELATIVE PERCENT: 0.1 %
BUN BLDV-MCNC: 13 MG/DL (ref 7–20)
CALCIUM SERPL-MCNC: 8.2 MG/DL (ref 8.3–10.6)
CHLORIDE BLD-SCNC: 101 MMOL/L (ref 99–110)
CO2: 22 MMOL/L (ref 21–32)
CREAT SERPL-MCNC: 0.8 MG/DL (ref 0.8–1.3)
EOSINOPHILS ABSOLUTE: 0 K/UL (ref 0–0.6)
EOSINOPHILS RELATIVE PERCENT: 0 %
GFR AFRICAN AMERICAN: >60
GFR NON-AFRICAN AMERICAN: >60
GLUCOSE BLD-MCNC: 199 MG/DL (ref 70–99)
HCT VFR BLD CALC: 40.1 % (ref 40.5–52.5)
HEMOGLOBIN: 13.3 G/DL (ref 13.5–17.5)
LYMPHOCYTES ABSOLUTE: 1.4 K/UL (ref 1–5.1)
LYMPHOCYTES RELATIVE PERCENT: 8.1 %
MAGNESIUM: 1.9 MG/DL (ref 1.8–2.4)
MCH RBC QN AUTO: 30.6 PG (ref 26–34)
MCHC RBC AUTO-ENTMCNC: 33.1 G/DL (ref 31–36)
MCV RBC AUTO: 92.2 FL (ref 80–100)
MONOCYTES ABSOLUTE: 1.1 K/UL (ref 0–1.3)
MONOCYTES RELATIVE PERCENT: 6.2 %
NEUTROPHILS ABSOLUTE: 14.9 K/UL (ref 1.7–7.7)
NEUTROPHILS RELATIVE PERCENT: 85.6 %
PDW BLD-RTO: 15.2 % (ref 12.4–15.4)
PHOSPHORUS: 4.8 MG/DL (ref 2.5–4.9)
PLATELET # BLD: 246 K/UL (ref 135–450)
PMV BLD AUTO: 7.7 FL (ref 5–10.5)
POTASSIUM SERPL-SCNC: 4.6 MMOL/L (ref 3.5–5.1)
RBC # BLD: 4.35 M/UL (ref 4.2–5.9)
SODIUM BLD-SCNC: 136 MMOL/L (ref 136–145)
WBC # BLD: 17.4 K/UL (ref 4–11)

## 2021-08-11 PROCEDURE — 85025 COMPLETE CBC W/AUTO DIFF WBC: CPT

## 2021-08-11 PROCEDURE — 80048 BASIC METABOLIC PNL TOTAL CA: CPT

## 2021-08-11 PROCEDURE — 99024 POSTOP FOLLOW-UP VISIT: CPT | Performed by: PHYSICIAN ASSISTANT

## 2021-08-11 PROCEDURE — 36415 COLL VENOUS BLD VENIPUNCTURE: CPT

## 2021-08-11 PROCEDURE — 2580000003 HC RX 258: Performed by: SURGERY

## 2021-08-11 PROCEDURE — G0378 HOSPITAL OBSERVATION PER HR: HCPCS

## 2021-08-11 PROCEDURE — 94761 N-INVAS EAR/PLS OXIMETRY MLT: CPT

## 2021-08-11 PROCEDURE — 84100 ASSAY OF PHOSPHORUS: CPT

## 2021-08-11 PROCEDURE — APPNB30 APP NON BILLABLE TIME 0-30 MINS: Performed by: PHYSICIAN ASSISTANT

## 2021-08-11 PROCEDURE — 99024 POSTOP FOLLOW-UP VISIT: CPT | Performed by: SURGERY

## 2021-08-11 PROCEDURE — 94150 VITAL CAPACITY TEST: CPT

## 2021-08-11 PROCEDURE — APPSS15 APP SPLIT SHARED TIME 0-15 MINUTES: Performed by: PHYSICIAN ASSISTANT

## 2021-08-11 PROCEDURE — 6370000000 HC RX 637 (ALT 250 FOR IP): Performed by: SURGERY

## 2021-08-11 PROCEDURE — 99024 POSTOP FOLLOW-UP VISIT: CPT | Performed by: NURSE PRACTITIONER

## 2021-08-11 PROCEDURE — 83735 ASSAY OF MAGNESIUM: CPT

## 2021-08-11 RX ADMIN — ACETAMINOPHEN 650 MG: 325 TABLET ORAL at 05:03

## 2021-08-11 RX ADMIN — IBUPROFEN 400 MG: 400 TABLET ORAL at 08:53

## 2021-08-11 RX ADMIN — OXYCODONE HYDROCHLORIDE 10 MG: 10 TABLET ORAL at 05:03

## 2021-08-11 RX ADMIN — LEVOTHYROXINE SODIUM 25 MCG: 0.03 TABLET ORAL at 05:08

## 2021-08-11 RX ADMIN — ACETAMINOPHEN 650 MG: 325 TABLET ORAL at 00:50

## 2021-08-11 RX ADMIN — OXYCODONE HYDROCHLORIDE 10 MG: 10 TABLET ORAL at 12:31

## 2021-08-11 RX ADMIN — SODIUM CHLORIDE: 9 INJECTION, SOLUTION INTRAVENOUS at 00:53

## 2021-08-11 RX ADMIN — NYSTATIN 500000 UNITS: 100000 SUSPENSION ORAL at 08:53

## 2021-08-11 RX ADMIN — ACETAMINOPHEN 650 MG: 325 TABLET ORAL at 12:31

## 2021-08-11 RX ADMIN — DOCUSATE SODIUM 50 MG AND SENNOSIDES 8.6 MG 1 TABLET: 8.6; 5 TABLET, FILM COATED ORAL at 08:53

## 2021-08-11 ASSESSMENT — PAIN SCALES - GENERAL
PAINLEVEL_OUTOF10: 7
PAINLEVEL_OUTOF10: 8
PAINLEVEL_OUTOF10: 9
PAINLEVEL_OUTOF10: 0
PAINLEVEL_OUTOF10: 9
PAINLEVEL_OUTOF10: 6

## 2021-08-11 NOTE — PROGRESS NOTES
CLINICAL PHARMACY NOTE: MEDS TO BEDS    Total # of Prescriptions Filled: 3   The following medications were delivered to the patient:  Current Discharge Medication List      START taking these medications    Details   oxyCODONE (ROXICODONE) 5 MG immediate release tablet Take 1 tablet by mouth every 6 hours as needed for Pain for up to 7 days. Intended supply: 7 days.  Take lowest dose possible to manage pain  Qty: 28 tablet, Refills: 0    Comments: Reduce doses taken as pain becomes manageable  Associated Diagnoses: Acute on chronic cholecystitis; Porcelain gallbladder      docusate sodium (COLACE) 100 MG capsule Take 1 capsule by mouth 2 times daily as needed for Constipation (take while on narcotic pain medication)  Qty: 60 capsule, Refills: 0      ibuprofen (ADVIL;MOTRIN) 400 MG tablet Take 1 tablet by mouth 4 times daily as needed for Pain  Qty: 100 tablet, Refills: 0         ·   ·     Additional Documentation:

## 2021-08-11 NOTE — PROGRESS NOTES
CLINICAL PHARMACY NOTE: MEDS TO BEDS    Total # of Prescriptions Filled: 4   The following medications were delivered to the patient:  Current Discharge Medication List      START taking these medications    Details   oxyCODONE (ROXICODONE) 5 MG immediate release tablet Take 1 tablet by mouth every 6 hours as needed for Pain for up to 7 days. Intended supply: 7 days.  Take lowest dose possible to manage pain  Qty: 28 tablet, Refills: 0    Comments: Reduce doses taken as pain becomes manageable  Associated Diagnoses: Acute on chronic cholecystitis; Porcelain gallbladder      docusate sodium (COLACE) 100 MG capsule Take 1 capsule by mouth 2 times daily as needed for Constipation (take while on narcotic pain medication)  Qty: 60 capsule, Refills: 0      ibuprofen (ADVIL;MOTRIN) 400 MG tablet Take 1 tablet by mouth 4 times daily as needed for Pain  Qty: 100 tablet, Refills: 0         · levothyroxine 25 mcg tablet  ·     Additional Documentation:

## 2021-08-11 NOTE — ACP (ADVANCE CARE PLANNING)
Advance Care Planning     Advance Care Planning Activator (Inpatient)  Conversation Note      Date of ACP Conversation: 8/11/2021     Conversation Conducted with: Patient with Decision Making Capacity    ACP Activator: JOYCELYN Espinal, [facility] Decision Maker:     Current Designated Health Care Decision Maker:     Primary Decision Maker: Bala Paris - [phone]   Patient reports he has ACP documentation on file with the [address] Preferences    Ventilation: \"If you were in your present state of health and suddenly became very ill and were unable to breathe on your own, what would your preference be about the use of a ventilator (breathing machine) if it were available to you? \"      Would the patient desire the use of ventilator (breathing machine)?: yes    \"If your health worsens and it becomes clear that your chance of recovery is unlikely, what would your preference be about the use of a ventilator (breathing machine) if it were available to you? \"     Would the patient desire the use of ventilator (breathing machine)?: Yes      Resuscitation  \"CPR works best to restart the heart when there is a sudden event, like a heart attack, in someone who is otherwise healthy. Unfortunately, CPR does not typically restart the heart for people who have serious health conditions or who are very sick. \"    \"In the event your heart stopped as a result of an underlying serious health condition, would you want attempts to be made to restart your heart (answer \"yes\" for attempt to resuscitate) or would you prefer a natural death (answer \"no\" for do not attempt to resuscitate)? \" yes       [] Yes   [x] No   Educated Patient / Maxine Lucas regarding differences between Advance Directives and portable DNR orders.     Length of ACP Conversation in minutes:  5    Conversation Outcomes:  [x] ACP discussion completed  [] Existing advance directive reviewed with patient; no changes to patient's previously recorded wishes  [] New Advance Directive completed  [] Portable Do Not Rescitate prepared for Provider review and signature  [] POLST/POST/MOLST/MOST prepared for Provider review and signature      Follow-up plan:    [] Schedule follow-up conversation to continue planning  [] Referred individual to Provider for additional questions/concerns   [] Advised patient/agent/surrogate to review completed ACP document and update if needed with changes in condition, patient preferences or care setting    [x] This note routed to one or more involved healthcare providers    Electronically signed by JOYCELYN Francisco, SANIYA on 8/11/2021 at 2:32 PM

## 2021-08-11 NOTE — CARE COORDINATION
INITIAL CASE MANAGEMENT ASSESSMENT    Reviewed chart, met with patient to assess possible discharge needs. Explained Case Management role/services. Living Situation: confirmed address, lives with his significant other in home with 5 MORALES    ADLs: independent     DME: none reported    PT/OT Recs: n/a    Active Services:  none reported     Transportation: active , family to transport home at [employer]     Medications: [facility]: [facility], no issues    PCP: confirmed Gera Paulino MD     PLAN/COMMENTS: from home with significant other, follow for needs     SW/CM provided contact information for patient or family to call with any questions. SW/CM will follow and assist as needed.     Dave HIRSCH, RICKEYW-S, Social Work  [phone]    Electronically signed by JOYCELYN Macedo, LSW on 8/11/2021 at 2:29 PM

## 2021-08-11 NOTE — PROGRESS NOTES
General and Vascular Surgery                                                           Daily Progress Note                                                             Ashtyn Howell PA-C     Pt Name: Mariana Frias [facility] Record Number: [id number]  Date of Birth 1960   Today's Date: 8/11/2021    ASSESSMENT/PlAN  POD#1 (8/10/21): Laparoscopic cholecystectomy with cholangiogram  1. Hypoxic postoperative. Doing better today. Wean O2 as tolerated  2. Feeling better today  3. IS, use every hour  4. OOB and ambulate as tolerated  5. Low fat diet  6. If able to wean off O2 we will D/C home later today  EDUCATION  Patient educated about their illness/diagnosis, stated above, and all questions answered. We discussed the importance of nutrition, medications they are taking, and healthy lifestyle. [address] has improved from yesterday. Pain is well controlled. He has no nausea and no vomiting. OBJECTIVE  VITALS:  height is 6' (1.829 m) and weight is 141 lb 5 oz (64.1 kg). His oral temperature is 97.7 °F (36.5 °C). His blood pressure is 111/67 and his pulse is 72. His respiration is 18 and oxygen saturation is 98%. VITALS:  /67   Pulse 72   Temp 97.7 °F (36.5 °C) (Oral)   Resp 18   Ht 6' (1.829 m)   Wt 141 lb 5 oz (64.1 kg)   SpO2 98%   BMI 19.17 kg/m²   GENERAL: alert, no distress  ABDOMEN: non-distended and tenderness present- incisional,  without rebound and guarding  I/O last 3 completed shifts: In: 3053.3 [P.O.:240;  I.V.:2813.3]  Out: 395 [Urine:375; Blood:20]  I/O this shift:  In: 146.6 [I.V.:146.6]  Out: -     LABS  Recent Labs     08/11/21  0540   WBC 17.4*   HGB 13.3*   HCT 40.1*         K 4.6      CO2 22   BUN 13   CREATININE 0.8   MG 1.90   PHOS 4.8   CALCIUM 8.2*     CBC:   Lab Results   Component Value Date    WBC 17.4 08/11/2021    RBC 4.35 08/11/2021    HGB 13.3 08/11/2021    HCT 40.1 08/11/2021    MCV 92.2 08/11/2021    MCH 30.6 08/11/2021    MCHC 33.1 08/11/2021    RDW 15.2 08/11/2021     08/11/2021    MPV 7.7 08/11/2021     CMP:    Lab Results   Component Value Date     08/11/2021    K 4.6 08/11/2021    K 4.4 06/09/2021     08/11/2021    CO2 22 08/11/2021    BUN 13 08/11/2021    CREATININE 0.8 08/11/2021    GFRAA >60 08/11/2021    AGRATIO 0.8 06/13/2021    LABGLOM >60 08/11/2021    GLUCOSE 199 08/11/2021    PROT 6.3 06/13/2021    LABALBU 2.7 06/13/2021    CALCIUM 8.2 08/11/2021    BILITOT 0.4 06/13/2021    ALKPHOS 271 06/13/2021    AST 35 06/13/2021    ALT 29 06/13/2021         Kelly Valerio PA-C  Electronically signed 8/11/2021 at 8:01 AM

## 2021-08-11 NOTE — PROGRESS NOTES
General Surgery  Daily Progress Note    Pt Name: Mariana Frias [facility] Record Number: [id number]  Date of Birth 1960   Today's Date: 8/11/2021    No chief complaint on file. ASSESSMENT/PLAN  1. S/p lap cholecystectomy with cholangiogram POD #1 for acute on chronic cholecystitis, porcelain gallbladder - doing well. Pain present but controlled. Admitted for hypoxia and oversedation. On 3L, wean as tolerated. IS. Hopefully home later today if back on room air. SUBJECTIVE  1 [facility] [place] has slightly improved from yesterday. Pain is well controlled. He has no nausea and no vomiting. Current activity is ad edmundo    OBJECTIVE  VITALS:  height is 6' (1.829 m) and weight is 141 lb 5 oz (64.1 kg). His oral temperature is 97.7 °F (36.5 °C). His blood pressure is 111/67 and his pulse is 72. His respiration is 18 and oxygen saturation is 98%. GENERAL: alert, no distress  LUNGS: normal respiratory effort, no accessory muscle use  HEART: normal rate and regular rhythm  ABDOMEN: soft, ND, appropriately tender, incisions c/d/i, no erythema or induration  EXTREMITY: no cyanosis and no clubbing  I/O last 3 completed shifts: In: 3053.3 [P.O.:240;  I.V.:2813.3]  Out: 395 [Urine:375; Blood:20]  I/O this shift:  In: 146.6 [I.V.:146.6]  Out: -     LABS  Recent Labs     08/11/21  0540   WBC 17.4*   HGB 13.3*   HCT 40.1*         K 4.6      CO2 22   BUN 13   CREATININE 0.8   MG 1.90   PHOS 4.8   CALCIUM 8.2*     CBC with Differential:    Lab Results   Component Value Date    WBC 17.4 08/11/2021    RBC 4.35 08/11/2021    HGB 13.3 08/11/2021    HCT 40.1 08/11/2021     08/11/2021    MCV 92.2 08/11/2021    MCH 30.6 08/11/2021    MCHC 33.1 08/11/2021    RDW 15.2 08/11/2021    BANDSPCT 4 04/04/2014    LYMPHOPCT 8.1 08/11/2021    MONOPCT 6.2 08/11/2021    BASOPCT 0.1 08/11/2021    MONOSABS 1.1 08/11/2021    LYMPHSABS 1.4 08/11/2021    EOSABS 0.0 08/11/2021    BASOSABS 0.0 08/11/2021     BMP:    Lab Results Component Value Date     08/11/2021    K 4.6 08/11/2021    K 4.4 06/09/2021     08/11/2021    CO2 22 08/11/2021    BUN 13 08/11/2021    LABALBU 2.7 06/13/2021    CREATININE 0.8 08/11/2021    CALCIUM 8.2 08/11/2021    GFRAA >60 08/11/2021    LABGLOM >60 08/11/2021    GLUCOSE 199 08/11/2021     Magnesium:    Lab Results   Component Value Date    MG 1.90 08/11/2021     Phosphorus:    Lab Results   Component Value Date    PHOS 4.8 08/11/2021         Wood Jones MD  Electronically signed 8/11/2021 at 8:24 AM

## 2021-08-16 ENCOUNTER — TELEPHONE (OUTPATIENT)
Dept: SURGERY | Age: 61
End: 2021-08-16

## 2021-08-16 NOTE — TELEPHONE ENCOUNTER
Patient aware pictures do not look infected. Keep clean and dry. Covered if drainage. Call with worsening pain/fevers or chills. Denies any of theses symptoms.

## 2021-08-16 NOTE — TELEPHONE ENCOUNTER
Patients wife will send picture to my [employer] email.  Will review with Dr. Bishop Gonzales and call back

## 2021-08-16 NOTE — TELEPHONE ENCOUNTER
Pt's wife is calling in stating sx site maybe infected, she says under the glue there is green drainage, pt is moaning from pain which she expected, she mentions site is really red and warm to the touch, please advise. She wants to know if he can be seen and wishes to be called on her phone.

## 2021-08-17 ENCOUNTER — TELEPHONE (OUTPATIENT)
Dept: SURGERY | Age: 61
End: 2021-08-17

## 2021-08-17 DIAGNOSIS — K81.0 ACUTE CHOLECYSTITIS: Primary | ICD-10-CM

## 2021-08-17 DIAGNOSIS — Z90.49 S/P LAPAROSCOPIC CHOLECYSTECTOMY: ICD-10-CM

## 2021-08-17 RX ORDER — HYDROCODONE BITARTRATE AND ACETAMINOPHEN 5; 325 MG/1; MG/1
1 TABLET ORAL EVERY 6 HOURS PRN
Qty: 20 TABLET | Refills: 0 | Status: SHIPPED | OUTPATIENT
Start: 2021-08-17 | End: 2021-08-22

## 2021-08-17 NOTE — TELEPHONE ENCOUNTER
Message sent to [address] in [address] [facility]20 electronically to [place] [facility].     Belle Nichols MD

## 2021-08-24 NOTE — DISCHARGE SUMMARY
Physician Discharge Summary     Patient ID:  Talia Blancokeeper  [id number]  73 y.o.  1960    Admit date: 8/10/2021    Discharge date and time: 8/11/2021  3:10 PM     Admitting Physician: Kristina Salazar MD     Discharge Physician: same    Admission Diagnoses: Biliary calculus of other site without obstruction [K80.80]  Acute on chronic cholecystitis [K81.2]    Discharge Diagnoses: same, s/p lap anastasiia    Admission Condition: fair    Discharged Condition: good    Indication for Admission: surgery    Hospital Course:   Acute on chronic cholecystitis, porcelain gallbladder  -Status post laparoscopic cholecystectomy with cholangiogram 8/10/2021.  -Admitted postoperatively due to hypoxia due to oversedation. Oxygen was weaned off, and he was discharged home in stable condition. Pathology: Chronic cholecystitis, and cholelithiasis. Consults: none    Significant Diagnostic Studies:   FL CHOLANGIOGRAM OR   Final Result   Intraprocedural fluoroscopic spot images as above. See separate procedure   report for more information. Discharge Exam:  GENERAL: alert, no distress  LUNGS: normal respiratory effort, no accessory muscle use  HEART: normal rate and regular rhythm  ABDOMEN: soft, ND, appropriately tender, incisions c/d/i, no erythema or induration  EXTREMITY: no cyanosis and no clubbing    Disposition: home    In process/preliminary results:  Outstanding Order Results     No orders found from 7/12/2021 to 8/11/2021. Patient Instructions:   Discharge Medication List as of 8/11/2021  5:00 PM      START taking these medications    Details   oxyCODONE (ROXICODONE) 5 MG immediate release tablet Take 1 tablet by mouth every 6 hours as needed for Pain for up to 7 days. Intended supply: 7 days.  Take lowest dose possible to manage pain, Disp-28 tablet, R-0Print      docusate sodium (COLACE) 100 MG capsule Take 1 capsule by mouth 2 times daily as needed for Constipation (take while on narcotic pain medication), Disp-60 capsule, R-0Print         CONTINUE these medications which have CHANGED    Details   ibuprofen (ADVIL;MOTRIN) 400 MG tablet Take 1 tablet by mouth 4 times daily as needed for Pain, Disp-100 tablet, R-0Print         CONTINUE these medications which have NOT CHANGED    Details   JANUVIA 100 MG tablet Take 100 mg by mouth daily , DAWHistorical Med      nystatin (MYCOSTATIN) 524780 UNIT/ML suspension TAKE 5 ML BY MOUTH FOUR TIMES DAILY, Disp-160 mL, R-0, Normal      albuterol sulfate HFA (VENTOLIN HFA) 108 (90 Base) MCG/ACT inhaler Inhale 2 puffs into the lungs 4 times daily as needed for Wheezing or Shortness of BreathHistorical Med      levothyroxine (SYNTHROID) 25 MCG tablet Take 25 mcg by mouth DailyHistorical Med      ondansetron (ZOFRAN ODT) 4 MG disintegrating tablet Take 1-2 tablets by mouth every 12 hours as needed for Nausea May Sub regular tablet (non-ODT) if insurance does not cover ODT., Disp-12 tablet, R-0Print      ALPRAZolam (XANAX) 0.5 MG tablet Take 0.5 mg by mouth 2 times daily as needed for Sleep or Anxiety. Historical Med           Activity: activity as tolerated, no driving while on analgesics and no heavy lifting for 6 weeks  Diet: low fat, low cholesterol diet  Wound Care: keep wound clean and dry and as directed    Follow-up with Dr. Rodney Mcgill in 2 weeks.     Mik HARRISON-RODRIGUE 1:33 PM 8/24/2021   [facility]  Office: [phone]   8/24/2021  1:30 PM

## 2021-09-28 ENCOUNTER — OFFICE VISIT (OUTPATIENT)
Dept: DERMATOLOGY | Age: 61
End: 2021-09-28
Payer: MEDICAID

## 2021-09-28 VITALS — TEMPERATURE: 97.8 F

## 2021-09-28 DIAGNOSIS — B07.9 VERRUCA: Primary | ICD-10-CM

## 2021-09-28 PROCEDURE — 17110 DESTRUCTION B9 LES UP TO 14: CPT | Performed by: DERMATOLOGY

## 2021-09-28 NOTE — PROGRESS NOTES
[facility]) Dermatology  Jaskaran Fowler M.D.  [phone]       Jennifer Grande  1960    62 y.o. male     Date of Visit: 9/28/2021    Chief Complaint:   Chief Complaint   Patient presents with    Verruca Vulgaris     (2) left thumb- with one getting infected        I was asked to see this patient by Dr. Diaz ref. provider found. History of Present Illness:  1. Patient presents today for evaluation of 2 viral verruca on his left palmar thumb. Has had one for years, the other is relatively new. Thinks it did become secondarily infected-has since resolved. Does become painful due to location. Has been keeping lesions pared down. Skin history:    Long history of generalized pruritus/prurigo nodules-started after treatment for oral squamous cell carcinoma. Treated with narrowband UVB phototherapy, intralesional Kenalog, clobetasol ointment. Bought home UVB hand unit      Review of Systems:  Constitutional: Reports general sense of well-being       Past Medical History, Surgical History, Family History, Medications and Allergies reviewed.     Social History:   Social History     Socioeconomic History    Marital status: Single     Spouse name: Not on file    Number of children: Not on file    Years of education: Not on file    Highest education level: Not on file   Occupational History    Not on file   Tobacco Use    Smoking status: Former Smoker     Packs/day: 1.00     Years: 20.00     Pack years: 20.00     Types: Cigarettes    Smokeless tobacco: Never Used   Vaping Use    Vaping Use: Never used   Substance and Sexual Activity    Alcohol use: No    Drug use: Yes     Types: Marijuana    Sexual activity: Never   Other Topics Concern    Not on file   Social History Narrative    Not on file     Social Determinants of Health     Financial Resource Strain:     Difficulty of Paying Living Expenses:    Food Insecurity:     Worried About Running Out of Food in the Last Year:     [address] in the Last Year:    Transportation Needs:     Lack of Transportation (Medical):  Lack of Transportation (Non-Medical):    Physical Activity:     Days of Exercise per Week:     Minutes of Exercise per Session:    Stress:     Feeling of Stress :    Social Connections:     Frequency of Communication with Friends and Family:     Frequency of Social Gatherings with Friends and Family:     Attends Taoist Services:     Active Member of Clubs or Organizations:     Attends Club or Organization Meetings:     Marital Status:    Intimate Partner Violence:     Fear of Current or Ex-Partner:     Emotionally Abused:     Physically Abused:     Sexually Abused:        Physical Examination       -General: Well-appearing, NAD  1.  2 verrucous papules left palmar thumb, 3 mm and 4 mm      Assessment and Plan     1. Verruca -2-left palmar thumb lesion(s) treated w/ liquid nitrogen. Edu re: risk of blister formation, discomfort, scar, hypopigmentation. Discussed wound care. 18% salicylic acid under occlusion once he has healed from liquid nitrogen.     Recheck with ongoing treatment 3 to 4 weeks

## 2021-09-30 ENCOUNTER — OFFICE VISIT (OUTPATIENT)
Dept: SURGERY | Age: 61
End: 2021-09-30
Payer: MEDICAID

## 2021-09-30 VITALS
HEART RATE: 69 BPM | SYSTOLIC BLOOD PRESSURE: 131 MMHG | WEIGHT: 143 LBS | BODY MASS INDEX: 19.39 KG/M2 | DIASTOLIC BLOOD PRESSURE: 87 MMHG

## 2021-09-30 DIAGNOSIS — K57.32 SIGMOID DIVERTICULITIS: Primary | ICD-10-CM

## 2021-09-30 PROCEDURE — G8420 CALC BMI NORM PARAMETERS: HCPCS | Performed by: SURGERY

## 2021-09-30 PROCEDURE — 99214 OFFICE O/P EST MOD 30 MIN: CPT | Performed by: SURGERY

## 2021-09-30 PROCEDURE — G8427 DOCREV CUR MEDS BY ELIG CLIN: HCPCS | Performed by: SURGERY

## 2021-09-30 PROCEDURE — 1036F TOBACCO NON-USER: CPT | Performed by: SURGERY

## 2021-09-30 PROCEDURE — 3017F COLORECTAL CA SCREEN DOC REV: CPT | Performed by: SURGERY

## 2021-09-30 RX ORDER — HYDROCODONE BITARTRATE AND ACETAMINOPHEN 5; 325 MG/1; MG/1
1 TABLET ORAL EVERY 6 HOURS PRN
Qty: 28 TABLET | Refills: 0 | Status: SHIPPED | OUTPATIENT
Start: 2021-09-30 | End: 2021-10-07

## 2021-09-30 NOTE — LETTER
performance of any surgical procedure. I am aware that the practice of medicine and surgery is not an exact science, and that no guarantees have been made to me concerning the results of the operation and/or procedure(s). 5. I consent to the administration of anesthesia and to the use of such anesthetics as may be deemed advisable by the anesthesiologist who has been engaged by me or my physician. 6. I certify that I have read and understand the above consent to operation and/or other procedure(s); that the explanations therein referred to were made to me by the informing physician in advance of my signing this consent; that all blanks or statements requiring insertion or completion were filled in and inapplicable paragraphs, if any, were stricken before I signed; and that all questions asked by me about the operation and/or procedure(s) which I have consented to have been fully answered in a satisfactory manner.            9/30/21                    _______________________  Signature Of Patient   Date              Witness                     OR Date:  ___________

## 2021-09-30 NOTE — Clinical Note
Hi Dr. Carroll Ramos,    I just saw Mr. Eric Bianchi back in the office for his sigmoid diverticulitis. He had another flare-up, which is slowly improving with antibiotics. Due to his multiple recent episodes, I recommended robotic assisted sigmoid colectomy in 3-4 weeks, once the current inflammation resolves. Would you mind seeing him for preoperative clearance? Thank you for allowing me to take care of Mr. Martinez with you.     Carroll Ramos

## 2021-09-30 NOTE — PATIENT INSTRUCTIONS
Surgeon: Radha Arias MD     Your surgery will be at [facility] [place]  First floor [facility] the [facility] [address], [place] [address]    Date:    Arrival time:    Surgery time:     Nothing to eat or drink after midnight the night before. COVID-19 testing protocol  Pre-operative COVID testing is required for ALL patients within 6 days of surgery. Please call to schedule at [facility], [facility], ect. Fax results to [phone] or email to [email or web address]      Date: __________________  Time:  __________________      Randolph Nile may be asked to stop blood thinners 5 days prior to surgery such as Coumadin, Plavix, Fragmin, Lovenox, and over the counter fish oil (Omega 3)     The surgery scheduler, Rolf will call you a few days prior to surgery to confirm date and time if any changes. Please make a History and Physical (surgery clearance) by your primary care physician prior to your surgery date. *Within 30 days of surgery*     Please contact Angie if you need to reschedule your surgery or have any questions.   Office phone number:     [phone]  Fax number for [facility]:    [phone]

## 2021-10-01 NOTE — PROGRESS NOTES
Established Patient Visit    [facility]   Bassam Dowd MD    [address]  [place]  Phone: [phone]  Fax: [phone]   YOB: 1960    Date of Visit:  9/30/2021    No ref. provider found  Annie Tapia MD    Subjective:     Vidal Hobbs presents for a follow-up of his sigmoid diverticulitis. He had a flare up at the time of his colonoscopy last week, which showed significant edema and inflammation. Dr. Kim Canales was unable to traverse the area safely so the colonoscopy was aborted. He was started on augmentin last week, and has had some improvement of his pain since then. He is eating and drinking OK. The caliber of his bowel movements have improved from \"pencil to sharpie\" in size. He denies any fevers or chills. Allergies   Allergen Reactions    Ativan [Lorazepam] Other (See Comments)     Post op overdose- caused heart to stop, CPR administered    Zoloft [Sertraline Hcl]      diarrhea and stomach distress per PT    Doxycycline Rash     Outpatient Medications Marked as Taking for the 9/30/21 encounter (Office Visit) with Terri Vale MD   Medication Sig Dispense Refill    HYDROcodone-acetaminophen (NORCO) 5-325 MG per tablet Take 1 tablet by mouth every 6 hours as needed for Pain for up to 7 days. Intended supply: 7 days.  Take lowest dose possible to manage pain 28 tablet 0    amoxicillin-clavulanate (AUGMENTIN) 875-125 MG per tablet Take 1 tablet by mouth 2 times daily for 10 days 20 tablet 0    docusate sodium (COLACE) 100 MG capsule Take 1 capsule by mouth 2 times daily as needed for Constipation (take while on narcotic pain medication) 60 capsule 0    ibuprofen (ADVIL;MOTRIN) 400 MG tablet Take 1 tablet by mouth 4 times daily as needed for Pain 100 tablet 0    JANUVIA 100 MG tablet Take 100 mg by mouth daily       nystatin (MYCOSTATIN) 724570 UNIT/ML suspension TAKE 5 ML BY MOUTH FOUR TIMES DAILY (Patient taking differently: As needed) 160 mL 0    albuterol sulfate HFA (VENTOLIN HFA) 108 (90 Base) MCG/ACT inhaler Inhale 2 puffs into the lungs 4 times daily as needed for Wheezing or Shortness of Breath      levothyroxine (SYNTHROID) 25 MCG tablet Take 25 mcg by mouth Daily      ondansetron (ZOFRAN ODT) 4 MG disintegrating tablet Take 1-2 tablets by mouth every 12 hours as needed for Nausea May Sub regular tablet (non-ODT) if insurance does not cover ODT. 12 tablet 0       Vitals:    09/30/21 1155   BP: 131/87   Pulse: 69   Weight: 143 lb (64.9 kg)     Body mass index is 19.39 kg/m². Wt Readings from Last 3 Encounters:   09/30/21 143 lb (64.9 kg)   08/11/21 141 lb 5 oz (64.1 kg)   07/26/21 141 lb (64 kg)     BP Readings from Last 3 Encounters:   09/30/21 131/87   08/11/21 111/67   08/10/21 (!) 171/97          Objective:    CONSTITUTIONAL:  awake, alert, no apparent distress    LUNGS:  Resp easy and unlabored  ABDOMEN:  Incisions well healed, soft, non-distended, mild suprapubic and LLQ tenderness without peritonitis, voluntary guarding absent, no masses palpated and hernia absent  MUSCULOSKELETAL: No edema  NEUROLOGIC:  Mental Status Exam:  Level of Alertness:   awake  Orientation:   person, place, time  SKIN: no erythema or induration      ASSESSMENT:     Diagnosis Orders   1. Sigmoid diverticulitis  CT ABDOMEN PELVIS W IV CONTRAST Additional Contrast? None    HYDROcodone-acetaminophen (NORCO) 5-325 MG per tablet         PLAN:    Sary Bee is doing poorly since the time of his colonoscopy. His colonoscopy showed persistent colonic inflammation consistent with ongoing/persistent sigmoid diverticulitis. Due to his multiple recurrent episodes without clear improvement between flare-ups, I recommended sigmoid colectomy. Will proceed with robotic assisted laparoscopic sigmoid colectomy, possible open, possible colostomy.   The risks, benefits, and alternatives were discussed with the patient and he is willing to consent for the operation. I discussed allowing his current flare-up to resolve with the oral antibiotics, and we will plan for surgery over the next 3-4 weeks. Will obtain a CT abd/pelvis in the meantime to evaluate for any complication of his diverticulitis. He will need preoperative clearance from Dr. Addie Yuan prior to surgery.     Electronically signed by Cindy See MD

## 2021-10-08 ENCOUNTER — TELEPHONE (OUTPATIENT)
Dept: SURGERY | Age: 61
End: 2021-10-08

## 2021-10-08 RX ORDER — AMOXICILLIN AND CLAVULANATE POTASSIUM 875; 125 MG/1; MG/1
1 TABLET, FILM COATED ORAL 2 TIMES DAILY
Qty: 28 TABLET | Refills: 0 | Status: SHIPPED | OUTPATIENT
Start: 2021-10-08 | End: 2021-11-15 | Stop reason: SDUPTHER

## 2021-11-09 RX ORDER — LACTOBACILLUS RHAMNOSUS GG 10B CELL
CAPSULE ORAL
Qty: 30 CAPSULE | Refills: 0 | Status: SHIPPED | OUTPATIENT
Start: 2021-11-09

## 2021-11-15 ENCOUNTER — TELEPHONE (OUTPATIENT)
Dept: SURGERY | Age: 61
End: 2021-11-15

## 2021-11-15 RX ORDER — AMOXICILLIN AND CLAVULANATE POTASSIUM 875; 125 MG/1; MG/1
1 TABLET, FILM COATED ORAL 2 TIMES DAILY
Qty: 28 TABLET | Refills: 0 | Status: SHIPPED | OUTPATIENT
Start: 2021-11-15 | End: 2021-11-29

## 2021-11-15 NOTE — TELEPHONE ENCOUNTER
Patient is requesting a refill on his antibiotic and hydrocodone. Patient uses [facility] in [facility]. Please call back to advise.

## 2021-12-06 ENCOUNTER — HOSPITAL ENCOUNTER (OUTPATIENT)
Dept: CT IMAGING | Age: 61
Discharge: HOME | End: 2021-12-06
Payer: MEDICAID

## 2021-12-06 DIAGNOSIS — R22.1 NECK MASS: ICD-10-CM

## 2021-12-06 DIAGNOSIS — C14.0 MALIGNANT NEOPLASM OF PHARYNX, UNSPECIFIED (HCC): ICD-10-CM

## 2021-12-06 PROCEDURE — 70490 CT SOFT TISSUE NECK W/O DYE: CPT

## 2022-01-15 ENCOUNTER — CLINICAL DOCUMENTATION (OUTPATIENT)
Dept: OTHER | Age: 62
End: 2022-01-15

## 2022-09-30 ENCOUNTER — HOSPITAL ENCOUNTER (OUTPATIENT)
Dept: CT IMAGING | Age: 62
Discharge: HOME | End: 2022-09-30
Payer: MEDICAID

## 2022-09-30 DIAGNOSIS — R39.89 OTHER SYMPTOMS AND SIGNS INVOLVING THE GENITOURINARY SYSTEM: ICD-10-CM

## 2022-09-30 LAB
CREAT SERPL-MCNC: 0.8 MG/DL (ref 0.8–1.3)
GFR AFRICAN AMERICAN: >60
GFR NON-AFRICAN AMERICAN: >60

## 2022-09-30 PROCEDURE — 74177 CT ABD & PELVIS W/CONTRAST: CPT

## 2022-09-30 PROCEDURE — 36415 COLL VENOUS BLD VENIPUNCTURE: CPT

## 2022-09-30 PROCEDURE — 6360000004 HC RX CONTRAST MEDICATION: Performed by: SURGERY

## 2022-09-30 PROCEDURE — 82565 ASSAY OF CREATININE: CPT

## 2022-09-30 RX ADMIN — IOPAMIDOL 100 ML: 755 INJECTION, SOLUTION INTRAVENOUS at 13:22

## 2022-10-13 ENCOUNTER — INITIAL CONSULT (OUTPATIENT)
Dept: SURGERY | Age: 62
End: 2022-10-13
Payer: MEDICAID

## 2022-10-13 VITALS
DIASTOLIC BLOOD PRESSURE: 98 MMHG | WEIGHT: 142 LBS | BODY MASS INDEX: 19.26 KG/M2 | SYSTOLIC BLOOD PRESSURE: 141 MMHG | HEART RATE: 89 BPM

## 2022-10-13 DIAGNOSIS — N32.1 COLOVESICAL FISTULA: ICD-10-CM

## 2022-10-13 DIAGNOSIS — K57.32 SIGMOID DIVERTICULITIS: Primary | ICD-10-CM

## 2022-10-13 DIAGNOSIS — K57.32 SIGMOID DIVERTICULITIS: ICD-10-CM

## 2022-10-13 LAB
ANION GAP SERPL CALCULATED.3IONS-SCNC: 14 MMOL/L (ref 3–16)
BASOPHILS ABSOLUTE: 0.1 K/UL (ref 0–0.2)
BASOPHILS RELATIVE PERCENT: 0.4 %
BUN BLDV-MCNC: 9 MG/DL (ref 7–20)
CALCIUM SERPL-MCNC: 9.6 MG/DL (ref 8.3–10.6)
CHLORIDE BLD-SCNC: 97 MMOL/L (ref 99–110)
CO2: 28 MMOL/L (ref 21–32)
CREAT SERPL-MCNC: 1 MG/DL (ref 0.8–1.3)
EOSINOPHILS ABSOLUTE: 0.1 K/UL (ref 0–0.6)
EOSINOPHILS RELATIVE PERCENT: 1 %
GFR AFRICAN AMERICAN: >60
GFR NON-AFRICAN AMERICAN: >60
GLUCOSE BLD-MCNC: 166 MG/DL (ref 70–99)
HCT VFR BLD CALC: 53.1 % (ref 40.5–52.5)
HEMOGLOBIN: 17.6 G/DL (ref 13.5–17.5)
LYMPHOCYTES ABSOLUTE: 0.8 K/UL (ref 1–5.1)
LYMPHOCYTES RELATIVE PERCENT: 6.5 %
MCH RBC QN AUTO: 32.3 PG (ref 26–34)
MCHC RBC AUTO-ENTMCNC: 33.2 G/DL (ref 31–36)
MCV RBC AUTO: 97.5 FL (ref 80–100)
MONOCYTES ABSOLUTE: 0.8 K/UL (ref 0–1.3)
MONOCYTES RELATIVE PERCENT: 6.1 %
NEUTROPHILS ABSOLUTE: 10.8 K/UL (ref 1.7–7.7)
NEUTROPHILS RELATIVE PERCENT: 86 %
PDW BLD-RTO: 14.8 % (ref 12.4–15.4)
PLATELET # BLD: 259 K/UL (ref 135–450)
PMV BLD AUTO: 7.5 FL (ref 5–10.5)
POTASSIUM SERPL-SCNC: 5.3 MMOL/L (ref 3.5–5.1)
PREALBUMIN: 20.8 MG/DL (ref 20–40)
RBC # BLD: 5.44 M/UL (ref 4.2–5.9)
SODIUM BLD-SCNC: 139 MMOL/L (ref 136–145)
TRANSFERRIN: 286 MG/DL (ref 200–360)
WBC # BLD: 12.5 K/UL (ref 4–11)

## 2022-10-13 PROCEDURE — 3017F COLORECTAL CA SCREEN DOC REV: CPT | Performed by: SURGERY

## 2022-10-13 PROCEDURE — G8484 FLU IMMUNIZE NO ADMIN: HCPCS | Performed by: SURGERY

## 2022-10-13 PROCEDURE — G8420 CALC BMI NORM PARAMETERS: HCPCS | Performed by: SURGERY

## 2022-10-13 PROCEDURE — 99214 OFFICE O/P EST MOD 30 MIN: CPT | Performed by: SURGERY

## 2022-10-13 PROCEDURE — G8427 DOCREV CUR MEDS BY ELIG CLIN: HCPCS | Performed by: SURGERY

## 2022-10-13 PROCEDURE — 1036F TOBACCO NON-USER: CPT | Performed by: SURGERY

## 2022-10-13 RX ORDER — AMOXICILLIN AND CLAVULANATE POTASSIUM 875; 125 MG/1; MG/1
1 TABLET, FILM COATED ORAL 2 TIMES DAILY
Qty: 56 TABLET | Refills: 1 | Status: ON HOLD | OUTPATIENT
Start: 2022-10-13 | End: 2022-11-08

## 2022-10-13 NOTE — Clinical Note
Isaura Farmer,  I was thinking about Mr. Martinez more. Would you mind placing ureteral stents on the day of his sigmoid colectomy? Looking forward to tackling him together.   Thanks,  Sylwia Puckett

## 2022-10-13 NOTE — LETTER
CONSENT TO OPERATION           Robotic assisted laparoscopic sigmoid colectomy, possible open       Possible Ostomy. Possible repair of bladder     PATIENT : Gary Martinez  YOB: 1960             DATE : 10/13/22    1. I request and consent that Dr. Jacquelyn Early and/or his associates or assistants perform an operation and/or procedures on the above patient at [facility], to treat the condition(s) which appear indicated by the diagnostic studies already performed. 2. It has been explained to me by the informing physician that during the course of the operation and/or procedure(s) unforeseen conditions may be revealed that necessitate an extension of the original operation and/or procedure(s) or different operation and/or procedures than those set forth in Paragraph 1. I therefore authorize and request that my physician and/or his associates or assistants perform such operations and/or procedures as are necessary and desirable in the exercise of professional judgment. The authority granted under this Paragraph 2 shall extend to all conditions that require treatment and are known to my physician at the time the operation is commenced. 3. I have been made aware by the informing physician of certain risks and consequences that are associated with the operation and/or procedure(s) described in Paragraph 1, the reasonable alternative methods or treatment, the possible consequences, the possibility that the operation and/or procedure(s) may be unsuccessful and the possibility of complications. I understand the reasonably known risks to be:  - Bleeding  - Infection  - Poor Healing  - Possible Damage to Nerve, Vessel, Tendon/Muscle or Bone  - Need for further Treatment/Surgery  - Stiffness  - Pain  - Residual or Recurrent Symptoms  - Anesthetic and/or Medical Risks     4.  I have also been informed by the informing physician that there are other risks from both known and unknown causes that are attendant to the performance of any surgical procedure. I am aware that the practice of medicine and surgery is not an exact science, and that no guarantees have been made to me concerning the results of the operation and/or procedure(s). 5. I consent to the administration of anesthesia and to the use of such anesthetics as may be deemed advisable by the anesthesiologist who has been engaged by me or my physician. 6. I certify that I have read and understand the above consent to operation and/or other procedure(s); that the explanations therein referred to were made to me by the informing physician in advance of my signing this consent; that all blanks or statements requiring insertion or completion were filled in and inapplicable paragraphs, if any, were stricken before I signed; and that all questions asked by me about the operation and/or procedure(s) which I have consented to have been fully answered in a satisfactory manner.            10/13/22                    _______________________  Signature Of Patient   Date              Witness                     OR Date:  ___________

## 2022-10-13 NOTE — PROGRESS NOTES
Established Patient Visit    [facility]  [place] and V[facility] Surgery   Linda Adams MD    [facility], [address] [place] [address] Drive  [address]  Phone: [phone]  Fax: Gayle Trujillo   YOB: 1960    Date of Visit:  10/13/2022    No ref. provider found  Brynn Solis MD    Subjective:     Sourav Horvath presents for a follow-up of his sigmoid diverticulitis. Since he was seen last year, he has had intermittent LLQ pain and now has pneumaturia. He was recently diagnosed with a colovesical fistula in the setting of his diverticulitis. He is eating and drinking without issues. His bowels are working [employer]. He denies any fevers or chills.     Pain Score:   6    Allergies   Allergen Reactions    Ativan [Lorazepam] Other (See Comments)     Post op overdose- caused heart to stop, CPR administered    Zoloft [Sertraline Hcl]      diarrhea and stomach distress per PT    Doxycycline Rash     Outpatient Medications Marked as Taking for the 10/13/22 encounter (Initial consult) with Vasu Nuñez MD   Medication Sig Dispense Refill    amoxicillin-clavulanate (AUGMENTIN) 875-125 MG per tablet Take 1 tablet by mouth 2 times daily for 28 days 56 tablet 1    lactobacillus (CULTURELLE) CAPS capsule TAKE 1 CAPSULE BY MOUTH DAILY 30 capsule 0    docusate sodium (COLACE) 100 MG capsule Take 1 capsule by mouth 2 times daily as needed for Constipation (take while on narcotic pain medication) 60 capsule 0    ibuprofen (ADVIL;MOTRIN) 400 MG tablet Take 1 tablet by mouth 4 times daily as needed for Pain 100 tablet 0    JANUVIA 100 MG tablet Take 100 mg by mouth daily       nystatin (MYCOSTATIN) 314062 UNIT/ML suspension TAKE 5 ML BY MOUTH FOUR TIMES DAILY (Patient taking differently: As needed) 160 mL 0    albuterol sulfate HFA (PROVENTIL;VENTOLIN;PROAIR) 108 (90 Base) MCG/ACT inhaler Inhale 2 puffs into the lungs 4 times daily as needed for Wheezing or Shortness of Breath      levothyroxine (SYNTHROID) 25 MCG tablet Take 25 mcg by mouth Daily      ondansetron (ZOFRAN ODT) 4 MG disintegrating tablet Take 1-2 tablets by mouth every 12 hours as needed for Nausea May Sub regular tablet (non-ODT) if insurance does not cover ODT. 12 tablet 0       Vitals:    10/13/22 1035   BP: (!) 141/98   Pulse: 89   Weight: 142 lb (64.4 kg)     Body mass index is 19.26 kg/m². Wt Readings from Last 3 Encounters:   10/13/22 142 lb (64.4 kg)   09/30/21 143 lb (64.9 kg)   08/11/21 141 lb 5 oz (64.1 kg)     BP Readings from Last 3 Encounters:   10/13/22 (!) 141/98   09/30/21 131/87   08/11/21 111/67          Objective:    CONSTITUTIONAL:  awake, alert, no apparent distress    LUNGS:  Resp easy and unlabored  ABDOMEN:  soft, ND, minimal LLQ tenderness, no peritonitis  MUSCULOSKELETAL: No edema  NEUROLOGIC:  Mental Status Exam:  Level of Alertness:   awake  Orientation:   person, place, time  SKIN: no cyanosis or clubbing        Radiology:  CT Abd/pelvis from 9/30/22 personally reviewed, showing:  Impression   Persistent wall thickening of the sigmoid colon with surrounding injection of   the fat suggesting diverticulitis. Consider colonoscopy follow-up to rule   out mass as a cause of the wall thickening       Air is seen within the bladder. Recommend correlation with urinalysis. Consider colovesical fistula in the appropriate clinical scenario if there   has not been recent bladder catheterization. Fatty infiltration of the liver. Subtle lobular liver contour raises the   question of superimposed cirrhosis. ASSESSMENT:     Diagnosis Orders   1. Sigmoid diverticulitis  Prealbumin    CBC with Auto Differential    Basic Metabolic Panel    Transferrin    amoxicillin-clavulanate (AUGMENTIN) 875-125 MG per tablet      2.  Colovesical fistula  Prealbumin    CBC with Auto Differential    Basic Metabolic Panel    Transferrin    amoxicillin-clavulanate (AUGMENTIN) 875-125 MG per tablet PLAN:    Jeferson Good has now developed a colovesical fistula in addition to his recurrent sigmoid diverticulitis. I recommended a robotic assisted low sigmoid colectomy, possible open, possible colostomy, with takedown of colovesical fistula. The risks, benefits, and alternatives were discussed with the patient and he is willing to consent for the operation. Will coordinate with Dr. Jaylene Jasso for timing of surgery. He will need a preop H&P with Dr. Mickie Patel. Will check labs including nutrition labs prior to surgery.     Electronically signed by Ada Horne MD on 10/13/2022

## 2022-10-13 NOTE — LETTER
Surgery Scheduling Form:      DEMOGRAPHICS:                                                                                                         .    Patient Name:  Tara Chawla  Patient :  1960   Patient SS#:      Patient Phone:  [phone] (home)  Alt. Patient Phone:                     Patient Address:  [address] [id number]    PCP:  Darian Mccoy MD  Insurance:  Payor: [address] / Plan: [employer] / Product Type: *No Product type* /   Insurance ID Number:    Payer/Plan Subscr  Sex Relation Sub. Ins. ID Effective Group Num   1. [id number] 1960 Male Self [id number]                                   [address]         DIAGNOSIS & PROCEDURE:                                                                                       .    Diagnosis:   K57.32 Sigmoid diverticulitis,    N32.1 Colovesical fistula   Operation:   Robotic assisted laparoscopic sigmoid colectomy, possible open                       Possible Ostomy . Possible repair of bladder   Location: [facility] AND [facility] AT [place] OR   Surgeon:    Sandip Alex MD        [facility] INFORMATION:                                                                                    .    Surgeon's Scheduling Instruction:  elective  Requested Date: 22     OR Time: 9:25am          Patient Arrival Time: 8:00am  OR Time Required:  140  Minutes  Anesthesia:  General  Equipment:                                                             SA Required:  Yes     Status:  Same Day Admission        Standard C-Arm:  No   PAT Required: Yes                               Best Time to Call:  ANY  Patient Requested to see PCP for Pre-op H & P:  Yes   Special Comments:      Dr. Carmen Grant to be on stand by.                         Sandip Alex MD    10/19/2022  67:42AO       PRE-CERTIFICATION INFORMATION: Fernando Patterson     Procedure:       CPT Code Modifier          78095

## 2022-10-13 NOTE — PATIENT INSTRUCTIONS
Surgeon: Giancarlo Blankenship MD  Your surgery will be at [facility] ORTHOPEDIC AND SPINE [facility] AT [place]  First floor of the [address] [facility]    Date:    Arrival time:    Surgery time:     Nothing to eat or drink after midnight the night before. FASTING SURGERY  You may take all your regular maintenance prescriptions in the morning with a small sip of water to wash them down. You will need to have a  drive you home from the hospital.   Also for your safety, it is strongly suggested that someone stay with you the first 24 hours after your surgery. We ask that you do not smoke 24 hours prior to surgery  We ask that you do not drink any alcoholic beverages 24 hours prior to surgery  No driving for 1 week after surgery. (Or while on pain medication)     You may be asked to stop blood thinners 2 days prior to surgery such as Coumadin, Plavix, Carlus Guy, Eliquis,and over the counter fish oil (Omega 3)     Last dose: ______________      Patient is on LIGHT DUTY (15lbs weight restriction) for 4-6 weeks after surgery. Please make a History and Physical (surgery clearance) by your primary care physician prior to your surgery date. *Within 30 days of surgery*     You will need to bring a photo ID and insurance card    Please contact pre-admission testing if you have any further questions. [phone]    Please contact Angie if you need to reschedule your surgery.    Office phone number:     [phone]  Fax number for [facility]:    [phone]

## 2022-10-31 RX ORDER — ALPRAZOLAM 0.5 MG/1
0.5 TABLET ORAL PRN
COMMUNITY

## 2022-10-31 NOTE — PROGRESS NOTES
Colon pink sheet started for DOS    H.P in epic per Dr. Ezra Stratton:     * Admitted with diarrhea? [] YES    [x]  NO     *Prior history of C-Diff. In last 3 months? [] YES    [x]  NO     *Antibiotic use in the past 6-8 weeks? []  NO    [x]  YES      If yes, which: REASON_Gibler placed pt on antibiotic _______________     *Prior hospitalization or nursing home in the last month? []  YES    [x]  NO     SAFETY FIRST. .call before you fall    [address] time__725        Surgery time__925    Do not eat or drink anything after 12:00 midnight prior to your surgery. This includes water chewing gum, mints and ice chips- the Day of Surgery. You may brush your teeth and gargle the morning of your surgery, but do not swallow the water     Please see your family doctor/pediatrician for a history and physical and/or questions concerning medications. Bring any test results/reports from your physicians office. If you are under the care of a heart doctor or specialist doctor, please be aware that you may be asked to them for clearance    You may be asked to stop blood thinners such as Coumadin, Plavix, Fragmin, Lovenox, etc., or any anti-inflammatories such as:  Aspirin, Ibuprofen, Advil, Naproxen prior to your surgery. We also ask that you stop any OTC medications such as fish oil, vitamin E, glucosamine, garlic, Multivitamins, COQ 10, etc.    We ask that you do not smoke 24 hours prior to surgery  We ask that you do not  drink any alcoholic beverages 24 hours prior to surgery     You must make arrangements for a responsible adult to take you home after your surgery. For your safety you will not be allowed to leave alone or drive yourself home. Your surgery will be cancelled if you do not have a ride home. Also for your safety, it is strongly suggested that someone stay with you the first 24 hours after your surgery.      A parent or legal guardian must accompany a child scheduled for surgery and plan to stay at the hospital until the child is discharged. Please do not bring other children with you. For your comfort, please wear simple loose fitting clothing to the hospital.  Please do not bring valuables. Do not wear any make-up or nail polish on your fingers or toes. For your safety, please do not wear any jewelry or body piercing's on the day of surgery. All jewelry must be removed. If you have dentures, they will be removed before going to operating room. For your convenience, we will provide you with a container. If you wear contact lenses or glasses, they will be removed, please bring a case for them. If you have a living will and a durable power of  for healthcare, please bring in a copy. As part of our patient safety program to minimize surgical site infections, we ask you to do the following:    Please notify your surgeon if you develop any illness between         now and the day of your surgery. This includes a cough, cold, fever, sore throat, nausea,         or vomiting, and diarrhea, etc.   Please notify your surgeon if you experience dizziness, shortness         of breath or blurred vision between now and the time of your surgery. Do not shave your operative site 96 hours prior to surgery. For face and neck surgery, men may use an electric razor 48 hours   prior to surgery. You may shower the night before surgery or the morning of   your surgery with an antibacterial soap. You will need to bring a photo ID and insurance card     If you use a C-pap or Bi-pap machine, please bring your machine with you to the hospital     Our goal is to provide you with excellent care, therefore, visitors will be limited to so that we may focus on providing this care for you. Please contact your surgeon office, if you have any further questions.                  [facility] phone number:  [address] [facility] fax number:  [phone]    Please note these are generalized instructions for all surgical cases, you may be provided with more specific instructions according to your surgery.

## 2022-11-07 ENCOUNTER — ANESTHESIA EVENT (OUTPATIENT)
Dept: OPERATING ROOM | Age: 62
DRG: 230 | End: 2022-11-07
Payer: MEDICAID

## 2022-11-08 ENCOUNTER — HOSPITAL ENCOUNTER (INPATIENT)
Age: 62
LOS: 4 days | Discharge: HOME | DRG: 230 | End: 2022-11-12
Attending: SURGERY | Admitting: SURGERY
Payer: MEDICAID

## 2022-11-08 ENCOUNTER — ANESTHESIA (OUTPATIENT)
Dept: OPERATING ROOM | Age: 62
DRG: 230 | End: 2022-11-08
Payer: MEDICAID

## 2022-11-08 DIAGNOSIS — K57.32 SIGMOID DIVERTICULITIS: ICD-10-CM

## 2022-11-08 DIAGNOSIS — Z90.49 S/P COLECTOMY: Primary | ICD-10-CM

## 2022-11-08 DIAGNOSIS — N32.1 COLOVESICAL FISTULA: ICD-10-CM

## 2022-11-08 LAB
ABO/RH: NORMAL
ANTIBODY SCREEN: NORMAL
GLUCOSE BLD-MCNC: 129 MG/DL (ref 70–99)
GLUCOSE BLD-MCNC: 248 MG/DL (ref 70–99)
GLUCOSE BLD-MCNC: 294 MG/DL (ref 70–99)
PERFORMED ON: ABNORMAL
POTASSIUM, WHOLE BLOOD: 3.8 MMOL/L (ref 3.5–5.1)

## 2022-11-08 PROCEDURE — C9290 INJ, BUPIVACAINE LIPOSOME: HCPCS | Performed by: SURGERY

## 2022-11-08 PROCEDURE — 3600000009 HC SURGERY ROBOT BASE: Performed by: SURGERY

## 2022-11-08 PROCEDURE — 6360000002 HC RX W HCPCS: Performed by: ANESTHESIOLOGY

## 2022-11-08 PROCEDURE — 2500000003 HC RX 250 WO HCPCS: Performed by: SURGERY

## 2022-11-08 PROCEDURE — 2500000003 HC RX 250 WO HCPCS: Performed by: NURSE ANESTHETIST, CERTIFIED REGISTERED

## 2022-11-08 PROCEDURE — 0T778DZ DILATION OF LEFT URETER WITH INTRALUMINAL DEVICE, VIA NATURAL OR ARTIFICIAL OPENING ENDOSCOPIC: ICD-10-PCS | Performed by: SURGERY

## 2022-11-08 PROCEDURE — 0DTN4ZZ RESECTION OF SIGMOID COLON, PERCUTANEOUS ENDOSCOPIC APPROACH: ICD-10-PCS | Performed by: SURGERY

## 2022-11-08 PROCEDURE — 0DT80ZZ RESECTION OF SMALL INTESTINE, OPEN APPROACH: ICD-10-PCS | Performed by: SURGERY

## 2022-11-08 PROCEDURE — 1200000000 HC SEMI PRIVATE

## 2022-11-08 PROCEDURE — 7100000001 HC PACU RECOVERY - ADDTL 15 MIN: Performed by: SURGERY

## 2022-11-08 PROCEDURE — 2580000003 HC RX 258: Performed by: SURGERY

## 2022-11-08 PROCEDURE — 6360000002 HC RX W HCPCS: Performed by: SURGERY

## 2022-11-08 PROCEDURE — 6360000002 HC RX W HCPCS: Performed by: NURSE ANESTHETIST, CERTIFIED REGISTERED

## 2022-11-08 PROCEDURE — 3600000019 HC SURGERY ROBOT ADDTL 15MIN: Performed by: SURGERY

## 2022-11-08 PROCEDURE — 7100000000 HC PACU RECOVERY - FIRST 15 MIN: Performed by: SURGERY

## 2022-11-08 PROCEDURE — 2580000003 HC RX 258: Performed by: NURSE ANESTHETIST, CERTIFIED REGISTERED

## 2022-11-08 PROCEDURE — 84132 ASSAY OF SERUM POTASSIUM: CPT

## 2022-11-08 PROCEDURE — 86900 BLOOD TYPING SEROLOGIC ABO: CPT

## 2022-11-08 PROCEDURE — 2500000003 HC RX 250 WO HCPCS

## 2022-11-08 PROCEDURE — 3700000000 HC ANESTHESIA ATTENDED CARE: Performed by: SURGERY

## 2022-11-08 PROCEDURE — 86850 RBC ANTIBODY SCREEN: CPT

## 2022-11-08 PROCEDURE — 8E0W4CZ ROBOTIC ASSISTED PROCEDURE OF TRUNK REGION, PERCUTANEOUS ENDOSCOPIC APPROACH: ICD-10-PCS | Performed by: SURGERY

## 2022-11-08 PROCEDURE — C1758 CATHETER, URETERAL: HCPCS | Performed by: SURGERY

## 2022-11-08 PROCEDURE — S2900 ROBOTIC SURGICAL SYSTEM: HCPCS | Performed by: SURGERY

## 2022-11-08 PROCEDURE — 88307 TISSUE EXAM BY PATHOLOGIST: CPT

## 2022-11-08 PROCEDURE — 2580000003 HC RX 258: Performed by: ANESTHESIOLOGY

## 2022-11-08 PROCEDURE — 44207 L COLECTOMY/COLOPROCTOSTOMY: CPT | Performed by: SURGERY

## 2022-11-08 PROCEDURE — 3700000001 HC ADD 15 MINUTES (ANESTHESIA): Performed by: SURGERY

## 2022-11-08 PROCEDURE — 2720000010 HC SURG SUPPLY STERILE: Performed by: SURGERY

## 2022-11-08 PROCEDURE — 6370000000 HC RX 637 (ALT 250 FOR IP): Performed by: SURGERY

## 2022-11-08 PROCEDURE — 44120 REMOVAL OF SMALL INTESTINE: CPT | Performed by: SURGERY

## 2022-11-08 PROCEDURE — 86901 BLOOD TYPING SEROLOGIC RH(D): CPT

## 2022-11-08 PROCEDURE — 2709999900 HC NON-CHARGEABLE SUPPLY: Performed by: SURGERY

## 2022-11-08 RX ORDER — ACETAMINOPHEN 325 MG/1
650 TABLET ORAL EVERY 6 HOURS
Status: DISCONTINUED | OUTPATIENT
Start: 2022-11-08 | End: 2022-11-12 | Stop reason: HOSPADM

## 2022-11-08 RX ORDER — MIDAZOLAM HYDROCHLORIDE 1 MG/ML
INJECTION INTRAMUSCULAR; INTRAVENOUS PRN
Status: DISCONTINUED | OUTPATIENT
Start: 2022-11-08 | End: 2022-11-08 | Stop reason: SDUPTHER

## 2022-11-08 RX ORDER — GLYCOPYRROLATE 0.2 MG/ML
INJECTION INTRAMUSCULAR; INTRAVENOUS PRN
Status: DISCONTINUED | OUTPATIENT
Start: 2022-11-08 | End: 2022-11-08 | Stop reason: SDUPTHER

## 2022-11-08 RX ORDER — DEXAMETHASONE SODIUM PHOSPHATE 4 MG/ML
INJECTION, SOLUTION INTRA-ARTICULAR; INTRALESIONAL; INTRAMUSCULAR; INTRAVENOUS; SOFT TISSUE PRN
Status: DISCONTINUED | OUTPATIENT
Start: 2022-11-08 | End: 2022-11-08 | Stop reason: SDUPTHER

## 2022-11-08 RX ORDER — FAMOTIDINE 20 MG/1
20 TABLET, FILM COATED ORAL 2 TIMES DAILY
Status: DISCONTINUED | OUTPATIENT
Start: 2022-11-08 | End: 2022-11-12 | Stop reason: HOSPADM

## 2022-11-08 RX ORDER — POTASSIUM CHLORIDE 7.45 MG/ML
10 INJECTION INTRAVENOUS PRN
Status: DISCONTINUED | OUTPATIENT
Start: 2022-11-08 | End: 2022-11-12 | Stop reason: HOSPADM

## 2022-11-08 RX ORDER — SODIUM CHLORIDE 0.9 % (FLUSH) 0.9 %
5-40 SYRINGE (ML) INJECTION EVERY 12 HOURS SCHEDULED
Status: DISCONTINUED | OUTPATIENT
Start: 2022-11-08 | End: 2022-11-08 | Stop reason: HOSPADM

## 2022-11-08 RX ORDER — SUCCINYLCHOLINE/SOD CL,ISO/PF 200MG/10ML
SYRINGE (ML) INTRAVENOUS PRN
Status: DISCONTINUED | OUTPATIENT
Start: 2022-11-08 | End: 2022-11-08 | Stop reason: SDUPTHER

## 2022-11-08 RX ORDER — KETAMINE HCL IN NACL, ISO-OSM 100MG/10ML
SYRINGE (ML) INJECTION PRN
Status: DISCONTINUED | OUTPATIENT
Start: 2022-11-08 | End: 2022-11-08 | Stop reason: SDUPTHER

## 2022-11-08 RX ORDER — PHENYLEPHRINE HCL IN 0.9% NACL 1 MG/10 ML
SYRINGE (ML) INTRAVENOUS PRN
Status: DISCONTINUED | OUTPATIENT
Start: 2022-11-08 | End: 2022-11-08 | Stop reason: SDUPTHER

## 2022-11-08 RX ORDER — APREPITANT 40 MG/1
40 CAPSULE ORAL ONCE
Status: COMPLETED | OUTPATIENT
Start: 2022-11-08 | End: 2022-11-08

## 2022-11-08 RX ORDER — DOCUSATE SODIUM 100 MG/1
100 CAPSULE, LIQUID FILLED ORAL 2 TIMES DAILY
Status: DISCONTINUED | OUTPATIENT
Start: 2022-11-08 | End: 2022-11-12 | Stop reason: HOSPADM

## 2022-11-08 RX ORDER — METRONIDAZOLE 500 MG/100ML
500 INJECTION, SOLUTION INTRAVENOUS ONCE
Status: COMPLETED | OUTPATIENT
Start: 2022-11-08 | End: 2022-11-08

## 2022-11-08 RX ORDER — ALBUTEROL SULFATE 90 UG/1
2 AEROSOL, METERED RESPIRATORY (INHALATION) 4 TIMES DAILY PRN
Status: DISCONTINUED | OUTPATIENT
Start: 2022-11-08 | End: 2022-11-12 | Stop reason: HOSPADM

## 2022-11-08 RX ORDER — MEPERIDINE HYDROCHLORIDE 25 MG/ML
12.5 INJECTION INTRAMUSCULAR; INTRAVENOUS; SUBCUTANEOUS EVERY 5 MIN PRN
Status: DISCONTINUED | OUTPATIENT
Start: 2022-11-08 | End: 2022-11-08 | Stop reason: HOSPADM

## 2022-11-08 RX ORDER — INDOCYANINE GREEN AND WATER 25 MG
KIT INJECTION PRN
Status: DISCONTINUED | OUTPATIENT
Start: 2022-11-08 | End: 2022-11-08 | Stop reason: SDUPTHER

## 2022-11-08 RX ORDER — POLYETHYLENE GLYCOL 3350 17 G/17G
17 POWDER, FOR SOLUTION ORAL DAILY
Status: DISCONTINUED | OUTPATIENT
Start: 2022-11-09 | End: 2022-11-12 | Stop reason: HOSPADM

## 2022-11-08 RX ORDER — OXYCODONE HYDROCHLORIDE 10 MG/1
10 TABLET ORAL PRN
Status: DISCONTINUED | OUTPATIENT
Start: 2022-11-08 | End: 2022-11-08 | Stop reason: HOSPADM

## 2022-11-08 RX ORDER — FENTANYL CITRATE 50 UG/ML
25 INJECTION, SOLUTION INTRAMUSCULAR; INTRAVENOUS EVERY 5 MIN PRN
Status: DISCONTINUED | OUTPATIENT
Start: 2022-11-08 | End: 2022-11-08 | Stop reason: HOSPADM

## 2022-11-08 RX ORDER — ONDANSETRON 2 MG/ML
INJECTION INTRAMUSCULAR; INTRAVENOUS PRN
Status: DISCONTINUED | OUTPATIENT
Start: 2022-11-08 | End: 2022-11-08 | Stop reason: SDUPTHER

## 2022-11-08 RX ORDER — OXYCODONE HYDROCHLORIDE 10 MG/1
10 TABLET ORAL EVERY 4 HOURS PRN
Status: DISCONTINUED | OUTPATIENT
Start: 2022-11-08 | End: 2022-11-12 | Stop reason: HOSPADM

## 2022-11-08 RX ORDER — SODIUM CHLORIDE 0.9 % (FLUSH) 0.9 %
5-40 SYRINGE (ML) INJECTION PRN
Status: DISCONTINUED | OUTPATIENT
Start: 2022-11-08 | End: 2022-11-08 | Stop reason: HOSPADM

## 2022-11-08 RX ORDER — SODIUM CHLORIDE 0.9 % (FLUSH) 0.9 %
5-40 SYRINGE (ML) INJECTION PRN
Status: DISCONTINUED | OUTPATIENT
Start: 2022-11-08 | End: 2022-11-12 | Stop reason: HOSPADM

## 2022-11-08 RX ORDER — ONDANSETRON 2 MG/ML
4 INJECTION INTRAMUSCULAR; INTRAVENOUS
Status: DISCONTINUED | OUTPATIENT
Start: 2022-11-08 | End: 2022-11-08 | Stop reason: HOSPADM

## 2022-11-08 RX ORDER — FENTANYL CITRATE 50 UG/ML
50 INJECTION, SOLUTION INTRAMUSCULAR; INTRAVENOUS EVERY 5 MIN PRN
Status: DISCONTINUED | OUTPATIENT
Start: 2022-11-08 | End: 2022-11-08 | Stop reason: HOSPADM

## 2022-11-08 RX ORDER — LACTOBACILLUS RHAMNOSUS GG 10B CELL
1 CAPSULE ORAL DAILY
Status: DISCONTINUED | OUTPATIENT
Start: 2022-11-09 | End: 2022-11-12 | Stop reason: HOSPADM

## 2022-11-08 RX ORDER — FENTANYL CITRATE 50 UG/ML
INJECTION, SOLUTION INTRAMUSCULAR; INTRAVENOUS PRN
Status: DISCONTINUED | OUTPATIENT
Start: 2022-11-08 | End: 2022-11-08 | Stop reason: SDUPTHER

## 2022-11-08 RX ORDER — ONDANSETRON 4 MG/1
4 TABLET, ORALLY DISINTEGRATING ORAL EVERY 8 HOURS PRN
Status: DISCONTINUED | OUTPATIENT
Start: 2022-11-08 | End: 2022-11-12 | Stop reason: HOSPADM

## 2022-11-08 RX ORDER — SODIUM CHLORIDE 9 MG/ML
INJECTION, SOLUTION INTRAVENOUS PRN
Status: DISCONTINUED | OUTPATIENT
Start: 2022-11-08 | End: 2022-11-12 | Stop reason: HOSPADM

## 2022-11-08 RX ORDER — PROPOFOL 10 MG/ML
INJECTION, EMULSION INTRAVENOUS CONTINUOUS PRN
Status: DISCONTINUED | OUTPATIENT
Start: 2022-11-08 | End: 2022-11-08 | Stop reason: SDUPTHER

## 2022-11-08 RX ORDER — ONDANSETRON 2 MG/ML
4 INJECTION INTRAMUSCULAR; INTRAVENOUS EVERY 6 HOURS PRN
Status: DISCONTINUED | OUTPATIENT
Start: 2022-11-08 | End: 2022-11-12 | Stop reason: HOSPADM

## 2022-11-08 RX ORDER — MAGNESIUM SULFATE 1 G/100ML
1000 INJECTION INTRAVENOUS PRN
Status: DISCONTINUED | OUTPATIENT
Start: 2022-11-08 | End: 2022-11-12 | Stop reason: HOSPADM

## 2022-11-08 RX ORDER — DEXTROSE, SODIUM CHLORIDE, AND POTASSIUM CHLORIDE 5; .45; .15 G/100ML; G/100ML; G/100ML
INJECTION INTRAVENOUS CONTINUOUS
Status: DISCONTINUED | OUTPATIENT
Start: 2022-11-08 | End: 2022-11-11

## 2022-11-08 RX ORDER — OXYCODONE HYDROCHLORIDE 5 MG/1
5 TABLET ORAL PRN
Status: DISCONTINUED | OUTPATIENT
Start: 2022-11-08 | End: 2022-11-08 | Stop reason: HOSPADM

## 2022-11-08 RX ORDER — SODIUM CHLORIDE 0.9 % (FLUSH) 0.9 %
5-40 SYRINGE (ML) INJECTION EVERY 12 HOURS SCHEDULED
Status: DISCONTINUED | OUTPATIENT
Start: 2022-11-08 | End: 2022-11-12 | Stop reason: HOSPADM

## 2022-11-08 RX ORDER — LIDOCAINE HYDROCHLORIDE 20 MG/ML
INJECTION, SOLUTION EPIDURAL; INFILTRATION; INTRACAUDAL; PERINEURAL PRN
Status: DISCONTINUED | OUTPATIENT
Start: 2022-11-08 | End: 2022-11-08 | Stop reason: SDUPTHER

## 2022-11-08 RX ORDER — OXYCODONE HYDROCHLORIDE 5 MG/1
5 TABLET ORAL EVERY 4 HOURS PRN
Status: DISCONTINUED | OUTPATIENT
Start: 2022-11-08 | End: 2022-11-12 | Stop reason: HOSPADM

## 2022-11-08 RX ORDER — ENOXAPARIN SODIUM 100 MG/ML
40 INJECTION SUBCUTANEOUS NIGHTLY
Status: DISCONTINUED | OUTPATIENT
Start: 2022-11-08 | End: 2022-11-12 | Stop reason: HOSPADM

## 2022-11-08 RX ORDER — SODIUM CHLORIDE 9 MG/ML
INJECTION, SOLUTION INTRAVENOUS PRN
Status: DISCONTINUED | OUTPATIENT
Start: 2022-11-08 | End: 2022-11-08 | Stop reason: HOSPADM

## 2022-11-08 RX ORDER — PROPOFOL 10 MG/ML
INJECTION, EMULSION INTRAVENOUS PRN
Status: DISCONTINUED | OUTPATIENT
Start: 2022-11-08 | End: 2022-11-08 | Stop reason: SDUPTHER

## 2022-11-08 RX ORDER — ROCURONIUM BROMIDE 10 MG/ML
INJECTION, SOLUTION INTRAVENOUS PRN
Status: DISCONTINUED | OUTPATIENT
Start: 2022-11-08 | End: 2022-11-08 | Stop reason: SDUPTHER

## 2022-11-08 RX ORDER — ALPRAZOLAM 0.5 MG/1
0.5 TABLET ORAL 2 TIMES DAILY PRN
Status: DISCONTINUED | OUTPATIENT
Start: 2022-11-08 | End: 2022-11-12 | Stop reason: HOSPADM

## 2022-11-08 RX ADMIN — SODIUM CHLORIDE: 9 INJECTION, SOLUTION INTRAVENOUS at 12:22

## 2022-11-08 RX ADMIN — ROCURONIUM BROMIDE 5 MG: 10 INJECTION, SOLUTION INTRAVENOUS at 09:54

## 2022-11-08 RX ADMIN — HYDROMORPHONE HYDROCHLORIDE 1 MG: 1 INJECTION, SOLUTION INTRAMUSCULAR; INTRAVENOUS; SUBCUTANEOUS at 19:34

## 2022-11-08 RX ADMIN — DEXAMETHASONE SODIUM PHOSPHATE 10 MG: 4 INJECTION, SOLUTION INTRAMUSCULAR; INTRAVENOUS at 10:13

## 2022-11-08 RX ADMIN — APREPITANT 40 MG: 40 CAPSULE ORAL at 09:34

## 2022-11-08 RX ADMIN — HYDROMORPHONE HYDROCHLORIDE 0.5 MG: 1 INJECTION, SOLUTION INTRAMUSCULAR; INTRAVENOUS; SUBCUTANEOUS at 15:42

## 2022-11-08 RX ADMIN — FENTANYL CITRATE 50 MCG: 50 INJECTION INTRAMUSCULAR; INTRAVENOUS at 09:54

## 2022-11-08 RX ADMIN — POTASSIUM CHLORIDE, DEXTROSE MONOHYDRATE AND SODIUM CHLORIDE: 150; 5; 450 INJECTION, SOLUTION INTRAVENOUS at 19:47

## 2022-11-08 RX ADMIN — Medication 100 MG: at 10:03

## 2022-11-08 RX ADMIN — CEFAZOLIN 2000 MG: 2 INJECTION, POWDER, FOR SOLUTION INTRAMUSCULAR; INTRAVENOUS at 13:46

## 2022-11-08 RX ADMIN — Medication 120 MG: at 09:54

## 2022-11-08 RX ADMIN — Medication 20 MG: at 21:13

## 2022-11-08 RX ADMIN — SODIUM CHLORIDE: 9 INJECTION, SOLUTION INTRAVENOUS at 13:43

## 2022-11-08 RX ADMIN — MIDAZOLAM 2 MG: 1 INJECTION INTRAMUSCULAR; INTRAVENOUS at 09:47

## 2022-11-08 RX ADMIN — INDOCYANINE GREEN AND WATER 10 MG: KIT at 14:47

## 2022-11-08 RX ADMIN — HYDROMORPHONE HYDROCHLORIDE 0.5 MG: 1 INJECTION, SOLUTION INTRAMUSCULAR; INTRAVENOUS; SUBCUTANEOUS at 12:34

## 2022-11-08 RX ADMIN — OXYCODONE 5 MG: 5 TABLET ORAL at 21:12

## 2022-11-08 RX ADMIN — SODIUM CHLORIDE: 9 INJECTION, SOLUTION INTRAVENOUS at 08:03

## 2022-11-08 RX ADMIN — METRONIDAZOLE 500 MG: 500 INJECTION, SOLUTION INTRAVENOUS at 09:58

## 2022-11-08 RX ADMIN — ROCURONIUM BROMIDE 15 MG: 10 INJECTION, SOLUTION INTRAVENOUS at 15:31

## 2022-11-08 RX ADMIN — FENTANYL CITRATE 50 MCG: 50 INJECTION INTRAMUSCULAR; INTRAVENOUS at 12:08

## 2022-11-08 RX ADMIN — DEXMEDETOMIDINE HYDROCHLORIDE 0.25 MCG/KG/HR: 100 INJECTION, SOLUTION INTRAVENOUS at 10:15

## 2022-11-08 RX ADMIN — PROPOFOL 150 MG: 10 INJECTION, EMULSION INTRAVENOUS at 09:54

## 2022-11-08 RX ADMIN — LIDOCAINE HYDROCHLORIDE 80 MG: 20 INJECTION, SOLUTION EPIDURAL; INFILTRATION; INTRACAUDAL; PERINEURAL at 09:54

## 2022-11-08 RX ADMIN — Medication 100 MCG: at 15:27

## 2022-11-08 RX ADMIN — SODIUM CHLORIDE, PRESERVATIVE FREE 10 ML: 5 INJECTION INTRAVENOUS at 21:14

## 2022-11-08 RX ADMIN — ROCURONIUM BROMIDE 20 MG: 10 INJECTION, SOLUTION INTRAVENOUS at 12:23

## 2022-11-08 RX ADMIN — DOCUSATE SODIUM 100 MG: 100 CAPSULE, LIQUID FILLED ORAL at 21:12

## 2022-11-08 RX ADMIN — ONDANSETRON 4 MG: 2 INJECTION INTRAMUSCULAR; INTRAVENOUS at 16:02

## 2022-11-08 RX ADMIN — SUGAMMADEX 200 MG: 100 INJECTION, SOLUTION INTRAVENOUS at 16:13

## 2022-11-08 RX ADMIN — PROPOFOL 75 MCG/KG/MIN: 10 INJECTION, EMULSION INTRAVENOUS at 10:15

## 2022-11-08 RX ADMIN — FENTANYL CITRATE 50 MCG: 50 INJECTION INTRAMUSCULAR; INTRAVENOUS at 10:15

## 2022-11-08 RX ADMIN — PIPERACILLIN AND TAZOBACTAM 3375 MG: 3; .375 INJECTION, POWDER, FOR SOLUTION INTRAVENOUS at 19:48

## 2022-11-08 RX ADMIN — GLYCOPYRROLATE 0.2 MG: 0.2 INJECTION, SOLUTION INTRAMUSCULAR; INTRAVENOUS at 10:13

## 2022-11-08 RX ADMIN — CEFAZOLIN 2000 MG: 2 INJECTION, POWDER, FOR SOLUTION INTRAMUSCULAR; INTRAVENOUS at 09:49

## 2022-11-08 RX ADMIN — Medication 30 MG: at 10:15

## 2022-11-08 RX ADMIN — FENTANYL CITRATE 50 MCG: 50 INJECTION, SOLUTION INTRAMUSCULAR; INTRAVENOUS at 17:30

## 2022-11-08 RX ADMIN — FENTANYL CITRATE 50 MCG: 50 INJECTION INTRAMUSCULAR; INTRAVENOUS at 10:07

## 2022-11-08 RX ADMIN — ROCURONIUM BROMIDE 45 MG: 10 INJECTION, SOLUTION INTRAVENOUS at 10:03

## 2022-11-08 RX ADMIN — ROCURONIUM BROMIDE 25 MG: 10 INJECTION, SOLUTION INTRAVENOUS at 11:05

## 2022-11-08 RX ADMIN — ENOXAPARIN SODIUM 40 MG: 100 INJECTION SUBCUTANEOUS at 21:14

## 2022-11-08 RX ADMIN — ROCURONIUM BROMIDE 20 MG: 10 INJECTION, SOLUTION INTRAVENOUS at 13:41

## 2022-11-08 ASSESSMENT — PAIN - FUNCTIONAL ASSESSMENT
PAIN_FUNCTIONAL_ASSESSMENT: PREVENTS OR INTERFERES SOME ACTIVE ACTIVITIES AND ADLS
PAIN_FUNCTIONAL_ASSESSMENT: PREVENTS OR INTERFERES SOME ACTIVE ACTIVITIES AND ADLS
PAIN_FUNCTIONAL_ASSESSMENT: 0-10

## 2022-11-08 ASSESSMENT — PAIN DESCRIPTION - LOCATION
LOCATION: ABDOMEN

## 2022-11-08 ASSESSMENT — PAIN SCALES - GENERAL
PAINLEVEL_OUTOF10: 10
PAINLEVEL_OUTOF10: 9
PAINLEVEL_OUTOF10: 10

## 2022-11-08 ASSESSMENT — PAIN DESCRIPTION - DESCRIPTORS
DESCRIPTORS: ACHING
DESCRIPTORS: THROBBING;STABBING
DESCRIPTORS: ACHING;STABBING;SHARP

## 2022-11-08 ASSESSMENT — PAIN DESCRIPTION - ORIENTATION
ORIENTATION: RIGHT;LEFT;MID;LOWER
ORIENTATION: MID
ORIENTATION: RIGHT;LEFT

## 2022-11-08 ASSESSMENT — PAIN DESCRIPTION - PAIN TYPE: TYPE: SURGICAL PAIN

## 2022-11-08 ASSESSMENT — LIFESTYLE VARIABLES: SMOKING_STATUS: 1

## 2022-11-08 ASSESSMENT — ENCOUNTER SYMPTOMS: SHORTNESS OF BREATH: 0

## 2022-11-08 NOTE — BRIEF OP NOTE
Brief Postoperative Note      Patient: Junito Hogue  YOB: 1960  MRN: [id number]    Date of Procedure: 11/8/2022    Pre-Op Diagnosis: Sigmoid diverticulitis, Colovesical fistula    Post-Op Diagnosis: Same       Procedure(s):  ROBOTIC ASSISTED LAPAROSCOPIC SIGMOID COLECTOMY, SMALL BOWEL RESECTION,  CYSTOSCOPY, LEFT URETERAL CATHETER INSERTION    Surgeon(s):  MD Fernanda Bro MD    Assistant:  Surgical Assistant: Bruce Gonzalez    Anesthesia: General    Estimated Blood Loss (mL): 200 ml   ml  IVF 3214 ml    Complications: None    Specimens:   ID Type Source Tests Collected by Time Destination   A : A) Sigmoid colon Tissue Colon SURGICAL PATHOLOGY Alicja Torrez MD 11/8/2022 1513    B : Small Bowel Tissue Tissue SURGICAL PATHOLOGY Alicja Torrez MD 11/8/2022 1540        Implants:  * No implants in log *      Drains:   Urinary Catheter 11/08/22 Other (comment) (Active)       Findings: significant inflammation of the sigmoid colon to the pelvic walls, abscess in suspected area of fistula, no bladder leak on backfill    Electronically signed by Alicja Torrez MD on 11/8/2022 at 4:18 PM

## 2022-11-08 NOTE — PROGRESS NOTES
ROBOTIC ASSISTED LAPAROSCOPIC SIGMOID COLECTOMY, SMALL BOWEL RESECTION,  CYSTOSCOPY, LEFT URETERAL CATHETER INSERTION

## 2022-11-08 NOTE — ANESTHESIA PRE PROCEDURE
Department of Anesthesiology  Preprocedure Note       Name:  Aga Nieves   Age:  65 y.o.  :  1960                                          MRN:  [id number]         Date:  2022      Surgeon: Levar Yeh):  Hema Holguin MD    Procedure: Procedure(s):  ROBOTIC ASSISTED LAPAROSCOPIC SIGMOID COLECTOMY, POSSIBLE OPEN, POSSIBLE OSTOMY, POSSIBLE REPAIR OF BLADDER    Medications prior to admission:   Prior to Admission medications    Medication Sig Start Date End Date Taking? Authorizing Provider   ALPRAZolam [place] Creamer) 0.5 MG tablet Take 0.5 mg by mouth as needed for Sleep. Yes Historical Provider, MD   lactobacillus (CULTURELLE) CAPS capsule TAKE 1 CAPSULE BY MOUTH DAILY 21   Hema Holguin MD   docusate sodium (COLACE) 100 MG capsule Take 1 capsule by mouth 2 times daily as needed for Constipation (take while on narcotic pain medication) 8/10/21   Hema Holguin MD   ibuprofen (ADVIL;MOTRIN) 400 MG tablet Take 1 tablet by mouth 4 times daily as needed for Pain 8/10/21   Hema Holguin MD   JANUVIA 100 MG tablet Take 100 mg by mouth daily  21   Historical Provider, MD   nystatin (MYCOSTATIN) 224047 UNIT/ML suspension TAKE 5 ML BY MOUTH FOUR TIMES DAILY  Patient taking differently: As needed 21   Hema Holguin MD   albuterol sulfate HFA (PROVENTIL;VENTOLIN;PROAIR) 108 (90 Base) MCG/ACT inhaler Inhale 2 puffs into the lungs 4 times daily as needed for Wheezing or Shortness of Breath    Historical Provider, MD   levothyroxine (SYNTHROID) 25 MCG tablet Take 25 mcg by mouth Daily    Historical Provider, MD   ondansetron (ZOFRAN ODT) 4 MG disintegrating tablet Take 1-2 tablets by mouth every 12 hours as needed for Nausea May Sub regular tablet (non-ODT) if insurance does not cover ODT.  21   Wilda Chowdary, APRN - CNP       Current medications:    Current Facility-Administered Medications   Medication Dose Route Frequency Provider Last Rate Last Admin    sodium chloride flush 0.9 % injection 5-40 mL  5-40 mL IntraVENous 2 times per day Tatiana Weller MD        sodium chloride flush 0.9 % injection 5-40 mL  5-40 mL IntraVENous PRN Tatiana Weller MD        0.9 % sodium chloride infusion   IntraVENous PRN Tatiana Weller  mL/hr at 11/08/22 0803 New Bag at 11/08/22 0803       Allergies: Allergies   Allergen Reactions    Ativan [Lorazepam] Other (See Comments)     Post op overdose- caused heart to stop, CPR administered    Zoloft [Sertraline Hcl]      diarrhea and stomach distress per PT    Doxycycline Rash       Problem List:    Patient Active Problem List   Diagnosis Code    H/O tongue cancer Z85.810    Rash and nonspecific skin eruption R21    Urinary incontinence R32    Elevated WBC count D72.829    Abdominal cramping R10.9    Mucous in stools R19.5    Diverticulitis K57.92    Moderate malnutrition (HCC) E44.0    Diverticulitis of intestine with abscess without bleeding K57.80    Acute on chronic cholecystitis K81.2       Past Medical History:        Diagnosis Date    Anxiety     Cancer ([facility] [address]     head/neck/tongue    Chronic back pain     Diabetes mellitus ([address]     Erectile dysfunction     Headache(784.0)     Osteoarthritis     PONV (postoperative nausea and vomiting)     Restless legs syndrome     Urinary incontinence     Wears dentures        Past Surgical History:        Procedure Laterality Date    CHOLECYSTECTOMY, LAPAROSCOPIC N/A 08/10/2021    LAPAROSCOPIC CHOLECYSTECTOMY WITH CHOLANGIOGRAM performed by Peace Savage MD at [address] [place] [address] Dr Neely        Social History:    Social History     Tobacco Use    Smoking status: Former     Packs/day: 1.00     Years: 30.00     Pack years: 30.00     Types: Cigarettes    Smokeless tobacco: Never   Substance Use Topics    Alcohol use:  No                                Counseling given: Not Answered      Vital Signs (Current): Vitals:    10/31/22 1151 11/08/22 0755 11/08/22 0814   BP:  108/78    Pulse:  77    Resp:  18    Temp:  97.1 °F (36.2 °C)    TempSrc:  Temporal    SpO2:   94%   Weight: 142 lb (64.4 kg)     Height: 6' (1.829 m)                                                BP Readings from Last 3 Encounters:   11/08/22 108/78   10/13/22 (!) 141/98   09/30/21 131/87       NPO Status: Time of last liquid consumption: 2300                        Time of last solid consumption: 2300                        Date of last liquid consumption: 11/07/22                        Date of last solid food consumption: 11/07/22    BMI:   Wt Readings from Last 3 Encounters:   10/31/22 142 lb (64.4 kg)   10/13/22 142 lb (64.4 kg)   09/30/21 143 lb (64.9 kg)     Body mass index is 19.26 kg/m². CBC:   Lab Results   Component Value Date/Time    WBC 12.5 10/13/2022 11:38 AM    RBC 5.44 10/13/2022 11:38 AM    HGB 17.6 10/13/2022 11:38 AM    HCT 53.1 10/13/2022 11:38 AM    MCV 97.5 10/13/2022 11:38 AM    RDW 14.8 10/13/2022 11:38 AM     10/13/2022 11:38 AM       CMP:   Lab Results   Component Value Date/Time     10/13/2022 11:38 AM    K 5.3 10/13/2022 11:38 AM    K 4.4 06/09/2021 04:50 AM    CL 97 10/13/2022 11:38 AM    CO2 28 10/13/2022 11:38 AM    BUN 9 10/13/2022 11:38 AM    CREATININE 1.0 10/13/2022 11:38 AM    GFRAA >60 10/13/2022 11:38 AM    AGRATIO 0.8 06/13/2021 06:30 AM    LABGLOM >60 10/13/2022 11:38 AM    GLUCOSE 166 10/13/2022 11:38 AM    PROT 6.3 06/13/2021 06:30 AM    CALCIUM 9.6 10/13/2022 11:38 AM    BILITOT 0.4 06/13/2021 06:30 AM    ALKPHOS 271 06/13/2021 06:30 AM    AST 35 06/13/2021 06:30 AM    ALT 29 06/13/2021 06:30 AM       POC Tests: No results for input(s): POCGLU, POCNA, POCK, POCCL, POCBUN, POCHEMO, POCHCT in the last 72 hours.     Coags: No results found for: PROTIME, INR, APTT    HCG (If Applicable): No results found for: PREGTESTUR, PREGSERUM, HCG, HCGQUANT     ABGs: No results found for: PHART, PO2ART, JWY7MBQ, VJS4RLD, BEART, F2CPURHE     Type & Screen (If Applicable):  No results found for: LABABO, LABRH    Drug/Infectious Status (If Applicable):  No results found for: HIV, HEPCAB    COVID-19 Screening (If Applicable):   Lab Results   Component Value Date/Time    COVID19 Not Detected 05/30/2021 02:30 PM           Anesthesia Evaluation     history of anesthetic complications: PONV. Airway: Mallampati: II  TM distance: >3 FB   Neck ROM: full  Mouth opening: > = 3 FB   Dental:    (+) edentulous  Comment: No loose teeth    Pulmonary: breath sounds clear to auscultation  (+) current smoker (has now quit)    (-) pneumonia, asthma, shortness of breath, recent URI and sleep apnea          Patient did not smoke on day of surgery. Cardiovascular:  Exercise tolerance: good (>4 METS),       (-) pacemaker, hypertension, valvular problems/murmurs, past MI, CAD, CABG/stent, dysrhythmias,  angina,  CHF,  LESLIE, no pulmonary hypertension and no hyperlipidemia      Rhythm: regular                      Neuro/Psych:   (+) headaches:,    (-) seizures, neuromuscular disease, TIA, CVA, psychiatric history and depression/anxiety            GI/Hepatic/Renal:        (-) hiatal hernia, GERD, PUD, hepatitis, liver disease, no renal disease, bowel prep and no morbid obesity      ROS comment: colovesical fistula with recurrent sigmoid diverticulitis. 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 (-) hyperthyroidism, blood dyscrasia, no electrolyte abnormalities               Abdominal:             Vascular:     - PVD, DVT and PE. Other Findings:           Anesthesia Plan      general     ASA 3     (Would have glidescope available due to h/o radiation and surgery when had cancer at base of tongue)  Induction: intravenous.     MIPS: Postoperative opioids intended, Prophylactic antiemetics administered and Postoperative trial

## 2022-11-08 NOTE — H&P
Update History & Physical    The patient's History and Physical from Dr. Jc Patel on October 21, 2022 was reviewed with the patient and I examined the patient. There was no change. The surgical site was confirmed by the patient and me. Plan: The risks, benefits, expected outcome, and alternative to the recommended procedure have been discussed with the patient. Patient understands and wants to proceed with the procedure. Will proceed with robotic assisted laparoscopic sigmoid colectomy, takedown of colovesical fistula, possible open, possible ostomy. Ancef, flagyl ordered. Bilateral SCDs. Polanco to be placed during surgery by Dr. Yuri Man after cystoscopy.     Electronically signed by Gurjit Guillen MD on 11/8/2022 at 9:32 AM

## 2022-11-08 NOTE — PROGRESS NOTES
Oral airway removed. WILLOUGHBY x 4 to command. F/C remains with bloody drainage. Titrating O2.  Abdominal sites remain intact with no drainage

## 2022-11-08 NOTE — ANESTHESIA POSTPROCEDURE EVALUATION
Department of Anesthesiology  Postprocedure Note    Patient: Elli Jarrell  MRN: [id number]  YOB: 1960  Date of evaluation: 11/8/2022      Procedure Summary     Date: 11/08/22 Room / Location: [address]    Anesthesia Start: 0548 Anesthesia Stop: 1635    Procedures:       ROBOTIC ASSISTED LAPAROSCOPIC SIGMOID COLECTOMY (Abdomen)      CYSTOSCOPY, LEFT URETERAL CATHETER INSERTION (Ureter) Diagnosis:       Sigmoid diverticulitis      Colovesical fistula      (Sigmoid diverticulitis, Colovesical fistula)    Surgeons: Ct Moore MD Responsible Provider: Barbara Amin MD    Anesthesia Type: General ASA Status: 3          Anesthesia Type: General    Zac Phase I: Zac Score: 4    Zac Phase II:        Anesthesia Post Evaluation    Patient location during evaluation: PACU  Patient participation: complete - patient participated  Level of consciousness: awake and alert  Pain score: 4  Airway patency: patent  Nausea & Vomiting: no nausea and no vomiting  Complications: no  Cardiovascular status: blood pressure returned to baseline  Respiratory status: acceptable  Hydration status: euvolemic

## 2022-11-08 NOTE — PROGRESS NOTES
Pt up to room 4107 from PACU. Pt awakens to painful stimuli, difficult to awaken via speech. Pt able to give name and date of birth, before falling back to sleep. VSS, GCS 13. Palpable pulses to all four extremities. Pt with 4L O2 via NC, baseline RA. Continuous telemetry setup with continuous pulse oximetry. Bed placed in lowest, locked position. Bed alarm on, call light within reach. Admission questions unable to be completed at this time due to pt post-op status.     Electronically signed by Norman Ramirez RN on 11/8/22 at 6:38 PM EST

## 2022-11-09 LAB
ANION GAP SERPL CALCULATED.3IONS-SCNC: 9 MMOL/L (ref 3–16)
BASOPHILS ABSOLUTE: 0 K/UL (ref 0–0.2)
BASOPHILS RELATIVE PERCENT: 0.1 %
BUN BLDV-MCNC: 11 MG/DL (ref 7–20)
CALCIUM SERPL-MCNC: 8.1 MG/DL (ref 8.3–10.6)
CHLORIDE BLD-SCNC: 100 MMOL/L (ref 99–110)
CO2: 26 MMOL/L (ref 21–32)
CREAT SERPL-MCNC: 0.9 MG/DL (ref 0.8–1.3)
EOSINOPHILS ABSOLUTE: 0 K/UL (ref 0–0.6)
EOSINOPHILS RELATIVE PERCENT: 0 %
GFR SERPL CREATININE-BSD FRML MDRD: >60 ML/MIN/{1.73_M2}
GLUCOSE BLD-MCNC: 122 MG/DL (ref 70–99)
GLUCOSE BLD-MCNC: 169 MG/DL (ref 70–99)
GLUCOSE BLD-MCNC: 218 MG/DL (ref 70–99)
GLUCOSE BLD-MCNC: 238 MG/DL (ref 70–99)
GLUCOSE BLD-MCNC: 327 MG/DL (ref 70–99)
HCT VFR BLD CALC: 50.3 % (ref 40.5–52.5)
HEMOGLOBIN: 16.4 G/DL (ref 13.5–17.5)
LYMPHOCYTES ABSOLUTE: 0.9 K/UL (ref 1–5.1)
LYMPHOCYTES RELATIVE PERCENT: 5.9 %
MAGNESIUM: 1.9 MG/DL (ref 1.8–2.4)
MCH RBC QN AUTO: 32.1 PG (ref 26–34)
MCHC RBC AUTO-ENTMCNC: 32.5 G/DL (ref 31–36)
MCV RBC AUTO: 98.7 FL (ref 80–100)
MONOCYTES ABSOLUTE: 1.1 K/UL (ref 0–1.3)
MONOCYTES RELATIVE PERCENT: 7.1 %
NEUTROPHILS ABSOLUTE: 13 K/UL (ref 1.7–7.7)
NEUTROPHILS RELATIVE PERCENT: 86.9 %
PDW BLD-RTO: 15.6 % (ref 12.4–15.4)
PERFORMED ON: ABNORMAL
PHOSPHORUS: 3.8 MG/DL (ref 2.5–4.9)
PLATELET # BLD: 220 K/UL (ref 135–450)
PMV BLD AUTO: 7.7 FL (ref 5–10.5)
POTASSIUM SERPL-SCNC: 5.1 MMOL/L (ref 3.5–5.1)
RBC # BLD: 5.1 M/UL (ref 4.2–5.9)
SODIUM BLD-SCNC: 135 MMOL/L (ref 136–145)
WBC # BLD: 15 K/UL (ref 4–11)

## 2022-11-09 PROCEDURE — 36415 COLL VENOUS BLD VENIPUNCTURE: CPT

## 2022-11-09 PROCEDURE — 99024 POSTOP FOLLOW-UP VISIT: CPT | Performed by: PHYSICIAN ASSISTANT

## 2022-11-09 PROCEDURE — 97162 PT EVAL MOD COMPLEX 30 MIN: CPT

## 2022-11-09 PROCEDURE — 99024 POSTOP FOLLOW-UP VISIT: CPT | Performed by: SURGERY

## 2022-11-09 PROCEDURE — 2500000003 HC RX 250 WO HCPCS: Performed by: SURGERY

## 2022-11-09 PROCEDURE — 2580000003 HC RX 258: Performed by: SURGERY

## 2022-11-09 PROCEDURE — 94760 N-INVAS EAR/PLS OXIMETRY 1: CPT

## 2022-11-09 PROCEDURE — 97166 OT EVAL MOD COMPLEX 45 MIN: CPT

## 2022-11-09 PROCEDURE — 83735 ASSAY OF MAGNESIUM: CPT

## 2022-11-09 PROCEDURE — APPNB15 APP NON BILLABLE TIME 0-15 MINS: Performed by: PHYSICIAN ASSISTANT

## 2022-11-09 PROCEDURE — 1200000000 HC SEMI PRIVATE

## 2022-11-09 PROCEDURE — APPSS15 APP SPLIT SHARED TIME 0-15 MINUTES: Performed by: PHYSICIAN ASSISTANT

## 2022-11-09 PROCEDURE — 6360000002 HC RX W HCPCS: Performed by: SURGERY

## 2022-11-09 PROCEDURE — 97530 THERAPEUTIC ACTIVITIES: CPT

## 2022-11-09 PROCEDURE — 6370000000 HC RX 637 (ALT 250 FOR IP): Performed by: SURGERY

## 2022-11-09 PROCEDURE — 84100 ASSAY OF PHOSPHORUS: CPT

## 2022-11-09 PROCEDURE — 6370000000 HC RX 637 (ALT 250 FOR IP): Performed by: SPECIALIST

## 2022-11-09 PROCEDURE — 2700000000 HC OXYGEN THERAPY PER DAY

## 2022-11-09 PROCEDURE — 80048 BASIC METABOLIC PNL TOTAL CA: CPT

## 2022-11-09 PROCEDURE — 51702 INSERT TEMP BLADDER CATH: CPT

## 2022-11-09 PROCEDURE — A4216 STERILE WATER/SALINE, 10 ML: HCPCS | Performed by: SURGERY

## 2022-11-09 PROCEDURE — 97116 GAIT TRAINING THERAPY: CPT

## 2022-11-09 PROCEDURE — APPNB15 APP NON BILLABLE TIME 0-15 MINS: Performed by: NURSE PRACTITIONER

## 2022-11-09 PROCEDURE — 85025 COMPLETE CBC W/AUTO DIFF WBC: CPT

## 2022-11-09 PROCEDURE — 6360000002 HC RX W HCPCS: Performed by: PHYSICIAN ASSISTANT

## 2022-11-09 RX ORDER — CLONIDINE 0.1 MG/24H
1 PATCH, EXTENDED RELEASE TRANSDERMAL WEEKLY
Status: DISCONTINUED | OUTPATIENT
Start: 2022-11-09 | End: 2022-11-12

## 2022-11-09 RX ORDER — INSULIN LISPRO 100 [IU]/ML
0-4 INJECTION, SOLUTION INTRAVENOUS; SUBCUTANEOUS
Status: DISCONTINUED | OUTPATIENT
Start: 2022-11-09 | End: 2022-11-12 | Stop reason: HOSPADM

## 2022-11-09 RX ORDER — KETOROLAC TROMETHAMINE 15 MG/ML
15 INJECTION, SOLUTION INTRAMUSCULAR; INTRAVENOUS EVERY 6 HOURS
Status: DISPENSED | OUTPATIENT
Start: 2022-11-09 | End: 2022-11-12

## 2022-11-09 RX ORDER — INSULIN LISPRO 100 [IU]/ML
0-4 INJECTION, SOLUTION INTRAVENOUS; SUBCUTANEOUS NIGHTLY
Status: DISCONTINUED | OUTPATIENT
Start: 2022-11-09 | End: 2022-11-12 | Stop reason: HOSPADM

## 2022-11-09 RX ORDER — DEXTROSE MONOHYDRATE 100 MG/ML
INJECTION, SOLUTION INTRAVENOUS CONTINUOUS PRN
Status: DISCONTINUED | OUTPATIENT
Start: 2022-11-09 | End: 2022-11-12 | Stop reason: HOSPADM

## 2022-11-09 RX ADMIN — OXYCODONE HYDROCHLORIDE 10 MG: 10 TABLET ORAL at 13:22

## 2022-11-09 RX ADMIN — Medication 20 MG: at 20:04

## 2022-11-09 RX ADMIN — POLYETHYLENE GLYCOL 3350 17 G: 17 POWDER, FOR SOLUTION ORAL at 08:10

## 2022-11-09 RX ADMIN — POTASSIUM CHLORIDE, DEXTROSE MONOHYDRATE AND SODIUM CHLORIDE: 150; 5; 450 INJECTION, SOLUTION INTRAVENOUS at 02:12

## 2022-11-09 RX ADMIN — FAMOTIDINE 20 MG: 20 TABLET ORAL at 08:09

## 2022-11-09 RX ADMIN — INSULIN LISPRO 1 UNITS: 100 INJECTION, SOLUTION INTRAVENOUS; SUBCUTANEOUS at 09:42

## 2022-11-09 RX ADMIN — INSULIN LISPRO 1 UNITS: 100 INJECTION, SOLUTION INTRAVENOUS; SUBCUTANEOUS at 13:10

## 2022-11-09 RX ADMIN — DOCUSATE SODIUM 100 MG: 100 CAPSULE, LIQUID FILLED ORAL at 08:09

## 2022-11-09 RX ADMIN — KETOROLAC TROMETHAMINE 15 MG: 15 INJECTION, SOLUTION INTRAMUSCULAR; INTRAVENOUS at 15:45

## 2022-11-09 RX ADMIN — ACETAMINOPHEN 650 MG: 325 TABLET ORAL at 18:52

## 2022-11-09 RX ADMIN — PIPERACILLIN AND TAZOBACTAM 3375 MG: 3; .375 INJECTION, POWDER, FOR SOLUTION INTRAVENOUS at 02:14

## 2022-11-09 RX ADMIN — KETOROLAC TROMETHAMINE 15 MG: 15 INJECTION, SOLUTION INTRAMUSCULAR; INTRAVENOUS at 22:05

## 2022-11-09 RX ADMIN — PIPERACILLIN AND TAZOBACTAM 3375 MG: 3; .375 INJECTION, POWDER, FOR SOLUTION INTRAVENOUS at 09:39

## 2022-11-09 RX ADMIN — HYDROMORPHONE HYDROCHLORIDE 1 MG: 1 INJECTION, SOLUTION INTRAMUSCULAR; INTRAVENOUS; SUBCUTANEOUS at 00:06

## 2022-11-09 RX ADMIN — ACETAMINOPHEN 650 MG: 325 TABLET ORAL at 05:14

## 2022-11-09 RX ADMIN — OXYCODONE HYDROCHLORIDE 10 MG: 10 TABLET ORAL at 18:52

## 2022-11-09 RX ADMIN — ALPRAZOLAM 0.5 MG: 0.5 TABLET ORAL at 13:23

## 2022-11-09 RX ADMIN — POTASSIUM CHLORIDE, DEXTROSE MONOHYDRATE AND SODIUM CHLORIDE: 150; 5; 450 INJECTION, SOLUTION INTRAVENOUS at 18:51

## 2022-11-09 RX ADMIN — ACETAMINOPHEN 650 MG: 325 TABLET ORAL at 13:23

## 2022-11-09 RX ADMIN — ACETAMINOPHEN 650 MG: 325 TABLET ORAL at 02:11

## 2022-11-09 RX ADMIN — HYDROMORPHONE HYDROCHLORIDE 1 MG: 1 INJECTION, SOLUTION INTRAMUSCULAR; INTRAVENOUS; SUBCUTANEOUS at 08:10

## 2022-11-09 RX ADMIN — OXYCODONE HYDROCHLORIDE 10 MG: 10 TABLET ORAL at 05:15

## 2022-11-09 RX ADMIN — POTASSIUM CHLORIDE, DEXTROSE MONOHYDRATE AND SODIUM CHLORIDE: 150; 5; 450 INJECTION, SOLUTION INTRAVENOUS at 09:37

## 2022-11-09 RX ADMIN — PIPERACILLIN AND TAZOBACTAM 3375 MG: 3; .375 INJECTION, POWDER, FOR SOLUTION INTRAVENOUS at 18:57

## 2022-11-09 RX ADMIN — Medication 1 CAPSULE: at 08:10

## 2022-11-09 RX ADMIN — DOCUSATE SODIUM 100 MG: 100 CAPSULE, LIQUID FILLED ORAL at 20:04

## 2022-11-09 RX ADMIN — SODIUM CHLORIDE, PRESERVATIVE FREE 10 ML: 5 INJECTION INTRAVENOUS at 08:18

## 2022-11-09 RX ADMIN — SODIUM CHLORIDE, PRESERVATIVE FREE 10 ML: 5 INJECTION INTRAVENOUS at 20:08

## 2022-11-09 RX ADMIN — ONDANSETRON 4 MG: 4 TABLET, ORALLY DISINTEGRATING ORAL at 13:23

## 2022-11-09 RX ADMIN — ENOXAPARIN SODIUM 40 MG: 100 INJECTION SUBCUTANEOUS at 20:03

## 2022-11-09 ASSESSMENT — PAIN DESCRIPTION - ORIENTATION
ORIENTATION: RIGHT;LEFT;MID
ORIENTATION: RIGHT;LEFT
ORIENTATION: RIGHT;LEFT
ORIENTATION: RIGHT;LEFT;MID
ORIENTATION: RIGHT;LEFT

## 2022-11-09 ASSESSMENT — PAIN DESCRIPTION - DESCRIPTORS
DESCRIPTORS: SHARP
DESCRIPTORS: ACHING
DESCRIPTORS: SHARP
DESCRIPTORS: ACHING;SHARP
DESCRIPTORS: THROBBING
DESCRIPTORS: THROBBING
DESCRIPTORS: ACHING;SHARP
DESCRIPTORS: DISCOMFORT;ACHING;SHARP;STABBING
DESCRIPTORS: SHARP;ACHING

## 2022-11-09 ASSESSMENT — PAIN SCALES - GENERAL
PAINLEVEL_OUTOF10: 4
PAINLEVEL_OUTOF10: 3
PAINLEVEL_OUTOF10: 7
PAINLEVEL_OUTOF10: 9
PAINLEVEL_OUTOF10: 4
PAINLEVEL_OUTOF10: 8
PAINLEVEL_OUTOF10: 3
PAINLEVEL_OUTOF10: 8

## 2022-11-09 ASSESSMENT — PAIN DESCRIPTION - LOCATION
LOCATION: ABDOMEN

## 2022-11-09 ASSESSMENT — PAIN - FUNCTIONAL ASSESSMENT
PAIN_FUNCTIONAL_ASSESSMENT: PREVENTS OR INTERFERES SOME ACTIVE ACTIVITIES AND ADLS
PAIN_FUNCTIONAL_ASSESSMENT: PREVENTS OR INTERFERES SOME ACTIVE ACTIVITIES AND ADLS
PAIN_FUNCTIONAL_ASSESSMENT: ACTIVITIES ARE NOT PREVENTED
PAIN_FUNCTIONAL_ASSESSMENT: PREVENTS OR INTERFERES SOME ACTIVE ACTIVITIES AND ADLS

## 2022-11-09 ASSESSMENT — PAIN DESCRIPTION - PAIN TYPE: TYPE: SURGICAL PAIN

## 2022-11-09 NOTE — OP NOTE
[address] Victorina Thomas 16                                OPERATIVE REPORT    PATIENT NAME: Guilherme Lorenzo                     :        1960  MED REC NO:   [id number]                          ROOM:       4107  ACCOUNT NO:   [de-identified]                           ADMIT DATE: 2022  PROVIDER:     Kajal Watkins MD    DATE OF PROCEDURE:  2022    PREOPERATIVE DIAGNOSIS:  Colovesical fistula secondary to recurrent  sigmoid diverticulitis. POSTOPERATIVE DIAGNOSIS:  Colovesical fistula secondary to recurrent  sigmoid diverticulitis. OPERATION PERFORMED:  Robotic-assisted laparoscopic sigmoid colectomy  with take down of colovesical fistula and drains of pericolonic abscess  with open small bowel resection. SURGEON:  Kajal Watkins MD    ANESTHESIA:  General endotracheal.    ASA CLASS:  III. DVT PROPHYLAXIS:  The patient was wearing bilateral SCDs. ANTIBIOTICS:  The patient received Ancef 2 gm IV and Flagyl 500 mg IV  preoperatively. ESTIMATED BLOOD LOSS:  200 mL. URINE OUTPUT:  200 mL. COMPLICATIONS:  None. SPECIMENS:  1. Sigmoid colon. 2.  Small bowel. INDICATIONS:  The patient is a 71-year-old gentleman with history of  recurrent diverticulitis. Initially, he was set up for surgery last  year; however that in a following through. He presented to Urology  recently with pneumaturia. He was found to have a colovesical fistula  on imaging secondary to his persistent diverticulitis. The patient did  have ongoing inflammation of his colon and was on antibiotic leading up  to surgery. He present electively after the risks, benefits, and  alternatives were explained to him and he was willing to consent for the  procedure. OPERATIVE PROCEDURE:  After informed consent was obtained. The patient  was brought to the operating room and placed in a supine position.    General anesthesia was induced. The patient was placed in a lithotomy  position. He was then prepped and draped in the usual sterile fashion. Time-out was then performed. Dr. Braxton Brandon started with cystoscopy. Once  in the bladder, evaluation of the bladder wall did not reveal any  obvious fistula. He was able to place left ureteral stent and left the  Polanco in place for the procedure. After doing so, we then proceeded  with the robotic sigmoid colon. The right supraumbilical incision was  made and penetrating towel clip was used to elevate the abdominal wall. A Veress needle was inserted and the abdomen was insufflated with carbon  dioxide gas to a pressure of 15 mmHg. An 8-mm robotic trocar was then  placed followed by a camera. There was no evidence of injury with our  trocar placement or Veress needle placement. We then placed two  additional 8-mm robotic trocars in the left upper quadrant. We placed  an 8 mm, which was upsized to a 12 mm trocar in the right lower  quadrant, and additional 5 mm assistant port in the right upper  quadrant. Through these, we brought in the robot after placing the  patient in Trendelenburg with right side down. We then evaluated the  pelvis, the patient had significant inflammation of the sigmoid colon to  not only the anterior pelvis, but left pelvis, right pelvis, and small  bowel. We first placed a silk  suture at future point of proximal transection with our sigmoid colon. We then started by slowly peeling down the sigmoid colon from  the anterior aspect of the pelvis near where the suspected colovesical  fistula was located. This was a very challenging process given the  degree of inflammation. We carefully chipped away at the scar tissue  with the combination of sharp dissection, blunt dissection, and electrocautery. There was  ongoing oozing from this area of chronic inflammation that we tried to  control as we went.   The patient  did have an abscess in between the sigmoid colon and bladder where the  suspected fistula was located. Within the cavity there was a mushroom of mucosa extending from the colon due to ongoing perforated but contained  diverticulitis, which was almost certainly the source of the fistula. The abscess was fully broken up and all the pus was suctioned out. With the distal aspect along the lower right side  of the pelvis, we had a hard time seeing the end of the sigmoid colon  before transition of the rectum. We then came back proximally. We  freed up the proximal sigmoid colon and left colon. We continued to free up the colon, which took multiple hours. Finally, we were able to free up down towards the pelvis more. There  was still significant amount of inflammation along the left pelvis;  however, we were able to identify the left ureter and we are able to  stay away from it running in the retroperitoneum. There were multiple  loops of distal small bowel of the terminal ileum that were struck to  the sigmoid colon. We very carefully peeled this away; however, there  was some significant chronic inflammation with those bowel loops. We  later re-inspected those when we brought up the colon specimen. Once we  were able to fully peel down the scar tissue from the sigmoid colon to  the pelvis, we elevated the colon and began to divide the mesentery. This was done using a vessel sealer working from proximally near our  suture point of future transection and working distally down towards the  rectum. Care was taken during this process to be hemostatic. This was  challenging given the degree of inflammation of the mesentery of that  inflamed segment of the sigmoid colon. Continuing with our dissection of the sigmoid colon, we did transition  all the way down to the rectum. Then using a robotic 60-mm stapler with  blue load two fires were used to come across the proximal rectum. The  sigmoid colon was then brought out of the pelvis.   We did backflow the  bladder at that point in order to make sure there was no leak. We did  not see any active drainage of saline from the bladder itself. We then  placed the Polanco catheter back to gravity. At this point, we performed  Firefly in order to evaluate the blood flow. The rectum had a strong  light up signifying good arterial flow. Similarly, our proximal colon  had good perfusion. At this point, we then undocked the robot and  created a lower transverse Pfannenstiel incision. Electrocautery was  used to divide the subcutaneous tissue along with the transverse  Pfannenstiel incision. Electrocautery was used to divide the  subcutaneous tissue along with the fascia. The rectus muscles were  splayed at the midline and the peritoneum was bluntly entered, the  peritoneum was then extended to the length of the fascial opening. A  medium wound protector was then placed to give us full exposure. The  sigmoid colon was brought out from the wound. We then went ahead and  created a colotomy and a 29 EEA anvil was brought out through the  proximal colon. MANAV stapler with blue load was then used to divide the  colon between the colotomy and the anvil to be able to fully remove the  specimen. Pursestring suture with 3-0 silk was placed around the anvil. The corners of the staple line were imbricated. We then evaluate our  small bowel. Taking look at the small bowel, I was concerned with the  degree of puckering from the chronic inflammation that this would be a  future point of small bowel obstruction. Therefore, after our  colorectal anastomosis, we did perform a small bowel resection. At this  point, I went below through the rectum. We passed a 25 EEA sizer along  with the 29 EEA sizer. This was able to brought up all the way out to  the staple line. We then placed a 29 EEA powered stapler. The pin was  able to come out through the anterior aspect of the rectum just proximal  to the stapler line.   We were able to connect the anvil to the stapler. This was able to be closed and fired and created two complete rings. Then using the rigid sigmoidoscope we performed an air leak test.  The  pelvis was filled with saline and the rectum was insufflated. There was  no evidence of air leak from our new anastomosis. We then suctioned out  free fluid and I scrubbed back in to the abdominal portion of the case. We then performed a small bowel resection. This was a shorter segment,  only 6 to 8 cm of small bowel. It was close to ileocecal valve;  however, there was enough room for a staple anastomosis. We first  started by creating mesenteric windows proximal and distal to the area  of chronic inflammation. MANAV stapler with blue load was then used to  divide the small bowel separately. The two edges of the small bowel  were then aligned using 3-0 silk sutures. Enterotomies were created and  a MANAV stapler with blue load was used to create a common channel. Care  was taken not to encroach into the ileocecal valve. We then elevated  our common enterotomy and a TX-60 linear stapler was used to close the  anastomosis. It was palpated and was widely patent. The corners of the  staple line were repaired using 3-0 silk sutures. The small bowel  anastomosis was then reduced back into the abdominal cavity. All free  fluid at this point were suctioned out. We then closed our lower  transverse incision. First, we closed the peritoneum using 0 Vicryl  suture. The fascia was then closed using 0 loop PDS suture x2, this  reapproximated the fascial edges well. We then allowed the abdomen to  re-inflate. The closure looked good with our lower transverse incision. We injected Exparel at this location and had previously injected at  prior trocar site. We then closed our 12 mm right lower quadrant trocar  site using a Ranfac and 0 Vicryl suture in a figure of eight, this  reapproximated fascial edge as well.   There was no ongoing bleeding and  the small bowel and colon looked to be in appropriate position. It  should be noted that the colonic anastomosis _____ without any tension  and appeared well perfused. The abdomen was then allowed to deflate. The lower transverse incision was then closed with multiple layers with  3-0 Vicryl. The trocar sites were then closed with 4-0 Vicryl. Dermabond was placed over the top of the incisions. Overall, the  patient tolerated the procedure well and was taken to the recovery room  in stable condition. His Polanco catheter was exchanged out at the end of the case and the left  ureteral stent was removed prior to placing the new Polanco catheter.         Mardy Dance, MD    D: 11/08/2022 17:40:52       T: 11/08/2022 21:12:21     [email or web address]  Job#: [id number]     Doc#: [id number]    CC:

## 2022-11-09 NOTE — PROGRESS NOTES
General and Vascular Surgery                                                           Daily Progress Note                                                             Norma Lopez PA-C     Pt Name: Tati Higgins Record Number: [id number]  Date of Birth 1960   Today's Date: 11/9/2022    Chief Complaint: Abdominal pain    ASSESSMENT/PLAN  S/P (11/8/22) POD#1:  Robotic-assisted laparoscopic sigmoid colectomy with take down of colovesical fistula and drains of pericolonic abscess with open small bowel resection for treatment of colovesical fistula secondary to recurrent sigmoid diverticulitis. Pain controlled  Denies any nausea or emesis  Denies any BMs or flatulence  Leukocytosis: WBC count 15.0 (POD#1)  Wean Oxygen as tolerated  Clear liquid diet as tolerated. Nutritional supplement added to every meal  OOB and ambulate  Will advance diet as bowel function returns  Will add Toradol to help mange pain    EDUCATION  Patient educated about their illness/diagnosis, stated above, and all questions answered. We discussed the importance of nutrition, medications they are taking, and healthy lifestyle. [address] has improved from yesterday. Pain is well controlled. He has no nausea and no vomiting. He has not passed flatus and has not had a bowel movement. He is tolerating ice chips. Current activity is ad edmundo    OBJECTIVE  VITALS:  height is 6' (1.829 m) and weight is 146 lb 2.6 oz (66.3 kg). His oral temperature is 98.1 °F (36.7 °C). His blood pressure is 123/79 and his pulse is 62. His respiration is 18 and oxygen saturation is 97%.  VITALS:  /79   Pulse 62   Temp 98.1 °F (36.7 °C) (Oral)   Resp 18   Ht 6' (1.829 m)   Wt 146 lb 2.6 oz (66.3 kg)   SpO2 97%   BMI 19.82 kg/m²   GENERAL: alert, no distress  LUNGS: clear to ausculation, without wheezes, rales or rhonci  HEART: normal rate and regular rhythm  ABDOMEN: non-distended, tenderness present- incisional,  without rebound and guarding  I/O last 3 completed shifts: In: 2800 [P.O.:240; I.V.:2500; IV Piggyback:60]  Out: 1136 [Urine:1375; Blood:200]  No intake/output data recorded. LABS  Recent Labs     11/09/22 0523   WBC 15.0*   HGB 16.4   HCT 50.3      *   K 5.1      CO2 26   BUN 11   CREATININE 0.9   MG 1.90   PHOS 3.8   CALCIUM 8.1*   CBC:   Lab Results   Component Value Date/Time    WBC 15.0 11/09/2022 05:23 AM    RBC 5.10 11/09/2022 05:23 AM    HGB 16.4 11/09/2022 05:23 AM    HCT 50.3 11/09/2022 05:23 AM    MCV 98.7 11/09/2022 05:23 AM    MCH 32.1 11/09/2022 05:23 AM    MCHC 32.5 11/09/2022 05:23 AM    RDW 15.6 11/09/2022 05:23 AM     11/09/2022 05:23 AM    MPV 7.7 11/09/2022 05:23 AM     CMP:    Lab Results   Component Value Date/Time     11/09/2022 05:23 AM    K 5.1 11/09/2022 05:23 AM    K 4.4 06/09/2021 04:50 AM     11/09/2022 05:23 AM    CO2 26 11/09/2022 05:23 AM    BUN 11 11/09/2022 05:23 AM    CREATININE 0.9 11/09/2022 05:23 AM    GFRAA >60 10/13/2022 11:38 AM    AGRATIO 0.8 06/13/2021 06:30 AM    LABGLOM >60 11/09/2022 05:23 AM    GLUCOSE 327 11/09/2022 05:23 AM    PROT 6.3 06/13/2021 06:30 AM    LABALBU 2.7 06/13/2021 06:30 AM    CALCIUM 8.1 11/09/2022 05:23 AM    BILITOT 0.4 06/13/2021 06:30 AM    ALKPHOS 271 06/13/2021 06:30 AM    AST 35 06/13/2021 06:30 AM    ALT 29 06/13/2021 06:30 AM         Cierra Contreras PA-C  Electronically signed 11/9/2022 at 9:18 AM    Agree with above note. The patient was personally seen and examined. Star Hendricks is doing OK. Pain present but controlled. Tolerating some clears. No flatus or BM.     NAD, alert and interactive  Normal respiratory effort, no accessory muscle use  Abd soft, ND, appropriately tender, incisions c/d/I, no erythema or induration  Ext no cyanosis or clubbing    WBC 15  Cr 0.9    A/P: s/p RAL sigmoid colectomy with takedown of colovesical fistula, small bowel resection POD #1    Continue clears. Await GI function.   Continue colace BID and miralax daily  Continue oh catheter x 7 days  Ambulate/OOB  IS  Continue IV zosyn for abscess during surgery    Henny Casillas MD

## 2022-11-09 NOTE — PROGRESS NOTES
Occupational Therapy  Facility/Department: [address] [place] [address]  Occupational Therapy Initial Assessment    Name: Madelyn Villa  : 1960  MRN: [id number]  Date of Service: 2022    Discharge Recommendations:  Home with assist PRN, Home with Home health OT  OT Equipment Recommendations  Other: TBD     Madelyn Villa scored a 19/24 on the AM-PAC ADL Inpatient form. Current research shows that an AM-PAC score of 18 or greater is typically associated with a discharge to the patient's home setting. Based on the patient's AM-PAC score, and their current ADL deficits, it is recommended that the patient have 2-3 sessions per week of Occupational Therapy at d/c to increase the patient's independence. At this time, this patient demonstrates the endurance and safety to discharge home with increased friend/family assist and a home OT follow up treatment frequency of 2-3x/wk. Please see assessment section for further patient specific details. If patient discharges prior to next session this note will serve as a discharge summary. Please see below for the latest assessment towards goals. Patient Diagnosis(es): Diagnoses of Sigmoid diverticulitis and Colovesical fistula were pertinent to this visit. Past Medical History:  has a past medical history of Anxiety, Cancer ([address] Chronic back pain, Diabetes mellitus ([address] Erectile dysfunction, Headache(784.0), Osteoarthritis, PONV (postoperative nausea and vomiting), Restless legs syndrome, Urinary incontinence, and Wears dentures. Past Surgical History:  has a past surgical history that includes Tonsillectomy (Bilateral); Cholecystectomy, laparoscopic (N/A, 08/10/2021); Portacath placement; colectomy (N/A, 2022); and Cystoscopy (N/A, 2022). Assessment   Performance deficits / Impairments: Decreased functional mobility ; Decreased ADL status; Decreased endurance;Decreased balance;Decreased high-level IADLs  Assessment: Pt is a 65 y.o. male admitte dwith Colovesical fistula secondary to recurrent sigmoid diverticulitis s/p Robotic-assisted laparoscopic sigmoid colectomy  with take down of colovesical fistula and drains of pericolonic abscess  with open small bowel resection 11/8. At baseline, pt lives with wife (wife enrolled in hospice), independent ADLs, IADLs, and fxl mobility. Pt currently functioning below baseline d/t the above deficits s/p surgery, today requiring min A bed mobility usin logroll technique, min A fxl transfers/mobility with RW limited to short distance d/t pain, and anticipate pt would require overall min A for ADLs. Will cont to see on acute to address the above limitations and maximize pt's safety and independence. Anticipate pt will progress to be safe to return home with increased assist from friends/family and possible home OT (Pending progress). Pt reports he has friends that are planning to assist him. Prognosis: Good  Decision Making: Medium Complexity  History: see above  REQUIRES OT FOLLOW-UP: Yes  Activity Tolerance  Activity Tolerance: Patient limited by pain; Patient limited by fatigue        Plan   Occupational Therapy Plan  Times Per Week: 3-5  Times Per Day: Once a day  Current Treatment Recommendations: Strengthening, Balance training, Functional mobility training, Endurance training, Safety education & training, Self-Care / ADL, Equipment evaluation, education, & procurement     Restrictions  Restrictions/Precautions  Restrictions/Precautions: Fall Risk  Position Activity Restriction  Other position/activity restrictions: s/p abd surgery, oh,  port L upper chest    Subjective   General  Chart Reviewed: Yes  Additional Pertinent Hx: Per Peace Savage MD's op note: \"The patient is a 59-year-old gentleman with history of  recurrent diverticulitis. Initially, he was set up for surgery last  year; however that in a following through. He presented to Urology  recently with pneumaturia.   He was found to have a colovesical fistula  on imaging secondary to his persistent diverticulitis. The patient did  have ongoing inflammation of his colon and was on antibiotic leading up  to surgery. \" Pt s/p Robotic-assisted laparoscopic sigmoid colectomy  with take down of colovesical fistula and drains of pericolonic abscess  with open small bowel resection 11/8. Family / Caregiver Present: No  Referring Practitioner: Phuong Han MD  Diagnosis: Colovesical fistula secondary to recurrent sigmoid diverticulitis s/p Robotic-assisted laparoscopic sigmoid colectomy  with take down of colovesical fistula and drains of pericolonic abscess  with open small bowel resection. Subjective  Subjective: Pt met b/s for OT eval/tx. Pt in bed on arrival, agreeable to participate in therapy. Pt reports 8/10 abd pain.      Social/Functional History  Social/Functional History  Lives With: Significant other (wife in hospice)  Type of Home: House  Home Layout: Two level, Laundry in basement, Bed/Bath upstairs (stairlift to 2nd floor)  Home Access: Stairs to enter with rails  Entrance Stairs - Number of Steps: 5  Entrance Stairs - Rails: Right  Bathroom Shower/Tub: Tub/Shower unit, Shower chair with back  Bathroom Toilet: Handicap height (sink nearby)  [employer]: Grab bars in shower, Shower chair, Hand-held shower  Bathroom Accessibility: Walker accessible  Home Equipment: Carla Charlie, 4 wheeled, Oxygen (1 L O2 at night)  Has the patient had two or more falls in the past year or any fall with injury in the past year?: No  ADL Assistance: Independent  Homemaking Assistance: Independent  Ambulation Assistance: Independent  Transfer Assistance: Independent  Active : Yes  Mode of Transportation: Car  Occupation: On disability  Leisure & Hobbies: ride motorcycle; mess around in garage       Objective     Safety Devices  Type of Devices: Bed alarm in place;Call light within reach;Gait belt;Left in bed    Balance  Sitting: Intact  Standing: With support (CGA/min A at [employer])  Gait  Overall Level of Assistance: Minimum assistance (Pt completed fxl mobility ~4 ft forwards/backwards with RW and Min A.)     AROM: Within functional limits  PROM: Within functional limits  Strength: Within functional limits  Coordination: Within functional limits  Tone: Normal  Sensation: Impaired (pt reports N/T B LE's, denies N/T in hands)    ADL  LE Dressing Skilled Clinical Factors: pt able to cross B LE's to manage footies, anticipate min A for complete LB dressing  Toileting Skilled Clinical Factors: catheter  Additional Comments: Anticipate pt is independent feeding, set-up for seated grooming, min A bathing and dressing based on ROM/strength, balance, endurance.     Bed mobility  Supine to Sit: Minimal assistance (HOB minimally elevated, use of rail, VC's for logroll technique)  Sit to Supine: Minimal assistance (usin logroll technique with VC's)  Scooting: Stand by assistance    Transfers  Sit to stand: Minimal assistance (EOB>RW VC's for hand placement)  Stand to sit: Minimal assistance (RW>EOB VC's for hand placement)    Vision  Vision: Impaired  Vision Exceptions: Wears glasses for reading  Hearing  Hearing: Within functional limits    Cognition  Overall Cognitive Status: WFL  Orientation  Overall Orientation Status: Within Functional Limits      Education Given To: Patient  Education Provided: Role of Therapy;Plan of Care;Transfer Training;ADL Adaptive Strategies  Education Provided Comments: logroll technique  Barriers to Learning: None  Education Outcome: Verbalized understanding;Demonstrated understanding                          AM-PAC Score        AM-[facility] Inpatient Daily Activity Raw Score: 19 (11/09/22 1557)  AM-PAC Inpatient ADL T-Scale Score : 40.22 (11/09/22 1557)  ADL Inpatient CMS 0-100% Score: 42.8 (11/09/22 1557)  ADL Inpatient CMS G-Code Modifier : CK (11/09/22 1557)           Goals  Short Term Goals  Time Frame for Short Term Goals: Prior to d/c:  Short Term Goal 1: Pt will bathe with supervision/mod I. Short Term Goal 2: Pt will dress with supervision/mod I. Short Term Goal 3: Pt will toilet with supervision/mod I. Short Term Goal 4: Pt will complete fxl mobility and fxl transfers to/from ADL surfaces with supervision/mod I using LRAD. Short Term Goal 5: Pt will tolerate standing >5 minutes for functional task with supervision/mod I to improve standing tolerance for ADL routine. Long Term Goals  Time Frame for Long Term Goals : STGs=LTGs  Patient Goals   Patient goals : to return home.        Therapy Time   Individual Concurrent Group Co-treatment   Time In 0108         Time Out 1515         Minutes 31         Timed Code Treatment Minutes: [address], OTR/L 5901

## 2022-11-09 NOTE — PROGRESS NOTES
Patient resting quietly in bed, easily awaked, a&Ox4, VSS, medicated per MAR, surgical incisions WNL with no redness or drainage. Polanco draining, SCDS in place, patient tolerating po intake, no N/V. All questions answered. Will continue to monitor.

## 2022-11-09 NOTE — PROGRESS NOTES
Physical Therapy  Facility/Department: [address] MED SURG  Physical Therapy Initial Assessment  If patient discharges prior to next session this note will serve as a discharge summary. Please see below for the latest assessment towards goals. Name: Eren Gaitan  : 1960  MRN: [id number]  Date of Service: 2022    Discharge Recommendations:  Home with assist PRN, Home with Home health PT, Patient would benefit from continued therapy after discharge   Eren Gaitan scored a 16/24 on the AM-PAC short mobility form. Current research shows that an AM-PAC score of 18 or greater is typically associated with a discharge to the patient's home setting. Based on the patient's AM-PAC score and their current functional mobility deficits, it is recommended that the patient have 2-3 sessions per week of Physical Therapy at d/c to increase the patient's independence. At this time, this patient demonstrates the endurance and safety to discharge home with prn assist and home PT (home vs OP services) and a follow up treatment frequency of 2-3x/wk. Please see assessment section for further patient specific details. PT Equipment Recommendations  Other: will monitor      Patient Diagnosis(es): Diagnoses of Sigmoid diverticulitis and Colovesical fistula were pertinent to this visit. Past Medical History:  has a past medical history of Anxiety, Cancer ([address] Chronic back pain, Diabetes mellitus ([address] Erectile dysfunction, Headache(784.0), Osteoarthritis, PONV (postoperative nausea and vomiting), Restless legs syndrome, Urinary incontinence, and Wears dentures. Past Surgical History:  has a past surgical history that includes Tonsillectomy (Bilateral); Cholecystectomy, laparoscopic (N/A, 08/10/2021); Portacath placement; colectomy (N/A, 2022); and Cystoscopy (N/A, 2022). Assessment   Assessment: The pt is a 64 yo male who presented to the hospital for elective procedure due to recurrent diverticulitis.  On 11-8-2022 the pt had sx for: Robotic-assisted laparoscopic sigmoid colectomy with take down of colovesical fistula and drains of pericolonic abscess with open small bowel resection. The pt reports he lives with his wife yury is in hospice care at home. PTA, he has been indep in mobility and self-care PTA. PMHx: tongue Ca, diverticulitis, anxiety, chronic back pain, DM, OA, RLS    Today, the pt demonstrated that he is functioning below his baseline as he needed min A for bed mobility and for transfers and he was only able to take 4 steps with a walker needing min A. His activity tolerance is decreased due to pain today and he did not want to get to the chair. Anticipate that he will progress and be able to return home with prn assist and home PT. He reports he has a rollator at home he could use if needed. Will con't to follow and make recommendations as he progresses. Therapy Prognosis: Good  Decision Making: Medium Complexity  Barriers to Learning: pain  Requires PT Follow-Up: Yes  Activity Tolerance  Activity Tolerance: Patient limited by endurance; Patient limited by fatigue; Other (comment)  Activity Tolerance Comments: decreased activity tolerance and pain     Plan   Physcial Therapy Plan  General Plan: 3-5 times per week  Current Treatment Recommendations: Strengthening, Functional mobility training, Balance training, Transfer training, Gait training, Therapeutic activities, Patient/Caregiver education & training, Safety education & training, Equipment evaluation, education, & procurement  Safety Devices  Type of Devices: Bed alarm in place, Call light within reach, Gait belt, Left in bed     Restrictions  Restrictions/Precautions  Restrictions/Precautions: Fall Risk  Position Activity Restriction  Other position/activity restrictions: s/p abd surgery, oh,  port L upper chest     Subjective   General  Chart Reviewed: Yes  Additional Pertinent Hx: Per Chente Aguilera MD op note:  The pt is a 65 yo male who presented to the hospital for elective procedure due to recurrent diverticulitis. On 11-8-2022 the pt had sx for: Robotic-assisted laparoscopic sigmoid colectomy  with take down of colovesical fistula and drains of pericolonic abscess  with open small bowel resection.        PMHx: tongue Ca, diverticulitis, anxiety, chronic back pain, DM, OA, RLS  Response To Previous Treatment: Not applicable  Family / Caregiver Present: No  Referring Practitioner: Ana Gregorio MD  Referral Date : 11/09/22  Diagnosis: Colovesical fistula secondary to recurrent sigmoid diverticulitis  Follows Commands: Within Functional Limits  Subjective  Subjective: the pt was found to be in the bed; he was agreeable to therapy assessment but did not want to get to the chair today; reporting abd pain from sx at 8/10         Social/Functional History  Social/Functional History  Lives With: Significant other (wife in hospice)  Type of Home: House  Home Layout: Two level, Laundry in basement, Bed/Bath upstairs (stairlift to 2nd floor)  Home Access: Stairs to enter with rails  Entrance Stairs - Number of Steps: 5  Entrance Stairs - Rails: Right  Bathroom Shower/Tub: Tub/Shower unit, Shower chair with back  [employer]: Handicap height (sink nearby)  [employer]: Grab bars in shower, Shower chair, Hand-held shower  Bathroom Accessibility: Walker accessible  Home Equipment: [facility], 4 wheeled, Oxygen (1 L O2 at night)  Has the patient had two or more falls in the past year or any fall with injury in the past year?: No  ADL Assistance: Independent  Homemaking Assistance: Independent  Ambulation Assistance: Independent  Transfer Assistance: Independent  Active : Yes  Mode of Transportation: Car  Occupation: On disability  Leisure & Hobbies: ride motorcycle; mess around in garage  [address]: Impaired  Vision Exceptions: Wears glasses for reading  Hearing  Hearing: Within functional limits    Cognition Orientation  Overall Orientation Status: Within Functional Limits  Cognition  Overall Cognitive Status: WFL     Objective           Gross Assessment  AROM: Within functional limits  Strength: Generally decreased, functional  Coordination: Generally decreased, functional  Tone: Normal  Sensation: Impaired (reports neuropathy in B feet)     Bed mobility  Supine to Sit: Minimal assistance (HOB minimally elevated, use of rail, VC's for logroll technique)  Sit to Supine: Minimal assistance (usin logroll technique with VC's)  Scooting: Stand by assistance  Transfers  Sit to Stand: Minimal Assistance  Stand to Sit: Minimal Assistance  Ambulation  Surface: Level tile  Device: Rolling Walker  Other Apparatus:  (IV pole managed by therapist)  Assistance: Minimal assistance  Quality of Gait: unsteady and tends to walk too far into the walker causing his balance to go backward; can correct with vc's; hesitant and painful stepping; decreased tolerance for activity  Distance: 4 steps forward and backward  More Ambulation?: No     Balance  Sitting - Static: Good;-  Sitting - Dynamic: Fair;+  Standing - Static: Fair  Standing - Dynamic: Fair;-  Comments: seated EOB with SBA; static standing at the walker with CGA/Min A             AM-PAC Score  -PAC Inpatient Mobility Raw Score : 16 (11/09/22 1557)  AM-PAC Inpatient T-Scale Score : 40.78 (11/09/22 1557)  Mobility Inpatient CMS 0-100% Score: 54.16 (11/09/22 1557)  Mobility Inpatient CMS G-Code Modifier : CK (11/09/22 1557)          Goals  Short Term Goals  Time Frame for Short Term Goals: upon d/c  Short Term Goal 1: Bed mobility vis logrolling with SBA. Short Term Goal 2: Transfers sit <> stand with SBA. Short Term Goal 3: Ambulate with wh walker 25 feet with CGA/SBA.   Patient Goals   Patient Goals : to go home       Education  Patient Education  Education Given To: Patient  Education Provided: Role of Therapy;Plan of Care  Education Provided Comments: logrolling  Education Method: Verbal;Demonstration  Barriers to Learning: None  Education Outcome: Demonstrated understanding;Continued education needed      Therapy Time   Individual Concurrent Group Co-treatment   Time In 9840         Time Out 1515         Minutes 31         Timed Code Treatment Minutes: 16 Minutes     Electronically signed by Jose Viramontes, PT [id number] on 11/9/2022 at 4:06 PM

## 2022-11-10 LAB
ANION GAP SERPL CALCULATED.3IONS-SCNC: 8 MMOL/L (ref 3–16)
BUN BLDV-MCNC: 8 MG/DL (ref 7–20)
CALCIUM SERPL-MCNC: 7.6 MG/DL (ref 8.3–10.6)
CHLORIDE BLD-SCNC: 103 MMOL/L (ref 99–110)
CO2: 25 MMOL/L (ref 21–32)
CREAT SERPL-MCNC: 0.7 MG/DL (ref 0.8–1.3)
GFR SERPL CREATININE-BSD FRML MDRD: >60 ML/MIN/{1.73_M2}
GLUCOSE BLD-MCNC: 125 MG/DL (ref 70–99)
GLUCOSE BLD-MCNC: 151 MG/DL (ref 70–99)
GLUCOSE BLD-MCNC: 152 MG/DL (ref 70–99)
GLUCOSE BLD-MCNC: 154 MG/DL (ref 70–99)
GLUCOSE BLD-MCNC: 167 MG/DL (ref 70–99)
HCT VFR BLD CALC: 44.7 % (ref 40.5–52.5)
HEMOGLOBIN: 15 G/DL (ref 13.5–17.5)
MCH RBC QN AUTO: 32.6 PG (ref 26–34)
MCHC RBC AUTO-ENTMCNC: 33.5 G/DL (ref 31–36)
MCV RBC AUTO: 97.3 FL (ref 80–100)
PDW BLD-RTO: 15.4 % (ref 12.4–15.4)
PERFORMED ON: ABNORMAL
PLATELET # BLD: 159 K/UL (ref 135–450)
PMV BLD AUTO: 7.5 FL (ref 5–10.5)
POTASSIUM SERPL-SCNC: 4.3 MMOL/L (ref 3.5–5.1)
RBC # BLD: 4.59 M/UL (ref 4.2–5.9)
SODIUM BLD-SCNC: 136 MMOL/L (ref 136–145)
WBC # BLD: 10.4 K/UL (ref 4–11)

## 2022-11-10 PROCEDURE — 6370000000 HC RX 637 (ALT 250 FOR IP): Performed by: SURGERY

## 2022-11-10 PROCEDURE — 80048 BASIC METABOLIC PNL TOTAL CA: CPT

## 2022-11-10 PROCEDURE — 85027 COMPLETE CBC AUTOMATED: CPT

## 2022-11-10 PROCEDURE — 1200000000 HC SEMI PRIVATE

## 2022-11-10 PROCEDURE — 6360000002 HC RX W HCPCS: Performed by: SURGERY

## 2022-11-10 PROCEDURE — 2500000003 HC RX 250 WO HCPCS: Performed by: NURSE PRACTITIONER

## 2022-11-10 PROCEDURE — 6360000002 HC RX W HCPCS: Performed by: PHYSICIAN ASSISTANT

## 2022-11-10 PROCEDURE — 99024 POSTOP FOLLOW-UP VISIT: CPT | Performed by: SURGERY

## 2022-11-10 PROCEDURE — APPSS15 APP SPLIT SHARED TIME 0-15 MINUTES: Performed by: NURSE PRACTITIONER

## 2022-11-10 PROCEDURE — 97116 GAIT TRAINING THERAPY: CPT

## 2022-11-10 PROCEDURE — 2700000000 HC OXYGEN THERAPY PER DAY

## 2022-11-10 PROCEDURE — 2580000003 HC RX 258: Performed by: SURGERY

## 2022-11-10 PROCEDURE — APPNB15 APP NON BILLABLE TIME 0-15 MINS: Performed by: NURSE PRACTITIONER

## 2022-11-10 PROCEDURE — 94760 N-INVAS EAR/PLS OXIMETRY 1: CPT

## 2022-11-10 PROCEDURE — 97530 THERAPEUTIC ACTIVITIES: CPT

## 2022-11-10 PROCEDURE — 2500000003 HC RX 250 WO HCPCS: Performed by: SURGERY

## 2022-11-10 PROCEDURE — 99024 POSTOP FOLLOW-UP VISIT: CPT | Performed by: NURSE PRACTITIONER

## 2022-11-10 RX ADMIN — DOCUSATE SODIUM 100 MG: 100 CAPSULE, LIQUID FILLED ORAL at 21:09

## 2022-11-10 RX ADMIN — KETOROLAC TROMETHAMINE 15 MG: 15 INJECTION, SOLUTION INTRAMUSCULAR; INTRAVENOUS at 15:05

## 2022-11-10 RX ADMIN — KETOROLAC TROMETHAMINE 15 MG: 15 INJECTION, SOLUTION INTRAMUSCULAR; INTRAVENOUS at 21:09

## 2022-11-10 RX ADMIN — POTASSIUM CHLORIDE, DEXTROSE MONOHYDRATE AND SODIUM CHLORIDE: 150; 5; 450 INJECTION, SOLUTION INTRAVENOUS at 23:05

## 2022-11-10 RX ADMIN — Medication 1 CAPSULE: at 08:37

## 2022-11-10 RX ADMIN — ACETAMINOPHEN 650 MG: 325 TABLET ORAL at 02:27

## 2022-11-10 RX ADMIN — PIPERACILLIN AND TAZOBACTAM 3375 MG: 3; .375 INJECTION, POWDER, FOR SOLUTION INTRAVENOUS at 02:29

## 2022-11-10 RX ADMIN — ALPRAZOLAM 0.5 MG: 0.5 TABLET ORAL at 18:05

## 2022-11-10 RX ADMIN — PIPERACILLIN AND TAZOBACTAM 3375 MG: 3; .375 INJECTION, POWDER, FOR SOLUTION INTRAVENOUS at 10:14

## 2022-11-10 RX ADMIN — OXYCODONE HYDROCHLORIDE 10 MG: 10 TABLET ORAL at 22:28

## 2022-11-10 RX ADMIN — POTASSIUM CHLORIDE, DEXTROSE MONOHYDRATE AND SODIUM CHLORIDE: 150; 5; 450 INJECTION, SOLUTION INTRAVENOUS at 02:31

## 2022-11-10 RX ADMIN — ACETAMINOPHEN 650 MG: 325 TABLET ORAL at 05:35

## 2022-11-10 RX ADMIN — FAMOTIDINE 20 MG: 20 TABLET ORAL at 08:37

## 2022-11-10 RX ADMIN — SODIUM CHLORIDE, PRESERVATIVE FREE 10 ML: 5 INJECTION INTRAVENOUS at 08:37

## 2022-11-10 RX ADMIN — FAMOTIDINE 20 MG: 20 TABLET ORAL at 21:09

## 2022-11-10 RX ADMIN — DOCUSATE SODIUM 100 MG: 100 CAPSULE, LIQUID FILLED ORAL at 08:37

## 2022-11-10 RX ADMIN — OXYCODONE HYDROCHLORIDE 10 MG: 10 TABLET ORAL at 14:49

## 2022-11-10 RX ADMIN — ENOXAPARIN SODIUM 40 MG: 100 INJECTION SUBCUTANEOUS at 21:09

## 2022-11-10 RX ADMIN — OXYCODONE HYDROCHLORIDE 10 MG: 10 TABLET ORAL at 10:14

## 2022-11-10 RX ADMIN — ACETAMINOPHEN 650 MG: 325 TABLET ORAL at 13:08

## 2022-11-10 RX ADMIN — OXYCODONE HYDROCHLORIDE 10 MG: 10 TABLET ORAL at 05:35

## 2022-11-10 RX ADMIN — PIPERACILLIN AND TAZOBACTAM 3375 MG: 3; .375 INJECTION, POWDER, FOR SOLUTION INTRAVENOUS at 18:07

## 2022-11-10 RX ADMIN — KETOROLAC TROMETHAMINE 15 MG: 15 INJECTION, SOLUTION INTRAMUSCULAR; INTRAVENOUS at 08:37

## 2022-11-10 RX ADMIN — POLYETHYLENE GLYCOL 3350 17 G: 17 POWDER, FOR SOLUTION ORAL at 08:37

## 2022-11-10 RX ADMIN — KETOROLAC TROMETHAMINE 15 MG: 15 INJECTION, SOLUTION INTRAMUSCULAR; INTRAVENOUS at 02:27

## 2022-11-10 RX ADMIN — ACETAMINOPHEN 650 MG: 325 TABLET ORAL at 18:05

## 2022-11-10 ASSESSMENT — PAIN SCALES - GENERAL
PAINLEVEL_OUTOF10: 7
PAINLEVEL_OUTOF10: 9
PAINLEVEL_OUTOF10: 8
PAINLEVEL_OUTOF10: 6
PAINLEVEL_OUTOF10: 8
PAINLEVEL_OUTOF10: 8
PAINLEVEL_OUTOF10: 7
PAINLEVEL_OUTOF10: 6
PAINLEVEL_OUTOF10: 7
PAINLEVEL_OUTOF10: 7
PAINLEVEL_OUTOF10: 8

## 2022-11-10 ASSESSMENT — PAIN DESCRIPTION - LOCATION
LOCATION: ABDOMEN

## 2022-11-10 NOTE — ACP (ADVANCE CARE PLANNING)
Advance Care Planning     Advance Care Planning Activator (Inpatient)  Conversation Note      Date of ACP Conversation: 11/10/2022     Conversation Conducted with: Patient with Decision Making Capacity    ACP Activator: Antonia Jaime RN    Health Care Decision Maker:     Current Designated Health Care Decision Maker:     Primary Decision Maker: [facility] - [phone]    Secondary Decision Maker: Nancy Mcnamara Step Child  [phone]    Care Preferences    Ventilation: \"If you were in your present state of health and suddenly became very ill and were unable to breathe on your own, what would your preference be about the use of a ventilator (breathing machine) if it were available to you? \"      Would the patient desire the use of ventilator (breathing machine)?: yes    \"If your health worsens and it becomes clear that your chance of recovery is unlikely, what would your preference be about the use of a ventilator (breathing machine) if it were available to you? \"     Would the patient desire the use of ventilator (breathing machine)?: No      Resuscitation  \"CPR works best to restart the heart when there is a sudden event, like a heart attack, in someone who is otherwise healthy. Unfortunately, CPR does not typically restart the heart for people who have serious health conditions or who are very sick. \"    \"In the event your heart stopped as a result of an underlying serious health condition, would you want attempts to be made to restart your heart (answer \"yes\" for attempt to resuscitate) or would you prefer a natural death (answer \"no\" for do not attempt to resuscitate)? \" no       [] Yes   [x] No   Educated Patient / [place] regarding differences between Advance Directives and portable DNR orders.     Length of ACP Conversation in minutes:  5    Conversation Outcomes:  [x] ACP discussion completed  [] Existing advance directive reviewed with patient; no changes to patient's previously recorded wishes  [] New Advance Directive completed  [] Portable Do Not Rescitate prepared for Provider review and signature  [] POLST/POST/MOLST/MOST prepared for Provider review and signature      Follow-up plan:    [] Schedule follow-up conversation to continue planning  [] Referred individual to Provider for additional questions/concerns   [] Advised patient/agent/surrogate to review completed ACP document and update if needed with changes in condition, patient preferences or care setting    [x] This note routed to one or more involved healthcare providers    Rosy ELAMN RN  Case Management  [phone]    Electronically signed by Rosy Owen RN on 11/10/2022 at 5:50 PM

## 2022-11-10 NOTE — PLAN OF CARE
Problem: Chronic Conditions and Co-morbidities  Goal: Patient's chronic conditions and co-morbidity symptoms are monitored and maintained or improved  11/10/2022 1509 by Sade Hidalgo RN  Outcome: Progressing  11/10/2022 0548 by Anju Crews RN  Outcome: Progressing     Problem: Discharge Planning  Goal: Discharge to home or other facility with appropriate resources  11/10/2022 1509 by Sade Hidalgo RN  Outcome: Progressing  11/10/2022 0548 by Anju Crews RN  Outcome: Progressing     Problem: Pain  Goal: Verbalizes/displays adequate comfort level or baseline comfort level  11/10/2022 1509 by Sade Hidalgo RN  Outcome: Progressing  11/10/2022 0548 by Anju Crews RN  Outcome: Progressing     Problem: Safety - Adult  Goal: Free from fall injury  11/10/2022 1509 by Sade Hidalgo RN  Outcome: Progressing  11/10/2022 0548 by Anju Crews RN  Outcome: Progressing     Problem: ABCDS Injury Assessment  Goal: Absence of physical injury  Outcome: Progressing

## 2022-11-10 NOTE — PLAN OF CARE
Problem: Chronic Conditions and Co-morbidities  Goal: Patient's chronic conditions and co-morbidity symptoms are monitored and maintained or improved  Outcome: Progressing     Problem: Discharge Planning  Goal: Discharge to home or other facility with appropriate resources  Outcome: Progressing     Problem: Pain  Goal: Verbalizes/displays adequate comfort level or baseline comfort level  11/10/2022 0548 by Naman Luna RN  Outcome: Progressing  11/9/2022 2156 by Flaquita Rodriguez RN  Outcome: Progressing     Problem: Safety - Adult  Goal: Free from fall injury  11/10/2022 0548 by Naman Luna RN  Outcome: Progressing  11/9/2022 2156 by Flaquita Rodriguez RN  Outcome: Progressing

## 2022-11-10 NOTE — PROGRESS NOTES
Physical Therapy  Facility/Department: [facility]  Physical Therapy Treatment    Name: Star Hendricks  : 1960  MRN: [id number]  Date of Service: 11/10/2022    Discharge Recommendations:  Star Hendricks scored a 18/24 on the AM-PAC short mobility form. Current research shows that an AM-PAC score of 18 or greater is typically associated with a discharge to the patient's home setting. Based on the patient's AM-PAC score and their current functional mobility deficits, it is recommended that the patient have 2-3 sessions per week of Physical Therapy at d/c to increase the patient's independence. At this time, this patient demonstrates the endurance and safety to discharge home with HHPT and a follow up treatment frequency of 2-3x/wk. Please see assessment section for further patient specific details. If patient discharges prior to next session this note will serve as a discharge summary. Please see below for the latest assessment towards goals. Home with assist PRN, Home with Home health PT, Patient would benefit from continued therapy after discharge   PT Equipment Recommendations  Other: continue to assess, pt owns rollator      Patient Diagnosis(es): Diagnoses of Sigmoid diverticulitis and Colovesical fistula were pertinent to this visit. Past Medical History:  has a past medical history of Anxiety, Cancer ([address] Chronic back pain, Diabetes mellitus ([address] Erectile dysfunction, Headache(784.0), Osteoarthritis, PONV (postoperative nausea and vomiting), Restless legs syndrome, Urinary incontinence, and Wears dentures. Past Surgical History:  has a past surgical history that includes Tonsillectomy (Bilateral); Cholecystectomy, laparoscopic (N/A, 08/10/2021); Portacath placement; colectomy (N/A, 2022); and Cystoscopy (N/A, 2022). Assessment   Body Structures, Functions, Activity Limitations Requiring Skilled Therapeutic Intervention: Decreased functional mobility ; Decreased endurance; Increased pain  Assessment: Pt with improved activity tolerance this session, requiring decreased physical assist for mobility. Pt able to ambulate to and from bathroom with RW and CGA, reporting decreased pain vs previous session. Anticipate good progress during inpatient stay. Pt will continue to benefit from skilled therapy to maximize safety and independence. PT recommends 24 hr supervision/assist with RW and HHPT for increased safety. Activity Tolerance  Activity Tolerance: Patient limited by fatigue;Patient limited by endurance     Plan   Physcial Therapy Plan  General Plan: 3-5 times per week  Current Treatment Recommendations: Strengthening, Functional mobility training, Balance training, Transfer training, Gait training, Therapeutic activities, Patient/Caregiver education & training, Safety education & training, Equipment evaluation, education, & procurement  Safety Devices  Type of Devices: All fall risk precautions in place, Call light within reach, Chair alarm in place, Gait belt, Left in chair, Nurse notified     Restrictions  Restrictions/Precautions  Restrictions/Precautions: Fall Risk  Position Activity Restriction  Other position/activity restrictions: s/p abd surgery, oh,  port L upper chest     Subjective   General  Chart Reviewed: Yes  Patient assessed for rehabilitation services?: Yes  Additional Pertinent Hx: Per Serena Vaca MD op note: The pt is a 66 yo male who presented to the hospital for elective procedure due to recurrent diverticulitis. On 11-8-2022 the pt had sx for: Robotic-assisted laparoscopic sigmoid colectomy  with take down of colovesical fistula and drains of pericolonic abscess  with open small bowel resection.        PMHx: tongue Ca, diverticulitis, anxiety, chronic back pain, DM, OA, RLS  Family / Caregiver Present: No  Referring Practitioner: Cristian Iniguez MD  Diagnosis: Colovesical fistula secondary to recurrent sigmoid diverticulitis  Follows Commands: Within Functional Limits  General Comment  Comments: Pt found supine in bed upon PT arrival.  Pt agreeable to therapy session. Subjective  Subjective: \"I was worried about how I would do today. But I think I did well. \"         Social/Functional History  Social/Functional History  Lives With: Significant other (wife in hospice)  Type of Home: House  Home Layout: Two level, Laundry in basement, Bed/Bath upstairs (stairlift to 2nd floor)  Home Access: Stairs to enter with rails  Entrance Stairs - Number of Steps: 5  Entrance Stairs - Rails: Right  Bathroom Shower/Tub: Tub/Shower unit, Shower chair with back  Bathroom Toilet: Handicap height (sink nearby)  Bathroom Equipment: Grab bars in shower, Shower chair, Hand-held shower  Bathroom Accessibility: Walker accessible  Home Equipment: [facility], 4 wheeled, Oxygen (1 L O2 at night)  Has the patient had two or more falls in the past year or any fall with injury in the past year?: No  ADL Assistance: Independent  Homemaking Assistance: Independent  Ambulation Assistance: Independent  Transfer Assistance: Independent  Active : Yes  Mode of Transportation: Car  Occupation: On [address]: ride motorcycle; mess around in garage  Vision/Hearing       Cognition         Objective   Heart Rate: 63  [address] [facility] [address]: Monitor  BP: (!) 132/96  BP Location: Left Arm  MAP (Calculated): 108  Resp: 16  SpO2: 93 %  O2 Device: None (Room air)                             Bed mobility  Supine to Sit: Stand by assistance (HOB slightly elevated, increased time)  Transfers  Sit to Stand: Contact guard assistance (from EOB and toilet to RW, cues for hand placement)  Stand to Sit: Contact guard assistance (cues for hand placement, occasional poor eccentric control)  Ambulation  Surface: Level tile  Device: Rolling Walker  Assistance: Contact guard assistance  Quality of Gait: decreased peter, decreased B step height/length, forward trunk flexion, occasional narrow PRABHJOT  Distance: 28' + 25'  Comments: Extended rest breaks between bouts due to fatigue. Pt reporting that he is feeling a lot better ambulating this session vs previous. Cues for upright posture. Pt encouraged to press button for RN staff to walk pt to restroom for increased endurance and strengthening. Balance  Comments: SBA for sitting balance EOB with no UE support. CGA for standing balance with BUE support on RW. OutComes Score                                                  AM-PAC Score  AM-PAC Inpatient Mobility Raw Score : 18 (11/10/22 1208)  AM-PAC Inpatient T-Scale Score : 43.63 (11/10/22 1208)  Mobility Inpatient CMS 0-100% Score: 46.58 (11/10/22 1208)  Mobility Inpatient CMS G-Code Modifier : CK (11/10/22 1208)          T[place]eti Score       Goals  Short Term Goals  Time Frame for Short Term Goals: upon d/c  Short Term Goal 1: Bed mobility vis logrolling with SBA. -ongoing  Short Term Goal 2: Transfers sit <> stand with SBA. -ongoing  Short Term Goal 3: Ambulate with wh walker 25 feet with CGA/SBA. -met 11/10; GOAL UPDATED: SBA  Patient Goals   Patient Goals : to go home       Education  Patient Education  Education Given To: Patient  Education Provided Comments: RW management  Education Method: Verbal  Barriers to Learning: None  Education Outcome: Verbalized understanding;Demonstrated understanding      Therapy Time   Individual Concurrent Group Co-treatment   Time In 1118         Time Out 1156         Minutes 38             Timed Code Treatment Minutes:  38    Total Treatment Minutes:  [address], PT   This note to serve as discharge summary if patient discharged before next session.

## 2022-11-10 NOTE — PROGRESS NOTES
General Surgery  Daily Progress Note    Pt Name: Tati Higgins Record Number: [id number]  Date of Birth 1960   Today's Date: 11/10/2022        ASSESSMENT/PLAN  RAL sigmoid colectomy with takedown of colovesical fistula, small bowel resection POD #2. Passing flatus, advance to full liquids. Await further return of GI function. Continue colace BID and miralax daily. Continue oh catheter x 7 days from day of surgery. Ambulate, OOB, IS. Continue IV zosyn for abscess during surgery . Wean O2 as tolerated. [address] has improved from yesterday. Pain is well controlled. He has nausea and no vomiting. He has passed flatus and has not had a bowel movement. He is tolerating thin liquids. Current activity is ad edmundo. OBJECTIVE  VITALS:  height is 6' (1.829 m) and weight is 149 lb 7.6 oz (67.8 kg). His oral temperature is 97.5 °F (36.4 °C). His blood pressure is 129/83 and his pulse is 57. His respiration is 16 and oxygen saturation is 98%. GENERAL: alert, no distress  LUNGS: normal respiratory effort, no accessory muscle use  HEART: normal rate and regular rhythm  ABDOMEN: soft, appropriately-tender, non-distended, incisions c/d/I  EXTREMITY: no cyanosis, clubbing or edema  I/O last 3 completed shifts: In: 18 [P.O.:540; I.V.:30]  Out: 2500 [Urine:2500]  No intake/output data recorded. LABS  Recent Labs     11/09/22  0523 11/10/22  0530   WBC 15.0* 10.4   HGB 16.4 15.0   HCT 50.3 44.7    159   * 136   K 5.1 4.3    103   CO2 26 25   BUN 11 8   CREATININE 0.9 0.7*   MG 1.90  --    PHOS 3.8  --    CALCIUM 8.1* 7.6*         CHRISTY Pelayo - CNP  Electronically signed 11/10/2022 at 8:45 AM    Agree with above note. The patient was personally seen and examined. Andres Johnson is doing better. Pain slightly improved. Tolerating some clears but having minimal nausea. + flatus, no BM.     Abd soft, ND, appropriately tender, incisions c/d/I, no erythema or induration    WBC 10.4  Cr 0.7    A/P: 64 yo male s/p RAL sigmoid colectomy with takedown of colovesical fistula, small bowel resection POD #2    Advance to full liquids. Having GI function.   Continue colace, miralax bowel regimen  Continue oh catheter for 7 days from surgery  Ambulate/OOB  IS, wean oxygen as tolerated    Laura Duke MD

## 2022-11-10 NOTE — PROGRESS NOTES
Department of Internal Medicine  General Internal Medicine  Attending Progress Note      SUBJECTIVE:  pt is doing better post op 2.  No BM but passed flatus, Afebrile,abd pain is controlled with med, mildly nauseous on and off, On Clear liquid diet,    OBJECTIVE      Medications    Current Facility-Administered Medications: insulin lispro (HUMALOG) injection vial 0-4 Units, 0-4 Units, SubCUTAneous, TID WC  insulin lispro (HUMALOG) injection vial 0-4 Units, 0-4 Units, SubCUTAneous, Nightly  glucose chewable tablet 16 g, 4 tablet, Oral, PRN  dextrose bolus 10% 125 mL, 125 mL, IntraVENous, PRN **OR** dextrose bolus 10% 250 mL, 250 mL, IntraVENous, PRN  glucagon (rDNA) injection 1 mg, 1 mg, SubCUTAneous, PRN  dextrose 10 % infusion, , IntraVENous, Continuous PRN  cloNIDine (CATAPRES) 0.1 MG/24HR 1 patch, 1 patch, TransDERmal, Weekly  ketorolac (TORADOL) injection 15 mg, 15 mg, IntraVENous, Q6H  albuterol sulfate HFA (PROVENTIL;VENTOLIN;PROAIR) 108 (90 Base) MCG/ACT inhaler 2 puff, 2 puff, Inhalation, 4x Daily PRN  ALPRAZolam (XANAX) tablet 0.5 mg, 0.5 mg, Oral, BID PRN  docusate sodium (COLACE) capsule 100 mg, 100 mg, Oral, BID  lactobacillus (CULTURELLE) capsule 1 capsule, 1 capsule, Oral, Daily  sodium chloride flush 0.9 % injection 5-40 mL, 5-40 mL, IntraVENous, 2 times per day  sodium chloride flush 0.9 % injection 5-40 mL, 5-40 mL, IntraVENous, PRN  0.9 % sodium chloride infusion, , IntraVENous, PRN  ondansetron (ZOFRAN-ODT) disintegrating tablet 4 mg, 4 mg, Oral, Q8H PRN **OR** ondansetron (ZOFRAN) injection 4 mg, 4 mg, IntraVENous, Q6H PRN  dextrose 5 % and 0.45 % NaCl with KCl 20 mEq infusion, , IntraVENous, Continuous  potassium chloride 10 mEq/100 mL IVPB (Peripheral Line), 10 mEq, IntraVENous, PRN  magnesium sulfate 1000 mg in dextrose 5% 100 mL IVPB, 1,000 mg, IntraVENous, PRN  acetaminophen (TYLENOL) tablet 650 mg, 650 mg, Oral, Q6H  oxyCODONE (ROXICODONE) immediate release tablet 5 mg, 5 mg, Oral, Q4H PRN **OR** oxyCODONE HCl (OXY-IR) immediate release tablet 10 mg, 10 mg, Oral, Q4H PRN  HYDROmorphone (DILAUDID) injection 0.5 mg, 0.5 mg, IntraVENous, Q2H PRN **OR** HYDROmorphone (DILAUDID) injection 1 mg, 1 mg, IntraVENous, Q2H PRN  polyethylene glycol (GLYCOLAX) packet 17 g, 17 g, Oral, Daily  famotidine (PEPCID) tablet 20 mg, 20 mg, Oral, BID **OR** famotidine (PEPCID) 20 mg in sodium chloride (PF) 0.9 % 10 mL injection, 20 mg, IntraVENous, BID  enoxaparin (LOVENOX) injection 40 mg, 40 mg, SubCUTAneous, Nightly  piperacillin-tazobactam (ZOSYN) 3,375 mg in dextrose 5 % 100 mL IVPB extended infusion (mini-bag), 3,375 mg, IntraVENous, Q8H  Physical    VITALS:  BP (!) 142/91   Pulse 56   Temp 97.3 °F (36.3 °C) (Oral)   Resp 16   Ht 6' (1.829 m)   Wt 149 lb 7.6 oz (67.8 kg)   SpO2 92%   BMI 20.27 kg/m²   CONSTITUTIONAL:  awake, alert, cooperative, no apparent distress, and appears stated age  LUNGS:  No increased work of breathing, good air exchange, clear to auscultation bilaterally, no crackles or wheezing  CARDIOVASCULAR:  Normal apical impulse, regular rate and rhythm, normal S1 and S2, no S3 or S4, and no murmur noted  ABDOMEN:  s/p surgery  +BS, mildly tender,   MUSCULOSKELETAL:  There is no redness, warmth, or swelling of the joints. Full range of motion noted. Motor strength is 5 out of 5 all extremities bilaterally.   Tone is normal.  Data    CBC:   Lab Results   Component Value Date/Time    WBC 10.4 11/10/2022 05:30 AM    RBC 4.59 11/10/2022 05:30 AM    HGB 15.0 11/10/2022 05:30 AM    HCT 44.7 11/10/2022 05:30 AM    MCV 97.3 11/10/2022 05:30 AM    MCH 32.6 11/10/2022 05:30 AM    MCHC 33.5 11/10/2022 05:30 AM    RDW 15.4 11/10/2022 05:30 AM     11/10/2022 05:30 AM    MPV 7.5 11/10/2022 05:30 AM     BMP:    Lab Results   Component Value Date/Time     11/10/2022 05:30 AM    K 4.3 11/10/2022 05:30 AM    K 4.4 06/09/2021 04:50 AM     11/10/2022 05:30 AM    CO2 25 11/10/2022 05:30 AM BUN 8 11/10/2022 05:30 AM    LABALBU 2.7 06/13/2021 06:30 AM    CREATININE 0.7 11/10/2022 05:30 AM    CALCIUM 7.6 11/10/2022 05:30 AM    GFRAA >60 10/13/2022 11:38 AM    LABGLOM >60 11/10/2022 05:30 AM    GLUCOSE 154 11/10/2022 05:30 AM       ASSESSMENT AND PLAN      Principal Problem: 1. Significant sigmoid colon inflammation abscess in the colon and small bowel areas, bladder fistula was r/o. S/p surgery colon and small bowell resection, - see detailed surgery note by dr Rocio Nuñez. Post op 2. So far great, remarkable recovery after difficult surgery. Patient feels good. Afebrile, passed flatus. No BM  on clear liquid diet, Pain is under controll with meds,   2. HTN start Clonidine patch, when patient can have food, change med,to oral.  3.DM -2, start Insulin coverage. 4. SIMONA cont med,  Cont incentive spirometry,PT/OT,compression boots for dVT profilaxis, already in place. F/ u labs.   Dr Elizabeth Arzola

## 2022-11-10 NOTE — CARE COORDINATION
INITIAL CASE MANAGEMENT ASSESSMENT    Met with patient to assess possible discharge needs. Explained Case Management role/services. Living Situation: Lives in a two story house with 5 MORALES. ADLs: Independent, has someone that does his laundry. DME: Rollator, stair lift to the upstair. PT/OT Recs: Recommendations for home with assist PRN and home healthcare for PT/OT. Active Services: N/A     Transportation: Active . Will have transportation to home at discharge. Medications: [employer] in [place]    PCP: Paolo Wade MD      HD/PD: N/A    PLAN/COMMENTS: Discharge plan is to return to home with Home Healthcare services if needed. No other needs identified. Provided contact information for patient or family to call with any questions. Will follow and assist as needed.     Soraya BAXTER RN  Case Management  [phone]    Electronically signed by Sroaya Garner RN on 11/10/2022 at 5:44 PM

## 2022-11-10 NOTE — PLAN OF CARE
Problem: Pain  Goal: Verbalizes/displays adequate comfort level or baseline comfort level  11/9/2022 2156 by Grisel Chase RN  Outcome: Progressing  11/9/2022 1107 by Nick Zhao RN  Outcome: Progressing     Problem: Safety - Adult  Goal: Free from fall injury  11/9/2022 2156 by Grisel Chase RN  Outcome: Progressing  11/9/2022 1107 by Nick Zhao RN  Outcome: Progressing

## 2022-11-11 LAB
ANION GAP SERPL CALCULATED.3IONS-SCNC: 12 MMOL/L (ref 3–16)
BUN BLDV-MCNC: 7 MG/DL (ref 7–20)
CALCIUM SERPL-MCNC: 7.9 MG/DL (ref 8.3–10.6)
CHLORIDE BLD-SCNC: 101 MMOL/L (ref 99–110)
CO2: 23 MMOL/L (ref 21–32)
CREAT SERPL-MCNC: 0.7 MG/DL (ref 0.8–1.3)
GFR SERPL CREATININE-BSD FRML MDRD: >60 ML/MIN/{1.73_M2}
GLUCOSE BLD-MCNC: 133 MG/DL (ref 70–99)
GLUCOSE BLD-MCNC: 136 MG/DL (ref 70–99)
GLUCOSE BLD-MCNC: 162 MG/DL (ref 70–99)
GLUCOSE BLD-MCNC: 180 MG/DL (ref 70–99)
GLUCOSE BLD-MCNC: 99 MG/DL (ref 70–99)
HCT VFR BLD CALC: 45.8 % (ref 40.5–52.5)
HEMOGLOBIN: 15.5 G/DL (ref 13.5–17.5)
MCH RBC QN AUTO: 32.8 PG (ref 26–34)
MCHC RBC AUTO-ENTMCNC: 33.9 G/DL (ref 31–36)
MCV RBC AUTO: 96.9 FL (ref 80–100)
PDW BLD-RTO: 15.2 % (ref 12.4–15.4)
PERFORMED ON: ABNORMAL
PERFORMED ON: NORMAL
PLATELET # BLD: 187 K/UL (ref 135–450)
PMV BLD AUTO: 7.8 FL (ref 5–10.5)
POTASSIUM SERPL-SCNC: 4.4 MMOL/L (ref 3.5–5.1)
RBC # BLD: 4.73 M/UL (ref 4.2–5.9)
SODIUM BLD-SCNC: 136 MMOL/L (ref 136–145)
WBC # BLD: 8.7 K/UL (ref 4–11)

## 2022-11-11 PROCEDURE — 97530 THERAPEUTIC ACTIVITIES: CPT

## 2022-11-11 PROCEDURE — 6370000000 HC RX 637 (ALT 250 FOR IP): Performed by: SURGERY

## 2022-11-11 PROCEDURE — 6360000002 HC RX W HCPCS: Performed by: PHYSICIAN ASSISTANT

## 2022-11-11 PROCEDURE — APPSS15 APP SPLIT SHARED TIME 0-15 MINUTES: Performed by: NURSE PRACTITIONER

## 2022-11-11 PROCEDURE — 2580000003 HC RX 258: Performed by: SURGERY

## 2022-11-11 PROCEDURE — 85027 COMPLETE CBC AUTOMATED: CPT

## 2022-11-11 PROCEDURE — 99024 POSTOP FOLLOW-UP VISIT: CPT | Performed by: SURGERY

## 2022-11-11 PROCEDURE — 80048 BASIC METABOLIC PNL TOTAL CA: CPT

## 2022-11-11 PROCEDURE — 6360000002 HC RX W HCPCS: Performed by: SURGERY

## 2022-11-11 PROCEDURE — APPNB15 APP NON BILLABLE TIME 0-15 MINS: Performed by: NURSE PRACTITIONER

## 2022-11-11 PROCEDURE — 1200000000 HC SEMI PRIVATE

## 2022-11-11 PROCEDURE — 97116 GAIT TRAINING THERAPY: CPT

## 2022-11-11 PROCEDURE — 2500000003 HC RX 250 WO HCPCS: Performed by: SURGERY

## 2022-11-11 PROCEDURE — 51702 INSERT TEMP BLADDER CATH: CPT

## 2022-11-11 RX ADMIN — ENOXAPARIN SODIUM 40 MG: 100 INJECTION SUBCUTANEOUS at 19:56

## 2022-11-11 RX ADMIN — PIPERACILLIN AND TAZOBACTAM 3375 MG: 3; .375 INJECTION, POWDER, FOR SOLUTION INTRAVENOUS at 19:22

## 2022-11-11 RX ADMIN — PIPERACILLIN AND TAZOBACTAM 3375 MG: 3; .375 INJECTION, POWDER, FOR SOLUTION INTRAVENOUS at 10:20

## 2022-11-11 RX ADMIN — KETOROLAC TROMETHAMINE 15 MG: 15 INJECTION, SOLUTION INTRAMUSCULAR; INTRAVENOUS at 20:04

## 2022-11-11 RX ADMIN — ONDANSETRON 4 MG: 2 INJECTION INTRAMUSCULAR; INTRAVENOUS at 17:07

## 2022-11-11 RX ADMIN — FAMOTIDINE 20 MG: 20 TABLET ORAL at 19:56

## 2022-11-11 RX ADMIN — ACETAMINOPHEN 650 MG: 325 TABLET ORAL at 13:50

## 2022-11-11 RX ADMIN — KETOROLAC TROMETHAMINE 15 MG: 15 INJECTION, SOLUTION INTRAMUSCULAR; INTRAVENOUS at 10:17

## 2022-11-11 RX ADMIN — PIPERACILLIN AND TAZOBACTAM 3375 MG: 3; .375 INJECTION, POWDER, FOR SOLUTION INTRAVENOUS at 03:30

## 2022-11-11 RX ADMIN — ACETAMINOPHEN 650 MG: 325 TABLET ORAL at 06:02

## 2022-11-11 RX ADMIN — DOCUSATE SODIUM 100 MG: 100 CAPSULE, LIQUID FILLED ORAL at 08:03

## 2022-11-11 RX ADMIN — OXYCODONE HYDROCHLORIDE 10 MG: 10 TABLET ORAL at 08:03

## 2022-11-11 RX ADMIN — KETOROLAC TROMETHAMINE 15 MG: 15 INJECTION, SOLUTION INTRAMUSCULAR; INTRAVENOUS at 17:05

## 2022-11-11 RX ADMIN — POLYETHYLENE GLYCOL 3350 17 G: 17 POWDER, FOR SOLUTION ORAL at 08:02

## 2022-11-11 RX ADMIN — FAMOTIDINE 20 MG: 20 TABLET ORAL at 08:02

## 2022-11-11 RX ADMIN — Medication 1 CAPSULE: at 08:03

## 2022-11-11 RX ADMIN — ACETAMINOPHEN 650 MG: 325 TABLET ORAL at 19:21

## 2022-11-11 RX ADMIN — SODIUM CHLORIDE: 9 INJECTION, SOLUTION INTRAVENOUS at 10:19

## 2022-11-11 RX ADMIN — OXYCODONE HYDROCHLORIDE 10 MG: 10 TABLET ORAL at 13:49

## 2022-11-11 RX ADMIN — DOCUSATE SODIUM 100 MG: 100 CAPSULE, LIQUID FILLED ORAL at 19:56

## 2022-11-11 RX ADMIN — KETOROLAC TROMETHAMINE 15 MG: 15 INJECTION, SOLUTION INTRAMUSCULAR; INTRAVENOUS at 03:31

## 2022-11-11 ASSESSMENT — PAIN SCALES - GENERAL
PAINLEVEL_OUTOF10: 0
PAINLEVEL_OUTOF10: 8
PAINLEVEL_OUTOF10: 7
PAINLEVEL_OUTOF10: 3
PAINLEVEL_OUTOF10: 10
PAINLEVEL_OUTOF10: 3
PAINLEVEL_OUTOF10: 3
PAINLEVEL_OUTOF10: 0
PAINLEVEL_OUTOF10: 5
PAINLEVEL_OUTOF10: 9

## 2022-11-11 ASSESSMENT — PAIN - FUNCTIONAL ASSESSMENT
PAIN_FUNCTIONAL_ASSESSMENT: PREVENTS OR INTERFERES SOME ACTIVE ACTIVITIES AND ADLS
PAIN_FUNCTIONAL_ASSESSMENT: ACTIVITIES ARE NOT PREVENTED

## 2022-11-11 ASSESSMENT — PAIN DESCRIPTION - LOCATION
LOCATION: ABDOMEN

## 2022-11-11 ASSESSMENT — PAIN DESCRIPTION - ORIENTATION
ORIENTATION: RIGHT;LEFT;MID
ORIENTATION: RIGHT;LEFT;MID

## 2022-11-11 ASSESSMENT — PAIN DESCRIPTION - DESCRIPTORS
DESCRIPTORS: DISCOMFORT
DESCRIPTORS: SHARP
DESCRIPTORS: DISCOMFORT

## 2022-11-11 ASSESSMENT — PAIN DESCRIPTION - PAIN TYPE
TYPE: SURGICAL PAIN
TYPE: SURGICAL PAIN

## 2022-11-11 NOTE — PROGRESS NOTES
Physical Therapy  Facility/Department: [address] MED SURG  Physical Therapy Daily Note  If pt. is D/C'd prior to next visit please refer back to last daily progress note for D/C status. Name: Diana Avalos  : 1960  MRN: [id number]  Date of Service: 2022    Discharge Recommendations:  Home with assist PRN, No therapy recommended at discharge   Diana Avalos scored a 21/24 on the AM-PAC short mobility form. At this time, no further PT is recommended upon discharge. Recommend patient returns to prior setting with prior services. PT Equipment Recommendations  Equipment Needed: No      Patient Diagnosis(es): Diagnoses of Sigmoid diverticulitis and Colovesical fistula were pertinent to this visit. Past Medical History:  has a past medical history of Anxiety, Cancer ([address] Chronic back pain, Diabetes mellitus ([address] Erectile dysfunction, Headache(784.0), Osteoarthritis, PONV (postoperative nausea and vomiting), Restless legs syndrome, Urinary incontinence, and Wears dentures. Past Surgical History:  has a past surgical history that includes Tonsillectomy (Bilateral); Cholecystectomy, laparoscopic (N/A, 08/10/2021); Portacath placement; colectomy (N/A, 2022); and Cystoscopy (N/A, 2022). Assessment   Assessment: Today, the pt showed great improvement and due to little pain was able to walk w/o a device 1 lap around the unit. He is now functioning just slightly below his baseline and anticipate that he will be safe for home with prn assist. Will con't to follow while he is on the acute floor but do not anticipate that he will need further skilled PT services at d/c.   Therapy Prognosis: Good  Barriers to Learning: none  Activity Tolerance  Activity Tolerance: Patient tolerated treatment well     Plan   Physcial Therapy Plan  General Plan: 3-5 times per week  Current Treatment Recommendations: Strengthening, Functional mobility training, Balance training, Transfer training, Gait training, Therapeutic activities, Patient/Caregiver education & training, Safety education & training, Equipment evaluation, education, & procurement  Safety Devices  Type of Devices: Call light within reach, Patient at risk for falls, Left in bed, Nurse notified     Restrictions  Restrictions/Precautions  Restrictions/Precautions: Fall Risk  Position Activity Restriction  Other position/activity restrictions: s/p abd surgery, oh,  port L upper chest     Subjective   General  Chart Reviewed: Yes  Patient assessed for rehabilitation services?: Yes  Additional Pertinent Hx: Per Hema Holguin MD op note: The pt is a 64 yo male who presented to the hospital for elective procedure due to recurrent diverticulitis. On 11-8-2022 the pt had sx for: Robotic-assisted laparoscopic sigmoid colectomy  with take down of colovesical fistula and drains of pericolonic abscess  with open small bowel resection. PMHx: tongue Ca, diverticulitis, anxiety, chronic back pain, DM, OA, RLS  Response To Previous Treatment: Patient with no complaints from previous session.   Family / Caregiver Present: Yes (wife in the room initially)  Referring Practitioner: Evangelina Zuleta MD  Referral Date : 11/09/22  Diagnosis: Colovesical fistula secondary to recurrent sigmoid diverticulitis  Follows Commands: Within Functional Limits  Subjective  Subjective: the pt was found to be up in the chair upon arrival to the room; the pt reports he is feeling much better and has very little pain; he reported to the OT that he showered indepZebit eariler today         Social/Functional History  Social/Functional History  Lives With: Significant other (wife in hospice)  Type of Home: House  Home Layout: Two level, Laundry in basement, Bed/Bath upstairs (stairlift to 2nd floor)  Home Access: Stairs to enter with rails  Entrance Stairs - Number of Steps: 5  Entrance Stairs - Rails: Right  Bathroom Shower/Tub: Tub/Shower unit, Shower chair with back  Eclectic Toilet: Handicap height (sink nearby)  [facility]: Grab bars in shower, Shower chair, Hand-held shower  Bathroom Accessibility: Walker accessible  Home Equipment: Darnella Belch, 4 wheeled, Oxygen (1 L O2 at night)  Has the patient had two or more falls in the past year or any fall with injury in the past year?: No  ADL Assistance: Independent  Homemaking Assistance: Independent  Ambulation Assistance: Independent  Transfer Assistance: Independent  Active : Yes  Mode of Transportation: Car  Occupation: On [address]: ride motorcycle; mess around in garage  [address]: Impaired  Vision Exceptions: Wears glasses for reading  Hearing  Hearing: Within functional limits        Objective     Bed mobility  Supine to Sit: Unable to assess  Sit to Supine: Modified independent (bed flat; used the rail slightly and via logroll)  Scooting: Modified independent  Transfers  Sit to Stand: Modified independent  Stand to Sit: Modified independent  Ambulation  Surface: Level tile  Device: No Device  Other Apparatus:  (the pt pushing own IV pole; catheter managed by therapist)  Assistance: Supervision  Quality of Gait: moderate pace, no gait deviations and no LOB  Distance: 1 lap around the unit w/o stopping  More Ambulation?: No  Stairs/Curb  Stairs?: No     Balance  Sitting - Static: Good  Sitting - Dynamic: Good  Standing - Static: Good  Standing - Dynamic: Good;-           AM-PAC Score  -PAC Inpatient Mobility Raw Score : 21 (11/11/22 1423)  AM-PAC Inpatient T-Scale Score : 50.25 (11/11/22 1423)  Mobility Inpatient CMS 0-100% Score: 28.97 (11/11/22 1423)  Mobility Inpatient CMS G-Code Modifier : CJ (11/11/22 1423)          Goals  Short Term Goals  Time Frame for Short Term Goals: upon d/c  Short Term Goal 1: Bed mobility vis logrolling with SBA. -met  Short Term Goal 2: Transfers sit <> stand with SBA. -met  Short Term Goal 3: Ambulate with wh walker 25 feet with CGA/SBA.  -met 11/10; GOAL UPDATED: SBA (met)  Short Term Goal 4: new goal: negotiate stairs with rails with SBA  Patient Goals   Patient Goals : to go home       Education  Patient Education  Education Given To: Patient  Education Provided: Role of Therapy;Plan of Care  Education Method: Verbal;Demonstration  Barriers to Learning: None  Education Outcome: Demonstrated understanding;Verbalized understanding      Therapy Time   Individual Concurrent Group Co-treatment   Time In 1407         Time Out 1435         Minutes 28               Electronically signed by Vance Spence, PT [id number] on 11/11/2022 at 2:39 PM

## 2022-11-11 NOTE — PLAN OF CARE
Problem: Chronic Conditions and Co-morbidities  Goal: Patient's chronic conditions and co-morbidity symptoms are monitored and maintained or improved  11/11/2022 0901 by Marichuy Schmitz RN  Outcome: Progressing  11/10/2022 2235 by Melody Rao RN  Outcome: Progressing     Problem: Discharge Planning  Goal: Discharge to home or other facility with appropriate resources  11/11/2022 0901 by Marichuy Schmitz RN  Outcome: Progressing  11/10/2022 2235 by Melody Rao RN  Outcome: Progressing     Problem: Pain  Goal: Verbalizes/displays adequate comfort level or baseline comfort level  11/11/2022 0901 by Marichuy Schmitz RN  Outcome: Progressing  11/10/2022 2235 by Melody Rao RN  Outcome: Progressing     Problem: Safety - Adult  Goal: Free from fall injury  11/11/2022 0901 by Marichuy Schmitz RN  Outcome: Progressing  11/10/2022 2235 by Melody Rao RN  Outcome: Progressing     Problem: ABCDS Injury Assessment  Goal: Absence of physical injury  11/11/2022 0901 by Marichuy Schmitz RN  Outcome: Progressing  11/10/2022 2235 by Melody Rao RN  Outcome: Progressing

## 2022-11-11 NOTE — PLAN OF CARE
Problem: Chronic Conditions and Co-morbidities  Goal: Patient's chronic conditions and co-morbidity symptoms are monitored and maintained or improved  11/10/2022 2235 by Lauren Valladares RN  Outcome: Progressing     Problem: Discharge Planning  Goal: Discharge to home or other facility with appropriate resources  11/10/2022 2235 by Lauren Valladares RN  Outcome: Progressing     Problem: Pain  Goal: Verbalizes/displays adequate comfort level or baseline comfort level  11/10/2022 2235 by Lauren Valladares RN  Outcome: Progressing     Problem: Safety - Adult  Goal: Free from fall injury  11/10/2022 2235 by Lauren Valladares RN  Outcome: Progressing     Problem: ABCDS Injury Assessment  Goal: Absence of physical injury  11/10/2022 2235 by Lauren Valladares RN  Outcome: Progressing

## 2022-11-11 NOTE — PROGRESS NOTES
General Surgery  Daily Progress Note    Pt Name: Tati Higgins Record Number: [id number]  Date of Birth 1960   Today's Date: 11/11/2022        ASSESSMENT/PLAN  RAL sigmoid colectomy with takedown of colovesical fistula, small bowel resection POD #3 (11/8). Had several Bms overnight. Continue colace BID and miralax daily. Continue oh catheter x 7 days from day of surgery. Ambulate, OOB, IS. Continue IV zosyn for abscess during surgery. O2 weaned. [address] has improved from yesterday. Pain is well controlled. He has no nausea and no vomiting. He has passed flatus and has had a bowel movement. He is tolerating full liquids. Current activity is ad edmundo. OBJECTIVE  VITALS:  height is 6' (1.829 m) and weight is 149 lb 7.6 oz (67.8 kg). His oral temperature is 97.8 °F (36.6 °C). His blood pressure is 140/83 (abnormal) and his pulse is 60. His respiration is 18 and oxygen saturation is 92%. GENERAL: alert, no distress  LUNGS: normal respiratory effort, no accessory muscle use  HEART: normal rate and regular rhythm  ABDOMEN: soft, appropriately-tender, non-distended, incisions c/d/I. Crepitance right abdomen. EXTREMITY: no cyanosis, clubbing or edema  I/O last 3 completed shifts: In: 6271 [P.O.:660; I.V.:5030.3; IV Piggyback:580.7]  Out: 0383 [Urine:3375]  I/O this shift:  In: -   Out: 400 [Urine:400]    LABS  Recent Labs     11/09/22  0523 11/10/22  0530 11/11/22  0500   WBC 15.0*   < > 8.7   HGB 16.4   < > 15.5   HCT 50.3   < > 45.8      < > 187   *   < > 136   K 5.1   < > 4.4      < > 101   CO2 26   < > 23   BUN 11   < > 7   CREATININE 0.9   < > 0.7*   MG 1.90  --   --    PHOS 3.8  --   --    CALCIUM 8.1*   < > 7.9*    < > = values in this interval not displayed. CHRISTY Holguin CNP  Electronically signed 11/11/2022 at 9:55 AM    Agree with above note. The patient was personally seen and examined. Santi Diop is doing OK.   Having some abdominal pain today. Had multiple BMs yesterday. Tolerating full liquids. Ambulating. Abd soft, ND, appropriately tender, incisions c/d/I, no erythema or induration, some crepitance noted on the patient's RLQ    WBC 8.7  Cr 0.7    A/P: 64 yo male s/p RAL sigmoid colectomy with takedown of colovesical fistula, small bowel resection POD #3    Advance to low fiber diet. Continue bowel regimen  Continue IV abx for abscess encountered during surgery  Crepitance on exam, without other signs of infection. Suspect this has been present since surgery from insufflation, but just noted today.   Will monitor  WBC WNL  Ambulate/OOB  Continue oh catheter due to healing fistula  Hopefully home over the next day or two    Mayra Arroyo MD

## 2022-11-11 NOTE — PROGRESS NOTES
Department of Internal Medicine  General Internal Medicine  Attending Progress Note      SUBJECTIVE:  pt had BM last night after that his abd pain get worse, got Tylnol. This am, doing a little better, No n/vo/ afebrile. Post op 3.     OBJECTIVE      Medications    Current Facility-Administered Medications: insulin lispro (HUMALOG) injection vial 0-4 Units, 0-4 Units, SubCUTAneous, TID WC  insulin lispro (HUMALOG) injection vial 0-4 Units, 0-4 Units, SubCUTAneous, Nightly  glucose chewable tablet 16 g, 4 tablet, Oral, PRN  dextrose bolus 10% 125 mL, 125 mL, IntraVENous, PRN **OR** dextrose bolus 10% 250 mL, 250 mL, IntraVENous, PRN  glucagon (rDNA) injection 1 mg, 1 mg, SubCUTAneous, PRN  dextrose 10 % infusion, , IntraVENous, Continuous PRN  cloNIDine (CATAPRES) 0.1 MG/24HR 1 patch, 1 patch, TransDERmal, Weekly  ketorolac (TORADOL) injection 15 mg, 15 mg, IntraVENous, Q6H  albuterol sulfate HFA (PROVENTIL;VENTOLIN;PROAIR) 108 (90 Base) MCG/ACT inhaler 2 puff, 2 puff, Inhalation, 4x Daily PRN  ALPRAZolam (XANAX) tablet 0.5 mg, 0.5 mg, Oral, BID PRN  docusate sodium (COLACE) capsule 100 mg, 100 mg, Oral, BID  lactobacillus (CULTURELLE) capsule 1 capsule, 1 capsule, Oral, Daily  sodium chloride flush 0.9 % injection 5-40 mL, 5-40 mL, IntraVENous, 2 times per day  sodium chloride flush 0.9 % injection 5-40 mL, 5-40 mL, IntraVENous, PRN  0.9 % sodium chloride infusion, , IntraVENous, PRN  ondansetron (ZOFRAN-ODT) disintegrating tablet 4 mg, 4 mg, Oral, Q8H PRN **OR** ondansetron (ZOFRAN) injection 4 mg, 4 mg, IntraVENous, Q6H PRN  dextrose 5 % and 0.45 % NaCl with KCl 20 mEq infusion, , IntraVENous, Continuous  potassium chloride 10 mEq/100 mL IVPB (Peripheral Line), 10 mEq, IntraVENous, PRN  magnesium sulfate 1000 mg in dextrose 5% 100 mL IVPB, 1,000 mg, IntraVENous, PRN  acetaminophen (TYLENOL) tablet 650 mg, 650 mg, Oral, Q6H  oxyCODONE (ROXICODONE) immediate release tablet 5 mg, 5 mg, Oral, Q4H PRN **OR** oxyCODONE HCl (OXY-IR) immediate release tablet 10 mg, 10 mg, Oral, Q4H PRN  HYDROmorphone (DILAUDID) injection 0.5 mg, 0.5 mg, IntraVENous, Q2H PRN **OR** HYDROmorphone (DILAUDID) injection 1 mg, 1 mg, IntraVENous, Q2H PRN  polyethylene glycol (GLYCOLAX) packet 17 g, 17 g, Oral, Daily  famotidine (PEPCID) tablet 20 mg, 20 mg, Oral, BID **OR** famotidine (PEPCID) 20 mg in sodium chloride (PF) 0.9 % 10 mL injection, 20 mg, IntraVENous, BID  enoxaparin (LOVENOX) injection 40 mg, 40 mg, SubCUTAneous, Nightly  piperacillin-tazobactam (ZOSYN) 3,375 mg in dextrose 5 % 100 mL IVPB extended infusion (mini-bag), 3,375 mg, IntraVENous, Q8H  Physical    VITALS:  BP (!) 158/88   Pulse 57   Temp 98 °F (36.7 °C) (Oral)   Resp 16   Ht 6' (1.829 m)   Wt 149 lb 7.6 oz (67.8 kg)   SpO2 95%   BMI 20.27 kg/m²   CONSTITUTIONAL:  awake, alert, cooperative, no apparent distress, and appears stated age  LUNGS:  No increased work of breathing, good air exchange, clear to auscultation bilaterally, no crackles or wheezing  CARDIOVASCULAR:  Normal apical impulse, regular rate and rhythm, normal S1 and S2, no S3 or S4, and no murmur noted  ABDOMEN:  No scars, normal bowel sounds, soft, non-distended, non-tender, no masses palpated, no hepatosplenomegally  Data    CBC:   Lab Results   Component Value Date/Time    WBC 8.7 11/11/2022 05:00 AM    RBC 4.73 11/11/2022 05:00 AM    HGB 15.5 11/11/2022 05:00 AM    HCT 45.8 11/11/2022 05:00 AM    MCV 96.9 11/11/2022 05:00 AM    MCH 32.8 11/11/2022 05:00 AM    MCHC 33.9 11/11/2022 05:00 AM    RDW 15.2 11/11/2022 05:00 AM     11/11/2022 05:00 AM    MPV 7.8 11/11/2022 05:00 AM     BMP:    Lab Results   Component Value Date/Time     11/11/2022 05:00 AM    K 4.4 11/11/2022 05:00 AM    K 4.4 06/09/2021 04:50 AM     11/11/2022 05:00 AM    CO2 23 11/11/2022 05:00 AM    BUN 7 11/11/2022 05:00 AM    LABALBU 2.7 06/13/2021 06:30 AM    CREATININE 0.7 11/11/2022 05:00 AM    CALCIUM 7.9 11/11/2022 05:00 AM    GFRAA >60 10/13/2022 11:38 AM    LABGLOM >60 11/11/2022 05:00 AM    GLUCOSE 133 11/11/2022 05:00 AM       ASSESSMENT AND PLAN      Principal Problem: 1. Significant sigmoid colon inflammation abscess in the colon and small bowel areas, bladder fistula was r/o. S/p surgery colon and small bowell resection, - see detailed surgery note by dr Castro Casiano. Post op 3 . So far great, remarkable recovery after difficult surgery. Patient feels good. Afebrile, passed flatus. Had BM  after that having significant abd pain, but now it is subsiding. full  liquid diet, Pain is under controll with meds,   2. HTN start Clonidine patch, when patient can have food, change med,to oral.  3.DM -2, start Insulin coverage. 4. SIMONA cont med,  Cont incentive spirometry,PT/OT,compression boots for dVT profilaxis, already in place. F/ u labs.   Dr Silvia Lopez

## 2022-11-11 NOTE — PROGRESS NOTES
Occupational Therapy  Facility/Department: [facility]  Occupational Therapy Daily Treatment and Discharge Note    Name: Diana Avalos  : 1960  MRN: [id number]  Date of Service: 2022    Discharge Recommendations:  Home with assist PRN  OT Equipment Recommendations  Equipment Needed: No     Diana Avalos scored a 24/24 on the  [address] form. At this time, no further OT is recommended upon discharge due to pt functioning at independent level. Recommend patient returns to prior setting with assist prn. Patient Diagnosis(es): Diagnoses of Sigmoid diverticulitis and Colovesical fistula were pertinent to this visit. Past Medical History:  has a past medical history of Anxiety, Cancer ([address] Chronic back pain, Diabetes mellitus ([address] Erectile dysfunction, Headache(784.0), Osteoarthritis, PONV (postoperative nausea and vomiting), Restless legs syndrome, Urinary incontinence, and Wears dentures. Past Surgical History:  has a past surgical history that includes Tonsillectomy (Bilateral); Cholecystectomy, laparoscopic (N/A, 08/10/2021); Portacath placement; colectomy (N/A, 2022); and Cystoscopy (N/A, 2022). Assessment   Assessment: Pt tolerated treatment well, and has met all goals. Pt completing ADLs at independent/modified independent level, and fxl mobility community distance in hallway with no AD and mod I. Anticipate pt will be safe to return home with assist prn. No further acute OT services indicated, will discharge. Prognosis: Good  REQUIRES OT FOLLOW-UP: No  Activity Tolerance  Activity Tolerance: Patient Tolerated treatment well        Plan   Occupational Therapy Plan  Times Per Week: d/c acute OT  Times Per Day:  Once a day  Current Treatment Recommendations: Strengthening, Balance training, Functional mobility training, Endurance training, Safety education & training, Self-Care / ADL, Equipment evaluation, education, & procurement Restrictions  Restrictions/Precautions  Restrictions/Precautions: Fall Risk  Position Activity Restriction  Other position/activity restrictions: s/p abd surgery, oh,  port L upper chest    Subjective   General  Chart Reviewed: Yes  Patient assessed for rehabilitation services?: Yes  Additional Pertinent Hx: Per Vasu Nuñez MD's op note: \"The patient is a 70-year-old gentleman with history of  recurrent diverticulitis. Initially, he was set up for surgery last  year; however that in a following through. He presented to Urology  recently with pneumaturia. He was found to have a colovesical fistula  on imaging secondary to his persistent diverticulitis. The patient did  have ongoing inflammation of his colon and was on antibiotic leading up  to surgery. \" Pt s/p Robotic-assisted laparoscopic sigmoid colectomy  with take down of colovesical fistula and drains of pericolonic abscess  with open small bowel resection 11/8. Family / Caregiver Present: No  Referring Practitioner: Vasu Nuñez MD  Diagnosis: Colovesical fistula secondary to recurrent sigmoid diverticulitis s/p Robotic-assisted laparoscopic sigmoid colectomy  with take down of colovesical fistula and drains of pericolonic abscess  with open small bowel resection. Subjective  Subjective: Pt met b/s for OT tx. Pt seated in recliner on arrival, agreeable to participate in therapy. Pt reports 1/10 abd pain.      Social/Functional History  Social/Functional History  Lives With: Significant other (wife in hospice)  Type of Home: House  Home Layout: Two level, Laundry in basement, Bed/Bath upstairs (stairlift to 2nd floor)  Home Access: Stairs to enter with rails  Entrance Stairs - Number of Steps: 5  Entrance Stairs - Rails: Right  Bathroom Shower/Tub: Tub/Shower unit, Shower chair with back  [employer]: Handicap height (sink nearby)  [employer]: Grab bars in shower, Shower chair, Hand-held shower  Bathroom Accessibility: Walker accessible  Home Equipment: Tino Tyson, 4 wheeled, Oxygen (1 L O2 at night)  Has the patient had two or more falls in the past year or any fall with injury in the past year?: No  ADL Assistance: Independent  Homemaking Assistance: Independent  Ambulation Assistance: Independent  Transfer Assistance: Independent  Active : Yes  Mode of Transportation: Car  Occupation: On disability  Leisure & Hobbies: ride motorcycle; mess around in garage       Objective     Safety Devices  Type of Devices: Call light within reach; Patient at risk for falls; Left in bed;Nurse notified    Balance  Sitting: Intact  Standing: Intact  Gait  Overall Level of Assistance: Modified independent (Pt completed fxl mobility entire lap around unit (~425 ft), to/from BR with no AD and mod I with pt managing own IV pole.)    Transfers  Sit to stand: Independent  Stand to sit:  Independent  Toilet Transfers  Toilet - Technique: Ambulating  Equipment Used: Grab bars  Toilet Transfer: Modified independent    ADL  LE Bathing Skilled Clinical Factors: per pt report, bathed independently in standing earlier this date  LE Dressing: Independent  Toileting Skilled Clinical Factors: catheter, pt demo'd toilet transfer with mod I, anticipate mod I toileting    Activity Tolerance  Activity Tolerance: Patient tolerated treatment well    Bed mobility  Supine to Sit: Unable to assess  Sit to Supine: Modified independent (bed flat; used the rail slightly and via logroll)  Scooting: Modified independent    Vision  Vision: Impaired  Vision Exceptions: Wears glasses for reading  Hearing  Hearing: Within functional limits    Cognition  Overall Cognitive Status: WFL  Orientation  Overall Orientation Status: Within Functional Limits      Education Given To: Patient  Education Provided: Role of Therapy;Transfer Training;ADL Adaptive Strategies  Education Provided Comments: logroll technique  Barriers to Learning: None  Education Outcome: Verbalized understanding;Demonstrated understanding                          AM-PAC Score        AM-PAC Inpatient Daily Activity Raw Score: 24 (11/11/22 1437)  AM-PAC Inpatient ADL T-Scale Score : 57.54 (11/11/22 1437)  ADL Inpatient CMS 0-100% Score: 0 (11/11/22 1437)  ADL Inpatient CMS G-Code Modifier : CH (11/11/22 1437)           Goals  Short Term Goals  Time Frame for Short Term Goals: Prior to d/c: status goals 11/11:  Short Term Goal 1: Pt will bathe with supervision/mod I. Goal met per pt report, reports showered independently in standing earlier this date. Short Term Goal 2: Pt will dress with supervision/mod I.-goal met. Short Term Goal 3: Pt will toilet with supervision/mod I.-per pt report goal met  Short Term Goal 4: Pt will complete fxl mobility and fxl transfers to/from ADL surfaces with supervision/mod I using LRAD. -goal met. Short Term Goal 5: Pt will tolerate standing >5 minutes for functional task with supervision/mod I to improve standing tolerance for ADL routine. Goal met. Long Term Goals  Time Frame for Long Term Goals : STGs=LTGs  Patient Goals   Patient goals : to return home.        Therapy Time   Individual Concurrent Group Co-treatment   Time In [facility] 192         Time Out 9 [facility], OTR/L 1253

## 2022-11-12 VITALS
WEIGHT: 146.39 LBS | HEIGHT: 72 IN | DIASTOLIC BLOOD PRESSURE: 61 MMHG | BODY MASS INDEX: 19.83 KG/M2 | OXYGEN SATURATION: 93 % | HEART RATE: 70 BPM | SYSTOLIC BLOOD PRESSURE: 115 MMHG | TEMPERATURE: 98 F | RESPIRATION RATE: 16 BRPM

## 2022-11-12 LAB
ANION GAP SERPL CALCULATED.3IONS-SCNC: 10 MMOL/L (ref 3–16)
BUN BLDV-MCNC: 9 MG/DL (ref 7–20)
CALCIUM SERPL-MCNC: 8.2 MG/DL (ref 8.3–10.6)
CHLORIDE BLD-SCNC: 102 MMOL/L (ref 99–110)
CO2: 24 MMOL/L (ref 21–32)
CREAT SERPL-MCNC: 0.7 MG/DL (ref 0.8–1.3)
GFR SERPL CREATININE-BSD FRML MDRD: >60 ML/MIN/{1.73_M2}
GLUCOSE BLD-MCNC: 110 MG/DL (ref 70–99)
GLUCOSE BLD-MCNC: 131 MG/DL (ref 70–99)
GLUCOSE BLD-MCNC: 236 MG/DL (ref 70–99)
HCT VFR BLD CALC: 45.1 % (ref 40.5–52.5)
HEMOGLOBIN: 15 G/DL (ref 13.5–17.5)
MCH RBC QN AUTO: 32.3 PG (ref 26–34)
MCHC RBC AUTO-ENTMCNC: 33.3 G/DL (ref 31–36)
MCV RBC AUTO: 96.9 FL (ref 80–100)
PDW BLD-RTO: 15 % (ref 12.4–15.4)
PERFORMED ON: ABNORMAL
PERFORMED ON: ABNORMAL
PLATELET # BLD: 230 K/UL (ref 135–450)
PMV BLD AUTO: 7.8 FL (ref 5–10.5)
POTASSIUM SERPL-SCNC: 4.3 MMOL/L (ref 3.5–5.1)
RBC # BLD: 4.65 M/UL (ref 4.2–5.9)
SODIUM BLD-SCNC: 136 MMOL/L (ref 136–145)
WBC # BLD: 9 K/UL (ref 4–11)

## 2022-11-12 PROCEDURE — 94760 N-INVAS EAR/PLS OXIMETRY 1: CPT

## 2022-11-12 PROCEDURE — 6370000000 HC RX 637 (ALT 250 FOR IP): Performed by: SPECIALIST

## 2022-11-12 PROCEDURE — 85027 COMPLETE CBC AUTOMATED: CPT

## 2022-11-12 PROCEDURE — 6370000000 HC RX 637 (ALT 250 FOR IP): Performed by: SURGERY

## 2022-11-12 PROCEDURE — 6360000002 HC RX W HCPCS: Performed by: SURGERY

## 2022-11-12 PROCEDURE — 2580000003 HC RX 258: Performed by: SURGERY

## 2022-11-12 PROCEDURE — 6360000002 HC RX W HCPCS: Performed by: PHYSICIAN ASSISTANT

## 2022-11-12 PROCEDURE — 80048 BASIC METABOLIC PNL TOTAL CA: CPT

## 2022-11-12 PROCEDURE — 99024 POSTOP FOLLOW-UP VISIT: CPT | Performed by: SURGERY

## 2022-11-12 RX ORDER — DOCUSATE SODIUM 100 MG/1
100 CAPSULE, LIQUID FILLED ORAL 2 TIMES DAILY
Qty: 60 CAPSULE | Refills: 0 | Status: SHIPPED | OUTPATIENT
Start: 2022-11-12

## 2022-11-12 RX ORDER — AMLODIPINE BESYLATE 5 MG/1
5 TABLET ORAL DAILY
Status: DISCONTINUED | OUTPATIENT
Start: 2022-11-12 | End: 2022-11-12 | Stop reason: HOSPADM

## 2022-11-12 RX ORDER — AMOXICILLIN AND CLAVULANATE POTASSIUM 875; 125 MG/1; MG/1
1 TABLET, FILM COATED ORAL 2 TIMES DAILY
Qty: 6 TABLET | Refills: 0
Start: 2022-11-12 | End: 2022-11-15

## 2022-11-12 RX ORDER — OXYCODONE HYDROCHLORIDE AND ACETAMINOPHEN 5; 325 MG/1; MG/1
1 TABLET ORAL EVERY 6 HOURS PRN
Qty: 20 TABLET | Refills: 0 | Status: SHIPPED | OUTPATIENT
Start: 2022-11-12 | End: 2022-11-18 | Stop reason: SDUPTHER

## 2022-11-12 RX ADMIN — FAMOTIDINE 20 MG: 20 TABLET ORAL at 08:30

## 2022-11-12 RX ADMIN — AMLODIPINE BESYLATE 5 MG: 5 TABLET ORAL at 08:31

## 2022-11-12 RX ADMIN — Medication 1 CAPSULE: at 08:30

## 2022-11-12 RX ADMIN — ONDANSETRON 4 MG: 2 INJECTION INTRAMUSCULAR; INTRAVENOUS at 12:00

## 2022-11-12 RX ADMIN — PIPERACILLIN AND TAZOBACTAM 3375 MG: 3; .375 INJECTION, POWDER, FOR SOLUTION INTRAVENOUS at 11:10

## 2022-11-12 RX ADMIN — SODIUM CHLORIDE, PRESERVATIVE FREE 10 ML: 5 INJECTION INTRAVENOUS at 08:31

## 2022-11-12 RX ADMIN — DOCUSATE SODIUM 100 MG: 100 CAPSULE, LIQUID FILLED ORAL at 08:30

## 2022-11-12 RX ADMIN — KETOROLAC TROMETHAMINE 15 MG: 15 INJECTION, SOLUTION INTRAMUSCULAR; INTRAVENOUS at 02:22

## 2022-11-12 RX ADMIN — PIPERACILLIN AND TAZOBACTAM 3375 MG: 3; .375 INJECTION, POWDER, FOR SOLUTION INTRAVENOUS at 02:22

## 2022-11-12 RX ADMIN — ACETAMINOPHEN 650 MG: 325 TABLET ORAL at 05:48

## 2022-11-12 RX ADMIN — ACETAMINOPHEN 650 MG: 325 TABLET ORAL at 12:00

## 2022-11-12 RX ADMIN — INSULIN LISPRO 1 UNITS: 100 INJECTION, SOLUTION INTRAVENOUS; SUBCUTANEOUS at 12:01

## 2022-11-12 ASSESSMENT — PAIN DESCRIPTION - ORIENTATION
ORIENTATION: RIGHT;LEFT;MID;LOWER
ORIENTATION: RIGHT;LEFT;MID;LOWER

## 2022-11-12 ASSESSMENT — PAIN SCALES - GENERAL
PAINLEVEL_OUTOF10: 6
PAINLEVEL_OUTOF10: 5

## 2022-11-12 ASSESSMENT — PAIN DESCRIPTION - LOCATION
LOCATION: ABDOMEN
LOCATION: ABDOMEN

## 2022-11-12 ASSESSMENT — PAIN DESCRIPTION - DESCRIPTORS
DESCRIPTORS: DISCOMFORT
DESCRIPTORS: ACHING

## 2022-11-12 ASSESSMENT — PAIN - FUNCTIONAL ASSESSMENT
PAIN_FUNCTIONAL_ASSESSMENT: ACTIVITIES ARE NOT PREVENTED
PAIN_FUNCTIONAL_ASSESSMENT: ACTIVITIES ARE NOT PREVENTED

## 2022-11-12 NOTE — DISCHARGE INSTRUCTIONS
Arvind Walsh MD     General and Vascular Surgery   Leanna Crane MD     [address] [facility] [address] [place]     [address] [address], [place] [facility]  [place] [employer] - [place]   Phone: [phone]           Fax: [phone]                                               POST-OPERATIVE INSTRUCTIONS FOR SIGMOID COLECTOMY    Call the office to schedule your post-operative appointment with your surgeon for two (2) weeks. You will have water occlusive glue closing your incision(s). You may shower. Wash incision(s) gently, and pat dry. Do not rub your incision(s). Do not soak incision(s) in tub baths, hot tubs or pools    General guidelines for activity:  Avoid strenuous activity or lifting anything heavier than 15 pounds for 6 weeks. It is OK to be up walking around; walking up and down stairs is also OK. Do what is comfortable: stop and rest when you feel tired. Drink plenty of fluids after surgery. Do NOT drive for one week and while taking your narcotic pain medicine. Watch for signs of infection:   Fever over 100.5°   Excessive warmth or bright redness around your incision(s)  Leakage of bloody or cloudy fluid from your incision(s)    You will have pain medicine ordered. Take as directed. If you experience constipation  Increase your water intake, and activity; walking is best.  A stool softener or mild laxative may be necessary if you still have not had a bowel  movement ; call the office for further instructions.

## 2022-11-12 NOTE — PLAN OF CARE
Problem: Chronic Conditions and Co-morbidities  Goal: Patient's chronic conditions and co-morbidity symptoms are monitored and maintained or improved  11/12/2022 1413 by Fili Matthew RN  Outcome: Completed  11/12/2022 0919 by Fili Matthew RN  Outcome: Progressing     Problem: Discharge Planning  Goal: Discharge to home or other facility with appropriate resources  11/12/2022 1413 by Fili Matthew RN  Outcome: Completed  11/12/2022 0919 by Fili Matthew RN  Outcome: Progressing     Problem: Pain  Goal: Verbalizes/displays adequate comfort level or baseline comfort level  11/12/2022 1413 by Fili Matthew RN  Outcome: Completed  11/12/2022 0919 by Fili Matthew RN  Outcome: Progressing     Problem: Safety - Adult  Goal: Free from fall injury  11/12/2022 1413 by Fili Matthew RN  Outcome: Completed  Flowsheets (Taken 11/12/2022 1000)  Free From Fall Injury:   Instruct family/caregiver on patient safety   Based on caregiver fall risk screen, instruct family/caregiver to ask for assistance with transferring infant if caregiver noted to have fall risk factors  11/12/2022 0919 by Fili Matthew RN  Outcome: Progressing     Problem: ABCDS Injury Assessment  Goal: Absence of physical injury  11/12/2022 1413 by Fili Matthew RN  Outcome: Completed  Flowsheets (Taken 11/12/2022 1000)  Absence of Physical Injury: Implement safety measures based on patient assessment  11/12/2022 0919 by Fili Matthew RN  Outcome: Progressing

## 2022-11-12 NOTE — PLAN OF CARE
Problem: Chronic Conditions and Co-morbidities  Goal: Patient's chronic conditions and co-morbidity symptoms are monitored and maintained or improved  11/11/2022 2247 by Astrid Williamson RN  Outcome: Progressing  11/11/2022 0901 by Lakeisha Macias RN  Outcome: Progressing     Problem: Discharge Planning  Goal: Discharge to home or other facility with appropriate resources  11/11/2022 2247 by Astrid Williamson RN  Outcome: Progressing  11/11/2022 0901 by Lakeisha Macias RN  Outcome: Progressing     Problem: Pain  Goal: Verbalizes/displays adequate comfort level or baseline comfort level  11/11/2022 2247 by Astrid Williamson RN  Outcome: Progressing  11/11/2022 0901 by Lakeisha Macias RN  Outcome: Progressing

## 2022-11-12 NOTE — DISCHARGE SUMMARY
Physician Discharge Summary     Patient ID:  Eren Gaitan  [id number]  45 y.o.  1960    Admit date: 11/8/2022    Discharge date and time: 11/12/2022  2:15 PM     Admitting Physician: Phuong Han MD     Discharge Physician: Phuong Han MD      Admission Diagnoses: Sigmoid diverticulitis [K57.32]  Colovesical fistula [N32.1]  S/P colectomy [Z90.49]    Discharge Diagnoses: same    Admission Condition: fair    Discharged Condition: good    Indication for Admission: 64 yo male presented for robotic assisted sigmoid colectomy for his known recurrent diverticulitis with colovesical fistula. Hospital Course: He underwent RAL sigmoid colectomy, takedown of colovesical fistula, drainage of pericolonic abscess, and small bowel resection. He was admitted after surgery for post operative care. He was continued on antibiotics during his hospital stay for his abscess encountered during surgery. He began passing flatus on POD #2 and his diet was advanced. He had a BM by POD #3. He was kept an additional day due to slightly worsening abdominal pain. His pain improved by POD #4, and he was discharged home in stable condition. His oh catheter, which remained in since surgery, was removed prior to discharge. Consults: intraoperative with urology    Significant Diagnostic Studies: none    Treatments: RAL sigmoid colectomy, IV zosyn    Discharge Exam:  Abd soft, ND, appropriately tender, incisions c/d/I, no erythema or induration    Disposition: home    In process/preliminary results:  Outstanding Order Results       No orders found from 10/10/2022 to 11/9/2022.             Patient Instructions:   Current Discharge Medication List        CONTINUE these medications which have NOT CHANGED    Details   ALPRAZolam (XANAX) 0.5 MG tablet Take 0.5 mg by mouth as needed for Sleep.      lactobacillus (CULTURELLE) CAPS capsule TAKE 1 CAPSULE BY MOUTH DAILY  Qty: 30 capsule, Refills: 0      docusate sodium (COLACE) 100 MG capsule Take 1 capsule by mouth 2 times daily as needed for Constipation (take while on narcotic pain medication)  Qty: 60 capsule, Refills: 0      ibuprofen (ADVIL;MOTRIN) 400 MG tablet Take 1 tablet by mouth 4 times daily as needed for Pain  Qty: 100 tablet, Refills: 0      JANUVIA 100 MG tablet Take 100 mg by mouth daily       albuterol sulfate HFA (PROVENTIL;VENTOLIN;PROAIR) 108 (90 Base) MCG/ACT inhaler Inhale 2 puffs into the lungs 4 times daily as needed for Wheezing or Shortness of Breath      levothyroxine (SYNTHROID) 50 MCG tablet Take 50 mcg by mouth Daily      ondansetron (ZOFRAN ODT) 4 MG disintegrating tablet Take 1-2 tablets by mouth every 12 hours as needed for Nausea May Sub regular tablet (non-ODT) if insurance does not cover ODT. Qty: 12 tablet, Refills: 0           STOP taking these medications       amoxicillin-clavulanate (AUGMENTIN) 875-125 MG per tablet Comments:   Reason for Stopping:         nystatin (MYCOSTATIN) 877381 UNIT/ML suspension Comments:   Reason for Stopping:             Activity: no lifting >15 lbs x 6 weeks  Diet: regular diet  Wound Care: as directed    Follow-up with Dr. Carlos Figueroa in 2 weeks.     Signed:  Vasu Nuñez MD    11/12/2022  8:58 AM

## 2022-11-12 NOTE — PROGRESS NOTES
Department of Internal Medicine  General Internal Medicine  Attending Progress Note      SUBJECTIVE:  pt is doing well,his abd pain is better, has BM, tolerating diet well, afebrile . He is post op 4.and  very thankfull to dr Paul Ndiaye about his good job and fast recovery and good outcome.     OBJECTIVE      Medications    Current Facility-Administered Medications: amLODIPine (NORVASC) tablet 5 mg, 5 mg, Oral, Daily  insulin lispro (HUMALOG) injection vial 0-4 Units, 0-4 Units, SubCUTAneous, TID WC  insulin lispro (HUMALOG) injection vial 0-4 Units, 0-4 Units, SubCUTAneous, Nightly  glucose chewable tablet 16 g, 4 tablet, Oral, PRN  dextrose bolus 10% 125 mL, 125 mL, IntraVENous, PRN **OR** dextrose bolus 10% 250 mL, 250 mL, IntraVENous, PRN  glucagon (rDNA) injection 1 mg, 1 mg, SubCUTAneous, PRN  dextrose 10 % infusion, , IntraVENous, Continuous PRN  ketorolac (TORADOL) injection 15 mg, 15 mg, IntraVENous, Q6H  albuterol sulfate HFA (PROVENTIL;VENTOLIN;PROAIR) 108 (90 Base) MCG/ACT inhaler 2 puff, 2 puff, Inhalation, 4x Daily PRN  ALPRAZolam (XANAX) tablet 0.5 mg, 0.5 mg, Oral, BID PRN  docusate sodium (COLACE) capsule 100 mg, 100 mg, Oral, BID  lactobacillus (CULTURELLE) capsule 1 capsule, 1 capsule, Oral, Daily  sodium chloride flush 0.9 % injection 5-40 mL, 5-40 mL, IntraVENous, 2 times per day  sodium chloride flush 0.9 % injection 5-40 mL, 5-40 mL, IntraVENous, PRN  0.9 % sodium chloride infusion, , IntraVENous, PRN  ondansetron (ZOFRAN-ODT) disintegrating tablet 4 mg, 4 mg, Oral, Q8H PRN **OR** ondansetron (ZOFRAN) injection 4 mg, 4 mg, IntraVENous, Q6H PRN  potassium chloride 10 mEq/100 mL IVPB (Peripheral Line), 10 mEq, IntraVENous, PRN  magnesium sulfate 1000 mg in dextrose 5% 100 mL IVPB, 1,000 mg, IntraVENous, PRN  acetaminophen (TYLENOL) tablet 650 mg, 650 mg, Oral, Q6H  oxyCODONE (ROXICODONE) immediate release tablet 5 mg, 5 mg, Oral, Q4H PRN **OR** oxyCODONE HCl (OXY-IR) immediate release tablet 10 mg, 10 mg, Oral, Q4H PRN  HYDROmorphone (DILAUDID) injection 0.5 mg, 0.5 mg, IntraVENous, Q2H PRN **OR** HYDROmorphone (DILAUDID) injection 1 mg, 1 mg, IntraVENous, Q2H PRN  polyethylene glycol (GLYCOLAX) packet 17 g, 17 g, Oral, Daily  famotidine (PEPCID) tablet 20 mg, 20 mg, Oral, BID **OR** famotidine (PEPCID) 20 mg in sodium chloride (PF) 0.9 % 10 mL injection, 20 mg, IntraVENous, BID  enoxaparin (LOVENOX) injection 40 mg, 40 mg, SubCUTAneous, Nightly  piperacillin-tazobactam (ZOSYN) 3,375 mg in dextrose 5 % 100 mL IVPB extended infusion (mini-bag), 3,375 mg, IntraVENous, Q8H  Physical    VITALS:  BP (!) 149/87   Pulse 63   Temp 97.5 °F (36.4 °C) (Oral)   Resp 20   Ht 6' (1.829 m)   Wt 146 lb 6.2 oz (66.4 kg)   SpO2 95%   BMI 19.85 kg/m²   CONSTITUTIONAL:  awake, alert, cooperative, no apparent distress, and appears stated age  LUNGS:  No increased work of breathing, good air exchange, clear to auscultation bilaterally, no crackles or wheezing  CARDIOVASCULAR:  Normal apical impulse, regular rate and rhythm, normal S1 and S2, no S3 or S4, and no murmur noted  ABDOMEN:  s/p surgery  + BS, mildle tender,   Data    CBC:   Lab Results   Component Value Date/Time    WBC 9.0 11/12/2022 05:38 AM    RBC 4.65 11/12/2022 05:38 AM    HGB 15.0 11/12/2022 05:38 AM    HCT 45.1 11/12/2022 05:38 AM    MCV 96.9 11/12/2022 05:38 AM    MCH 32.3 11/12/2022 05:38 AM    MCHC 33.3 11/12/2022 05:38 AM    RDW 15.0 11/12/2022 05:38 AM     11/12/2022 05:38 AM    MPV 7.8 11/12/2022 05:38 AM     BMP:    Lab Results   Component Value Date/Time     11/11/2022 05:00 AM    K 4.4 11/11/2022 05:00 AM    K 4.4 06/09/2021 04:50 AM     11/11/2022 05:00 AM    CO2 23 11/11/2022 05:00 AM    BUN 7 11/11/2022 05:00 AM    LABALBU 2.7 06/13/2021 06:30 AM    CREATININE 0.7 11/11/2022 05:00 AM    CALCIUM 7.9 11/11/2022 05:00 AM    GFRAA >60 10/13/2022 11:38 AM    LABGLOM >60 11/11/2022 05:00 AM    GLUCOSE 133 11/11/2022 05:00 AM ASSESSMENT AND PLAN      Principal Problem: 1. Significant sigmoid colon inflammation abscess in the colon and small bowel areas, bladder fistula was r/o. S/p surgery colon and small bowell resection, - see detailed surgery note by dr Julisa Mi. Post op 4 . So far great, remarkable recovery after difficult surgery. Patient feels good. Afebrile, passed flatus. Had BM  after that having significant abd pain, but now it is subsiding. full  liquid diet, Pain is under controll with meds, possible d/c today per surgery. 2. HTN higher resume oral meds Amlodipine 5 mg daily d/c Clonidine patch. 3.DM -2, start Insulin coverage. 4. SIMONA cont med,  Cont incentive spirometry,PT/OT,compression boots for dVT profilaxis, already in place. F/ u labs.   Dr Parry Primrose

## 2022-11-12 NOTE — PROGRESS NOTES
Discharge instructions and medications reviewed with patient. Pt. Verbalized understanding. Denies questions. Discontinued IV without complications. Pt. tolerated well.

## 2022-11-12 NOTE — PROGRESS NOTES
Discontinued Oh per Dr. Cynthia Wade nd pt tolerated well. 300 ml of urine noted in oh bag. Urine noted dark yellow clear with no odor. Once pt voids, pt able to discharge. Will continue to monitor.

## 2022-11-12 NOTE — PLAN OF CARE
Problem: Chronic Conditions and Co-morbidities  Goal: Patient's chronic conditions and co-morbidity symptoms are monitored and maintained or improved  11/12/2022 0919 by Kiarra Holcomb RN  Outcome: Progressing  11/11/2022 2247 by Anna Marie Maher RN  Outcome: Progressing     Problem: Discharge Planning  Goal: Discharge to home or other facility with appropriate resources  11/12/2022 0919 by Kiarra Holcomb RN  Outcome: Progressing  11/11/2022 2247 by Anna Marie Maher RN  Outcome: Progressing     Problem: Pain  Goal: Verbalizes/displays adequate comfort level or baseline comfort level  11/12/2022 0919 by Kiarra Holcomb RN  Outcome: Progressing  11/11/2022 2247 by Anna Marie Maher RN  Outcome: Progressing     Problem: Safety - Adult  Goal: Free from fall injury  11/12/2022 0919 by Kiarra Holcomb RN  Outcome: Progressing  11/11/2022 2247 by Anna Marie Maher RN  Outcome: Progressing     Problem: ABCDS Injury Assessment  Goal: Absence of physical injury  11/12/2022 0919 by Kiarra Holcomb RN  Outcome: Progressing  11/11/2022 2247 by Anna Marie Maher RN  Outcome: Progressing

## 2022-11-16 NOTE — PROGRESS NOTES
Physician Progress Note      PATIENT:               Ansley Arechiga  CSN #:                  [id number]  :                       1960  ADMIT DATE:       2022 7:27 AM  [address] [place] DATE:        2022 2:15 PM  RESPONDING  PROVIDER #:        Craig Mcnamara CNP          QUERY TEXT:    Pt admitted with recurrent sigmoid diverticulitis with abscess and noted to   have surgical pathology consistent with Serositis. If possible, please   document in progress notes and d/c summary a correlating diagnosis to explain   pathology findings: The medical record reflects the following:  Risk Factors:  recurrent sigmoid diverticulitis with abscess  Clinical Indicators: Pathology report \"Large bowel with diverticular disease,   including perforation with  ?? ? ? associated ulceration, mural abscess, and acute adhesive serositis  ? -   No evidence of dysplasia or malignancy ? B. Small bowel, resection:  ? -   Portion of small bowel with acute adhesive serositis and  ?? ? ? associated mural abscess \"  Treatment: Robotic-assisted laparoscopic sigmoid colectomy with take down of   colovesical fistula and drains of pericolonic abscess with open small bowel   resection  Options provided:  -- Acute Serositis  -- Other - I will add my own diagnosis  -- Disagree - Not applicable / Not valid  -- Disagree - Clinically unable to determine / Unknown  -- Refer to Clinical Documentation Reviewer    PROVIDER RESPONSE TEXT:    This patient has acute Serositis of sigmoid colon and small bowel.     Query created by: Seth Oscar on 2022 2:39 PM      Electronically signed by:  Craig Mcnamara CNP 2022 6:49 AM

## 2022-11-18 ENCOUNTER — TELEPHONE (OUTPATIENT)
Dept: SURGERY | Age: 62
End: 2022-11-18

## 2022-11-18 DIAGNOSIS — Z90.49 S/P COLECTOMY: ICD-10-CM

## 2022-11-18 DIAGNOSIS — K57.32 SIGMOID DIVERTICULITIS: ICD-10-CM

## 2022-11-18 DIAGNOSIS — N32.1 COLOVESICAL FISTULA: ICD-10-CM

## 2022-11-18 RX ORDER — OXYCODONE HYDROCHLORIDE AND ACETAMINOPHEN 5; 325 MG/1; MG/1
1 TABLET ORAL EVERY 6 HOURS PRN
Qty: 20 TABLET | Refills: 0 | Status: SHIPPED | OUTPATIENT
Start: 2022-11-18 | End: 2022-11-25

## 2022-11-18 NOTE — TELEPHONE ENCOUNTER
Patient states he is still having a lot of post op pain, and is almost our of his pain medication and will run out over the weekend. Requesting a refill, please call to advise.

## 2022-11-18 NOTE — TELEPHONE ENCOUNTER
Patient has sigmoid colectomy on 11-8. Will run out of pain medicine this weekend. Sent message to MJKATELYN and SAEID to fill since [facility] is [facility].

## 2022-12-01 ENCOUNTER — OFFICE VISIT (OUTPATIENT)
Dept: SURGERY | Age: 62
End: 2022-12-01

## 2022-12-01 VITALS
WEIGHT: 146 LBS | BODY MASS INDEX: 19.8 KG/M2 | SYSTOLIC BLOOD PRESSURE: 131 MMHG | HEART RATE: 85 BPM | DIASTOLIC BLOOD PRESSURE: 92 MMHG

## 2022-12-01 DIAGNOSIS — K57.32 SIGMOID DIVERTICULITIS: Primary | ICD-10-CM

## 2022-12-01 DIAGNOSIS — Z90.49 S/P COLECTOMY: ICD-10-CM

## 2022-12-01 DIAGNOSIS — N32.1 COLOVESICAL FISTULA: ICD-10-CM

## 2022-12-01 PROCEDURE — 99024 POSTOP FOLLOW-UP VISIT: CPT | Performed by: SURGERY

## 2022-12-01 RX ORDER — HYDROCODONE BITARTRATE AND ACETAMINOPHEN 5; 325 MG/1; MG/1
1 TABLET ORAL EVERY 6 HOURS PRN
Qty: 20 TABLET | Refills: 0 | Status: SHIPPED | OUTPATIENT
Start: 2022-12-01 | End: 2022-12-06

## 2022-12-21 NOTE — PROGRESS NOTES
Post Operative Visit    [address]  [place] [facility]   Anshul Phan MD    [facility] [address]  [place] 24  Phone: [phone]  Fax: Gayle Trujillo   YOB: 1960    Date of Visit:  2022    No ref. provider found  Heriberto Avelar MD    Subjective:     Carie Oglesby presents for a three week follow-up s/p RAL sigmoid colectomy, small bowel resection. Overall he has been doing well since his operation. He has been eating/drinking without difficulty. His bowel movements have returned to normal.  He has not had any nausea and vomiting. There has been some relief of his pain. It is  controlled with his current pain regimen. He denies any fevers or chills. Pain Score:   6    Allergies   Allergen Reactions    Ativan [Lorazepam] Other (See Comments)     Post op overdose- caused heart to stop, CPR administered    Zoloft [Sertraline Hcl]      diarrhea and stomach distress per PT    Doxycycline Rash     Outpatient Medications Marked as Taking for the 22 encounter (Office Visit) with Tabatha Hill MD   Medication Sig Dispense Refill    [] HYDROcodone-acetaminophen (NORCO) 5-325 MG per tablet Take 1 tablet by mouth every 6 hours as needed for Pain for up to 5 days. Intended supply: 5 days.  Take lowest dose possible to manage pain 20 tablet 0    docusate sodium (COLACE) 100 MG capsule Take 1 capsule by mouth 2 times daily 60 capsule 0    ALPRAZolam (XANAX) 0.5 MG tablet Take 0.5 mg by mouth as needed for Sleep.      lactobacillus (CULTURELLE) CAPS capsule TAKE 1 CAPSULE BY MOUTH DAILY 30 capsule 0    ibuprofen (ADVIL;MOTRIN) 400 MG tablet Take 1 tablet by mouth 4 times daily as needed for Pain 100 tablet 0    JANUVIA 100 MG tablet Take 100 mg by mouth daily       albuterol sulfate HFA (PROVENTIL;VENTOLIN;PROAIR) 108 (90 Base) MCG/ACT inhaler Inhale 2 puffs into the lungs 4 times daily as needed for Wheezing or Shortness of Breath      levothyroxine (SYNTHROID) 50 MCG tablet Take 50 mcg by mouth Daily      ondansetron (ZOFRAN ODT) 4 MG disintegrating tablet Take 1-2 tablets by mouth every 12 hours as needed for Nausea May Sub regular tablet (non-ODT) if insurance does not cover ODT. 12 tablet 0       Vitals:    12/01/22 1201   BP: (!) 131/92   Pulse: 85   Weight: 146 lb (66.2 kg)     Body mass index is 19.8 kg/m². Wt Readings from Last 3 Encounters:   12/01/22 146 lb (66.2 kg)   11/12/22 146 lb 6.2 oz (66.4 kg)   10/13/22 142 lb (64.4 kg)     BP Readings from Last 3 Encounters:   12/01/22 (!) 131/92   11/12/22 115/61   10/13/22 (!) 141/98          Objective:    CONSTITUTIONAL:  awake, alert, no apparent distress    LUNGS:  Resp easy and unlabored  ABDOMEN:  incisions c/d/I, no erythema or induration, soft, non-distended, non-tender, voluntary guarding absent,  and hernia absent  MUSCULOSKELETAL: No edema  NEUROLOGIC:  Mental Status Exam:  Level of Alertness:   awake  Orientation:   person, place, time  SKIN: as above      Pathology:  FINAL DIAGNOSIS:        A. Sigmoid colon, resection:        - Large bowel with diverticular disease, including perforation with          associated ulceration, mural abscess, and acute adhesive serositis        - No evidence of dysplasia or malignancy        B. Small bowel, resection:        - Portion of small bowel with acute adhesive serositis and          associated mural abscess        - No evidence of dysplasia or malignancy      KIRSE/KIRSE     ASSESSMENT:     Diagnosis Orders   1. Sigmoid diverticulitis  HYDROcodone-acetaminophen (NORCO) 5-325 MG per tablet      2. Colovesical fistula  HYDROcodone-acetaminophen (NORCO) 5-325 MG per tablet      3. S/P colectomy  HYDROcodone-acetaminophen (NORCO) 5-325 MG per tablet          PLAN:    Star Hendricks is doing well s/p RAL sigmoid colectomy, small bowel resection. Incisions are healing appropriately.   Will plan on having him follow up in 3-4 weeks prn. He is to continue with lifting restrictions for the next 3weeks to reduce the chance of hernia. Norco given for pain control.     Electronically signed by Laura Duke MD

## 2023-01-05 ENCOUNTER — OFFICE VISIT (OUTPATIENT)
Dept: SURGERY | Age: 63
End: 2023-01-05

## 2023-01-05 VITALS — HEART RATE: 80 BPM | DIASTOLIC BLOOD PRESSURE: 89 MMHG | SYSTOLIC BLOOD PRESSURE: 131 MMHG

## 2023-01-05 DIAGNOSIS — K57.32 SIGMOID DIVERTICULITIS: Primary | ICD-10-CM

## 2023-01-05 DIAGNOSIS — Z90.49 S/P COLECTOMY: ICD-10-CM

## 2023-01-05 DIAGNOSIS — N32.1 COLOVESICAL FISTULA: ICD-10-CM

## 2023-01-05 PROCEDURE — 99024 POSTOP FOLLOW-UP VISIT: CPT | Performed by: SURGERY

## 2023-01-05 NOTE — PROGRESS NOTES
Post Operative Visit    [address]  [facility] and Vascular Surgery   Jean Rosales MD    [address]  [place]  Phone: [phone]  Fax: Gayle Trujillo   YOB: 1960    Date of Visit:  1/5/2023    No ref. provider found  Arnol Bush MD    Subjective:     Boaz Garner presents for a six week follow-up s/p RAL sigmoid colectomy. Overall he has been doing well since his operation. He has been eating/drinking without difficulty. His bowel movements are slightly constipated, but have been regular with alternating colace and miralax every other day. Allergies   Allergen Reactions    Ativan [Lorazepam] Other (See Comments)     Post op overdose- caused heart to stop, CPR administered    Zoloft [Sertraline Hcl]      diarrhea and stomach distress per PT    Doxycycline Rash     No outpatient medications have been marked as taking for the 1/5/23 encounter (Appointment) with Royann Epley, MD.       There were no vitals filed for this visit. There is no height or weight on file to calculate BMI. Wt Readings from Last 3 Encounters:   12/01/22 146 lb (66.2 kg)   11/12/22 146 lb 6.2 oz (66.4 kg)   10/13/22 142 lb (64.4 kg)     BP Readings from Last 3 Encounters:   12/01/22 (!) 131/92   11/12/22 115/61   10/13/22 (!) 141/98          Objective:    CONSTITUTIONAL:  awake, alert, no apparent distress    LUNGS:  Resp easy and unlabored  ABDOMEN:  incisions c/d/I, no erythema or induration, soft, non-distended, non-tender, voluntary guarding absent,  and hernia absent  MUSCULOSKELETAL: No edema  NEUROLOGIC:  Mental Status Exam:  Level of Alertness:   awake  Orientation:   person, place, time  SKIN: as above      Pathology:  FINAL DIAGNOSIS:        A.  Sigmoid colon, resection:        - Large bowel with diverticular disease, including perforation with          associated ulceration, mural abscess, and acute adhesive serositis        - No evidence of dysplasia or malignancy        B. Small bowel, resection:        - Portion of small bowel with acute adhesive serositis and          associated mural abscess        - No evidence of dysplasia or malignancy      KIRSE/KIRSE     ASSESSMENT:     Diagnosis Orders   1. Sigmoid diverticulitis        2. Colovesical fistula        3. S/P colectomy            PLAN:    Zoe Shields is doing well s/p RAL sigmoid colectomy. Incisions are healing appropriately. Will plan on having him follow up prn.     Electronically signed by Ree Malone MD on 1/5/2023 at 9:49 AM

## 2023-01-05 NOTE — Clinical Note
Hi Dr. Ross Done,  I just saw Mr. Danyel Mcallister in the office for his 6 week follow up after sigmoid colectomy. He is doing very well. He is going to follow up with me as needed. Thank you for allowing me to take care of Mr. Martinez with you.   Vandana Traylor

## 2023-05-17 ENCOUNTER — OFFICE VISIT (OUTPATIENT)
Dept: DERMATOLOGY | Age: 63
End: 2023-05-17

## 2023-05-17 ENCOUNTER — NURSE ONLY (OUTPATIENT)
Dept: DERMATOLOGY | Age: 63
End: 2023-05-17

## 2023-05-17 DIAGNOSIS — L28.2 PRURIGO PAPULE: ICD-10-CM

## 2023-05-17 DIAGNOSIS — D22.9 BENIGN NEVUS: ICD-10-CM

## 2023-05-17 DIAGNOSIS — B35.3 TINEA PEDIS OF BOTH FEET: ICD-10-CM

## 2023-05-17 DIAGNOSIS — L29.9 PRURITUS OF SKIN: ICD-10-CM

## 2023-05-17 DIAGNOSIS — D48.5 NEOPLASM OF UNCERTAIN BEHAVIOR OF SKIN: Primary | ICD-10-CM

## 2023-05-17 DIAGNOSIS — L29.9 PRURITUS OF SKIN: Primary | ICD-10-CM

## 2023-05-17 RX ORDER — KETOCONAZOLE 20 MG/G
CREAM TOPICAL
Qty: 60 G | Refills: 2 | Status: SHIPPED | OUTPATIENT
Start: 2023-05-17

## 2023-05-17 RX ORDER — CLOBETASOL PROPIONATE 0.5 MG/G
OINTMENT TOPICAL
Qty: 60 G | Refills: 2 | Status: SHIPPED | OUTPATIENT
Start: 2023-05-17

## 2023-05-17 RX ORDER — CLOBETASOL PROPIONATE 0.5 MG/G
CREAM TOPICAL
Qty: 60 G | Refills: 2 | Status: SHIPPED | OUTPATIENT
Start: 2023-05-17

## 2023-05-17 NOTE — PROGRESS NOTES
Patient here for UVB treatment. Tolerating treatment well with no signs of burning. restart dose per protocol.

## 2023-05-17 NOTE — PROGRESS NOTES
[address] [place] [address] Dermatology  Wild Casillas M.D.  [phone]       [address] [place] [address]  1960    65 y.o. male     Date of Visit: 5/17/2023    Chief Complaint:   Chief Complaint   Patient presents with    Skin Lesion        I was asked to see this patient by Dr. Diaz ref. provider found. History of Present Illness:  1. TBE    Multiple nevi. Patient has not noticed any new or changing pigmented lesions. Stable in size, shape, color. Not itching, bleeding. Long history of pruritus of skin and secondary prurigo nodules. Treated with narrowband UVB phototherapy [id number]. Also treated with intralesional Kenalog, clobetasol ointment. Does have a home hand unit that he can use for moderate and localized areas. Was then doing well in 2019, has had no treatment for pruritus since then-pruritus and prurigo nodules slowly recurred over the last 1 to 2 years. Wife was sick and subsequently succumbed to pancreatic cancer about 5 months ago. New keratotic papule right proximal anterior lower leg. New in the last several months-seems different from his other prurigo nodules. History of oral squamous cell carcinoma-in remission for years. Still follows with an oncologist.  Gets some of his health care at the [address] [facility]      Skin history:     Long history of generalized pruritus/prurigo nodules-started after treatment for oral squamous cell carcinoma. Treated with narrowband UVB phototherapy, intralesional Kenalog, clobetasol ointment. Bought home UVB hand unit    Review of Systems:  Constitutional: Reports general sense of well-being       Past Medical History, Surgical History, Family History, Medications and Allergies reviewed.     Social History:   Social History     Socioeconomic History    Marital status: Single     Spouse name: Not on file    Number of children: Not on file    Years of education: Not on file    Highest education level: Not on file   Occupational History    Not on file   Tobacco Use    Smoking

## 2023-05-22 ENCOUNTER — NURSE ONLY (OUTPATIENT)
Dept: DERMATOLOGY | Age: 63
End: 2023-05-22
Payer: MEDICAID

## 2023-05-22 DIAGNOSIS — L29.9 PRURITUS OF SKIN: Primary | ICD-10-CM

## 2023-05-22 LAB — DERMATOLOGY PATHOLOGY REPORT: NORMAL

## 2023-05-22 PROCEDURE — 96900 ACTINOTHERAPY UV LIGHT: CPT | Performed by: DERMATOLOGY

## 2023-05-24 ENCOUNTER — NURSE ONLY (OUTPATIENT)
Dept: DERMATOLOGY | Age: 63
End: 2023-05-24

## 2023-05-24 DIAGNOSIS — L29.9 PRURITUS OF SKIN: Primary | ICD-10-CM

## 2023-05-24 NOTE — PROGRESS NOTES
Patient here for UVB treatment. Tolerating treatment well with no signs of burning. increase dose per protocol.

## 2023-05-30 ENCOUNTER — NURSE ONLY (OUTPATIENT)
Dept: DERMATOLOGY | Age: 63
End: 2023-05-30
Payer: MEDICAID

## 2023-05-30 DIAGNOSIS — L29.9 PRURITUS OF SKIN: Primary | ICD-10-CM

## 2023-05-30 PROCEDURE — 96900 ACTINOTHERAPY UV LIGHT: CPT | Performed by: DERMATOLOGY

## 2023-06-01 ENCOUNTER — NURSE ONLY (OUTPATIENT)
Dept: DERMATOLOGY | Age: 63
End: 2023-06-01

## 2023-06-01 DIAGNOSIS — L29.9 PRURITUS OF SKIN: Primary | ICD-10-CM

## 2023-06-05 ENCOUNTER — NURSE ONLY (OUTPATIENT)
Dept: DERMATOLOGY | Age: 63
End: 2023-06-05
Payer: MEDICAID

## 2023-06-05 DIAGNOSIS — L29.9 PRURITUS OF SKIN: Primary | ICD-10-CM

## 2023-06-05 PROCEDURE — 96900 ACTINOTHERAPY UV LIGHT: CPT | Performed by: DERMATOLOGY

## 2023-06-19 ENCOUNTER — NURSE ONLY (OUTPATIENT)
Dept: DERMATOLOGY | Age: 63
End: 2023-06-19
Payer: MEDICAID

## 2023-06-19 DIAGNOSIS — D48.5 NEOPLASM OF UNCERTAIN BEHAVIOR OF SKIN: Primary | ICD-10-CM

## 2023-06-19 PROCEDURE — 96900 ACTINOTHERAPY UV LIGHT: CPT | Performed by: DERMATOLOGY

## 2023-06-19 NOTE — PROGRESS NOTES
Patient here for UVB treatment. Tolerating treatment well with no signs of burning. decreased dose per protocol by 25%.

## 2023-06-21 ENCOUNTER — NURSE ONLY (OUTPATIENT)
Dept: DERMATOLOGY | Age: 63
End: 2023-06-21

## 2023-06-21 DIAGNOSIS — L29.9 PRURITUS OF SKIN: Primary | ICD-10-CM

## 2023-06-26 ENCOUNTER — NURSE ONLY (OUTPATIENT)
Dept: DERMATOLOGY | Age: 63
End: 2023-06-26
Payer: MEDICAID

## 2023-06-26 DIAGNOSIS — L29.9 PRURITUS OF SKIN: Primary | ICD-10-CM

## 2023-06-26 PROCEDURE — 96900 ACTINOTHERAPY UV LIGHT: CPT | Performed by: DERMATOLOGY

## 2023-07-10 ENCOUNTER — NURSE ONLY (OUTPATIENT)
Dept: DERMATOLOGY | Age: 63
End: 2023-07-10
Payer: MEDICAID

## 2023-07-10 DIAGNOSIS — L29.9 PRURITUS OF SKIN: Primary | ICD-10-CM

## 2023-07-10 PROCEDURE — 96900 ACTINOTHERAPY UV LIGHT: CPT | Performed by: DERMATOLOGY

## 2023-07-10 NOTE — PROGRESS NOTES
Patient here for UVB treatment. Tolerating treatment well with no signs of burning. decreased dose per protocol.

## 2023-07-12 ENCOUNTER — NURSE ONLY (OUTPATIENT)
Dept: DERMATOLOGY | Age: 63
End: 2023-07-12

## 2023-07-12 DIAGNOSIS — L29.9 PRURITUS OF SKIN: Primary | ICD-10-CM

## 2023-07-18 ENCOUNTER — OFFICE VISIT (OUTPATIENT)
Dept: DERMATOLOGY | Age: 63
End: 2023-07-18
Payer: MEDICAID

## 2023-07-18 ENCOUNTER — NURSE ONLY (OUTPATIENT)
Dept: DERMATOLOGY | Age: 63
End: 2023-07-18
Payer: MEDICAID

## 2023-07-18 DIAGNOSIS — L29.9 PRURITUS OF SKIN: Primary | ICD-10-CM

## 2023-07-18 DIAGNOSIS — L28.2 PRURIGO PAPULE: ICD-10-CM

## 2023-07-18 PROCEDURE — 96900 ACTINOTHERAPY UV LIGHT: CPT | Performed by: DERMATOLOGY

## 2023-07-18 PROCEDURE — 3017F COLORECTAL CA SCREEN DOC REV: CPT | Performed by: DERMATOLOGY

## 2023-07-18 PROCEDURE — G8427 DOCREV CUR MEDS BY ELIG CLIN: HCPCS | Performed by: DERMATOLOGY

## 2023-07-18 PROCEDURE — 1036F TOBACCO NON-USER: CPT | Performed by: DERMATOLOGY

## 2023-07-18 PROCEDURE — G8420 CALC BMI NORM PARAMETERS: HCPCS | Performed by: DERMATOLOGY

## 2023-07-18 PROCEDURE — 99214 OFFICE O/P EST MOD 30 MIN: CPT | Performed by: DERMATOLOGY

## 2023-07-18 NOTE — PROGRESS NOTES
[facility]) Dermatology  Bradford Hudson M.D.  [address] [place] [address]  1960    59 y.o. male     Date of Visit: 7/18/2023    Chief Complaint:   Chief Complaint   Patient presents with    Skin Lesion        I was asked to see this patient by Dr. Diaz ref. provider found. History of Present Illness:  1. Patient presents today for follow-up of pruritus and prurigo nodules. Has been on narrowband UVB phototherapy but is mostly coming 2 times per week-has had a couple of breaks due to his schedule where he was off for 1 to 2 weeks. Overall has improved even on his current regimen. Prurigo nodule right lower leg removed at his last visit has resolved-patient happy with improvement. Does have clobetasol cream and ointment-uses these twice daily as needed. Prefers clobetasol cream.    Skin history:     Long history of generalized pruritus/prurigo nodules-started after treatment for oral squamous cell carcinoma. Treated with narrowband UVB phototherapy, intralesional Kenalog, clobetasol ointment. Bought home UVB hand unit    Review of Systems:  Constitutional: Reports general sense of well-being       Past Medical History, Surgical History, Family History, Medications and Allergies reviewed.     Social History:   Social History     Socioeconomic History    Marital status: Single     Spouse name: Not on file    Number of children: Not on file    Years of education: Not on file    Highest education level: Not on file   Occupational History    Not on file   Tobacco Use    Smoking status: Former     Packs/day: 1.00     Years: 30.00     Pack years: 30.00     Types: Cigarettes    Smokeless tobacco: Never   Vaping Use    Vaping Use: Never used   Substance and Sexual Activity    Alcohol use: No    Drug use: Yes     Types: Marijuana (Weed)     Comment: rarely- told to hold for surgery    Sexual activity: Never   Other Topics Concern    Not on file   Social History Narrative    Not on file     Social

## 2023-07-24 ENCOUNTER — HOSPITAL ENCOUNTER (OUTPATIENT)
Dept: GENERAL RADIOLOGY | Age: 63
Discharge: HOME | End: 2023-07-24
Attending: STUDENT IN AN ORGANIZED HEALTH CARE EDUCATION/TRAINING PROGRAM
Payer: MEDICAID

## 2023-07-24 ENCOUNTER — NURSE ONLY (OUTPATIENT)
Dept: DERMATOLOGY | Age: 63
End: 2023-07-24
Payer: MEDICAID

## 2023-07-24 ENCOUNTER — HOSPITAL ENCOUNTER (OUTPATIENT)
Age: 63
Discharge: HOME | End: 2023-07-24
Payer: MEDICAID

## 2023-07-24 ENCOUNTER — TRANSCRIBE ORDERS (OUTPATIENT)
Dept: GENERAL RADIOLOGY | Age: 63
End: 2023-07-24

## 2023-07-24 DIAGNOSIS — Z01.89 ENCOUNTER FOR IMAGING TO SCREEN FOR EYE METAL PRIOR TO MAGNETIC RESONANCE IMAGING (MRI): ICD-10-CM

## 2023-07-24 DIAGNOSIS — L29.9 PRURITUS OF SKIN: Primary | ICD-10-CM

## 2023-07-24 DIAGNOSIS — S32.038A OTH FRACTURE OF THIRD LUMBAR VERTEBRA, INIT FOR CLOS FX (HCC): ICD-10-CM

## 2023-07-24 PROCEDURE — 72100 X-RAY EXAM L-S SPINE 2/3 VWS: CPT

## 2023-07-24 PROCEDURE — 70030 X-RAY EYE FOR FOREIGN BODY: CPT

## 2023-07-24 PROCEDURE — 72040 X-RAY EXAM NECK SPINE 2-3 VW: CPT

## 2023-07-24 PROCEDURE — 96900 ACTINOTHERAPY UV LIGHT: CPT | Performed by: DERMATOLOGY

## 2023-07-25 ENCOUNTER — HOSPITAL ENCOUNTER (OUTPATIENT)
Dept: MRI IMAGING | Age: 63
Discharge: HOME | End: 2023-07-25
Attending: STUDENT IN AN ORGANIZED HEALTH CARE EDUCATION/TRAINING PROGRAM
Payer: MEDICAID

## 2023-07-25 DIAGNOSIS — S32.038A OTH FRACTURE OF THIRD LUMBAR VERTEBRA, INIT FOR CLOS FX (HCC): ICD-10-CM

## 2023-07-25 PROCEDURE — 72148 MRI LUMBAR SPINE W/O DYE: CPT

## 2023-07-26 ENCOUNTER — NURSE ONLY (OUTPATIENT)
Dept: DERMATOLOGY | Age: 63
End: 2023-07-26
Payer: MEDICAID

## 2023-07-26 DIAGNOSIS — L29.9 PRURITUS OF SKIN: Primary | ICD-10-CM

## 2023-07-26 PROCEDURE — 96900 ACTINOTHERAPY UV LIGHT: CPT | Performed by: DERMATOLOGY

## 2023-08-11 ENCOUNTER — TELEPHONE (OUTPATIENT)
Dept: DERMATOLOGY | Age: 63
End: 2023-08-11

## 2023-08-11 NOTE — TELEPHONE ENCOUNTER
Pt stated he spoke with his hematologist physician and he has the okay to be put on dupixent   Please advise   Thanks   C/b [phone]

## 2023-08-14 ENCOUNTER — NURSE ONLY (OUTPATIENT)
Dept: DERMATOLOGY | Age: 63
End: 2023-08-14
Payer: MEDICAID

## 2023-08-14 DIAGNOSIS — L29.9 PRURITUS OF SKIN: Primary | ICD-10-CM

## 2023-08-14 PROCEDURE — 96900 ACTINOTHERAPY UV LIGHT: CPT | Performed by: DERMATOLOGY

## 2023-08-14 NOTE — TELEPHONE ENCOUNTER
Called pt @ [phone] [facility] stating that Dr. Sharmaine Devlin wants to know if you had spoken with your oncologist regarding your malignancy?

## 2023-08-14 NOTE — TELEPHONE ENCOUNTER
Spoke with pt today when he came in for his uvb treatment. Pt signed [facility] enrollment form  Pt states is due this week for labs at the [place] and will get letter from oncology for [facility]     I have completed the [facility] enrollment form   Do you want me to go ahead and get things started?

## 2023-08-14 NOTE — PROGRESS NOTES
Patient here for UVB treatment. Tolerating treatment well with no signs of burning. Decreased dose per protocol.  Restart

## 2023-08-21 ENCOUNTER — NURSE ONLY (OUTPATIENT)
Dept: DERMATOLOGY | Age: 63
End: 2023-08-21
Payer: MEDICAID

## 2023-08-21 DIAGNOSIS — L29.9 PRURITUS OF SKIN: Primary | ICD-10-CM

## 2023-08-21 PROCEDURE — 96900 ACTINOTHERAPY UV LIGHT: CPT | Performed by: DERMATOLOGY

## 2023-08-23 ENCOUNTER — NURSE ONLY (OUTPATIENT)
Dept: DERMATOLOGY | Age: 63
End: 2023-08-23
Payer: MEDICAID

## 2023-08-23 DIAGNOSIS — L29.9 PRURITUS OF SKIN: Primary | ICD-10-CM

## 2023-08-23 PROCEDURE — 96900 ACTINOTHERAPY UV LIGHT: CPT | Performed by: DERMATOLOGY

## 2023-08-24 NOTE — TELEPHONE ENCOUNTER
Called and left message for patient regarding letter form Oncologist. Dr. Theodore Sullivan is requiring this [facility]. Paperwork sign and ready to be faxed just waiting on letter.

## 2023-08-28 ENCOUNTER — NURSE ONLY (OUTPATIENT)
Dept: DERMATOLOGY | Age: 63
End: 2023-08-28
Payer: MEDICAID

## 2023-08-28 DIAGNOSIS — L29.9 PRURITUS OF SKIN: Primary | ICD-10-CM

## 2023-08-28 PROCEDURE — 96900 ACTINOTHERAPY UV LIGHT: CPT | Performed by: DERMATOLOGY

## 2023-08-28 NOTE — PROGRESS NOTES
Patient here for UVB treatment. Tolerating treatment well with no signs of burning. increased dose per protocol.

## 2023-08-30 ENCOUNTER — NURSE ONLY (OUTPATIENT)
Dept: DERMATOLOGY | Age: 63
End: 2023-08-30
Payer: MEDICAID

## 2023-08-30 DIAGNOSIS — L29.9 PRURITUS OF SKIN: Primary | ICD-10-CM

## 2023-08-30 PROCEDURE — 96900 ACTINOTHERAPY UV LIGHT: CPT | Performed by: DERMATOLOGY

## 2023-09-06 ENCOUNTER — NURSE ONLY (OUTPATIENT)
Dept: DERMATOLOGY | Age: 63
End: 2023-09-06

## 2023-09-06 DIAGNOSIS — L29.9 PRURITUS OF SKIN: Primary | ICD-10-CM

## 2023-09-06 NOTE — PROGRESS NOTES
Patient here for UVB treatment. Tolerating treatment well with no signs of burning. Held dose per protocol.

## 2023-09-11 ENCOUNTER — NURSE ONLY (OUTPATIENT)
Dept: DERMATOLOGY | Age: 63
End: 2023-09-11
Payer: MEDICAID

## 2023-09-11 DIAGNOSIS — L29.9 PRURITUS OF SKIN: Primary | ICD-10-CM

## 2023-09-11 PROCEDURE — 96900 ACTINOTHERAPY UV LIGHT: CPT | Performed by: DERMATOLOGY

## 2023-09-13 ENCOUNTER — NURSE ONLY (OUTPATIENT)
Dept: DERMATOLOGY | Age: 63
End: 2023-09-13
Payer: MEDICAID

## 2023-09-13 DIAGNOSIS — L29.9 PRURITUS OF SKIN: Primary | ICD-10-CM

## 2023-09-13 PROCEDURE — 96900 ACTINOTHERAPY UV LIGHT: CPT | Performed by: DERMATOLOGY

## 2023-09-18 ENCOUNTER — NURSE ONLY (OUTPATIENT)
Dept: DERMATOLOGY | Age: 63
End: 2023-09-18
Payer: MEDICAID

## 2023-09-18 ENCOUNTER — HOSPITAL ENCOUNTER (OUTPATIENT)
Dept: MRI IMAGING | Age: 63
Discharge: HOME | End: 2023-09-18
Attending: STUDENT IN AN ORGANIZED HEALTH CARE EDUCATION/TRAINING PROGRAM
Payer: MEDICAID

## 2023-09-18 ENCOUNTER — HOSPITAL ENCOUNTER (OUTPATIENT)
Dept: GENERAL RADIOLOGY | Age: 63
Discharge: HOME | End: 2023-09-18
Attending: STUDENT IN AN ORGANIZED HEALTH CARE EDUCATION/TRAINING PROGRAM
Payer: MEDICAID

## 2023-09-18 DIAGNOSIS — S32.038A OTH FRACTURE OF THIRD LUMBAR VERTEBRA, INIT FOR CLOS FX (HCC): ICD-10-CM

## 2023-09-18 DIAGNOSIS — S32.038A: ICD-10-CM

## 2023-09-18 DIAGNOSIS — L29.9 PRURITUS OF SKIN: Primary | ICD-10-CM

## 2023-09-18 PROCEDURE — 72141 MRI NECK SPINE W/O DYE: CPT

## 2023-09-18 PROCEDURE — 96900 ACTINOTHERAPY UV LIGHT: CPT | Performed by: DERMATOLOGY

## 2023-09-18 PROCEDURE — 72100 X-RAY EXAM L-S SPINE 2/3 VWS: CPT

## 2023-09-18 NOTE — PROGRESS NOTES
Patient here for UVB treatment. Tolerating treatment well with no signs of burning. increase dose per protocol.
Milagros Still

## 2023-09-25 ENCOUNTER — NURSE ONLY (OUTPATIENT)
Dept: DERMATOLOGY | Age: 63
End: 2023-09-25
Payer: MEDICAID

## 2023-09-25 DIAGNOSIS — L29.9 PRURITUS OF SKIN: Primary | ICD-10-CM

## 2023-09-25 PROCEDURE — 96900 ACTINOTHERAPY UV LIGHT: CPT | Performed by: DERMATOLOGY

## 2023-09-27 ENCOUNTER — NURSE ONLY (OUTPATIENT)
Dept: DERMATOLOGY | Age: 63
End: 2023-09-27
Payer: MEDICAID

## 2023-09-27 DIAGNOSIS — L29.9 PRURITUS OF SKIN: Primary | ICD-10-CM

## 2023-09-27 PROCEDURE — 96900 ACTINOTHERAPY UV LIGHT: CPT | Performed by: DERMATOLOGY

## 2023-10-02 ENCOUNTER — NURSE ONLY (OUTPATIENT)
Dept: DERMATOLOGY | Age: 63
End: 2023-10-02
Payer: MEDICAID

## 2023-10-02 DIAGNOSIS — L29.9 PRURITUS OF SKIN: Primary | ICD-10-CM

## 2023-10-02 PROCEDURE — 96900 ACTINOTHERAPY UV LIGHT: CPT | Performed by: DERMATOLOGY

## 2023-10-11 ENCOUNTER — NURSE ONLY (OUTPATIENT)
Dept: DERMATOLOGY | Age: 63
End: 2023-10-11
Payer: MEDICAID

## 2023-10-11 DIAGNOSIS — L29.9 PRURITUS OF SKIN: Primary | ICD-10-CM

## 2023-10-11 PROCEDURE — 96900 ACTINOTHERAPY UV LIGHT: CPT | Performed by: DERMATOLOGY

## 2023-10-23 ENCOUNTER — TELEPHONE (OUTPATIENT)
Dept: DERMATOLOGY | Age: 63
End: 2023-10-23

## 2023-10-23 ENCOUNTER — OFFICE VISIT (OUTPATIENT)
Dept: DERMATOLOGY | Age: 63
End: 2023-10-23
Payer: MEDICAID

## 2023-10-23 ENCOUNTER — NURSE ONLY (OUTPATIENT)
Dept: DERMATOLOGY | Age: 63
End: 2023-10-23
Payer: MEDICAID

## 2023-10-23 DIAGNOSIS — L28.2 PRURIGO PAPULE: ICD-10-CM

## 2023-10-23 DIAGNOSIS — L29.9 PRURITUS OF SKIN: Primary | ICD-10-CM

## 2023-10-23 DIAGNOSIS — Z79.899 HIGH RISK MEDICATION USE: ICD-10-CM

## 2023-10-23 PROCEDURE — 1036F TOBACCO NON-USER: CPT | Performed by: DERMATOLOGY

## 2023-10-23 PROCEDURE — G8420 CALC BMI NORM PARAMETERS: HCPCS | Performed by: DERMATOLOGY

## 2023-10-23 PROCEDURE — G8427 DOCREV CUR MEDS BY ELIG CLIN: HCPCS | Performed by: DERMATOLOGY

## 2023-10-23 PROCEDURE — G8484 FLU IMMUNIZE NO ADMIN: HCPCS | Performed by: DERMATOLOGY

## 2023-10-23 PROCEDURE — 99214 OFFICE O/P EST MOD 30 MIN: CPT | Performed by: DERMATOLOGY

## 2023-10-23 PROCEDURE — 3017F COLORECTAL CA SCREEN DOC REV: CPT | Performed by: DERMATOLOGY

## 2023-10-23 PROCEDURE — 96900 ACTINOTHERAPY UV LIGHT: CPT | Performed by: DERMATOLOGY

## 2023-10-23 RX ORDER — METHOCARBAMOL 500 MG/1
TABLET, FILM COATED ORAL
COMMUNITY
Start: 2023-09-20 | End: 2023-10-23

## 2023-10-23 NOTE — PATIENT INSTRUCTIONS
Your provider would like to start you on a specialty medication that will require a prior authorization from your insurance. Your provider send the prescription to our specialty pharmacy, [address] [place] [address] and to the  of the medication as well. Extension [facility] Spence will check with your insurance to see if the medication is covered, assist with appeals, and help with patient assistance if needed. The  will set up injection training and sharps disposal (if applicable). If your insurance requires you get your prescription filled at a specific specialty pharmacy, they will transfer it. It is important that you return any calls to your pharmacy liaison or the  or else this can result in a delay of receiving your medication. Please call your pharmacy liaison with any questions regarding the status of your medication.      Medication: [facility]

## 2023-10-23 NOTE — PROGRESS NOTES
Patient here for UVB treatment. Tolerating treatment well with no signs of burning. decrease dose per protocol.

## 2023-10-23 NOTE — PROGRESS NOTES
[address] Dermatology  Jesus Alston M.D.  [address] [place] [address]  1960    59 y.o. male     Date of Visit: 10/23/2023    Chief Complaint:   Chief Complaint   Patient presents with    Rash     3 mo UVB f/u- \"I think it is working\"        I was asked to see this patient by Dr. Diaz ref. provider found. History of Present Illness:  1. Patient presents today for follow-up of prurigo nodules-reestablished care 5/23 after several years. Having a severe flare of prurigo nodules-widespread over his chest, arms, back, lower legs. Restarted narrowband UVB phototherapy which she has been fairly consistent with since May. Does think that it is somewhat helpful although continues to have widespread pruritus and prurigo nodules. Gets some relief from pruritus with clobetasol under occlusion but continues to develop new lesions. Fellow with fellow in oncology at the [address] [place] [address] 9/14/2023 and then several times since then regarding [facility]. She discussed with her attending and they felt that this patient's risk of recurrence of his oral squamous cell carcinoma was at baseline and did not have any reason why he would be at higher risk of malignancy. We had considered Dupixent previously but had wanted to discuss with oncology first.  They had no objections to proceeding. Oncology contact: [phone]  [id number]    Skin history:     Long history of generalized pruritus/prurigo nodules-started after treatment for oral squamous cell carcinoma. Treated with narrowband UVB phototherapy, intralesional Kenalog, clobetasol ointment. Bought home UVB hand unit    5/23 long history of pruritus of skin and secondary prurigo nodules. Treated with narrowband UVB phototherapy [id number]. Also treated with intralesional Kenalog, clobetasol ointment. Does have a home hand unit that he can use for moderate and localized areas.   Was then doing well in 2019, has had no treatment for pruritus since then-pruritus and prurigo

## 2023-11-13 ENCOUNTER — TELEPHONE (OUTPATIENT)
Dept: DERMATOLOGY | Age: 63
End: 2023-11-13

## 2023-11-13 NOTE — TELEPHONE ENCOUNTER
Pt got his first two doses of Dupixent a couple weeks ago. He though he was supposed to take it every two weeks. He is not sure how to get his next couple of doses. He is not sure if it is automatic or does he need to call and get it sent to him?     Phone is [phone]

## 2023-11-14 NOTE — TELEPHONE ENCOUNTER
Abran Spence has reached out to patient on 11/08 and 11/10 to schedule refill, sending patient [facility] message to please call Abran Spence

## 2023-12-09 ENCOUNTER — HOSPITAL ENCOUNTER (OUTPATIENT)
Dept: CT IMAGING | Age: 63
Discharge: HOME | End: 2023-12-09
Attending: STUDENT IN AN ORGANIZED HEALTH CARE EDUCATION/TRAINING PROGRAM
Payer: MEDICAID

## 2023-12-09 DIAGNOSIS — M47.812 CERVICAL SPONDYLOSIS WITHOUT MYELOPATHY: ICD-10-CM

## 2023-12-09 PROCEDURE — 72125 CT NECK SPINE W/O DYE: CPT

## 2024-01-23 ENCOUNTER — OFFICE VISIT (OUTPATIENT)
Dept: DERMATOLOGY | Age: 64
End: 2024-01-23
Payer: MEDICAID

## 2024-01-23 DIAGNOSIS — L28.2 PRURIGO PAPULE: ICD-10-CM

## 2024-01-23 DIAGNOSIS — L29.9 PRURITUS OF SKIN: Primary | ICD-10-CM

## 2024-01-23 DIAGNOSIS — Z79.899 HIGH RISK MEDICATION USE: ICD-10-CM

## 2024-01-23 PROCEDURE — 1036F TOBACCO NON-USER: CPT | Performed by: DERMATOLOGY

## 2024-01-23 PROCEDURE — 99214 OFFICE O/P EST MOD 30 MIN: CPT | Performed by: DERMATOLOGY

## 2024-01-23 PROCEDURE — G8420 CALC BMI NORM PARAMETERS: HCPCS | Performed by: DERMATOLOGY

## 2024-01-23 PROCEDURE — G8427 DOCREV CUR MEDS BY ELIG CLIN: HCPCS | Performed by: DERMATOLOGY

## 2024-01-23 PROCEDURE — 3017F COLORECTAL CA SCREEN DOC REV: CPT | Performed by: DERMATOLOGY

## 2024-01-23 PROCEDURE — G8484 FLU IMMUNIZE NO ADMIN: HCPCS | Performed by: DERMATOLOGY

## 2024-01-23 NOTE — PROGRESS NOTES
[facility] Dermatology  Heidy Anne M.D.  [phone]       Gary Martinez  1960    63 y.o. male     Date of Visit: 1/23/2024    Chief Complaint:   Chief Complaint   Patient presents with    Skin Lesion        I was asked to see this patient by Dr. Diaz ref. provider found.    History of Present Illness:  1.  Patient presents today for follow-up of widespread prurigo nodules.  Started Dupixent 10/23.  States that he has had dramatic improvement in his pruritus.  Much more comfortable-improvement has started in the last month.  Does have diarrhea a few days after his dose that last for a few days.  No other side effects.  States that the diarrhea is minor compared to severity of his pruritus prior to starting Dupixent.  Still has multiple prurigo nodules but these seem to be improving.      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  Treated with narrowband UVB phototherapy, intralesional Kenalog, clobetasol ointment.  Bought home UVB hand unit     5/23 long history of pruritus of skin and secondary prurigo nodules.  Treated with narrowband UVB phototherapy [id number].  Also treated with intralesional Kenalog, clobetasol ointment.  Does have a home hand unit that he can use for moderate and localized areas.  Was then doing well in 2019, has had no treatment for pruritus since then-pruritus and prurigo nodules slowly recurred over the last 1 to 2 years.  Wife was sick and subsequently succumbed to pancreatic cancer about 5 months ago.    Review of Systems:  Constitutional: Reports general sense of well-being       Past Medical History, Surgical History, Family History, Medications and Allergies reviewed.    Social History:   Social History     Socioeconomic History    Marital status: Single     Spouse name: Not on file    Number of children: Not on file    Years of education: Not on

## 2024-01-29 ENCOUNTER — HOSPITAL ENCOUNTER (OUTPATIENT)
Age: 64
Discharge: HOME | End: 2024-01-29
Payer: MEDICAID

## 2024-01-29 ENCOUNTER — HOSPITAL ENCOUNTER (OUTPATIENT)
Dept: GENERAL RADIOLOGY | Age: 64
Discharge: HOME | End: 2024-01-29
Attending: STUDENT IN AN ORGANIZED HEALTH CARE EDUCATION/TRAINING PROGRAM
Payer: MEDICAID

## 2024-01-29 PROCEDURE — 72050 X-RAY EXAM NECK SPINE 4/5VWS: CPT

## 2024-02-29 ENCOUNTER — TELEPHONE (OUTPATIENT)
Dept: DERMATOLOGY | Age: 64
End: 2024-02-29

## 2024-03-05 ENCOUNTER — OFFICE VISIT (OUTPATIENT)
Dept: DERMATOLOGY | Age: 64
End: 2024-03-05
Payer: MEDICAID

## 2024-03-05 DIAGNOSIS — Z79.899 HIGH RISK MEDICATION USE: ICD-10-CM

## 2024-03-05 DIAGNOSIS — L28.2 PRURIGO PAPULE: ICD-10-CM

## 2024-03-05 DIAGNOSIS — L29.9 PRURITUS OF SKIN: Primary | ICD-10-CM

## 2024-03-05 PROCEDURE — 99214 OFFICE O/P EST MOD 30 MIN: CPT | Performed by: DERMATOLOGY

## 2024-03-05 PROCEDURE — G8420 CALC BMI NORM PARAMETERS: HCPCS | Performed by: DERMATOLOGY

## 2024-03-05 PROCEDURE — G8484 FLU IMMUNIZE NO ADMIN: HCPCS | Performed by: DERMATOLOGY

## 2024-03-05 PROCEDURE — G8427 DOCREV CUR MEDS BY ELIG CLIN: HCPCS | Performed by: DERMATOLOGY

## 2024-03-05 PROCEDURE — 1036F TOBACCO NON-USER: CPT | Performed by: DERMATOLOGY

## 2024-03-05 PROCEDURE — 3017F COLORECTAL CA SCREEN DOC REV: CPT | Performed by: DERMATOLOGY

## 2024-03-05 NOTE — PROGRESS NOTES
[facility] Dermatology  Heidy Anne M.D.  [phone]       Gary Martinez  1960    63 y.o. male     Date of Visit: 3/5/2024    Chief Complaint:   Chief Complaint   Patient presents with    Lesion(s)        I was asked to see this patient by Dr. Diaz ref. provider found.    History of Present Illness:  1.  Patient is today for follow-up.  Was in the hospital with RSV and pneumonia, held his Dupixent during his hospitalization.  Now doing well-no fevers, chills.  Energy is improving.  States that his oxygenation has improved-was sleeping with oxygen at night and no longer is.  Notes that he had had excellent improvement on Dupixent since his last visit in January-prurigo nodules healed completely and had had no itching.  Would very much like to restart Dupixent if possible-states that his quality of life had improved.  Felt that he had more improvement with Dupixent than with light.     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  Treated with narrowband UVB phototherapy, intralesional Kenalog, clobetasol ointment.  Bought home UVB hand unit     5/23 long history of pruritus of skin and secondary prurigo nodules.  Treated with narrowband UVB phototherapy [id number].  Also treated with intralesional Kenalog, clobetasol ointment.  Does have a home hand unit that he can use for moderate and localized areas.  Was then doing well in 2019, has had no treatment for pruritus since then-pruritus and prurigo nodules slowly recurred over the last 1 to 2 years.  Wife was sick and subsequently succumbed to pancreatic cancer about 5 months ago.    Review of Systems:  Constitutional: Reports general sense of well-being       Past Medical History, Surgical History, Family History, Medications and Allergies reviewed.    Social History:   Social History     Socioeconomic History    Marital status:

## 2024-05-17 ENCOUNTER — HOSPITAL ENCOUNTER (OUTPATIENT)
Dept: PHYSICAL THERAPY | Age: 64
Setting detail: THERAPIES SERIES
Discharge: HOME | End: 2024-05-17
Payer: MEDICAID

## 2024-05-17 DIAGNOSIS — R26.89 DECREASED FUNCTIONAL MOBILITY: ICD-10-CM

## 2024-05-17 DIAGNOSIS — G89.29 NECK PAIN, CHRONIC: Primary | ICD-10-CM

## 2024-05-17 DIAGNOSIS — R29.898 DECREASED ROM OF NECK: ICD-10-CM

## 2024-05-17 DIAGNOSIS — M54.2 NECK PAIN, CHRONIC: Primary | ICD-10-CM

## 2024-05-17 PROCEDURE — 97530 THERAPEUTIC ACTIVITIES: CPT

## 2024-05-17 PROCEDURE — 97161 PT EVAL LOW COMPLEX 20 MIN: CPT

## 2024-05-17 PROCEDURE — 97110 THERAPEUTIC EXERCISES: CPT

## 2024-05-17 NOTE — PLAN OF CARE
and aching  Pain decreases with: Ice, Heat, Medication, and lidocaine patches  Pain increases with: Activity and Movement- see subjective     Relevant Medical History:   Cancer (HCC)  head/neck/tongue    Diabetes mellitus (HCC)      Osteoarthritis         Other Medical History    Diagnosis Date Comment Source   Anxiety      Chronic back pain      Erectile dysfunction      Headache(784.0)      PONV (postoperative nausea and vomiting)      Restless legs syndrome      Urinary incontinence      Wears dentures             Living status: Lives alone    Occupation/School:  Work/School Status: Retired  Job Duties/Demands: NA    Sport/ Recreation/ Leisure/ Hobbies: Cut the grass    Review Of Systems (ROS):  [x] Performed Review of systems (Integumentary, CardioPulmonary, Neurological) by intake and observation. Intake form has been scanned into medical record. Patient has been instructed to contact their primary care physician regarding ROS issues if not already being addressed at this time.    [x] Patient history, allergies, meds reviewed. Medical chart reviewed. See intake form.     Co-morbidities/Complexities (which will affect course of rehabilitation):  []None        Arthritic conditions  [] Rheumatoid arthritis (M05.9)  [x] Osteoarthritis (M19.91)  [] Gout        Neurological conditions  [x] Prior Stroke (I69.30)  [] Parkinson's (G20)  [] Encephalopathy (G93.40)  [] Multiple Sclerosis (G35)  [] Post-polio (G14)  [] Spinal cord injury (SCI)  [] Traumatic brain injury (TBI)  [] ALS    Gastrointestinal conditions   [] Constipation (K59.00)  [] Chron's/ulcerative colitis    Endocrine conditions   [] Hypothyroid (E03.9)  [] Hyperthyroid [] Hx of COVID    Musculoskeletal conditions  [] Disc pathology   [] Congenital spine pathologies   [] Osteoporosis (M81.8)  [] Osteopenia (M85.8)  [] Scoliosis    Cardio/Pulmonary conditions  [] Asthma (J45)  [] Coughing   [] COPD (J44.9)  [] CHF  [] A-fib          Rheumatological 
  [] Progression is slowed due to complexities/Impairments listed.  [] Progression has been slowed due to co-morbidities.  [x] Plan just implemented, too soon (<30days) to assess goals progression   [] Goals require adjustment due to lack of progress  [] Patient is not progressing as expected and requires additional follow up with physician  [] Other:     TREATMENT PLAN     Frequency/Duration: 1-2x/week for 8 weeks for the following treatment interventions:    Interventions:  Therapeutic Exercise (23508) including: strength training, ROM, and functional mobility  Therapeutic Activities (74786) including: functional mobility training and education.  Neuromuscular Re-education (72262) activation and proprioception, including postural re-education.    Manual Therapy (21800) as indicated to include: Passive Range of Motion, Gr I-IV mobilizations, Soft Tissue Mobilization, and Dry Needling/IASTM  Modalities as needed that may include: Cryotherapy and Thermal Agents  Patient education on joint protection, postural re-education, activity modification, and progression of HEP    Plan: POC initiated as per evaluation    Electronically Signed by Shawna Boggs PT, DPT  Date: 05/17/2024     Note: Portions of this note have been templated and/or copied from initial evaluation, reassessments and prior notes for documentation efficiency.    Note: If patient does not return for scheduled/recommended follow up visits, this note will serve as a discharge from care along with the most recent update on progress.    Ortho Evaluation

## 2024-05-20 ENCOUNTER — HOSPITAL ENCOUNTER (OUTPATIENT)
Dept: PHYSICAL THERAPY | Age: 64
Setting detail: THERAPIES SERIES
Discharge: HOME | End: 2024-05-20
Payer: MEDICAID

## 2024-05-20 PROCEDURE — 97110 THERAPEUTIC EXERCISES: CPT

## 2024-05-20 PROCEDURE — 97140 MANUAL THERAPY 1/> REGIONS: CPT

## 2024-05-20 NOTE — PROGRESS NOTES
Multifactorial  Infection and bradycardia likely contributing   -bradycardia improved  -patient back to baseline, mental status AAO X2     Patient here for UVB treatment. Tolerating treatment well with no signs of burning. increase dose per protocol.

## 2024-05-20 NOTE — FLOWSHEET NOTE
Iontophoresis (24474)    VASO (49778)     Ultrasound (42206)    Group Therapy (75530)     Estim Attended (77007)    Canalith Repositioning (29469)     Other:    Other:    Total Timed Code Tx Minutes 38 3       Total Treatment Minutes 38        Charge Justification:  (27284) THERAPEUTIC EXERCISE - Provided verbal/tactile cueing for activities related to strengthening, flexibility, endurance, ROM performed to prevent loss of range of motion, maintain or improve muscular strength or increase flexibility, following either an injury or surgery.   (98994) THERAPEUTIC ACTIVITY - use of dynamic activities to improve functional performance. (Ex include squatting, ascending/descending stairs, walking, bending, lifting, catching, throwing, pushing, pulling, jumping.)  Direct, one on one contact, billed in 15-minute increments.    GOALS     Patient stated goal: \"solve the pain factors\"  [] Progressing: [] Met: [] Not Met: [] Adjusted    Therapist goals for Patient:   Short Term Goals: To be achieved in: 2 weeks  1. Independent in HEP and progression per patient tolerance, in order to prevent re-injury.   [] Progressing: [] Met: [] Not Met: [] Adjusted  2. Patient will have a decrease in pain to <3/10 to facilitate improvement in movement, function, and ADLs as indicated by Functional Deficits.  [] Progressing: [] Met: [] Not Met: [] Adjusted    Long Term Goals: To be achieved in: 8 weeks  1. Disability index score of 15/50 or less for the Modified Oswestry to assist with reaching prior level of function with activities such as ADLs.  [] Progressing: [] Met: [] Not Met: [] Adjusted  2. Patient will demonstrate increased AROM of cervical spine to 45 degrees in all directions without pain to allow for proper joint functioning to enable patient to drive safely.   [] Progressing: [] Met: [] Not Met: [] Adjusted  3. Patient will demonstrate increased Strength of deep cervical flexors/extensors to demonstrate improved muscle

## 2024-05-22 ENCOUNTER — APPOINTMENT (OUTPATIENT)
Dept: PHYSICAL THERAPY | Age: 64
End: 2024-05-22
Payer: MEDICAID

## 2024-05-22 RX ORDER — DUPILUMAB 300 MG/2ML
INJECTION, SOLUTION SUBCUTANEOUS
Qty: 4 ML | Refills: 6 | Status: ACTIVE | OUTPATIENT
Start: 2024-05-22

## 2024-05-23 ENCOUNTER — TELEPHONE (OUTPATIENT)
Dept: DERMATOLOGY | Age: 64
End: 2024-05-23

## 2024-05-23 NOTE — TELEPHONE ENCOUNTER
Submitted PA for Dupixent  [facility] Key: [id number] STATUS: PENDING.    Follow up done daily; if no decision with in three days we will refax.  If another three days goes by with no decision will call the insurance for status.

## 2024-05-24 NOTE — TELEPHONE ENCOUNTER
Approved on May 23  Medication approved: Dupixent.  Authorization Expiration Date: 5/21/2025    If this requires a response please respond to the pool ( P MHCX PSC MEDICATION PRE-AUTH).      Thank you please advise patient.

## 2024-05-28 ENCOUNTER — HOSPITAL ENCOUNTER (OUTPATIENT)
Dept: PHYSICAL THERAPY | Age: 64
Setting detail: THERAPIES SERIES
Discharge: HOME | End: 2024-05-28
Payer: MEDICAID

## 2024-05-28 PROCEDURE — 97140 MANUAL THERAPY 1/> REGIONS: CPT

## 2024-05-28 PROCEDURE — 97110 THERAPEUTIC EXERCISES: CPT
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
## 2024-05-30 ENCOUNTER — HOSPITAL ENCOUNTER (OUTPATIENT)
Dept: PHYSICAL THERAPY | Age: 64
Setting detail: THERAPIES SERIES
Discharge: HOME | End: 2024-05-30
Payer: MEDICAID

## 2024-05-30 PROCEDURE — 97110 THERAPEUTIC EXERCISES: CPT

## 2024-05-30 PROCEDURE — 97140 MANUAL THERAPY 1/> REGIONS: CPT
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
## 2024-06-03 ENCOUNTER — OFFICE VISIT (OUTPATIENT)
Dept: DERMATOLOGY | Age: 64
End: 2024-06-03
Payer: MEDICAID

## 2024-06-03 DIAGNOSIS — Z79.899 HIGH RISK MEDICATION USE: ICD-10-CM

## 2024-06-03 DIAGNOSIS — L28.2 PRURIGO PAPULE: ICD-10-CM

## 2024-06-03 DIAGNOSIS — L29.9 PRURITUS OF SKIN: Primary | ICD-10-CM

## 2024-06-03 PROCEDURE — G8420 CALC BMI NORM PARAMETERS: HCPCS | Performed by: DERMATOLOGY

## 2024-06-03 PROCEDURE — 1036F TOBACCO NON-USER: CPT | Performed by: DERMATOLOGY

## 2024-06-03 PROCEDURE — 99214 OFFICE O/P EST MOD 30 MIN: CPT | Performed by: DERMATOLOGY

## 2024-06-03 PROCEDURE — 3017F COLORECTAL CA SCREEN DOC REV: CPT | Performed by: DERMATOLOGY

## 2024-06-03 PROCEDURE — G8427 DOCREV CUR MEDS BY ELIG CLIN: HCPCS | Performed by: DERMATOLOGY

## 2024-06-03 NOTE — PROGRESS NOTES
[facility] Dermatology  Heidy Anne M.D.  [phone]       Gary Martinez  1960    63 y.o. male     Date of Visit: 6/3/2024    Chief Complaint:   Chief Complaint   Patient presents with    Skin Lesion        I was asked to see this patient by Dr. Diaz ref. provider found.    History of Present Illness:  1.  Patient presents today for ceqlah-yf-psuauneps Dupixent after his last visit and has been on Dupixent since March.  Previously had been off of Dupixent after hospitalization for pneumonia.  States that Dupixent recaptured control after about 1 to 2 weeks.  Very happy with his improvement.  Pruritus is negligible at this point and he has no prurigo nodules-all lesions have healed.  No difficulty with Dupixent-denies side effects.      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  Treated with narrowband UVB phototherapy, intralesional Kenalog, clobetasol ointment.  Bought home UVB hand unit     5/23 long history of pruritus of skin and secondary prurigo nodules.  Treated with narrowband UVB phototherapy 9527-3580.  Also treated with intralesional Kenalog, clobetasol ointment.  Does have a home hand unit that he can use for moderate and localized areas.  Was then doing well in 2019, has had no treatment for pruritus since then-pruritus and prurigo nodules slowly recurred over the last 1 to 2 years.  Started Dupixent 10/23    Wife was sick and subsequently succumbed to pancreatic cancer about 5 months ago.       Review of Systems:  Constitutional: Reports general sense of well-being       Past Medical History, Surgical History, Family History, Medications and Allergies reviewed.    Social History:   Social History     Socioeconomic History    Marital status: Single     Spouse name: Not on file    Number of children: Not on file    Years of education: Not on file    Highest

## 2024-06-05 ENCOUNTER — HOSPITAL ENCOUNTER (OUTPATIENT)
Dept: PHYSICAL THERAPY | Age: 64
Setting detail: THERAPIES SERIES
Discharge: HOME | End: 2024-06-05
Payer: MEDICAID

## 2024-06-05 PROCEDURE — 97530 THERAPEUTIC ACTIVITIES: CPT

## 2024-06-05 PROCEDURE — 97140 MANUAL THERAPY 1/> REGIONS: CPT

## 2024-06-05 PROCEDURE — 97110 THERAPEUTIC EXERCISES: CPT

## 2024-06-05 NOTE — FLOWSHEET NOTE
all directions without pain to allow for proper joint functioning to enable patient to drive safely.   [] Progressing: [] Met: [] Not Met: [] Adjusted  3. Patient will demonstrate increased Strength of deep cervical flexors/extensors to demonstrate improved muscle activation/motor control to allow for proper functional mobility to enable patient to return to prolonged sitting (1+ hours) without pain.   [] Progressing: [] Met: [] Not Met: [] Adjusted  4. Patient will return to sleeping for 5+ hours without increased symptoms or restriction in his neck.   [] Progressing: [] Met: [] Not Met: [] Adjusted  5. Patient will be able to clean his house without neck pain    [] Progressing: [] Met: [] Not Met: [] Adjusted     Overall Progression Towards Functional goals/ Treatment Progress Update:  [] Patient is progressing as expected towards functional goals listed.    [] Progression is slowed due to complexities/Impairments listed.  [] Progression has been slowed due to co-morbidities.  [x] Plan just implemented, too soon (<30days) to assess goals progression   [] Goals require adjustment due to lack of progress  [] Patient is not progressing as expected and requires additional follow up with physician  [] Other:     TREATMENT PLAN   Frequency/Duration: 1-2x/week for 8 weeks for the following treatment interventions:    Plan: Cont POC- Continue emphasis/focus on exercise progression, increasing ROM, and reduce/eliminate soft tissue swelling/inflammation/restriction. Next visit plan to progress weights, progress reps, and add new exercises .    Electronically Signed by Shawna Boggs PT, DPT Date: 06/05/2024     Note: Portions of this note have been templated and/or copied from initial evaluation, reassessments and prior notes for documentation efficiency.    Note: If patient does not return for scheduled/recommended follow up visits, this note will serve as a discharge from care along with the most recent update on progress.

## 2024-06-06 NOTE — ANESTHESIA PRE PROCEDURE
Peripheral IV  Date/Time: 6/6/2024 8:45 AM  Inserted by: IRINA Morley-CRNA    Placement  Needle size: 22 G  Laterality: right  Location: hand  Site prep: alcohol  Technique: anatomical landmarks  Attempts: 1         [facility] Department of Anesthesiology  Pre-Anesthesia Evaluation/Consultation       Name:  Talia Bailey  : 1960  Age:  62 y.o. MRN:  [id number]  Date: 8/10/2021           Surgeon: Surgeon(s):  Kristina Salazar MD    Procedure: Procedure(s):  LAPAROSCOPIC CHOLECYSTECTOMY WITH CHOLANGIOGRAM, POSSIBLE OPEN     Allergies   Allergen Reactions    Zoloft [Sertraline Hcl]      diarrhea and stomach distress per PT    Doxycycline Rash     Patient Active Problem List   Diagnosis    H/O tongue cancer    Rash and nonspecific skin eruption    Urinary incontinence    Elevated WBC count    Abdominal cramping    Mucous in stools    Diverticulitis    Moderate malnutrition ([address]    Diverticulitis of intestine with abscess without bleeding     Past Medical History:   Diagnosis Date    Anxiety     Cancer ([address]     head/neck/tongue    Chronic back pain     Diabetes mellitus ([address]     Erectile dysfunction     Headache(784.0)     Osteoarthritis     PONV (postoperative nausea and vomiting)     Restless legs syndrome     Urinary incontinence      Past Surgical History:   Procedure Laterality Date    TONSILLECTOMY Bilateral      Social History     Tobacco Use    Smoking status: Former Smoker     Packs/day: 1.00     Years: 20.00     Pack years: 20.00     Types: Cigarettes    Smokeless tobacco: Never Used   Vaping Use    Vaping Use: Never used   Substance Use Topics    Alcohol use: No    Drug use: Yes     Types: Marijuana     Medications  No current facility-administered medications on file prior to encounter.      Current Outpatient Medications on File Prior to Encounter   Medication Sig Dispense Refill    JANUVIA 100 MG tablet Take 100 mg by mouth daily       nystatin (MYCOSTATIN) 112769 UNIT/ML suspension TAKE 5 ML BY MOUTH FOUR TIMES DAILY (Patient taking differently: As needed) 160 mL 0    albuterol sulfate HFA (VENTOLIN HFA) 108 (90 Base) MCG/ACT inhaler Inhale 2 puffs into the lungs 4 times daily as needed for Wheezing or Shortness of Breath      levothyroxine (SYNTHROID) 25 MCG tablet Take 25 mcg by mouth Daily      ondansetron (ZOFRAN ODT) 4 MG disintegrating tablet Take 1-2 tablets by mouth every 12 hours as needed for Nausea May Sub regular tablet (non-ODT) if insurance does not cover ODT. 12 tablet 0    ALPRAZolam (XANAX) 0.5 MG tablet Take 0.5 mg by mouth 2 times daily as needed for Sleep or Anxiety. Current Facility-Administered Medications   Medication Dose Route Frequency Provider Last Rate Last Admin    0.9 % sodium chloride infusion   Intravenous Continuous Darrius Douglas MD        sodium chloride flush 0.9 % injection 5-40 mL  5-40 mL Intravenous 2 times per day Darrius Douglas MD        sodium chloride flush 0.9 % injection 5-40 mL  5-40 mL Intravenous PRN Darrius Douglas MD        0.9 % sodium chloride infusion  25 mL Intravenous PRN Darrius Douglas MD        ceFAZolin (ANCEF) 2000 mg in dextrose 5 % 100 mL IVPB  2,000 mg Intravenous On Call to [address] MD         Vital Signs (Current)   Vitals:    08/05/21 1628 08/10/21 0826   Weight: 141 lb (64 kg) 141 lb (64 kg)   Height: 6' (1.829 m)                                             Vital Signs Statistics (for past 48 hrs)     No data recorded  BP Readings from Last 3 Encounters:   07/26/21 127/87   07/08/21 (!) 129/90   06/28/21 100/81       BMI  Body mass index is 19.12 kg/m². Estimated body mass index is 19.12 kg/m² as calculated from the following:    Height as of this encounter: 6' (1.829 m). Weight as of this encounter: 141 lb (64 kg).     CBC   Lab Results   Component Value Date    WBC 10.3 08/05/2021    RBC 4.88 08/05/2021    HGB 15.5 08/05/2021    HCT 44.7 08/05/2021    MCV 91.5 08/05/2021    RDW 14.7 08/05/2021     08/05/2021     CMP    Lab Results   Component Value Date     08/05/2021    K 5.2 08/05/2021    K 4.4 (+) Diabetes, malignancy/cancer (head/neck/tongue). (-) hypothyroidism               Abdominal:             Vascular:     - DVT and PE. Other Findings:             Anesthesia Plan      general     ASA 3       Induction: intravenous. MIPS: Postoperative opioids intended and Prophylactic antiemetics administered. Anesthetic plan and risks discussed with patient. Plan discussed with CRNA. This pre-anesthesia assessment may be used as a history and physical.    DOS STAFF ADDENDUM:    Pt seen and examined, chart reviewed (including anesthesia, drug and allergy history). No interval changes to history and physical examination. Anesthetic plan, risks, benefits, alternatives, and personnel involved discussed with patient. Patient verbalized an understanding and agrees to proceed.       Clary Kemp MD  August 10, 2021  8:55 AM

## 2024-06-07 ENCOUNTER — HOSPITAL ENCOUNTER (OUTPATIENT)
Dept: PHYSICAL THERAPY | Age: 64
Setting detail: THERAPIES SERIES
Discharge: HOME | End: 2024-06-07
Payer: MEDICAID

## 2024-06-07 PROCEDURE — 97110 THERAPEUTIC EXERCISES: CPT

## 2024-06-07 PROCEDURE — 97140 MANUAL THERAPY 1/> REGIONS: CPT

## 2024-06-07 NOTE — FLOWSHEET NOTE
progress reps, and add new exercises .    Increasing thoracic/cervical mobility    Electronically Signed by Shawna Boggs PT, DPT Date: 06/07/2024     Note: Portions of this note have been templated and/or copied from initial evaluation, reassessments and prior notes for documentation efficiency.    Note: If patient does not return for scheduled/recommended follow up visits, this note will serve as a discharge from care along with the most recent update on progress.    Ortho Evaluation

## 2024-06-11 ENCOUNTER — OFFICE VISIT (OUTPATIENT)
Dept: ORTHOPEDIC SURGERY | Age: 64
End: 2024-06-11
Payer: MEDICAID

## 2024-06-11 ENCOUNTER — HOSPITAL ENCOUNTER (OUTPATIENT)
Dept: PHYSICAL THERAPY | Age: 64
Setting detail: THERAPIES SERIES
Discharge: HOME | End: 2024-06-11
Payer: MEDICAID

## 2024-06-11 VITALS — BODY MASS INDEX: 18.96 KG/M2 | HEIGHT: 72 IN | WEIGHT: 140 LBS

## 2024-06-11 DIAGNOSIS — M47.812 CERVICAL SPONDYLOSIS: ICD-10-CM

## 2024-06-11 DIAGNOSIS — M54.2 NECK PAIN: Primary | ICD-10-CM

## 2024-06-11 DIAGNOSIS — M43.12 ANTEROLISTHESIS OF CERVICAL SPINE: ICD-10-CM

## 2024-06-11 PROCEDURE — 97140 MANUAL THERAPY 1/> REGIONS: CPT

## 2024-06-11 PROCEDURE — G8427 DOCREV CUR MEDS BY ELIG CLIN: HCPCS | Performed by: PHYSICIAN ASSISTANT

## 2024-06-11 PROCEDURE — 1036F TOBACCO NON-USER: CPT | Performed by: PHYSICIAN ASSISTANT

## 2024-06-11 PROCEDURE — 97110 THERAPEUTIC EXERCISES: CPT

## 2024-06-11 PROCEDURE — 3017F COLORECTAL CA SCREEN DOC REV: CPT | Performed by: PHYSICIAN ASSISTANT

## 2024-06-11 PROCEDURE — 99203 OFFICE O/P NEW LOW 30 MIN: CPT | Performed by: PHYSICIAN ASSISTANT

## 2024-06-11 PROCEDURE — G8420 CALC BMI NORM PARAMETERS: HCPCS | Performed by: PHYSICIAN ASSISTANT

## 2024-06-11 RX ORDER — MELOXICAM 15 MG/1
15 TABLET ORAL DAILY
Qty: 30 TABLET | Refills: 1 | Status: SHIPPED | OUTPATIENT
Start: 2024-06-11

## 2024-06-11 RX ORDER — TIZANIDINE 4 MG/1
4 TABLET ORAL 4 TIMES DAILY PRN
Qty: 60 TABLET | Refills: 0 | Status: SHIPPED | OUTPATIENT
Start: 2024-06-11
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
## 2024-06-12 PROBLEM — M43.12 ANTEROLISTHESIS OF CERVICAL SPINE: Status: ACTIVE | Noted: 2024-06-12
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
## 2024-06-13 ENCOUNTER — HOSPITAL ENCOUNTER (OUTPATIENT)
Dept: PHYSICAL THERAPY | Age: 64
Setting detail: THERAPIES SERIES
End: 2024-06-13
Payer: MEDICAID

## 2024-06-15 ENCOUNTER — HOSPITAL ENCOUNTER (OUTPATIENT)
Dept: MRI IMAGING | Age: 64
Discharge: HOME | End: 2024-06-15
Payer: MEDICAID

## 2024-06-15 DIAGNOSIS — M47.812 CERVICAL SPONDYLOSIS: ICD-10-CM

## 2024-06-15 PROCEDURE — 72141 MRI NECK SPINE W/O DYE: CPT

## 2024-06-17 ENCOUNTER — OFFICE VISIT (OUTPATIENT)
Dept: ORTHOPEDIC SURGERY | Age: 64
End: 2024-06-17
Payer: MEDICAID

## 2024-06-17 VITALS — BODY MASS INDEX: 18.96 KG/M2 | WEIGHT: 140 LBS | HEIGHT: 72 IN

## 2024-06-17 DIAGNOSIS — M25.511 BILATERAL SHOULDER PAIN, UNSPECIFIED CHRONICITY: Primary | ICD-10-CM

## 2024-06-17 DIAGNOSIS — M25.512 BILATERAL SHOULDER PAIN, UNSPECIFIED CHRONICITY: Primary | ICD-10-CM

## 2024-06-17 DIAGNOSIS — M75.81 ROTATOR CUFF TENDONITIS, RIGHT: ICD-10-CM

## 2024-06-17 DIAGNOSIS — M43.12 ANTEROLISTHESIS OF CERVICAL SPINE: ICD-10-CM

## 2024-06-17 DIAGNOSIS — M75.82 ROTATOR CUFF TENDONITIS, LEFT: ICD-10-CM

## 2024-06-17 DIAGNOSIS — M48.02 CERVICAL STENOSIS OF SPINE: ICD-10-CM

## 2024-06-17 PROCEDURE — 3017F COLORECTAL CA SCREEN DOC REV: CPT | Performed by: PHYSICIAN ASSISTANT

## 2024-06-17 PROCEDURE — G8420 CALC BMI NORM PARAMETERS: HCPCS | Performed by: PHYSICIAN ASSISTANT

## 2024-06-17 PROCEDURE — 1036F TOBACCO NON-USER: CPT | Performed by: PHYSICIAN ASSISTANT

## 2024-06-17 PROCEDURE — 20610 DRAIN/INJ JOINT/BURSA W/O US: CPT | Performed by: PHYSICIAN ASSISTANT

## 2024-06-17 PROCEDURE — 99213 OFFICE O/P EST LOW 20 MIN: CPT | Performed by: PHYSICIAN ASSISTANT

## 2024-06-17 PROCEDURE — G8427 DOCREV CUR MEDS BY ELIG CLIN: HCPCS | Performed by: PHYSICIAN ASSISTANT

## 2024-06-17 RX ORDER — BUPIVACAINE HYDROCHLORIDE 2.5 MG/ML
2 INJECTION, SOLUTION INFILTRATION; PERINEURAL ONCE
Status: COMPLETED | OUTPATIENT
Start: 2024-06-17 | End: 2024-06-17

## 2024-06-17 RX ORDER — TRIAMCINOLONE ACETONIDE 40 MG/ML
40 INJECTION, SUSPENSION INTRA-ARTICULAR; INTRAMUSCULAR ONCE
Status: COMPLETED | OUTPATIENT
Start: 2024-06-17 | End: 2024-06-17

## 2024-06-17 RX ADMIN — TRIAMCINOLONE ACETONIDE 40 MG: 40 INJECTION, SUSPENSION INTRA-ARTICULAR; INTRAMUSCULAR at 15:02

## 2024-06-17 RX ADMIN — BUPIVACAINE HYDROCHLORIDE 5 MG: 2.5 INJECTION, SOLUTION INFILTRATION; PERINEURAL at 15:02

## 2024-06-18 ENCOUNTER — APPOINTMENT (OUTPATIENT)
Dept: PHYSICAL THERAPY | Age: 64
End: 2024-06-18
Payer: MEDICAID
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
## 2024-06-20 ENCOUNTER — APPOINTMENT (OUTPATIENT)
Dept: PHYSICAL THERAPY | Age: 64
End: 2024-06-20
Payer: MEDICAID

## 2024-07-01 ENCOUNTER — TELEPHONE (OUTPATIENT)
Dept: ORTHOPEDIC SURGERY | Age: 64
End: 2024-07-01

## 2024-07-01 NOTE — TELEPHONE ENCOUNTER
Imported medical records for 4/17/24 to date into O for [employer]    Sent the request to [facility] billing.    PT was not ordered by our physician.

## 2024-07-08 ENCOUNTER — OFFICE VISIT (OUTPATIENT)
Dept: ORTHOPEDIC SURGERY | Age: 64
End: 2024-07-08
Payer: MEDICAID

## 2024-07-08 VITALS — BODY MASS INDEX: 18.99 KG/M2 | HEIGHT: 72 IN

## 2024-07-08 DIAGNOSIS — M75.82 ROTATOR CUFF TENDONITIS, LEFT: Primary | ICD-10-CM

## 2024-07-08 DIAGNOSIS — M47.812 CERVICAL SPONDYLOSIS: ICD-10-CM

## 2024-07-08 DIAGNOSIS — M75.81 ROTATOR CUFF TENDONITIS, RIGHT: ICD-10-CM

## 2024-07-08 PROCEDURE — G8420 CALC BMI NORM PARAMETERS: HCPCS | Performed by: PHYSICIAN ASSISTANT

## 2024-07-08 PROCEDURE — 1036F TOBACCO NON-USER: CPT | Performed by: PHYSICIAN ASSISTANT

## 2024-07-08 PROCEDURE — G8427 DOCREV CUR MEDS BY ELIG CLIN: HCPCS | Performed by: PHYSICIAN ASSISTANT

## 2024-07-08 PROCEDURE — 99213 OFFICE O/P EST LOW 20 MIN: CPT | Performed by: PHYSICIAN ASSISTANT

## 2024-07-08 PROCEDURE — 3017F COLORECTAL CA SCREEN DOC REV: CPT | Performed by: PHYSICIAN ASSISTANT

## 2024-07-08 NOTE — PROGRESS NOTES
upper extremities.  Reflexes: Bilaterally triceps, biceps and brachioradialis are 2+.  Clonus absent bilaterally at the feet.   Gait & station: normal, patient ambulates without assistance.           X Rays: not performed in the office today:     MRI of the cervical spine dated 6/15/2024 reviewed and discussed today.  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 There is mild  scoliosis. Alignment is otherwise anatomic. Vertebral body height and signal are  well-maintained. There is mild loss of disc height at C7-T1. There are  productive changes at the C1-C2 articulation. Cervical spinal cord is normal in  contour and signal.  C2-3: Facet hypertrophy without significant spinal stenosis.  C3-4: Facet uncovertebral hypertrophy resulting in moderate right foraminal  stenosis.  C4-C5: Disc osteophyte complex and facet/uncovertebral hypertrophy resulting in  mild-moderate central canal stenosis and moderate right, mild left foraminal  stenosis.  C5-6: No spinal stenosis.  C6-7: Diffuse disc bulge and facet/uncovertebral hypertrophy resulting in trace  central canal and mild/moderate right foraminal stenosis.  C7-T1: No spinal stenosis.  IMPRESSION:  Mild progression in overall moderate spinal stenosis worst at C3-4 and C4-5.  Electronically signed by Piyush Sampson    Diagnosis:       ICD-10-CM    1. Rotator cuff tendonitis, left  M75.82       2. Rotator cuff tendonitis, right  M75.81       3. Cervical spondylosis  M47.812            Assessment and Plan:       Assessment:  Posterior cervical pain felt to be related to his cervical spondylosis.  No improvement with conservative treatment.  He denies any radicular pain in his left and right upper extremity.    I had an extensive discussion with Mr. Gary Martinez regarding the natural history, etiology, and long term consequences of his

## 2024-07-30 ENCOUNTER — OFFICE VISIT (OUTPATIENT)
Dept: ORTHOPEDIC SURGERY | Age: 64
End: 2024-07-30
Payer: MEDICAID

## 2024-07-30 VITALS — WEIGHT: 139.99 LBS | HEIGHT: 72 IN | BODY MASS INDEX: 18.96 KG/M2

## 2024-07-30 DIAGNOSIS — S32.000A COMPRESSION FRACTURE OF LUMBAR VERTEBRA, UNSPECIFIED LUMBAR VERTEBRAL LEVEL, INITIAL ENCOUNTER (HCC): Primary | ICD-10-CM

## 2024-07-30 DIAGNOSIS — M47.816 LUMBAR SPONDYLOSIS: ICD-10-CM

## 2024-07-30 DIAGNOSIS — M47.812 CERVICAL SPONDYLOSIS: ICD-10-CM

## 2024-07-30 PROCEDURE — G8427 DOCREV CUR MEDS BY ELIG CLIN: HCPCS | Performed by: ORTHOPAEDIC SURGERY

## 2024-07-30 PROCEDURE — G8420 CALC BMI NORM PARAMETERS: HCPCS | Performed by: ORTHOPAEDIC SURGERY

## 2024-07-30 PROCEDURE — 1036F TOBACCO NON-USER: CPT | Performed by: ORTHOPAEDIC SURGERY

## 2024-07-30 PROCEDURE — 3017F COLORECTAL CA SCREEN DOC REV: CPT | Performed by: ORTHOPAEDIC SURGERY

## 2024-07-30 PROCEDURE — 99214 OFFICE O/P EST MOD 30 MIN: CPT | Performed by: ORTHOPAEDIC SURGERY
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
## 2024-08-08 ENCOUNTER — APPOINTMENT (OUTPATIENT)
Dept: CT IMAGING | Age: 64
DRG: 197 | End: 2024-08-08
Payer: MEDICAID

## 2024-08-08 ENCOUNTER — APPOINTMENT (OUTPATIENT)
Dept: VASCULAR LAB | Age: 64
DRG: 197 | End: 2024-08-08
Payer: MEDICAID

## 2024-08-08 ENCOUNTER — HOSPITAL ENCOUNTER (EMERGENCY)
Dept: VASCULAR LAB | Age: 64
Discharge: HOME | DRG: 197 | End: 2024-08-10
Payer: MEDICAID

## 2024-08-08 ENCOUNTER — HOSPITAL ENCOUNTER (INPATIENT)
Age: 64
LOS: 2 days | Discharge: HOME HEALTH | DRG: 197 | End: 2024-08-10
Attending: SPECIALIST | Admitting: SPECIALIST
Payer: MEDICAID

## 2024-08-08 DIAGNOSIS — M79.606 LEG PAIN: Primary | ICD-10-CM

## 2024-08-08 DIAGNOSIS — I82.412 ACUTE DEEP VEIN THROMBOSIS (DVT) OF FEMORAL VEIN OF LEFT LOWER EXTREMITY (HCC): Primary | ICD-10-CM

## 2024-08-08 DIAGNOSIS — I72.4 ANEURYSM OF LEFT POPLITEAL ARTERY (HCC): ICD-10-CM

## 2024-08-08 DIAGNOSIS — I26.99 OTHER ACUTE PULMONARY EMBOLISM WITHOUT ACUTE COR PULMONALE (HCC): ICD-10-CM

## 2024-08-08 LAB
ALBUMIN SERPL-MCNC: 3.5 G/DL (ref 3.4–5)
ALBUMIN/GLOB SERPL: 1 {RATIO} (ref 1.1–2.2)
ALP SERPL-CCNC: 84 U/L (ref 40–129)
ALT SERPL-CCNC: 12 U/L (ref 10–40)
ANION GAP SERPL CALCULATED.3IONS-SCNC: 7 MMOL/L (ref 3–16)
ANTI-XA UNFRAC HEPARIN: <0.1 IU/ML (ref 0.3–0.7)
APTT BLD: 28.5 SEC (ref 22.1–36.4)
AST SERPL-CCNC: 20 U/L (ref 15–37)
BASOPHILS # BLD: 0 K/UL (ref 0–0.2)
BASOPHILS NFR BLD: 0.3 %
BILIRUB SERPL-MCNC: 0.8 MG/DL (ref 0–1)
BUN SERPL-MCNC: 8 MG/DL (ref 7–20)
CALCIUM SERPL-MCNC: 8.9 MG/DL (ref 8.3–10.6)
CHLORIDE SERPL-SCNC: 98 MMOL/L (ref 99–110)
CK SERPL-CCNC: 41 U/L (ref 39–308)
CO2 SERPL-SCNC: 30 MMOL/L (ref 21–32)
CREAT SERPL-MCNC: 0.8 MG/DL (ref 0.8–1.3)
DEPRECATED RDW RBC AUTO: 13.9 % (ref 12.4–15.4)
DEPRECATED RDW RBC AUTO: 14.5 % (ref 12.4–15.4)
ECHO BSA: 1.8 M2
ECHO BSA: 1.8 M2
EOSINOPHIL # BLD: 0.2 K/UL (ref 0–0.6)
EOSINOPHIL NFR BLD: 2 %
GFR SERPLBLD CREATININE-BSD FMLA CKD-EPI: >90 ML/MIN/{1.73_M2}
GLUCOSE SERPL-MCNC: 106 MG/DL (ref 70–99)
HCT VFR BLD AUTO: 52.9 % (ref 40.5–52.5)
HCT VFR BLD AUTO: 55.5 % (ref 40.5–52.5)
HGB BLD-MCNC: 18.6 G/DL (ref 13.5–17.5)
HGB BLD-MCNC: 19.1 G/DL (ref 13.5–17.5)
INR PPP: 1.01 (ref 0.85–1.15)
LACTATE BLDV-SCNC: 1.5 MMOL/L (ref 0.4–2)
LYMPHOCYTES # BLD: 1.3 K/UL (ref 1–5.1)
LYMPHOCYTES NFR BLD: 15.1 %
MCH RBC QN AUTO: 34.6 PG (ref 26–34)
MCH RBC QN AUTO: 35.4 PG (ref 26–34)
MCHC RBC AUTO-ENTMCNC: 34.4 G/DL (ref 31–36)
MCHC RBC AUTO-ENTMCNC: 35.2 G/DL (ref 31–36)
MCV RBC AUTO: 100.6 FL (ref 80–100)
MCV RBC AUTO: 100.8 FL (ref 80–100)
MONOCYTES # BLD: 1.4 K/UL (ref 0–1.3)
MONOCYTES NFR BLD: 15.1 %
NEUTROPHILS # BLD: 6 K/UL (ref 1.7–7.7)
NEUTROPHILS NFR BLD: 67.5 %
NT-PROBNP SERPL-MCNC: 405 PG/ML (ref 0–124)
PLATELET # BLD AUTO: 143 K/UL (ref 135–450)
PLATELET # BLD AUTO: 152 K/UL (ref 135–450)
PMV BLD AUTO: 8.3 FL (ref 5–10.5)
PMV BLD AUTO: 8.5 FL (ref 5–10.5)
POTASSIUM SERPL-SCNC: 4.2 MMOL/L (ref 3.5–5.1)
PROT SERPL-MCNC: 7 G/DL (ref 6.4–8.2)
PROTHROMBIN TIME: 13.6 SEC (ref 11.9–14.9)
RBC # BLD AUTO: 5.26 M/UL (ref 4.2–5.9)
RBC # BLD AUTO: 5.5 M/UL (ref 4.2–5.9)
SODIUM SERPL-SCNC: 135 MMOL/L (ref 136–145)
TROPONIN, HIGH SENSITIVITY: 12 NG/L (ref 0–22)
VAS LEFT ATA DIST PSV: 88 CM/S
VAS LEFT ATA MID PSV: 66 CM/S
VAS LEFT ATA PROX PSV: 77 CM/S
VAS LEFT CFA DIST PSV: 51.9 CM/S
VAS LEFT CFA PROX PSV: 81.6 CM/S
VAS LEFT PERONEAL DIST PSV: 58 CM/S
VAS LEFT PERONEAL MID PSV: 64 CM/S
VAS LEFT PERONEAL PROX PSV: 82 CM/S
VAS LEFT PFA PROX PSV: 104 CM/S
VAS LEFT POP A DIST PSV: 83.8 CM/S
VAS LEFT POP A PROX PSV: 47 CM/S
VAS LEFT POP A PROX VEL RATIO: 0.78
VAS LEFT PTA DIST PSV: 83 CM/S
VAS LEFT PTA MID PSV: 81 CM/S
VAS LEFT PTA PROX PSV: 77 CM/S
VAS LEFT SFA DIST PSV: 60.5 CM/S
VAS LEFT SFA DIST VEL RATIO: 0.59
VAS LEFT SFA MID PSV: 102.2 CM/S
VAS LEFT SFA MID VEL RATIO: 1.25
VAS LEFT SFA PROX PSV: 81.5 CM/S
VAS LEFT SFA PROX VEL RATIO: 1
WBC # BLD AUTO: 10.7 K/UL (ref 4–11)
WBC # BLD AUTO: 9 K/UL (ref 4–11)

## 2024-08-08 PROCEDURE — 2580000003 HC RX 258: Performed by: SPECIALIST

## 2024-08-08 PROCEDURE — 85025 COMPLETE CBC W/AUTO DIFF WBC: CPT

## 2024-08-08 PROCEDURE — 6370000000 HC RX 637 (ALT 250 FOR IP): Performed by: SPECIALIST

## 2024-08-08 PROCEDURE — 6360000004 HC RX CONTRAST MEDICATION: Performed by: PHYSICIAN ASSISTANT

## 2024-08-08 PROCEDURE — 93926 LOWER EXTREMITY STUDY: CPT

## 2024-08-08 PROCEDURE — 85520 HEPARIN ASSAY: CPT

## 2024-08-08 PROCEDURE — 85610 PROTHROMBIN TIME: CPT

## 2024-08-08 PROCEDURE — 96366 THER/PROPH/DIAG IV INF ADDON: CPT

## 2024-08-08 PROCEDURE — 96365 THER/PROPH/DIAG IV INF INIT: CPT

## 2024-08-08 PROCEDURE — 6360000002 HC RX W HCPCS: Performed by: PHYSICIAN ASSISTANT

## 2024-08-08 PROCEDURE — 36415 COLL VENOUS BLD VENIPUNCTURE: CPT

## 2024-08-08 PROCEDURE — 99285 EMERGENCY DEPT VISIT HI MDM: CPT

## 2024-08-08 PROCEDURE — 2060000000 HC ICU INTERMEDIATE R&B

## 2024-08-08 PROCEDURE — 96376 TX/PRO/DX INJ SAME DRUG ADON: CPT

## 2024-08-08 PROCEDURE — G0378 HOSPITAL OBSERVATION PER HR: HCPCS

## 2024-08-08 PROCEDURE — 83880 ASSAY OF NATRIURETIC PEPTIDE: CPT

## 2024-08-08 PROCEDURE — 93926 LOWER EXTREMITY STUDY: CPT | Performed by: INTERNAL MEDICINE

## 2024-08-08 PROCEDURE — 71260 CT THORAX DX C+: CPT

## 2024-08-08 PROCEDURE — 84484 ASSAY OF TROPONIN QUANT: CPT

## 2024-08-08 PROCEDURE — 83605 ASSAY OF LACTIC ACID: CPT

## 2024-08-08 PROCEDURE — 85730 THROMBOPLASTIN TIME PARTIAL: CPT

## 2024-08-08 PROCEDURE — 85027 COMPLETE CBC AUTOMATED: CPT

## 2024-08-08 PROCEDURE — 93971 EXTREMITY STUDY: CPT

## 2024-08-08 PROCEDURE — 80053 COMPREHEN METABOLIC PANEL: CPT

## 2024-08-08 PROCEDURE — 96375 TX/PRO/DX INJ NEW DRUG ADDON: CPT

## 2024-08-08 PROCEDURE — 96374 THER/PROPH/DIAG INJ IV PUSH: CPT

## 2024-08-08 PROCEDURE — 82550 ASSAY OF CK (CPK): CPT

## 2024-08-08 PROCEDURE — 93971 EXTREMITY STUDY: CPT | Performed by: INTERNAL MEDICINE

## 2024-08-08 PROCEDURE — 93005 ELECTROCARDIOGRAM TRACING: CPT | Performed by: PHYSICIAN ASSISTANT

## 2024-08-08 RX ORDER — SODIUM CHLORIDE 450 MG/100ML
INJECTION, SOLUTION INTRAVENOUS CONTINUOUS
Status: DISCONTINUED | OUTPATIENT
Start: 2024-08-08 | End: 2024-08-10

## 2024-08-08 RX ORDER — ALPRAZOLAM 0.5 MG/1
0.5 TABLET ORAL NIGHTLY PRN
Status: DISCONTINUED | OUTPATIENT
Start: 2024-08-08 | End: 2024-08-10

## 2024-08-08 RX ORDER — HEPARIN SODIUM 1000 [USP'U]/ML
80 INJECTION, SOLUTION INTRAVENOUS; SUBCUTANEOUS ONCE
Status: DISCONTINUED | OUTPATIENT
Start: 2024-08-08 | End: 2024-08-08

## 2024-08-08 RX ORDER — ALBUTEROL SULFATE 90 UG/1
2 AEROSOL, METERED RESPIRATORY (INHALATION) EVERY 6 HOURS PRN
Status: DISCONTINUED | OUTPATIENT
Start: 2024-08-08 | End: 2024-08-10 | Stop reason: HOSPADM

## 2024-08-08 RX ORDER — MORPHINE SULFATE 4 MG/ML
4 INJECTION, SOLUTION INTRAMUSCULAR; INTRAVENOUS ONCE
Status: COMPLETED | OUTPATIENT
Start: 2024-08-08 | End: 2024-08-08

## 2024-08-08 RX ORDER — LEVOTHYROXINE SODIUM 0.05 MG/1
50 TABLET ORAL
Status: DISCONTINUED | OUTPATIENT
Start: 2024-08-09 | End: 2024-08-10 | Stop reason: HOSPADM

## 2024-08-08 RX ORDER — HEPARIN SODIUM 1000 [USP'U]/ML
40 INJECTION, SOLUTION INTRAVENOUS; SUBCUTANEOUS PRN
Status: DISCONTINUED | OUTPATIENT
Start: 2024-08-08 | End: 2024-08-08 | Stop reason: SDUPTHER

## 2024-08-08 RX ORDER — ACETAMINOPHEN 500 MG
500 TABLET ORAL EVERY 6 HOURS PRN
Status: DISCONTINUED | OUTPATIENT
Start: 2024-08-08 | End: 2024-08-10 | Stop reason: HOSPADM

## 2024-08-08 RX ORDER — ONDANSETRON 2 MG/ML
4 INJECTION INTRAMUSCULAR; INTRAVENOUS ONCE
Status: COMPLETED | OUTPATIENT
Start: 2024-08-08 | End: 2024-08-08

## 2024-08-08 RX ORDER — ONDANSETRON 4 MG/1
4 TABLET, ORALLY DISINTEGRATING ORAL 3 TIMES DAILY PRN
Status: DISCONTINUED | OUTPATIENT
Start: 2024-08-08 | End: 2024-08-09

## 2024-08-08 RX ORDER — ALOGLIPTIN 6.25 MG/1
6.25 TABLET, FILM COATED ORAL DAILY
Status: DISCONTINUED | OUTPATIENT
Start: 2024-08-09 | End: 2024-08-10 | Stop reason: HOSPADM

## 2024-08-08 RX ORDER — HEPARIN SODIUM 1000 [USP'U]/ML
80 INJECTION, SOLUTION INTRAVENOUS; SUBCUTANEOUS PRN
Status: DISCONTINUED | OUTPATIENT
Start: 2024-08-08 | End: 2024-08-08 | Stop reason: SDUPTHER

## 2024-08-08 RX ORDER — HEPARIN SODIUM 1000 [USP'U]/ML
80 INJECTION, SOLUTION INTRAVENOUS; SUBCUTANEOUS ONCE
Status: COMPLETED | OUTPATIENT
Start: 2024-08-08 | End: 2024-08-08

## 2024-08-08 RX ORDER — HEPARIN SODIUM 10000 [USP'U]/100ML
0-4000 INJECTION, SOLUTION INTRAVENOUS CONTINUOUS
Status: DISPENSED | OUTPATIENT
Start: 2024-08-08 | End: 2024-08-09

## 2024-08-08 RX ADMIN — SODIUM CHLORIDE: 4.5 INJECTION, SOLUTION INTRAVENOUS at 22:25

## 2024-08-08 RX ADMIN — HEPARIN SODIUM 1140 UNITS/HR: 10000 INJECTION, SOLUTION INTRAVENOUS at 18:06

## 2024-08-08 RX ADMIN — MORPHINE SULFATE 4 MG: 4 INJECTION, SOLUTION INTRAMUSCULAR; INTRAVENOUS at 17:44

## 2024-08-08 RX ADMIN — IOPAMIDOL 75 ML: 755 INJECTION, SOLUTION INTRAVENOUS at 18:25

## 2024-08-08 RX ADMIN — HEPARIN SODIUM 5100 UNITS: 1000 INJECTION INTRAVENOUS; SUBCUTANEOUS at 17:47

## 2024-08-08 RX ADMIN — ONDANSETRON 4 MG: 2 INJECTION INTRAMUSCULAR; INTRAVENOUS at 17:43

## 2024-08-08 RX ADMIN — ACETAMINOPHEN 500 MG: 500 TABLET ORAL at 23:18

## 2024-08-08 ASSESSMENT — PAIN SCALES - GENERAL
PAINLEVEL_OUTOF10: 7
PAINLEVEL_OUTOF10: 8
PAINLEVEL_OUTOF10: 6
PAINLEVEL_OUTOF10: 8
PAINLEVEL_OUTOF10: 8

## 2024-08-08 ASSESSMENT — PAIN DESCRIPTION - DESCRIPTORS
DESCRIPTORS: ACHING
DESCRIPTORS: THROBBING;SHARP

## 2024-08-08 ASSESSMENT — PAIN DESCRIPTION - LOCATION
LOCATION: LEG

## 2024-08-08 ASSESSMENT — PAIN - FUNCTIONAL ASSESSMENT: PAIN_FUNCTIONAL_ASSESSMENT: 0-10

## 2024-08-08 ASSESSMENT — PAIN DESCRIPTION - ORIENTATION
ORIENTATION: LEFT
ORIENTATION: LEFT
ORIENTATION: LEFT;LOWER

## 2024-08-08 ASSESSMENT — ENCOUNTER SYMPTOMS
VOMITING: 0
COLOR CHANGE: 0
CHEST TIGHTNESS: 0
RESPIRATORY NEGATIVE: 1
BACK PAIN: 0
NAUSEA: 0
COUGH: 0
ABDOMINAL PAIN: 0
DIARRHEA: 0
SHORTNESS OF BREATH: 0
CONSTIPATION: 0

## 2024-08-08 ASSESSMENT — PAIN SCALES - WONG BAKER: WONGBAKER_NUMERICALRESPONSE: HURTS WHOLE LOT

## 2024-08-08 ASSESSMENT — PAIN DESCRIPTION - PAIN TYPE: TYPE: ACUTE PAIN

## 2024-08-08 ASSESSMENT — PAIN DESCRIPTION - FREQUENCY: FREQUENCY: CONTINUOUS

## 2024-08-08 NOTE — ED PROVIDER NOTES
[facility] [place] EMERGENCY DEPARTMENT  EMERGENCY DEPARTMENT ENCOUNTER        Pt Name: Gary Martinez  MRN: [id number]  Birthdate 1960  Date of evaluation: 8/8/2024  Provider: IAN Brooks  PCP: Tony Love MD  Note Started: 5:05 PM EDT 8/8/24      TAHIR. I have evaluated this patient.        CHIEF COMPLAINT       Chief Complaint   Patient presents with    Leg Swelling     Pt to ED with complaint of left leg swelling that started on Saturday or Sunday. Pt states he just got back from [place] on Sunday and was in the car long. No SOB.        HISTORY OF PRESENT ILLNESS: 1 or more Elements     History From: Patient  Limitations to history : None    Gary Martinez is a 64 y.o. male with past medical history of prior cancer and diverticulitis who presents ED with complaint of left leg swelling.  He reports he does had recent travel from [place].  He got back on Sunday.  He reports started Saturday evening/Sunday morning to have pain that he describes as an 8/10 to his left calf.  Reports swelling to his left calf.  Pain now rating up into his left thigh.  He denies chest pain or shortness of breath.  Came to the ED for further evaluation and treatment.  He denies history of DVT or PE.  He denies any blood thinners.  Denies injury or trauma.  Denies decreased range of motion or strength.  Denies ecchymoses, erythema or warmth.  Denies fever or chills.  Denies numbness or tingling.  Denies cough or hemoptysis.    Nursing Notes were all reviewed and agreed with or any disagreements were addressed in the HPI.    REVIEW OF SYSTEMS :      Review of Systems   Constitutional:  Negative for activity change, appetite change, chills, diaphoresis, fatigue and fever.   Respiratory: Negative.  Negative for cough, chest tightness and shortness of breath.    Cardiovascular:  Positive for leg swelling. Negative for chest pain and palpitations.   Gastrointestinal:  Negative for abdominal pain,  [facility] for evaluation by vascular surgery/cardiology.        I am the Primary Clinician of Record.  FINAL IMPRESSION      1. Acute deep vein thrombosis (DVT) of femoral vein of left lower extremity (HCC)    2. Other acute pulmonary embolism without acute cor pulmonale (HCC)          DISPOSITION/PLAN     DISPOSITION Decision To Admit 08/08/2024 07:39:44 PM      PATIENT REFERRED TO:  No follow-up provider specified.    DISCHARGE MEDICATIONS:  New Prescriptions    No medications on file       DISCONTINUED MEDICATIONS:  Discontinued Medications    CLOBETASOL (TEMOVATE) 0.05 % CREAM    Apply BID for up to 2 weeks    CLOBETASOL (TEMOVATE) 0.05 % OINTMENT    Apply BID for up to 2 weeks    KETOCONAZOLE (NIZORAL) 2 % CREAM    Apply to both feet BID for 1 month    MUPIROCIN (BACTROBAN) 2 % OINTMENT    Apply daily after washing to biopsy site, then apply Band Aid              (Please note that portions of this note were completed with a voice recognition program.  Efforts were made to edit the dictations but occasionally words are mis-transcribed.)    IAN Brooks (electronically signed)       Samuel Berman PA  08/08/24 2004

## 2024-08-08 NOTE — ED PROVIDER NOTES
I received notification from the vascular  that this patient has a left lower extremity DVT with a arterial popliteal aneurysm.  She is instructed to go ahead and order the arterial duplex.  I notified Clayton SOSA who is the primary provider for this patient of the vascular diagnostic results.       Nuvia Morris, CHRISTY - CNP  08/08/24 1640

## 2024-08-08 NOTE — CONSULTS
Clinical Pharmacy Note  Heparin Dosing Consult    Gary Martinez is a 64 y.o. male ordered heparin per VTE/DVT/PE Nomogram by JOVANI Berman    No results found for: \"ANTIXAUHEP\", \"LABHEPA\"   Lab Results   Component Value Date/Time    HGB 17.8 06/12/2023 01:17 PM    HCT 52.8 06/12/2023 01:17 PM     06/12/2023 01:17 PM       Ht Readings from Last 1 Encounters:   08/08/24 1.829 m (6')        Wt Readings from Last 1 Encounters:   08/08/24 63.5 kg (140 lb)        Assessment/Plan:  Initial bolus: 5100 units  Initial infusion rate: 1140 units/hr  Next anti-Xa: 8/9/24 at  0000    Pharmacy will continue to monitor adjust heparin based on anti-Xa results using nomogram below:     VTE/DVT/PE Heparin Nomogram     Initial Bolus: 80 units/kg Max Bolus: 10,000 units       Initial Rate: 18 units/kg/hr Max Initial Rate: 2,100 units/hr     anti-Xa Bolus Titration   < 0.1 Heparin 80 units/kg bolus Increase drip by 4 units/kg/hr   0.1 - 0.29 Heparin 40 units/kg bolus Increase drip by 2 units/kg/hr   0.3 - 0.7 No Bolus No Change   0.71 - 0.8 No Bolus Decrease drip by 1 units/kg/hr   0.81 - 0.99 No Bolus Decrease drip by 2 units/kg/hr   > 1 Hold Heparin for 1 hour Decrease drip by 3 units/kg/hr       Obtain anti-Xa 6 hours after initial bolus and 6 hours after any dose change until two consecutive therapeutic anti-Xa levels are achieved - then daily.

## 2024-08-08 NOTE — PROGRESS NOTES
Called by ED for DVT /PE  CT reviwed  Doppler images reviewed  Concomitant pop a. Aneurysm   Recommend vascular sx consult for pop aneurysm   No intervention planned for DVT/PE at this time  Recommend heparin gtt     Full consult to follow    Jaydon Carrington MD [facility]   General, Interventional Cardiology, and Peripheral Vascular Disease   [facility]   (O): [phone]  (F): [phone]

## 2024-08-08 NOTE — ED NOTES
Pt resting in bed at this time, laying in a supine position with head of bed elevated . Call light remains in reach instructed pt how to use, and encouraged pt to call if needed assistance, no distress noted. RR even and unlabored, skin warm and dry. Side rails up x's 2, bed in lowest position  No needs at this time. Will continue to monitor closely.

## 2024-08-08 NOTE — ED NOTES
Pt to room 9 at this time. Placed in a gown and on the monitor. Power Port accessed by Ledy Looney RN. Pt tolerated well.

## 2024-08-09 ENCOUNTER — HOSPITAL ENCOUNTER (INPATIENT)
Dept: VASCULAR LAB | Age: 64
DRG: 197 | End: 2024-08-09
Attending: SURGERY
Payer: MEDICAID

## 2024-08-09 PROBLEM — I72.4 ANEURYSM OF LEFT POPLITEAL ARTERY (HCC): Status: ACTIVE | Noted: 2024-08-09

## 2024-08-09 PROBLEM — I82.412 ACUTE DEEP VEIN THROMBOSIS (DVT) OF FEMORAL VEIN OF LEFT LOWER EXTREMITY (HCC): Status: ACTIVE | Noted: 2024-08-09

## 2024-08-09 LAB
ANTI-XA UNFRAC HEPARIN: 0.19 IU/ML (ref 0.3–0.7)
ECHO BSA: 1.8 M2
EKG ATRIAL RATE: 90 BPM
EKG DIAGNOSIS: NORMAL
EKG P AXIS: 88 DEGREES
EKG P-R INTERVAL: 154 MS
EKG Q-T INTERVAL: 360 MS
EKG QRS DURATION: 72 MS
EKG QTC CALCULATION (BAZETT): 440 MS
EKG R AXIS: -27 DEGREES
EKG T AXIS: 51 DEGREES
EKG VENTRICULAR RATE: 90 BPM
VAS RIGHT ATA MID PSV: 58.7 CM/S
VAS RIGHT CFA DIST PSV: 42.2 CM/S
VAS RIGHT CFA PROX PSV: 65.9 CM/S
VAS RIGHT PERONEAL MID PSV: 40.3 CM/S
VAS RIGHT PFA PROX PSV: 73 CM/S
VAS RIGHT POP A DIST PSV: 70.1 CM/S
VAS RIGHT POP A PROX PSV: 51.2 CM/S
VAS RIGHT POP A PROX VEL RATIO: 0.99
VAS RIGHT PTA MID PSV: 88.2 CM/S
VAS RIGHT SFA DIST PSV: 51.9 CM/S
VAS RIGHT SFA DIST VEL RATIO: 0.6
VAS RIGHT SFA MID PSV: 85.9 CM/S
VAS RIGHT SFA MID VEL RATIO: 1.2
VAS RIGHT SFA PROX PSV: 72.8 CM/S
VAS RIGHT SFA PROX VEL RATIO: 1.1

## 2024-08-09 PROCEDURE — 93926 LOWER EXTREMITY STUDY: CPT | Performed by: INTERNAL MEDICINE

## 2024-08-09 PROCEDURE — 6370000000 HC RX 637 (ALT 250 FOR IP): Performed by: SPECIALIST

## 2024-08-09 PROCEDURE — 97530 THERAPEUTIC ACTIVITIES: CPT

## 2024-08-09 PROCEDURE — 97162 PT EVAL MOD COMPLEX 30 MIN: CPT | Performed by: PHYSICAL THERAPIST

## 2024-08-09 PROCEDURE — 93010 ELECTROCARDIOGRAM REPORT: CPT | Performed by: INTERNAL MEDICINE

## 2024-08-09 PROCEDURE — 97116 GAIT TRAINING THERAPY: CPT | Performed by: PHYSICAL THERAPIST

## 2024-08-09 PROCEDURE — 99223 1ST HOSP IP/OBS HIGH 75: CPT | Performed by: INTERNAL MEDICINE

## 2024-08-09 PROCEDURE — 6360000002 HC RX W HCPCS: Performed by: SPECIALIST

## 2024-08-09 PROCEDURE — 93926 LOWER EXTREMITY STUDY: CPT

## 2024-08-09 PROCEDURE — 99255 IP/OBS CONSLTJ NEW/EST HI 80: CPT | Performed by: SURGERY

## 2024-08-09 PROCEDURE — 2700000000 HC OXYGEN THERAPY PER DAY

## 2024-08-09 PROCEDURE — 94760 N-INVAS EAR/PLS OXIMETRY 1: CPT

## 2024-08-09 PROCEDURE — 2060000000 HC ICU INTERMEDIATE R&B

## 2024-08-09 PROCEDURE — G0378 HOSPITAL OBSERVATION PER HR: HCPCS

## 2024-08-09 PROCEDURE — 96376 TX/PRO/DX INJ SAME DRUG ADON: CPT

## 2024-08-09 PROCEDURE — 97165 OT EVAL LOW COMPLEX 30 MIN: CPT

## 2024-08-09 PROCEDURE — 96366 THER/PROPH/DIAG IV INF ADDON: CPT

## 2024-08-09 PROCEDURE — 2580000003 HC RX 258: Performed by: SPECIALIST

## 2024-08-09 RX ORDER — ONDANSETRON 2 MG/ML
4 INJECTION INTRAMUSCULAR; INTRAVENOUS EVERY 4 HOURS PRN
Status: DISCONTINUED | OUTPATIENT
Start: 2024-08-09 | End: 2024-08-10 | Stop reason: HOSPADM

## 2024-08-09 RX ORDER — MORPHINE SULFATE 4 MG/ML
4 INJECTION, SOLUTION INTRAMUSCULAR; INTRAVENOUS EVERY 4 HOURS PRN
Status: DISCONTINUED | OUTPATIENT
Start: 2024-08-09 | End: 2024-08-10 | Stop reason: HOSPADM

## 2024-08-09 RX ORDER — ONDANSETRON 4 MG/1
4 TABLET, FILM COATED ORAL EVERY 6 HOURS PRN
Status: DISCONTINUED | OUTPATIENT
Start: 2024-08-09 | End: 2024-08-10 | Stop reason: HOSPADM

## 2024-08-09 RX ORDER — ALPRAZOLAM 0.5 MG/1
0.5 TABLET ORAL
Status: COMPLETED | OUTPATIENT
Start: 2024-08-09 | End: 2024-08-09

## 2024-08-09 RX ORDER — HEPARIN SODIUM 1000 [USP'U]/ML
2500 INJECTION, SOLUTION INTRAVENOUS; SUBCUTANEOUS ONCE
Status: COMPLETED | OUTPATIENT
Start: 2024-08-09 | End: 2024-08-09

## 2024-08-09 RX ADMIN — ALOGLIPTIN 6.25 MG: 6.25 TABLET, FILM COATED ORAL at 08:25

## 2024-08-09 RX ADMIN — ALPRAZOLAM 0.5 MG: 0.5 TABLET ORAL at 13:32

## 2024-08-09 RX ADMIN — MORPHINE SULFATE 4 MG: 4 INJECTION, SOLUTION INTRAMUSCULAR; INTRAVENOUS at 23:19

## 2024-08-09 RX ADMIN — SODIUM CHLORIDE: 4.5 INJECTION, SOLUTION INTRAVENOUS at 21:34

## 2024-08-09 RX ADMIN — ACETAMINOPHEN 500 MG: 500 TABLET ORAL at 20:56

## 2024-08-09 RX ADMIN — ONDANSETRON 4 MG: 4 TABLET, ORALLY DISINTEGRATING ORAL at 07:18

## 2024-08-09 RX ADMIN — APIXABAN 10 MG: 5 TABLET, FILM COATED ORAL at 20:55

## 2024-08-09 RX ADMIN — LEVOTHYROXINE SODIUM 50 MCG: 0.05 TABLET ORAL at 05:20

## 2024-08-09 RX ADMIN — MORPHINE SULFATE 4 MG: 4 INJECTION, SOLUTION INTRAMUSCULAR; INTRAVENOUS at 08:26

## 2024-08-09 RX ADMIN — SODIUM CHLORIDE: 4.5 INJECTION, SOLUTION INTRAVENOUS at 10:13

## 2024-08-09 RX ADMIN — ONDANSETRON 4 MG: 2 INJECTION INTRAMUSCULAR; INTRAVENOUS at 20:56

## 2024-08-09 RX ADMIN — APIXABAN 10 MG: 5 TABLET, FILM COATED ORAL at 08:25

## 2024-08-09 RX ADMIN — HEPARIN SODIUM 2500 UNITS: 1000 INJECTION INTRAVENOUS; SUBCUTANEOUS at 00:37

## 2024-08-09 RX ADMIN — ONDANSETRON 4 MG: 4 TABLET, ORALLY DISINTEGRATING ORAL at 01:02

## 2024-08-09 RX ADMIN — ONDANSETRON HYDROCHLORIDE 4 MG: 4 TABLET, FILM COATED ORAL at 13:17

## 2024-08-09 RX ADMIN — MORPHINE SULFATE 4 MG: 4 INJECTION, SOLUTION INTRAMUSCULAR; INTRAVENOUS at 00:57

## 2024-08-09 ASSESSMENT — PAIN DESCRIPTION - LOCATION
LOCATION: LEG
LOCATION: LEG
LOCATION: HEAD;NECK

## 2024-08-09 ASSESSMENT — PAIN SCALES - GENERAL
PAINLEVEL_OUTOF10: 2
PAINLEVEL_OUTOF10: 0
PAINLEVEL_OUTOF10: 8
PAINLEVEL_OUTOF10: 6
PAINLEVEL_OUTOF10: 9
PAINLEVEL_OUTOF10: 8
PAINLEVEL_OUTOF10: 0

## 2024-08-09 ASSESSMENT — PAIN DESCRIPTION - ORIENTATION
ORIENTATION: LEFT;LOWER
ORIENTATION: LEFT
ORIENTATION: LEFT

## 2024-08-09 ASSESSMENT — PAIN SCALES - WONG BAKER: WONGBAKER_NUMERICALRESPONSE: HURTS A LITTLE BIT

## 2024-08-09 ASSESSMENT — PAIN DESCRIPTION - DESCRIPTORS
DESCRIPTORS: BURNING
DESCRIPTORS: BURNING;TIGHTNESS

## 2024-08-09 NOTE — PROGRESS NOTES
4 Eyes Skin Assessment     NAME:  Gary Martinez  YOB: 1960  MEDICAL RECORD NUMBER:  [id number]    The patient is being assessed for  Admission    I agree that at least one RN has performed a thorough Head to Toe Skin Assessment on the patient. ALL assessment sites listed below have been assessed.      Areas assessed by both nurses:    Head, Face, Ears, Shoulders, Back, Chest, Arms, Elbows, Hands, Sacrum. Buttock, Coccyx, Ischium, Legs. Feet and Heels, and Under Medical Devices         Does the Patient have a Wound? No noted wound(s)       Nick Prevention initiated by RN: No  Wound Care Orders initiated by RN: No    Pressure Injury (Stage 3,4, Unstageable, DTI, NWPT, and Complex wounds) if present, place Wound referral order by RN under : No    New Ostomies, if present place, Ostomy referral order under : No     Nurse 1 eSignature: Electronically signed by Merced Carlisle RN on 8/8/24 at 10:38 PM EDT    **SHARE this note so that the co-signing nurse can place an eSignature**    Nurse 2 eSignature: Electronically signed by Balbina Batista RN on 8/8/24 at 10:38 PM EDT

## 2024-08-09 NOTE — PROGRESS NOTES
Occupational Therapy  Facility/Department: [address] PROGRESSIVE CARE  Occupational Therapy Initial Assessment    Name: Gary Martinez  : 1960  MRN: [id number]  Date of Service: 2024    Discharge Recommendations:  Home with assist PRN  OT Equipment Recommendations  Equipment Needed: No     Gary Martinez scored a 19/24 on the AM-PAC ADL Inpatient form.  At this time, no further OT is recommended upon discharge. Recommend patient returns to prior setting with prior services.       Patient Diagnosis(es): The primary encounter diagnosis was Acute deep vein thrombosis (DVT) of femoral vein of left lower extremity (HCC). Diagnoses of Other acute pulmonary embolism without acute cor pulmonale (HCC) and Aneurysm of left popliteal artery (HCC) were also pertinent to this visit.  Past Medical History:  has a past medical history of Anxiety, Cancer (HCC), Chronic back pain, Diabetes mellitus (HCC), Erectile dysfunction, Headache(784.0), Osteoarthritis, PONV (postoperative nausea and vomiting), Restless legs syndrome, Urinary incontinence, and Wears dentures.  Past Surgical History:  has a past surgical history that includes Tonsillectomy (Bilateral); Cholecystectomy, laparoscopic (N/A, 08/10/2021); Portacath placement; colectomy (N/A, 2022); and Cystoscopy (N/A, 2022).           Assessment   Performance deficits / Impairments: Decreased functional mobility ;Decreased ADL status;Decreased endurance;Decreased balance;Decreased safe awareness  Assessment: 65 y/o male admitted 2024 with PE and DVT. PTA pt lives at home alone and was independent with ADLs and functional mobility. Today, pt c/o LE pain with activity. Pt required SBA/CGA for transfers and functional mobility around room with cane and RW. Pt reports improved stability with RW. Pt declined ADLs and anticipate will require CGA for full ADLs based on endurance and balance. Pt is functioning slightly below baseline and benefit from skilled therapy  Condition:: 6 month FBSE Please Describe Your Condition:: \\n- No concerns \\n- H/o [facility] (4/2023) and family h/o MM

## 2024-08-09 NOTE — H&P
H & P dictated  116 133  Acute L L E DVT  after long travel from [place]  L small 2.4 cm popliteal aneurysm,  B/L small subsegmental  PE  Hx of smoking  Polyglobulia d/t smoking, he quit.  SIMONA,\  DM   S/p colon surgery for diverticulitis,  Hx of compr Fx of thoracic spine,  Pt has been seen by dr Carrington, dr Traylor, surgery has been planned, no thrombectomy Started on heparin, very good response to it, L leg swelling almost resolved, Start Eliquis, 10 mg bid for 7 days,  resume home meds,  plan to d/c tomorrow, cont outpatient f/u.  Dr Love

## 2024-08-09 NOTE — H&P
[facility]          [address] [place] [address]                           HISTORY & PHYSICAL      PATIENT NAME: LINDA DOAN               : 1960  MED REC NO: [id number]                      ROOM: [place] [address]  ACCOUNT NO: [id number]                       ADMIT DATE: 2024  PROVIDER: Tony Love MD      CHIEF COMPLAINT:  Left leg swelling and pain.    HISTORY OF PRESENT ILLNESS:  This is a 64-year-old male patient, came to the emergency room because of acute left lower extremity swelling and pain that started in the calf.  The patient was at [place] for his brother's  and the patient had more than 10 hours driving without any stopping.  The patient came home on  and on Monday he noticed some swelling and pain to get worse to the degree that it was really large.  Yesterday, he came to the emergency room.  The patient was diagnosed with acute DVT and had some small PE also.  Patient denies any shortness of breath.  No pleuritic pain.  No cough.  The patient is started on heparin, was seen by Vascular, Dr. Traylor is at room right now and because the scan showed some small aneurysm, Dr. Traylor is not concerned about it, does not want to plan any surgery, no thrombectomy.  He also was seen by Dr. Kern and also not recommended any thrombectomy at this time.  The patient is feeling significantly improved.  He is doing fine.  The patient's pain responded to the morphine.  No chest pain, no fever, no chills.  So, we discussed with Dr. Traylor, *** we are going to start the standard anticoagulation with Eliquis 10 mg for 7 days and after we want to DC the heparin.    PAST MEDICAL HISTORY:  Significant for throat cancer, it was about 10 years ago, [facility]  No recurrence of the cancer.  History of small TIA in the past.  Status post colectomy, diverticulitis, diabetes mellitus, hypertension, anxiety, cervical spondylosis, rotator

## 2024-08-09 NOTE — DISCHARGE INSTR - COC
Continuity of Care Form    Patient Name: Gary Martinez   :  1960  MRN:  [id number]    Admit date:  2024  Discharge date:  8/10    Code Status Order: Full Code   Advance Directives:     Admitting Physician:  Tony Love MD  PCP: Tony Love MD    Discharging Nurse: Nico GOULD  Discharging Hospital Unit/Room#: Z3T-2786/5254-01  Discharging Unit Phone Number: [phone]    Emergency Contact:   Extended Emergency Contact Information  Primary Emergency Contact: Jeferson Baptiste  Home Phone: [phone]  Relation: Step Child  Secondary Emergency Contact: Hiwot Calero  Address: [address] [place] [address]  Home Phone: [phone]  Mobile Phone: [phone]  Relation: Healthcare Decision Maker    Past Surgical History:  Past Surgical History:   Procedure Laterality Date    CHOLECYSTECTOMY, LAPAROSCOPIC N/A 08/10/2021    LAPAROSCOPIC CHOLECYSTECTOMY WITH CHOLANGIOGRAM performed by Ivan Jenkins MD at [facility] OR    COLECTOMY N/A 2022    ROBOTIC ASSISTED LAPAROSCOPIC SIGMOID COLECTOMY performed by Ivan Jenkins MD at [facility] OR    CYSTOSCOPY N/A 2022    CYSTOSCOPY, LEFT URETERAL CATHETER INSERTION performed by Ivan Jenkins MD at [facility] OR    PORTACATH PLACEMENT      TONSILLECTOMY Bilateral        Immunization History:   Immunization History   Administered Date(s) Administered    COVID-19, MODERNA BLUE border, Primary or Immunocompromised, (age 12y+), IM, 100 mcg/0.5mL 2021, 2021       Active Problems:  Patient Active Problem List   Diagnosis Code    H/O tongue cancer Z85.810    Rash and nonspecific skin eruption R21    Urinary incontinence R32    Elevated WBC count D72.829    Abdominal cramping R10.9    Mucous in stools R19.5    Diverticulitis K57.92    Moderate malnutrition (HCC) E44.0    Diverticulitis of intestine with abscess without bleeding K57.80    Acute on chronic cholecystitis K81.2    S/P colectomy Z90.49     Sigmoid diverticulitis K57.32    Colovesical fistula N32.1    Cervical spondylosis M47.812    Anterolisthesis of cervical spine M43.12    Cervical stenosis of spine M48.02    Rotator cuff tendonitis, right M75.81    Rotator cuff tendonitis, left M75.82    Pulmonary embolism, bilateral (HCC) I26.99    Acute deep vein thrombosis (DVT) of femoral vein of left lower extremity (HCC) I82.412    Aneurysm of left popliteal artery (HCC) I72.4       Isolation/Infection:   Isolation            C Diff Contact          Patient Infection Status       Infection Onset Added Last Indicated Last Indicated By Review Planned Expiration Resolved Resolved By    C-diff Rule Out 08/08/24 08/08/24 08/08/24 Clostridium Difficile Toxin/Antigen (Ordered) 08/15/24 08/18/24                         Nurse Assessment:  Last Vital Signs: /69   Pulse 72   Temp 98.6 °F (37 °C) (Axillary)   Resp 18   Ht 1.829 m (6')   Wt 63.5 kg (139 lb 15.9 oz)   SpO2 98%   BMI 18.99 kg/m²     Last documented pain score (0-10 scale): Pain Level: 0  Last Weight:   Wt Readings from Last 1 Encounters:   08/09/24 63.5 kg (139 lb 15.9 oz)     Mental Status:  oriented and alert    IV Access:  - Left chest port    Nursing Mobility/ADLs:  Walking   Independent  Transfer  Independent  Bathing  Independent  Dressing  Independent  Toileting  Independent  Feeding  Independent  Med Admin  Independent  Med Delivery   whole    Wound Care Documentation and Therapy:  Incision 11/08/22 Abdomen Medial (Active)   Number of days: 640        Elimination:  Continence:   Bowel: Yes  Bladder: Yes  Urinary Catheter: None   Colostomy/Ileostomy/Ileal Conduit: No       Date of Last BM: 8/8    Intake/Output Summary (Last 24 hours) at 8/9/2024 1601  Last data filed at 8/9/2024 1332  Gross per 24 hour   Intake 20 ml   Output --   Net 20 ml     No intake/output data recorded.    Safety Concerns:     Bleeding precautions    Impairments/Disabilities:      None    Nutrition

## 2024-08-09 NOTE — PROGRESS NOTES
Clinical Pharmacy Note  Heparin Dosing       Lab Results   Component Value Date/Time    ANTIXAUHEP 0.19 08/08/2024 11:50 PM      Lab Results   Component Value Date/Time    HGB 18.6 08/08/2024 08:14 PM    HCT 52.9 08/08/2024 08:14 PM     08/08/2024 08:14 PM    INR 1.01 08/08/2024 05:28 PM       Current Infusion Rate: 1140 units/hr    Plan:  Bolus: 2500 units  Rate: increase to 1270 units/hr  Next anti-Xa level: 0800 8/9/24    Pharmacy will continue to monitor and adjust based on anti-Xa results.  Adilson Pryor, PharmD

## 2024-08-09 NOTE — CONSULTS
Interventional Cardiology Consultation     Gary Martinez  1960    PCP: Tony Love MD  Referring Physician: Dr Love  Reason for Referral: DVT/PE  Chief Complaint:   Chief Complaint   Patient presents with    Leg Swelling     Pt to ED with complaint of left leg swelling that started on Saturday or Sunday. Pt states he just got back from [place] on Sunday and was in the car long. No SOB.        Subjective:     History of Present Illness: The patient is 64 y.o. male with a past medical history significant for squamous cell cancer of the tongue, recent long car ride from [place]. Presents with left calf and thigh pain. He denies chest pain, PND, orthopnea, dyspnea at rest, palpitations, syncope or edema. Left chest wall PORt. No prior history of a blood clot. No pertinent family history. Presented to the [facility] ER for w/u and venous doppler + for DVT and CT chest with incidental PE.             Past Medical History:   Diagnosis Date    Anxiety     Cancer (HCC)     head/neck/tongue    Chronic back pain     Diabetes mellitus (HCC)     Erectile dysfunction     Headache(784.0)     Osteoarthritis     PONV (postoperative nausea and vomiting)     Restless legs syndrome     Urinary incontinence     Wears dentures      Past Surgical History:   Procedure Laterality Date    CHOLECYSTECTOMY, LAPAROSCOPIC N/A 08/10/2021    LAPAROSCOPIC CHOLECYSTECTOMY WITH CHOLANGIOGRAM performed by Ivan Jenkins MD at [facility] OR    COLECTOMY N/A 11/8/2022    ROBOTIC ASSISTED LAPAROSCOPIC SIGMOID COLECTOMY performed by Ivan Jenkins MD at [facility] OR    CYSTOSCOPY N/A 11/8/2022    CYSTOSCOPY, LEFT URETERAL CATHETER INSERTION performed by Ivan Jenkins MD at [facility] OR    PORTACATH PLACEMENT      TONSILLECTOMY Bilateral      Family History   Problem Relation Age of Onset    Heart Disease Mother     High Blood Pressure Mother     Heart Disease Father     High Blood Pressure Father      Social History        Review of Systems:  Constitutional: No unanticipated weight loss. There's been no change in energy level, sleep pattern, or activity level. No fevers, chills.   Eyes: No visual changes or diplopia. No scleral icterus.  ENT: No Headaches, hearing loss or vertigo. No mouth sores or sore throat.  Cardiovascular: as reviewed in HPI  Respiratory: No cough or wheezing, no sputum production. No hemoptysis.     Gastrointestinal: No abdominal pain, appetite loss, blood in stools. No change in bowel or bladder habits.  Genitourinary: No dysuria, trouble voiding, or hematuria.  Musculoskeletal:  No gait disturbance, no joint complaints.  Integumentary: No rash or pruritis.  Neurological: No headache, diplopia, change in muscle strength, numbness or tingling.   Psychiatric: No anxiety or depression.  Endocrine: No temperature intolerance. No excessive thirst, fluid intake, or urination. No tremor.  Hematologic/Lymphatic: No abnormal bruising or bleeding, blood clots or swollen lymph nodes.  Allergic/Immunologic: No nasal congestion or hives.    Physical Exam:   /78   Pulse 69   Temp 98.4 °F (36.9 °C) (Oral)   Resp 15   Ht 1.829 m (6')   Wt 63.5 kg (139 lb 15.9 oz)   SpO2 97%   BMI 18.99 kg/m²   Wt Readings from Last 3 Encounters:   08/09/24 63.5 kg (139 lb 15.9 oz)   07/30/24 63.5 kg (139 lb 15.9 oz)   06/17/24 63.5 kg (140 lb)     Constitutional: He is oriented to person, place, and time. He appears well-developed and well-nourished. In no acute distress.   Head: Normocephalic and atraumatic. Pupils equal and round.  Neck: Neck supple. No JVP or carotid bruit appreciated. No mass and no thyromegaly present. No lymphadenopathy present.  Cardiovascular: Normal rate. Normal heart sounds. Exam reveals no gallop and no friction rub. No murmur heard.  Pulmonary/Chest: Effort normal and breath sounds normal. No respiratory distress. He has no wheezes, rhonchi or rales.   Abdominal: Soft, non-tender. Bowel sounds

## 2024-08-09 NOTE — PROGRESS NOTES
Medication Reconciliation    List of medications patient is currently taking is complete.     Source of information: 1. Conversation with patient at bedside                                      2. EPIC records      Allergies  Ativan [lorazepam], Zoloft [sertraline hcl], and Doxycycline     Notes regarding home medications:   1. Patient received all of his AM home medications prior to arrival to the emergency department.    2. Patient receives his Dupixent injection every 2 weeks, last dose was 7/26/24 and is due for dose tomorrow 8/9/24.       Merced Castillo, Pharmacy Intern  8/8/2024 8:15 PM

## 2024-08-09 NOTE — PLAN OF CARE
Problem: Discharge Planning  Goal: Discharge to home or other facility with appropriate resources  8/9/2024 0618 by Balbina Batista RN  Outcome: Progressing  8/9/2024 0610 by Merced Carlisle RN  Outcome: Progressing     Problem: Pain  Goal: Verbalizes/displays adequate comfort level or baseline comfort level  8/9/2024 0618 by Balbina Batista RN  Outcome: Progressing  8/9/2024 0610 by Merced Carlisle RN  Outcome: Progressing     Problem: Safety - Adult  Goal: Free from fall injury  8/9/2024 0618 by Balbina Batista RN  Outcome: Progressing  8/9/2024 0610 by Merced Carlisle RN  Outcome: Progressing     Problem: ABCDS Injury Assessment  Goal: Absence of physical injury  8/9/2024 0618 by Balbina Batista RN  Outcome: Progressing  8/9/2024 0610 by Merced Carlisle RN  Outcome: Progressing

## 2024-08-09 NOTE — PROGRESS NOTES
Patient admitted to room 5254 at 2215. Pt ambulated to the bed. Pt with belongings at bedside. Pt placed on 2 L of oxygen, but room air is his baseline. Pt has heparin infusing into his port. The patient is normal sinus rhythm and confirmed with CMU. There was some swelling in the left calf. Patient is alert and oriented to room. Call light in reach. Patient denies any further needs at this time.    Electronically signed by Balbina Batista RN on 8/8/2024 at 10:38 PM

## 2024-08-09 NOTE — PROGRESS NOTES
Physical Therapy  Facility/Department: [address] PROGRESSIVE CARE  Physical Therapy Initial Assessment    Name: Gary Martinez  : 1960  MRN: [id number]  Date of Service: 2024    Discharge Recommendations:  Home with assist PRN, Home with Home health PT (Home health aide for household tasks)   PT Equipment Recommendations  Equipment Needed: No      Gary Martinez scored a 19/24 on the AM-PAC short mobility form. Current research shows that an AM-PAC score of 18 or greater is typically associated with a discharge to the patient's home setting. Based on the patient's AM-PAC score and their current functional mobility deficits, it is recommended that the patient have 2-3 sessions per week of Physical Therapy at d/c to increase the patient's independence.  At this time, this patient demonstrates the endurance and safety to discharge home with Home PT and a follow up treatment frequency of 2-3x/wk.  Please see assessment section for further patient specific details.    If patient discharges prior to next session this note will serve as a discharge summary.  Please see below for the latest assessment towards goals.       Patient Diagnosis(es): The primary encounter diagnosis was Acute deep vein thrombosis (DVT) of femoral vein of left lower extremity (HCC). Diagnoses of Other acute pulmonary embolism without acute cor pulmonale (HCC) and Aneurysm of left popliteal artery (HCC) were also pertinent to this visit.  Past Medical History:  has a past medical history of Anxiety, Cancer (HCC), Chronic back pain, Diabetes mellitus (HCC), Erectile dysfunction, Headache(784.0), Osteoarthritis, PONV (postoperative nausea and vomiting), Restless legs syndrome, Urinary incontinence, and Wears dentures.  Past Surgical History:  has a past surgical history that includes Tonsillectomy (Bilateral); Cholecystectomy, laparoscopic (N/A, 08/10/2021); Portacath placement; colectomy (N/A, 2022); and Cystoscopy (N/A,  ULM, PT     Electronically signed by DAYANA HATFIELD PT on 8/9/2024 at 2:23 PM

## 2024-08-09 NOTE — CONSULTS
VASCULAR SURGERY CONSULTATION    Gary Martinez is a 64 y.o. male admitted with L femoropopliteal-tibial DVT and very small PE. Symptoms began early this week with LLE swelling and pain after arriving back to [place] after traveling by car from [place]. Since admission leg feels better with decreased swelling. Denies any SOB, chest pain or hemoptysis. Denies any claudication, rest pain, gangrene, nonhealing foot wounds or digital discoloration. No previous vascular interventions. + H/O smoking. + H/O oral squamous cell Ca treated in 2012 at [facility] - \"clear\" on recent f/u 6 months ago at [facility]. Asked by Tony Love MD to see pt for incidental L popliteal aneurysm seen on venous scan.    Past Medical History:   Diagnosis Date    Anxiety     Cancer (HCC)     head/neck/tongue    Chronic back pain     Diabetes mellitus (HCC)     Erectile dysfunction     Headache(784.0)     Osteoarthritis     PONV (postoperative nausea and vomiting)     Restless legs syndrome     Urinary incontinence     Wears dentures      Past Surgical History:   Procedure Laterality Date    CHOLECYSTECTOMY, LAPAROSCOPIC N/A 08/10/2021    LAPAROSCOPIC CHOLECYSTECTOMY WITH CHOLANGIOGRAM performed by Ivan Jenkins MD at [facility] OR    COLECTOMY N/A 11/8/2022    ROBOTIC ASSISTED LAPAROSCOPIC SIGMOID COLECTOMY performed by Ivan Jenkins MD at [facility] OR    CYSTOSCOPY N/A 11/8/2022    CYSTOSCOPY, LEFT URETERAL CATHETER INSERTION performed by Ivan Jenkins MD at [facility] OR    PORTACATH PLACEMENT      TONSILLECTOMY Bilateral      Family History   Problem Relation Age of Onset    Heart Disease Mother     High Blood Pressure Mother     Heart Disease Father     High Blood Pressure Father      Social History     Socioeconomic History    Marital status: Single     Spouse name: None    Number of children: None    Years of education: None    Highest education level: None   Tobacco Use    Smoking status: Former     Current packs/day:  items are noted in HPI. C/O diarrhea and weight loss. GI w/u pending     Objective:     /78   Pulse 69   Temp 98.4 °F (36.9 °C) (Oral)   Resp 15   Ht 1.829 m (6')   Wt 63.5 kg (139 lb 15.9 oz)   SpO2 97%   BMI 18.99 kg/m²     General:  alert, appears stated age, and cooperative; thin   Skin:  normal   Eyes: conjunctivae/corneas clear. PERRL, EOM's intact. Fundi benign.   Mouth: MMM no lesions   Lymph Nodes:  Cervical, supraclavicular, and axillary nodes normal.L subclavian port   Lungs:  clear to auscultation bilaterally   Heart:  regular rate and rhythm, S1, S2 normal, no murmur, click, rub or gallop   Abdomen: soft, non-tender; bowel sounds normal; no masses,  no organomegaly   CVA:  absent   Genitourinary: defer exam   Extremities:  Tender L calf to palpation - no edema visible.  L foot pink and warm.  No L foot gangrene, cyanosis or livedo reticularis    Pulses:   R bruit  L bruit   2   carotid 2    2   brachial 2    2   radial 2    2   femoral 2    2   popliteal 3    2   posterior tibial 2    1   dorsalis pedis 2       bypass graft          Neurologic:  Alert and oriented x3. Gait normal. Reflexes and motor strength normal and symmetric. Cranial nerves 2-12 and sensation grossly intact.   Psychiatric:  non focal       CTPA 8/8/2024 personally reviewed  IMPRESSION:  Small acute subsegmental pulmonary emboli scattered along both lower lobes  and right upper lobe.     Mildly dilated and atherosclerotic thoracic aorta with no aneurysm or  dissection.     No right heart enlargement seen.     Mild chronic obstructive lung changes with mild subpleural atelectasis or  scarring along the lung bases posteriorly.     3 mm subpleural nodule or scar right upper lobe.     Moderate biapical pleural thickening and scarring     Left Port-A-Cath in good position     Multiple old compression fractures along the upper thoracic spine which are  unchanged.     Venous duplex LLE 8/8/2024 - personally reviewed   Acute

## 2024-08-09 NOTE — CARE COORDINATION
oxygen for the ride home but confirms he has O2 tanks and a concentrator at home if needed.     The Plan for Transition of Care is related to the following treatment goals of Pulmonary embolism, bilateral (HCC) [I26.99]  Acute deep vein thrombosis (DVT) of femoral vein of left lower extremity (HCC) [I82.412]  Other acute pulmonary embolism without acute cor pulmonale (HCC) [I26.99]    IF APPLICABLE: The Patient and/or patient representative Gary and his family were provided with a choice of provider and agrees with the discharge plan. Freedom of choice list with basic dialogue that supports the patient's individualized plan of care/goals and shares the quality data associated with the providers was provided to: Patient   Patient Representative Name:       The Patient and/or Patient Representative Agree with the Discharge Plan? Yes    Chaya Schmitt RN  Case Management Department  Ph: [phone]

## 2024-08-09 NOTE — PROGRESS NOTES
Clinical Pharmacy Note  Medication Counseling    Reviewed new medications started during hospital admission: eliquis, , . Indications and side effects were emphasized during counseling.  All medication-related questions addressed.  Patient verbalized understanding of education.    Should the patient express any additional questions or concerns regarding their medications, please do not hesitate to contact the pharmacy department.    Patient/caregiver aware they may refuse medications during hospital stay.       10 minutes spent educating patient regarding medications.

## 2024-08-10 VITALS
WEIGHT: 140.43 LBS | DIASTOLIC BLOOD PRESSURE: 80 MMHG | TEMPERATURE: 98.7 F | HEART RATE: 73 BPM | SYSTOLIC BLOOD PRESSURE: 123 MMHG | BODY MASS INDEX: 19.02 KG/M2 | OXYGEN SATURATION: 92 % | RESPIRATION RATE: 18 BRPM | HEIGHT: 72 IN

## 2024-08-10 LAB
DEPRECATED RDW RBC AUTO: 13.9 % (ref 12.4–15.4)
GLUCOSE BLD-MCNC: 135 MG/DL (ref 70–99)
HCT VFR BLD AUTO: 50.3 % (ref 40.5–52.5)
HGB BLD-MCNC: 17.2 G/DL (ref 13.5–17.5)
MCH RBC QN AUTO: 34.3 PG (ref 26–34)
MCHC RBC AUTO-ENTMCNC: 34.2 G/DL (ref 31–36)
MCV RBC AUTO: 100.2 FL (ref 80–100)
PERFORMED ON: ABNORMAL
PLATELET # BLD AUTO: 129 K/UL (ref 135–450)
PMV BLD AUTO: 8.4 FL (ref 5–10.5)
RBC # BLD AUTO: 5.02 M/UL (ref 4.2–5.9)
TISSUE TRANSGLUTAMINASE ANTIBODY IGG: <0.8 U/ML (ref 0–14)
WBC # BLD AUTO: 6.6 K/UL (ref 4–11)

## 2024-08-10 PROCEDURE — 85027 COMPLETE CBC AUTOMATED: CPT

## 2024-08-10 PROCEDURE — G0378 HOSPITAL OBSERVATION PER HR: HCPCS

## 2024-08-10 PROCEDURE — 6370000000 HC RX 637 (ALT 250 FOR IP): Performed by: SPECIALIST

## 2024-08-10 PROCEDURE — 6370000000 HC RX 637 (ALT 250 FOR IP): Performed by: INTERNAL MEDICINE

## 2024-08-10 PROCEDURE — 36415 COLL VENOUS BLD VENIPUNCTURE: CPT

## 2024-08-10 PROCEDURE — 83516 IMMUNOASSAY NONANTIBODY: CPT

## 2024-08-10 PROCEDURE — 94760 N-INVAS EAR/PLS OXIMETRY 1: CPT

## 2024-08-10 PROCEDURE — 99238 HOSP IP/OBS DSCHRG MGMT 30/<: CPT | Performed by: INTERNAL MEDICINE

## 2024-08-10 RX ORDER — PREDNISONE 20 MG/1
TABLET ORAL
Qty: 7 TABLET | Refills: 0 | Status: SHIPPED | OUTPATIENT
Start: 2024-08-10

## 2024-08-10 RX ORDER — PANTOPRAZOLE SODIUM 40 MG/1
40 TABLET, DELAYED RELEASE ORAL
Qty: 90 TABLET | Refills: 1 | Status: SHIPPED | OUTPATIENT
Start: 2024-08-10

## 2024-08-10 RX ORDER — ALPRAZOLAM 0.5 MG/1
0.5 TABLET ORAL 2 TIMES DAILY PRN
Status: DISCONTINUED | OUTPATIENT
Start: 2024-08-10 | End: 2024-08-10 | Stop reason: HOSPADM

## 2024-08-10 RX ADMIN — APIXABAN 10 MG: 5 TABLET, FILM COATED ORAL at 08:54

## 2024-08-10 RX ADMIN — ALOGLIPTIN 6.25 MG: 6.25 TABLET, FILM COATED ORAL at 08:54

## 2024-08-10 RX ADMIN — ALPRAZOLAM 0.5 MG: 0.5 TABLET ORAL at 09:30

## 2024-08-10 RX ADMIN — ACETAMINOPHEN 500 MG: 500 TABLET ORAL at 13:29

## 2024-08-10 RX ADMIN — LEVOTHYROXINE SODIUM 50 MCG: 0.05 TABLET ORAL at 06:15

## 2024-08-10 ASSESSMENT — PAIN DESCRIPTION - DESCRIPTORS: DESCRIPTORS: DISCOMFORT

## 2024-08-10 ASSESSMENT — PAIN SCALES - GENERAL: PAINLEVEL_OUTOF10: 7

## 2024-08-10 ASSESSMENT — PAIN DESCRIPTION - LOCATION: LOCATION: LEG

## 2024-08-10 ASSESSMENT — PAIN - FUNCTIONAL ASSESSMENT: PAIN_FUNCTIONAL_ASSESSMENT: ACTIVITIES ARE NOT PREVENTED

## 2024-08-10 ASSESSMENT — PAIN DESCRIPTION - ORIENTATION: ORIENTATION: LEFT

## 2024-08-10 NOTE — PROGRESS NOTES
Discharge orders acknowledged by RN . Discharge teaching completed with pt and family. AVS reviewed and all questions answered. Medication regimen reviewed and pt understands schedule. E-scripts sent to pt RX. Follow up appointments also reviewed with pt and resources given for discharge. IV removed and port de-accessed. Bedside monitor removed from pt. Pt vitals WNL. Pt discharged with all belongings to home with family. Pt transported off of unit via wheelchair. No complications.

## 2024-08-10 NOTE — FLOWSHEET NOTE
08/10/24 0900   Treatment Team Notification   Reason for Communication Review case  (GI consult? patient reports GI issues since April, missed appt on Thursday d/t hospitilization. Patient is also very anxious and asking for PRN xanax, current orders are 0.5mg PRN HS)   Name of Team Member Notified Dr. Guillen   Treatment Team Role Attending Provider   Method of Communication Secure Message   Response Waiting for response   Notification Time 0902
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
## 2024-08-10 NOTE — CARE COORDINATION
Case Management Discharge Note          Date / Time of Note: 8/10/2024 12:53 PM                  Patient Name: Gary Martinez   YOB: 1960  Diagnosis: Pulmonary embolism, bilateral (HCC) [I26.99]  Acute deep vein thrombosis (DVT) of femoral vein of left lower extremity (HCC) [I82.412]  Other acute pulmonary embolism without acute cor pulmonale (HCC) [I26.99]   Date / Time: 8/8/2024  4:00 PM    Financial:  Payor: [facility] PL / Plan: [facility] [place] / Product Type: *No Product type* /      Pharmacy:    [id number] [place], [place] - [address] - P [phone] - F [phone]  [address]  [facility] [address]  Phone: [phone] Fax: [phone]    [id number] [place], [place] - [address] [facility] [phone] - F [phone]  [address] [facility] [address]  Phone: [phone] Fax: [phone]    [facility] [address] [facility] - P [phone] - F [phone]  [address] [facility] [address]  Phone: [phone] Fax: [phone]    [facility] - [place], [place] - [address] - P [phone] - F [phone] [address] [place] [address]  Phone: [phone] Fax: [phone]      Assistance purchasing medications?: Potential Assistance Purchasing Medications: No  Assistance provided by Case Management: None at this time    DISCHARGE Disposition: Home with Home Health Care    Discharging to Facility/ Agency   Name: [facility]  Address:   [address] [place] [address]  Phone:  [phone]  Fax:  [phone]      Home Care:  Home Care ordered at discharge: Yes  Home Care Agency: [facility]  Phone: [phone]  Fax: [phone]  Orders faxed: No  Vinnie stated he will pull orders from Epic      Home Oxygen and Respiratory Equipment:  Already Active with O2  through [facility]        Transportation:  Transportation PLAN for discharge: family   Mode of Transport: Private Car      Transport form completed: Not Indicated    IMM Completed:   Not Indicated    Additional CM Notes: Home with [facility].  Spoke with Vinnie at [facility] who will pull orders from Epic.  Family/friend to transport pt home at d/c.     The Plan for Transition of Care is related to the following treatment goals of Pulmonary embolism, bilateral (HCC) [I26.99]  Acute deep vein thrombosis (DVT) of femoral vein of left lower extremity (HCC) [I82.412]  Other acute pulmonary embolism without acute cor pulmonale (HCC) [I26.99]    The Patient and/or patient representative Gary and his family were provided with a choice of provider and agrees with the discharge plan Yes    Freedom of choice list was provided with basic dialogue that supports the patient's individualized plan of care/goals and shares the quality data associated with the providers. Yes    JOYCELYN Alvarado  [facility]   Case Management Department  Ph: [phone]  Fax: [phone]  Electronically signed by JOYCELYN Alvarado on 8/10/2024 at 12:55 PM

## 2024-08-12 NOTE — DISCHARGE SUMMARY
Hospitalist Discharge Summary   8/12/2024 1:40 PM  Subjective:   Admit Date: 8/8/2024  PCP: Tony Love MD  Interval History: pt is ready for the discharge  Feels better  Rest of the events as in progress notes and chart  Admitted with shortness of breath  Diagnosed with PE BL started on heparin and is changed to eliquis  Denies any other complaints  Chart reviewed.  Diet: No diet orders on file  Medications:   Scheduled Meds:  Continuous Infusions:  CBC:   Recent Labs     08/10/24  0517   WBC 6.6   HGB 17.2   *     BMP:  No results for input(s): \"NA\", \"K\", \"CL\", \"CO2\", \"BUN\", \"CREATININE\", \"GLUCOSE\" in the last 72 hours.  Hepatic: No results for input(s): \"AST\", \"ALT\", \"BILITOT\", \"ALKPHOS\" in the last 72 hours.    Invalid input(s): \"ALB\"  Troponin: No results for input(s): \"TROPONINI\" in the last 72 hours.  BNP: No results for input(s): \"BNP\" in the last 72 hours.  Lipids: No results for input(s): \"CHOL\", \"HDL\" in the last 72 hours.    Invalid input(s): \"LDLCALCU\"  INR: No results for input(s): \"INR\" in the last 72 hours.  Orders Placed This Encounter   Procedures    GI Bacterial Pathogens By PCR     Standing Status:   Standing     Number of Occurrences:   1    CT CHEST PULMONARY EMBOLISM W CONTRAST     Standing Status:   Standing     Number of Occurrences:   1     Order Specific Question:   Reason for exam:     Answer:   ro PE     Order Specific Question:   Additional Contrast?     Answer:   1    CBC with Auto Differential     Standing Status:   Standing     Number of Occurrences:   1    Comprehensive Metabolic Panel w/ Reflex to MG     Standing Status:   Standing     Number of Occurrences:   1    Protime-INR     Standing Status:   Standing     Number of Occurrences:   1    Lactic Acid     Standing Status:   Standing     Number of Occurrences:   1    CK     Standing Status:   Standing     Number of Occurrences:   1    CBC     Standing Status:   Standing     Number of Occurrences:   1    Anti-Xa,

## 2024-08-26 ENCOUNTER — TELEPHONE (OUTPATIENT)
Dept: VASCULAR SURGERY | Age: 64
End: 2024-08-26

## 2024-08-26 NOTE — TELEPHONE ENCOUNTER
Mr. Martinez called about his eliquis he was started on. He was given a starter kit and only has 2 pills left.

## 2024-08-26 NOTE — TELEPHONE ENCOUNTER
Called pt no answer pt needs to call pcp or provider who prescribed this medication Dr. Traylor is not the prescribing provider

## 2024-09-25 ENCOUNTER — OFFICE VISIT (OUTPATIENT)
Dept: VASCULAR SURGERY | Age: 64
End: 2024-09-25
Payer: MEDICAID

## 2024-09-25 VITALS
HEART RATE: 74 BPM | DIASTOLIC BLOOD PRESSURE: 86 MMHG | HEIGHT: 72 IN | WEIGHT: 141 LBS | SYSTOLIC BLOOD PRESSURE: 138 MMHG | OXYGEN SATURATION: 96 % | BODY MASS INDEX: 19.1 KG/M2

## 2024-09-25 DIAGNOSIS — I82.412 ACUTE DEEP VEIN THROMBOSIS (DVT) OF FEMORAL VEIN OF LEFT LOWER EXTREMITY (HCC): Primary | ICD-10-CM

## 2024-09-25 DIAGNOSIS — I72.4 ANEURYSM OF LEFT POPLITEAL ARTERY (HCC): Primary | ICD-10-CM

## 2024-09-25 PROCEDURE — 3017F COLORECTAL CA SCREEN DOC REV: CPT | Performed by: SURGERY

## 2024-09-25 PROCEDURE — G8420 CALC BMI NORM PARAMETERS: HCPCS | Performed by: SURGERY

## 2024-09-25 PROCEDURE — 99212 OFFICE O/P EST SF 10 MIN: CPT | Performed by: SURGERY

## 2024-09-25 PROCEDURE — 1036F TOBACCO NON-USER: CPT | Performed by: SURGERY

## 2024-09-25 PROCEDURE — G8427 DOCREV CUR MEDS BY ELIG CLIN: HCPCS | Performed by: SURGERY

## 2024-11-13 ENCOUNTER — OFFICE VISIT (OUTPATIENT)
Dept: VASCULAR SURGERY | Age: 64
End: 2024-11-13
Payer: MEDICAID

## 2024-11-13 VITALS
HEIGHT: 72 IN | BODY MASS INDEX: 19.23 KG/M2 | WEIGHT: 142 LBS | HEART RATE: 90 BPM | DIASTOLIC BLOOD PRESSURE: 93 MMHG | OXYGEN SATURATION: 99 % | SYSTOLIC BLOOD PRESSURE: 132 MMHG

## 2024-11-13 DIAGNOSIS — I72.4 ANEURYSM OF LEFT POPLITEAL ARTERY (HCC): Primary | ICD-10-CM

## 2024-11-13 DIAGNOSIS — I82.412 ACUTE DEEP VEIN THROMBOSIS (DVT) OF FEMORAL VEIN OF LEFT LOWER EXTREMITY (HCC): Primary | ICD-10-CM

## 2024-11-13 PROCEDURE — G8427 DOCREV CUR MEDS BY ELIG CLIN: HCPCS | Performed by: SURGERY

## 2024-11-13 PROCEDURE — G8420 CALC BMI NORM PARAMETERS: HCPCS | Performed by: SURGERY

## 2024-11-13 PROCEDURE — 3017F COLORECTAL CA SCREEN DOC REV: CPT | Performed by: SURGERY

## 2024-11-13 PROCEDURE — G8484 FLU IMMUNIZE NO ADMIN: HCPCS | Performed by: SURGERY

## 2024-11-13 PROCEDURE — 1036F TOBACCO NON-USER: CPT | Performed by: SURGERY

## 2024-11-13 PROCEDURE — 99213 OFFICE O/P EST LOW 20 MIN: CPT | Performed by: SURGERY

## 2024-11-13 NOTE — PROGRESS NOTES
Second OV since DVT and small PE in early August.  Also found to have left popliteal artery aneurysm measuring 2.4 cm with mural thrombus at that time.  Remains on oral anticoagulants without problems.  Has not had problems with walking or left foot pain/discoloration.  Continues to wear his compression stockings but at HS (?) and all swelling has resolved.  No recurrent shortness of breath.  W/U for hypercoag/recurrent Ca (SCCA tongue) through [facility] negative.    EXAM:  L leg without pitting edema - slightly larger than R leg    Visible GSV bilaterally.    Prominent L popliteal pulse     Venous scan today - occlusive chronic thrombus L distal SFV/popliteal veins    A/P: S/P L fempop DVT - symptoms/findings resolved. Chronic venous occlusion S/P DVT.   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   Will undergo a left leg CTA for anatomic definition of his aneurysm and surgical planning.   Return to [facility] to undergo right greater saphenous vein mapping for use as conduit in the future.   Explained in detail the patient seems understand this well and is agreeable to this approach.

## 2024-11-26 ENCOUNTER — HOSPITAL ENCOUNTER (OUTPATIENT)
Dept: CT IMAGING | Age: 64
Discharge: HOME | End: 2024-11-26
Payer: MEDICAID

## 2024-11-26 DIAGNOSIS — I72.4 ANEURYSM OF LEFT POPLITEAL ARTERY (HCC): ICD-10-CM

## 2024-11-26 LAB
PERFORMED ON: ABNORMAL
POC CREATININE: 0.6 MG/DL (ref 0.8–1.3)
POC SAMPLE TYPE: ABNORMAL

## 2024-11-26 PROCEDURE — 6360000004 HC RX CONTRAST MEDICATION: Performed by: SURGERY

## 2024-11-26 PROCEDURE — 82565 ASSAY OF CREATININE: CPT

## 2024-11-26 PROCEDURE — 75635 CT ANGIO ABDOMINAL ARTERIES: CPT

## 2024-11-26 RX ORDER — IOPAMIDOL 755 MG/ML
75 INJECTION, SOLUTION INTRAVASCULAR
Status: COMPLETED | OUTPATIENT
Start: 2024-11-26 | End: 2024-11-26

## 2024-11-26 RX ADMIN — IOPAMIDOL 75 ML: 755 INJECTION, SOLUTION INTRAVENOUS at 13:55

## 2024-12-11 ENCOUNTER — OFFICE VISIT (OUTPATIENT)
Dept: VASCULAR SURGERY | Age: 64
End: 2024-12-11
Payer: MEDICAID

## 2024-12-11 VITALS
BODY MASS INDEX: 19.5 KG/M2 | HEIGHT: 72 IN | DIASTOLIC BLOOD PRESSURE: 88 MMHG | OXYGEN SATURATION: 98 % | WEIGHT: 144 LBS | HEART RATE: 73 BPM | SYSTOLIC BLOOD PRESSURE: 139 MMHG

## 2024-12-11 DIAGNOSIS — I72.4 POPLITEAL ARTERY ANEURYSM (HCC): Primary | ICD-10-CM

## 2024-12-11 PROCEDURE — G8420 CALC BMI NORM PARAMETERS: HCPCS | Performed by: SURGERY

## 2024-12-11 PROCEDURE — 3017F COLORECTAL CA SCREEN DOC REV: CPT | Performed by: SURGERY

## 2024-12-11 PROCEDURE — 1036F TOBACCO NON-USER: CPT | Performed by: SURGERY

## 2024-12-11 PROCEDURE — G8484 FLU IMMUNIZE NO ADMIN: HCPCS | Performed by: SURGERY

## 2024-12-11 PROCEDURE — 99213 OFFICE O/P EST LOW 20 MIN: CPT | Performed by: SURGERY

## 2024-12-11 PROCEDURE — G8427 DOCREV CUR MEDS BY ELIG CLIN: HCPCS | Performed by: SURGERY

## 2024-12-11 NOTE — PROGRESS NOTES
F/U regarding L popliteal aneurysm.  No complaints of left foot pain, discoloration or claudication.  Recent CTA of the extremity as well as vein mapping today of the right leg.  Continues wear compression stocking and remains on anticoagulation.    EXAM:  No L leg edema    Palpable L DP/PT pulses    Pulsatile left popliteal mass appears confined to popliteal fossa    CTA reviewed: Poor filling of arterial vessels distally but appears to fill all the way out through a nondiseased below-knee popliteal and posterior tibial artery.  Aneurysm length is approximately 5 cm and appears confined to the popliteal fossa.    Right GSV mapping shows excellent caliber vein.    A/P: Asymptomatic left popliteal artery aneurysm (2.7 cm with mural thrombus)   Status post left leg DVT with PE.  Residual chronic venous occlusive disease.   Recommended proceeding with repair in the next month or 2 via a posterior approach harvesting right greater saphenous vein.   Patient is agreeable to this and will remain on anticoagulation up to the time of the surgery.   Will schedule accordingly either before the end of the year or early part of 2025.

## 2024-12-27 ENCOUNTER — TELEPHONE (OUTPATIENT)
Dept: VASCULAR SURGERY | Age: 64
End: 2024-12-27

## 2024-12-27 ENCOUNTER — PREP FOR PROCEDURE (OUTPATIENT)
Dept: VASCULAR SURGERY | Age: 64
End: 2024-12-27

## 2024-12-27 DIAGNOSIS — I72.4 POPLITEAL ANEURYSM (HCC): ICD-10-CM

## 2024-12-27 DIAGNOSIS — I72.4 POPLITEAL ARTERY ANEURYSM (HCC): ICD-10-CM

## 2024-12-27 DIAGNOSIS — Z01.818 PREOP TESTING: Primary | ICD-10-CM

## 2024-12-27 NOTE — TELEPHONE ENCOUNTER
Called patient and spoke to him about a date.  He is open to any date so I told him I would work on a date and get it scheduled and call him back.

## 2024-12-30 ENCOUNTER — TELEPHONE (OUTPATIENT)
Dept: VASCULAR SURGERY | Age: 64
End: 2024-12-30

## 2024-12-30 ENCOUNTER — ANESTHESIA EVENT (OUTPATIENT)
Dept: OPERATING ROOM | Age: 64
DRG: 181 | End: 2024-12-30
Payer: MEDICAID

## 2024-12-30 DIAGNOSIS — I72.4 POPLITEAL ARTERY ANEURYSM (HCC): ICD-10-CM

## 2024-12-30 RX ORDER — SODIUM CHLORIDE 9 MG/ML
INJECTION, SOLUTION INTRAVENOUS PRN
Status: CANCELLED | OUTPATIENT
Start: 2024-12-30

## 2024-12-30 RX ORDER — SODIUM CHLORIDE 0.9 % (FLUSH) 0.9 %
5-40 SYRINGE (ML) INJECTION PRN
Status: CANCELLED | OUTPATIENT
Start: 2024-12-30

## 2024-12-30 RX ORDER — SODIUM CHLORIDE 0.9 % (FLUSH) 0.9 %
5-40 SYRINGE (ML) INJECTION EVERY 12 HOURS SCHEDULED
Status: CANCELLED | OUTPATIENT
Start: 2024-12-30

## 2024-12-30 NOTE — PROGRESS NOTES
[facility] PRE-OPERATIVE INSTRUCTIONS    Day of Procedure:  12/31              Arrival time:         6       Surgery time:730    Take the following medications with a sip of water:  Follow your MD/Surgeons pre-procedure instructions regarding your medications     Do not eat or drink anything after 12:00 midnight prior to your surgery.  This includes water, chewing gum, mints and ice chips.   You may brush your teeth and gargle the morning of your surgery, but do not swallow the water.     Please see your family doctor/pediatrician for a history and physical and/or concerning medications.   Bring any test results/reports from your physicians office.   If you are under the care of a heart doctor or specialist doctor, please be aware that you may be asked to see them for clearance.    You may be asked to stop blood thinners such as Coumadin, Plavix, Fragmin, Lovenox, etc., or any anti-inflammatories such as:  Aspirin, Ibuprofen, Advil, Naproxen prior to your surgery.    We also ask that you stop any over the counter medications such as fish oil, vitamin E, glucosamine, garlic, Multivitamins, COQ 10, etc.    We ask that you do not smoke 24 hours prior to surgery.  We ask that you do not  drink any alcoholic beverages 24 hours prior to surgery     You must make arrangements for a responsible adult to take you home after your surgery.    For your safety, you will not be allowed to leave alone or drive yourself home.  Your surgery will be cancelled if you do not have a ride home.     Also for your safety, you must have someone stay with you the first 24 hours after your surgery.     A parent or legal guardian must accompany a child scheduled for surgery and plan to stay at the hospital until the child is discharged.    Please do not bring other children with you.    For your comfort, please wear simple loose fitting clothing to the hospital.  Please do not bring valuables.    Do not wear any make-up on face or  nail polish on your fingers or toes.      For your safety, please do not wear any jewelry or body piercing's on the day of surgery.   All jewelry must be removed.      If you have dentures, they will be removed before going to operating room.    For your convenience, we will provide you with a container.    If you wear contact lenses or glasses, they will be removed, please bring a case for them.     If you have a living will and a durable power of  for healthcare, please bring in a copy.     As part of our patient safety program to minimize surgical site infections, we ask you to do the following:    Please notify your surgeon if you develop any illness between         now and the  day of your surgery.    This includes a cough, cold, fever, sore throat, nausea,         or vomiting, and diarrhea, etc.   Please notify your surgeon if you experience dizziness, shortness         of breath or blurred vision between now and the time of your surgery.      Do not shave your operative site 96 hours prior to surgery.   For face and neck surgery, men may use an electric razor 48 hours   prior to surgery.    You must shower the night before surgery and the morning of   your surgery with an antibacterial soap.    You will need to bring a photo ID and insurance card.    [facility] has an onsite pharmacy, would you like to utilize our pharmacy.     If you will be staying overnight and use a C-pap machine, please bring   your C-pap to hospital.     Our goal is to provide you with excellent care, therefore, visitors will be limited to two in the room at a time so that we may focus on providing this care for you.          Please contact pre-admission testing if you have any further questions.                 Yanna Barba phone number:  [phone]     [facility] fax number:  [phone]  Please note these are generalized instructions for all surgical cases, you may be provided with more specific instructions according to your

## 2024-12-30 NOTE — TELEPHONE ENCOUNTER
Spoke with Gayla from authorizations department. She is calling to clarify the name of the procedure the patient is having tomorrow. It was originally scheduled and needed to be rescheduled. However the reschedule was for a different procedure. She wanted to make sure she did not need to start a new authorization.    Spoke with Ghazal who rescheduled the procedure, and she will correct it back to the original scheduled surgery.

## 2024-12-30 NOTE — TELEPHONE ENCOUNTER
Received a phone call from Dr. Traylor this morning and he stated that he had a conflict with the surgery tomorrow that I did not know about.  Called patient and LVM to say we were cancelling surgery and Dr. Traylor and I will work on getting a new time and date before he goes out of town.  He can resume his Eliquis until we find a new time and date for surgery.    Will cancel surgery and try and get it rescheduled.

## 2024-12-30 NOTE — TELEPHONE ENCOUNTER
Called patient and LVM.  We could get his surgery reschedule for tomorrow 12/31/24 at 7:30 AM arrive at 6:00AM.

## 2024-12-31 ENCOUNTER — HOSPITAL ENCOUNTER (INPATIENT)
Age: 64
LOS: 2 days | Discharge: HOME | DRG: 181 | End: 2025-01-02
Attending: SURGERY | Admitting: SURGERY
Payer: MEDICAID

## 2024-12-31 ENCOUNTER — ANESTHESIA (OUTPATIENT)
Dept: OPERATING ROOM | Age: 64
DRG: 181 | End: 2024-12-31
Payer: MEDICAID

## 2024-12-31 DIAGNOSIS — M79.609 POSTOPERATIVE PAIN OF EXTREMITY: Primary | ICD-10-CM

## 2024-12-31 DIAGNOSIS — G89.18 POSTOPERATIVE PAIN OF EXTREMITY: Primary | ICD-10-CM

## 2024-12-31 PROBLEM — I73.9 PERIPHERAL VASCULAR DISEASE (HCC): Status: ACTIVE | Noted: 2024-12-31

## 2024-12-31 LAB
ABO + RH BLD: NORMAL
ANION GAP SERPL CALCULATED.3IONS-SCNC: 12 MMOL/L (ref 3–16)
ANION GAP SERPL CALCULATED.3IONS-SCNC: 9 MMOL/L (ref 3–16)
APTT BLD: 28.2 SEC (ref 22.1–36.4)
BLD GP AB SCN SERPL QL: NORMAL
BUN SERPL-MCNC: 9 MG/DL (ref 7–20)
BUN SERPL-MCNC: 9 MG/DL (ref 7–20)
CALCIUM SERPL-MCNC: 8.1 MG/DL (ref 8.3–10.6)
CALCIUM SERPL-MCNC: 9 MG/DL (ref 8.3–10.6)
CHLORIDE SERPL-SCNC: 100 MMOL/L (ref 99–110)
CHLORIDE SERPL-SCNC: 104 MMOL/L (ref 99–110)
CO2 SERPL-SCNC: 24 MMOL/L (ref 21–32)
CO2 SERPL-SCNC: 26 MMOL/L (ref 21–32)
CREAT SERPL-MCNC: 0.9 MG/DL (ref 0.8–1.3)
CREAT SERPL-MCNC: 0.9 MG/DL (ref 0.8–1.3)
DEPRECATED RDW RBC AUTO: 14.1 % (ref 12.4–15.4)
DEPRECATED RDW RBC AUTO: 14.5 % (ref 12.4–15.4)
GFR SERPLBLD CREATININE-BSD FMLA CKD-EPI: >90 ML/MIN/{1.73_M2}
GFR SERPLBLD CREATININE-BSD FMLA CKD-EPI: >90 ML/MIN/{1.73_M2}
GLUCOSE BLD-MCNC: 115 MG/DL (ref 70–99)
GLUCOSE BLD-MCNC: 165 MG/DL (ref 70–99)
GLUCOSE BLD-MCNC: 175 MG/DL (ref 70–99)
GLUCOSE SERPL-MCNC: 132 MG/DL (ref 70–99)
GLUCOSE SERPL-MCNC: 171 MG/DL (ref 70–99)
HCT VFR BLD AUTO: 56.8 % (ref 40.5–52.5)
HCT VFR BLD AUTO: 58 % (ref 40.5–52.5)
HGB BLD-MCNC: 19.4 G/DL (ref 13.5–17.5)
HGB BLD-MCNC: 20.1 G/DL (ref 13.5–17.5)
INR PPP: 1.03 (ref 0.85–1.15)
MCH RBC QN AUTO: 34.9 PG (ref 26–34)
MCH RBC QN AUTO: 35.1 PG (ref 26–34)
MCHC RBC AUTO-ENTMCNC: 34.1 G/DL (ref 31–36)
MCHC RBC AUTO-ENTMCNC: 34.7 G/DL (ref 31–36)
MCV RBC AUTO: 101.2 FL (ref 80–100)
MCV RBC AUTO: 102.4 FL (ref 80–100)
PATH INTERP BLD-IMP: NO
PATH INTERP BLD-IMP: NORMAL
PATH INTERP BLD-IMP: YES
PERFORMED ON: ABNORMAL
PLATELET # BLD AUTO: 127 K/UL (ref 135–450)
PLATELET # BLD AUTO: 96 K/UL (ref 135–450)
PLATELET BLD QL SMEAR: ABNORMAL
PLATELET BLD QL SMEAR: ABNORMAL
PMV BLD AUTO: 8.5 FL (ref 5–10.5)
PMV BLD AUTO: 9 FL (ref 5–10.5)
POTASSIUM SERPL-SCNC: 4 MMOL/L (ref 3.5–5.1)
POTASSIUM SERPL-SCNC: 4.5 MMOL/L (ref 3.5–5.1)
PROTHROMBIN TIME: 13.7 SEC (ref 11.9–14.9)
RBC # BLD AUTO: 5.55 M/UL (ref 4.2–5.9)
RBC # BLD AUTO: 5.73 M/UL (ref 4.2–5.9)
SLIDE REVIEW: ABNORMAL
SLIDE REVIEW: ABNORMAL
SODIUM SERPL-SCNC: 137 MMOL/L (ref 136–145)
SODIUM SERPL-SCNC: 138 MMOL/L (ref 136–145)
WBC # BLD AUTO: 8.9 K/UL (ref 4–11)
WBC # BLD AUTO: 9.3 K/UL (ref 4–11)

## 2024-12-31 PROCEDURE — 85730 THROMBOPLASTIN TIME PARTIAL: CPT

## 2024-12-31 PROCEDURE — 2580000003 HC RX 258: Performed by: ANESTHESIOLOGY

## 2024-12-31 PROCEDURE — 2709999900 HC NON-CHARGEABLE SUPPLY: Performed by: SURGERY

## 2024-12-31 PROCEDURE — 6360000002 HC RX W HCPCS: Performed by: SURGERY

## 2024-12-31 PROCEDURE — 06BP0ZZ EXCISION OF RIGHT SAPHENOUS VEIN, OPEN APPROACH: ICD-10-PCS | Performed by: SURGERY

## 2024-12-31 PROCEDURE — 3E033XZ INTRODUCTION OF VASOPRESSOR INTO PERIPHERAL VEIN, PERCUTANEOUS APPROACH: ICD-10-PCS | Performed by: SURGERY

## 2024-12-31 PROCEDURE — 2580000003 HC RX 258: Performed by: SURGERY

## 2024-12-31 PROCEDURE — 04QN0ZZ REPAIR LEFT POPLITEAL ARTERY, OPEN APPROACH: ICD-10-PCS | Performed by: SURGERY

## 2024-12-31 PROCEDURE — 85610 PROTHROMBIN TIME: CPT

## 2024-12-31 PROCEDURE — 7100000000 HC PACU RECOVERY - FIRST 15 MIN

## 2024-12-31 PROCEDURE — 2100000000 HC CCU R&B

## 2024-12-31 PROCEDURE — 6360000002 HC RX W HCPCS: Performed by: ANESTHESIOLOGY

## 2024-12-31 PROCEDURE — 35151 REPAIR DEFECT OF ARTERY: CPT | Performed by: SURGERY

## 2024-12-31 PROCEDURE — 2580000003 HC RX 258

## 2024-12-31 PROCEDURE — 80048 BASIC METABOLIC PNL TOTAL CA: CPT

## 2024-12-31 PROCEDURE — 2500000003 HC RX 250 WO HCPCS: Performed by: SURGERY

## 2024-12-31 PROCEDURE — 36415 COLL VENOUS BLD VENIPUNCTURE: CPT

## 2024-12-31 PROCEDURE — 6360000002 HC RX W HCPCS

## 2024-12-31 PROCEDURE — 6370000000 HC RX 637 (ALT 250 FOR IP): Performed by: SURGERY

## 2024-12-31 PROCEDURE — 3700000001 HC ADD 15 MINUTES (ANESTHESIA): Performed by: SURGERY

## 2024-12-31 PROCEDURE — 7100000001 HC PACU RECOVERY - ADDTL 15 MIN

## 2024-12-31 PROCEDURE — 6370000000 HC RX 637 (ALT 250 FOR IP)

## 2024-12-31 PROCEDURE — 86900 BLOOD TYPING SEROLOGIC ABO: CPT

## 2024-12-31 PROCEDURE — 86850 RBC ANTIBODY SCREEN: CPT

## 2024-12-31 PROCEDURE — 85027 COMPLETE CBC AUTOMATED: CPT

## 2024-12-31 PROCEDURE — 3600000004 HC SURGERY LEVEL 4 BASE: Performed by: SURGERY

## 2024-12-31 PROCEDURE — 2500000003 HC RX 250 WO HCPCS

## 2024-12-31 PROCEDURE — P9045 ALBUMIN (HUMAN), 5%, 250 ML: HCPCS

## 2024-12-31 PROCEDURE — 6370000000 HC RX 637 (ALT 250 FOR IP): Performed by: ANESTHESIOLOGY

## 2024-12-31 PROCEDURE — 3700000000 HC ANESTHESIA ATTENDED CARE: Performed by: SURGERY

## 2024-12-31 PROCEDURE — 3600000014 HC SURGERY LEVEL 4 ADDTL 15MIN: Performed by: SURGERY

## 2024-12-31 PROCEDURE — 86901 BLOOD TYPING SEROLOGIC RH(D): CPT

## 2024-12-31 RX ORDER — OXYCODONE HYDROCHLORIDE 5 MG/1
5 TABLET ORAL EVERY 4 HOURS PRN
Status: DISCONTINUED | OUTPATIENT
Start: 2024-12-31 | End: 2025-01-02 | Stop reason: HOSPADM

## 2024-12-31 RX ORDER — OXYCODONE HYDROCHLORIDE 10 MG/1
10 TABLET ORAL EVERY 4 HOURS PRN
Status: DISCONTINUED | OUTPATIENT
Start: 2024-12-31 | End: 2025-01-02 | Stop reason: HOSPADM

## 2024-12-31 RX ORDER — ONDANSETRON 4 MG/1
4 TABLET, ORALLY DISINTEGRATING ORAL 3 TIMES DAILY PRN
Status: DISCONTINUED | OUTPATIENT
Start: 2024-12-31 | End: 2025-01-02 | Stop reason: HOSPADM

## 2024-12-31 RX ORDER — SODIUM CHLORIDE 0.9 % (FLUSH) 0.9 %
5-40 SYRINGE (ML) INJECTION EVERY 12 HOURS SCHEDULED
Status: DISCONTINUED | OUTPATIENT
Start: 2024-12-31 | End: 2024-12-31 | Stop reason: SDUPTHER

## 2024-12-31 RX ORDER — SODIUM CHLORIDE 0.9 % (FLUSH) 0.9 %
5-40 SYRINGE (ML) INJECTION PRN
Status: DISCONTINUED | OUTPATIENT
Start: 2024-12-31 | End: 2025-01-02 | Stop reason: HOSPADM

## 2024-12-31 RX ORDER — PHENYLEPHRINE HYDROCHLORIDE 10 MG/ML
INJECTION INTRAVENOUS
Status: DISCONTINUED | OUTPATIENT
Start: 2024-12-31 | End: 2024-12-31 | Stop reason: SDUPTHER

## 2024-12-31 RX ORDER — HEPARIN SODIUM 1000 [USP'U]/ML
INJECTION, SOLUTION INTRAVENOUS; SUBCUTANEOUS
Status: DISCONTINUED | OUTPATIENT
Start: 2024-12-31 | End: 2024-12-31 | Stop reason: SDUPTHER

## 2024-12-31 RX ORDER — APREPITANT 40 MG/1
40 CAPSULE ORAL ONCE
Status: COMPLETED | OUTPATIENT
Start: 2024-12-31 | End: 2024-12-31

## 2024-12-31 RX ORDER — SODIUM CHLORIDE 0.9 % (FLUSH) 0.9 %
5-40 SYRINGE (ML) INJECTION EVERY 12 HOURS SCHEDULED
Status: DISCONTINUED | OUTPATIENT
Start: 2024-12-31 | End: 2024-12-31

## 2024-12-31 RX ORDER — ALOGLIPTIN 25 MG/1
25 TABLET, FILM COATED ORAL DAILY
Status: DISCONTINUED | OUTPATIENT
Start: 2024-12-31 | End: 2025-01-02 | Stop reason: HOSPADM

## 2024-12-31 RX ORDER — METHOCARBAMOL 100 MG/ML
INJECTION, SOLUTION INTRAMUSCULAR; INTRAVENOUS
Status: DISCONTINUED | OUTPATIENT
Start: 2024-12-31 | End: 2024-12-31 | Stop reason: SDUPTHER

## 2024-12-31 RX ORDER — SODIUM CHLORIDE 9 MG/ML
INJECTION, SOLUTION INTRAVENOUS PRN
Status: DISCONTINUED | OUTPATIENT
Start: 2024-12-31 | End: 2024-12-31 | Stop reason: SDUPTHER

## 2024-12-31 RX ORDER — SODIUM CHLORIDE 0.9 % (FLUSH) 0.9 %
5-40 SYRINGE (ML) INJECTION PRN
Status: DISCONTINUED | OUTPATIENT
Start: 2024-12-31 | End: 2024-12-31

## 2024-12-31 RX ORDER — MORPHINE SULFATE 4 MG/ML
4 INJECTION, SOLUTION INTRAMUSCULAR; INTRAVENOUS
Status: DISCONTINUED | OUTPATIENT
Start: 2024-12-31 | End: 2025-01-02 | Stop reason: HOSPADM

## 2024-12-31 RX ORDER — ONDANSETRON 2 MG/ML
4 INJECTION INTRAMUSCULAR; INTRAVENOUS
Status: DISCONTINUED | OUTPATIENT
Start: 2024-12-31 | End: 2024-12-31

## 2024-12-31 RX ORDER — EPHEDRINE SULFATE/0.9% NACL/PF 25 MG/5 ML
SYRINGE (ML) INTRAVENOUS
Status: DISCONTINUED | OUTPATIENT
Start: 2024-12-31 | End: 2024-12-31 | Stop reason: SDUPTHER

## 2024-12-31 RX ORDER — PROPOFOL 10 MG/ML
INJECTION, EMULSION INTRAVENOUS
Status: DISCONTINUED | OUTPATIENT
Start: 2024-12-31 | End: 2024-12-31 | Stop reason: SDUPTHER

## 2024-12-31 RX ORDER — SODIUM CHLORIDE 9 MG/ML
INJECTION, SOLUTION INTRAVENOUS PRN
Status: DISCONTINUED | OUTPATIENT
Start: 2024-12-31 | End: 2024-12-31 | Stop reason: HOSPADM

## 2024-12-31 RX ORDER — LIDOCAINE HYDROCHLORIDE 20 MG/ML
INJECTION, SOLUTION EPIDURAL; INFILTRATION; INTRACAUDAL; PERINEURAL
Status: DISCONTINUED | OUTPATIENT
Start: 2024-12-31 | End: 2024-12-31 | Stop reason: SDUPTHER

## 2024-12-31 RX ORDER — FAMOTIDINE 10 MG/ML
INJECTION, SOLUTION INTRAVENOUS
Status: DISCONTINUED | OUTPATIENT
Start: 2024-12-31 | End: 2024-12-31 | Stop reason: SDUPTHER

## 2024-12-31 RX ORDER — PAPAVERINE HYDROCHLORIDE 30 MG/ML
INJECTION INTRAMUSCULAR; INTRAVENOUS
Status: COMPLETED | OUTPATIENT
Start: 2024-12-31 | End: 2024-12-31

## 2024-12-31 RX ORDER — SODIUM CHLORIDE 9 MG/ML
INJECTION, SOLUTION INTRAVENOUS PRN
Status: DISCONTINUED | OUTPATIENT
Start: 2024-12-31 | End: 2025-01-02 | Stop reason: HOSPADM

## 2024-12-31 RX ORDER — FENTANYL CITRATE 50 UG/ML
25 INJECTION, SOLUTION INTRAMUSCULAR; INTRAVENOUS EVERY 5 MIN PRN
Status: DISCONTINUED | OUTPATIENT
Start: 2024-12-31 | End: 2024-12-31

## 2024-12-31 RX ORDER — SCOLOPAMINE TRANSDERMAL SYSTEM 1 MG/1
1 PATCH, EXTENDED RELEASE TRANSDERMAL ONCE
Status: DISCONTINUED | OUTPATIENT
Start: 2024-12-31 | End: 2025-01-02 | Stop reason: HOSPADM

## 2024-12-31 RX ORDER — ONDANSETRON 2 MG/ML
INJECTION INTRAMUSCULAR; INTRAVENOUS
Status: DISCONTINUED | OUTPATIENT
Start: 2024-12-31 | End: 2024-12-31 | Stop reason: SDUPTHER

## 2024-12-31 RX ORDER — ALBUMIN HUMAN 50 G/1000ML
SOLUTION INTRAVENOUS
Status: DISCONTINUED | OUTPATIENT
Start: 2024-12-31 | End: 2024-12-31 | Stop reason: SDUPTHER

## 2024-12-31 RX ORDER — ALBUTEROL SULFATE 90 UG/1
2 INHALANT RESPIRATORY (INHALATION) EVERY 4 HOURS PRN
Status: DISCONTINUED | OUTPATIENT
Start: 2024-12-31 | End: 2025-01-02 | Stop reason: HOSPADM

## 2024-12-31 RX ORDER — ALBUTEROL SULFATE 90 UG/1
INHALANT RESPIRATORY (INHALATION)
Status: DISCONTINUED | OUTPATIENT
Start: 2024-12-31 | End: 2024-12-31 | Stop reason: SDUPTHER

## 2024-12-31 RX ORDER — MORPHINE SULFATE 2 MG/ML
2 INJECTION, SOLUTION INTRAMUSCULAR; INTRAVENOUS
Status: DISCONTINUED | OUTPATIENT
Start: 2024-12-31 | End: 2025-01-02 | Stop reason: HOSPADM

## 2024-12-31 RX ORDER — DROPERIDOL 2.5 MG/ML
0.62 INJECTION, SOLUTION INTRAMUSCULAR; INTRAVENOUS
Status: DISCONTINUED | OUTPATIENT
Start: 2024-12-31 | End: 2024-12-31

## 2024-12-31 RX ORDER — SODIUM CHLORIDE 0.9 % (FLUSH) 0.9 %
5-40 SYRINGE (ML) INJECTION EVERY 12 HOURS SCHEDULED
Status: DISCONTINUED | OUTPATIENT
Start: 2024-12-31 | End: 2025-01-02 | Stop reason: HOSPADM

## 2024-12-31 RX ORDER — MEPERIDINE HYDROCHLORIDE 50 MG/ML
12.5 INJECTION INTRAMUSCULAR; INTRAVENOUS; SUBCUTANEOUS EVERY 5 MIN PRN
Status: DISCONTINUED | OUTPATIENT
Start: 2024-12-31 | End: 2024-12-31

## 2024-12-31 RX ORDER — GLYCOPYRROLATE 0.2 MG/ML
INJECTION INTRAMUSCULAR; INTRAVENOUS
Status: DISCONTINUED | OUTPATIENT
Start: 2024-12-31 | End: 2024-12-31 | Stop reason: SDUPTHER

## 2024-12-31 RX ORDER — NALOXONE HYDROCHLORIDE 0.4 MG/ML
INJECTION, SOLUTION INTRAMUSCULAR; INTRAVENOUS; SUBCUTANEOUS PRN
Status: DISCONTINUED | OUTPATIENT
Start: 2024-12-31 | End: 2024-12-31

## 2024-12-31 RX ORDER — SODIUM CHLORIDE 9 MG/ML
INJECTION, SOLUTION INTRAVENOUS CONTINUOUS
Status: DISCONTINUED | OUTPATIENT
Start: 2024-12-31 | End: 2025-01-01

## 2024-12-31 RX ORDER — OXYCODONE HYDROCHLORIDE 5 MG/1
5 TABLET ORAL
Status: DISCONTINUED | OUTPATIENT
Start: 2024-12-31 | End: 2024-12-31

## 2024-12-31 RX ORDER — LEVOTHYROXINE SODIUM 50 UG/1
50 TABLET ORAL EVERY MORNING
Status: DISCONTINUED | OUTPATIENT
Start: 2024-12-31 | End: 2025-01-02 | Stop reason: HOSPADM

## 2024-12-31 RX ORDER — PROTAMINE SULFATE 10 MG/ML
INJECTION, SOLUTION INTRAVENOUS
Status: DISCONTINUED | OUTPATIENT
Start: 2024-12-31 | End: 2024-12-31 | Stop reason: SDUPTHER

## 2024-12-31 RX ORDER — SODIUM CHLORIDE 0.9 % (FLUSH) 0.9 %
5-40 SYRINGE (ML) INJECTION PRN
Status: DISCONTINUED | OUTPATIENT
Start: 2024-12-31 | End: 2024-12-31 | Stop reason: HOSPADM

## 2024-12-31 RX ORDER — DEXAMETHASONE SODIUM PHOSPHATE 4 MG/ML
INJECTION, SOLUTION INTRA-ARTICULAR; INTRALESIONAL; INTRAMUSCULAR; INTRAVENOUS; SOFT TISSUE
Status: DISCONTINUED | OUTPATIENT
Start: 2024-12-31 | End: 2024-12-31 | Stop reason: SDUPTHER

## 2024-12-31 RX ORDER — ROCURONIUM BROMIDE 10 MG/ML
INJECTION, SOLUTION INTRAVENOUS
Status: DISCONTINUED | OUTPATIENT
Start: 2024-12-31 | End: 2024-12-31 | Stop reason: SDUPTHER

## 2024-12-31 RX ORDER — SODIUM CHLORIDE 0.9 % (FLUSH) 0.9 %
5-40 SYRINGE (ML) INJECTION PRN
Status: DISCONTINUED | OUTPATIENT
Start: 2024-12-31 | End: 2024-12-31 | Stop reason: SDUPTHER

## 2024-12-31 RX ORDER — SODIUM CHLORIDE 9 MG/ML
INJECTION, SOLUTION INTRAVENOUS PRN
Status: DISCONTINUED | OUTPATIENT
Start: 2024-12-31 | End: 2024-12-31

## 2024-12-31 RX ORDER — MIDAZOLAM HYDROCHLORIDE 1 MG/ML
INJECTION, SOLUTION INTRAMUSCULAR; INTRAVENOUS
Status: DISCONTINUED | OUTPATIENT
Start: 2024-12-31 | End: 2024-12-31 | Stop reason: SDUPTHER

## 2024-12-31 RX ORDER — SODIUM CHLORIDE 0.9 % (FLUSH) 0.9 %
5-40 SYRINGE (ML) INJECTION EVERY 12 HOURS SCHEDULED
Status: DISCONTINUED | OUTPATIENT
Start: 2024-12-31 | End: 2024-12-31 | Stop reason: HOSPADM

## 2024-12-31 RX ADMIN — MIDAZOLAM 2 MG: 1 INJECTION INTRAMUSCULAR; INTRAVENOUS at 07:33

## 2024-12-31 RX ADMIN — MORPHINE SULFATE 4 MG: 4 INJECTION, SOLUTION INTRAMUSCULAR; INTRAVENOUS at 12:08

## 2024-12-31 RX ADMIN — PROTAMINE SULFATE 20 MG: 10 INJECTION, SOLUTION INTRAVENOUS at 10:41

## 2024-12-31 RX ADMIN — PHENYLEPHRINE HYDROCHLORIDE 150 MCG: 10 INJECTION INTRAVENOUS at 07:46

## 2024-12-31 RX ADMIN — FAMOTIDINE 20 MG: 10 INJECTION, SOLUTION INTRAVENOUS at 07:48

## 2024-12-31 RX ADMIN — OXYCODONE 5 MG: 5 TABLET ORAL at 20:45

## 2024-12-31 RX ADMIN — LIDOCAINE HYDROCHLORIDE 50 MG: 20 INJECTION, SOLUTION EPIDURAL; INFILTRATION; INTRACAUDAL; PERINEURAL at 07:41

## 2024-12-31 RX ADMIN — GLYCOPYRROLATE 0.2 MG: 0.2 INJECTION, SOLUTION INTRAMUSCULAR; INTRAVENOUS at 09:02

## 2024-12-31 RX ADMIN — EPHEDRINE SULFATE 10 MG: 5 INJECTION INTRAVENOUS at 08:24

## 2024-12-31 RX ADMIN — SODIUM CHLORIDE: 9 INJECTION, SOLUTION INTRAVENOUS at 20:49

## 2024-12-31 RX ADMIN — SODIUM CHLORIDE: 9 INJECTION, SOLUTION INTRAVENOUS at 06:27

## 2024-12-31 RX ADMIN — ONDANSETRON 4 MG: 2 INJECTION INTRAMUSCULAR; INTRAVENOUS at 07:49

## 2024-12-31 RX ADMIN — PROPOFOL 50 MG: 10 INJECTION, EMULSION INTRAVENOUS at 07:43

## 2024-12-31 RX ADMIN — PHENYLEPHRINE HYDROCHLORIDE 50 MCG/MIN: 10 INJECTION INTRAVENOUS at 07:46

## 2024-12-31 RX ADMIN — APREPITANT 40 MG: 40 CAPSULE ORAL at 06:44

## 2024-12-31 RX ADMIN — EPHEDRINE SULFATE 7.5 MG: 5 INJECTION INTRAVENOUS at 08:18

## 2024-12-31 RX ADMIN — EPHEDRINE SULFATE 7.5 MG: 5 INJECTION INTRAVENOUS at 08:21

## 2024-12-31 RX ADMIN — PROPOFOL 50 MG: 10 INJECTION, EMULSION INTRAVENOUS at 07:41

## 2024-12-31 RX ADMIN — ALBUTEROL SULFATE 2 PUFF: 90 AEROSOL, METERED RESPIRATORY (INHALATION) at 11:17

## 2024-12-31 RX ADMIN — LEVOTHYROXINE SODIUM 50 MCG: 0.05 TABLET ORAL at 13:08

## 2024-12-31 RX ADMIN — SODIUM CHLORIDE: 9 INJECTION, SOLUTION INTRAVENOUS at 10:51

## 2024-12-31 RX ADMIN — PHENYLEPHRINE HYDROCHLORIDE 100 MCG: 10 INJECTION INTRAVENOUS at 08:21

## 2024-12-31 RX ADMIN — OXYCODONE 5 MG: 5 TABLET ORAL at 13:08

## 2024-12-31 RX ADMIN — SODIUM CHLORIDE: 9 INJECTION, SOLUTION INTRAVENOUS at 07:33

## 2024-12-31 RX ADMIN — MORPHINE SULFATE 4 MG: 4 INJECTION, SOLUTION INTRAMUSCULAR; INTRAVENOUS at 22:05

## 2024-12-31 RX ADMIN — WATER 2000 MG: 1 INJECTION INTRAMUSCULAR; INTRAVENOUS; SUBCUTANEOUS at 15:52

## 2024-12-31 RX ADMIN — ALOGLIPTIN 25 MG: 25 TABLET, FILM COATED ORAL at 13:08

## 2024-12-31 RX ADMIN — HYDROMORPHONE HYDROCHLORIDE 0.25 MG: 1 INJECTION, SOLUTION INTRAMUSCULAR; INTRAVENOUS; SUBCUTANEOUS at 08:02

## 2024-12-31 RX ADMIN — HEPARIN SODIUM 3000 UNITS: 1000 INJECTION INTRAVENOUS; SUBCUTANEOUS at 09:23

## 2024-12-31 RX ADMIN — MORPHINE SULFATE 2 MG: 2 INJECTION, SOLUTION INTRAMUSCULAR; INTRAVENOUS at 15:07

## 2024-12-31 RX ADMIN — METHOCARBAMOL 60 MG: 100 INJECTION INTRAMUSCULAR; INTRAVENOUS at 07:57

## 2024-12-31 RX ADMIN — HYDROMORPHONE HYDROCHLORIDE 0.25 MG: 1 INJECTION, SOLUTION INTRAMUSCULAR; INTRAVENOUS; SUBCUTANEOUS at 08:08

## 2024-12-31 RX ADMIN — SODIUM CHLORIDE 2000 MG: 900 INJECTION INTRAVENOUS at 07:46

## 2024-12-31 RX ADMIN — HEPARIN SODIUM 1000 UNITS: 1000 INJECTION INTRAVENOUS; SUBCUTANEOUS at 10:09

## 2024-12-31 RX ADMIN — ALBUMIN (HUMAN) 12.5 G: 12.5 INJECTION, SOLUTION INTRAVENOUS at 08:57

## 2024-12-31 RX ADMIN — ROCURONIUM BROMIDE 80 MG: 10 INJECTION, SOLUTION INTRAVENOUS at 07:43

## 2024-12-31 RX ADMIN — DEXAMETHASONE SODIUM PHOSPHATE 10 MG: 4 INJECTION, SOLUTION INTRAMUSCULAR; INTRAVENOUS at 07:48

## 2024-12-31 RX ADMIN — SUGAMMADEX 200 MG: 100 INJECTION, SOLUTION INTRAVENOUS at 11:13

## 2024-12-31 ASSESSMENT — PAIN DESCRIPTION - LOCATION
LOCATION: LEG;KNEE
LOCATION: LEG
LOCATION: INCISION;KNEE
LOCATION: GROIN
LOCATION: KNEE;LEG
LOCATION: KNEE;INCISION
LOCATION: LEG;KNEE
LOCATION: KNEE;LEG
LOCATION: GROIN

## 2024-12-31 ASSESSMENT — PAIN DESCRIPTION - DESCRIPTORS
DESCRIPTORS: ACHING;DISCOMFORT
DESCRIPTORS: ACHING;BURNING;THROBBING
DESCRIPTORS: ACHING;DISCOMFORT
DESCRIPTORS: DISCOMFORT;ACHING
DESCRIPTORS: ACHING;BURNING
DESCRIPTORS: ACHING;BURNING
DESCRIPTORS: ACHING

## 2024-12-31 ASSESSMENT — PAIN DESCRIPTION - FREQUENCY
FREQUENCY: CONTINUOUS

## 2024-12-31 ASSESSMENT — PAIN - FUNCTIONAL ASSESSMENT
PAIN_FUNCTIONAL_ASSESSMENT: PREVENTS OR INTERFERES SOME ACTIVE ACTIVITIES AND ADLS
PAIN_FUNCTIONAL_ASSESSMENT: 0-10
PAIN_FUNCTIONAL_ASSESSMENT: PREVENTS OR INTERFERES SOME ACTIVE ACTIVITIES AND ADLS
PAIN_FUNCTIONAL_ASSESSMENT: ACTIVITIES ARE NOT PREVENTED
PAIN_FUNCTIONAL_ASSESSMENT: PREVENTS OR INTERFERES SOME ACTIVE ACTIVITIES AND ADLS

## 2024-12-31 ASSESSMENT — PAIN SCALES - GENERAL
PAINLEVEL_OUTOF10: 6
PAINLEVEL_OUTOF10: 3
PAINLEVEL_OUTOF10: 4
PAINLEVEL_OUTOF10: 5
PAINLEVEL_OUTOF10: 9
PAINLEVEL_OUTOF10: 5
PAINLEVEL_OUTOF10: 9
PAINLEVEL_OUTOF10: 8
PAINLEVEL_OUTOF10: 3

## 2024-12-31 ASSESSMENT — PAIN DESCRIPTION - PAIN TYPE
TYPE: ACUTE PAIN;SURGICAL PAIN
TYPE: SURGICAL PAIN
TYPE: ACUTE PAIN;SURGICAL PAIN
TYPE: ACUTE PAIN;SURGICAL PAIN

## 2024-12-31 ASSESSMENT — PAIN DESCRIPTION - ONSET
ONSET: ON-GOING

## 2024-12-31 ASSESSMENT — PAIN DESCRIPTION - ORIENTATION
ORIENTATION: LEFT
ORIENTATION: LEFT
ORIENTATION: RIGHT
ORIENTATION: LEFT
ORIENTATION: RIGHT

## 2024-12-31 NOTE — PROGRESS NOTES
Bedside report from Evelina GOULD. Skin assessment completed. Pt stating mouth is dry, informed we will try ice chips first. Some coughing with ice chips- will allow pt to wake up more before attempting again. Holding off on PO medications until pt is more awake

## 2024-12-31 NOTE — OP NOTE
[facility]          [address] [place] [address]                            OPERATIVE REPORT      PATIENT NAME: LINDA DOAN               : 1960  MED REC NO: [id number]                      ROOM: [place] [address]  ACCOUNT NO: [id number]                       ADMIT DATE: 2024  PROVIDER: Pacheco Traylor MD      DATE OF PROCEDURE:  2024    SURGEON:  Pacheco Traylor MD    PREOPERATIVE DIAGNOSIS:  Asymptomatic left popliteal artery aneurysm (2.7 cm).    POSTOPERATIVE DIAGNOSIS:  Asymptomatic left popliteal artery aneurysm (2.7 cm).    PROCEDURE:  Repair of left popliteal artery aneurysm via posterior approach with interpositioned reverse greater saphenous vein graft.    ANESTHESIA:  General endotracheal.    ESTIMATED BLOOD LOSS:  Less than 100 mL.    HISTORY:  The patient is a 64-year-old gentleman who in the recent past was diagnosed with a left leg DVT and on evaluation with duplex scanning, was found to have a left popliteal artery aneurysm.  The aneurysm was noted to have significant amount of mural thrombus, which was confirmed on eventual CT angiography.  After being treated for his DVT for 6 months, it was recommended that he undergo repair of the aneurysm using a contralateral greater saphenous vein as conduit.  The size of the aneurysm in length was short enough to allow for a posterior exposure.  He agreed to proceed understanding the risks, benefits, and other options.    TECHNIQUE:  The patient was brought to the operating room and placed on the operating room table in the supine position.  After adequate induction of anesthesia, his right groin was prepped and draped in a sterile fashion.  A longitudinal incision was made below the groin crease over the greater saphenous vein, which was identified in the subcutaneous tissue.  It was carefully dissected proximally up to the saphenofemoral junction, which was skeletonized by dividing  Another additional 7-0 Prolene was used to repair a bleeding site in the anastomosis.  Hemostasis was obtained with Surgicel.  There was noted to be another bleeding site inside the aneurysm sac, which was oversewn from the inside using a 2-0 silk suture.  At this point, the sac was reapplied across the aneurysm without sutures.  There was noted to be good flow throughout the graft with good Doppler signals.  Structures were returned to their normal location.  The leg was flexed slightly, and the fascia of the popliteal fossa was closed using interrupted 3-0 Vicryls.  Subcutaneous tissue was closed using interrupted 3-0 Vicryls, and the skin was closed using a subcuticular stitch of 4-0 Monocryl with Dermabond.  The patient was then extubated and transferred to the recovery room.          MONICA EDWARDS MD      D:  12/31/2024 12:24:45     T:  12/31/2024 13:44:26     VU/RACHEL  Job #:  [id number]     Doc#:  [id number]

## 2024-12-31 NOTE — PLAN OF CARE
Problem: Discharge Planning  Goal: Discharge to home or other facility with appropriate resources  Outcome: Progressing  Flowsheets (Taken 12/31/2024 1835)  Discharge to home or other facility with appropriate resources:   Identify barriers to discharge with patient and caregiver   Identify discharge learning needs (meds, wound care, etc)   Refer to discharge planning if patient needs post-hospital services based on physician order or complex needs related to functional status, cognitive ability or social support system   Arrange for needed discharge resources and transportation as appropriate     Problem: Pain  Goal: Verbalizes/displays adequate comfort level or baseline comfort level  Outcome: Progressing  Flowsheets (Taken 12/31/2024 1835)  Verbalizes/displays adequate comfort level or baseline comfort level:   Encourage patient to monitor pain and request assistance   Assess pain using appropriate pain scale   Administer analgesics based on type and severity of pain and evaluate response     Problem: Safety - Adult  Goal: Free from fall injury  Outcome: Progressing  Flowsheets (Taken 12/31/2024 1835)  Free From Fall Injury: Instruct family/caregiver on patient safety     Problem: Chronic Conditions and Co-morbidities  Goal: Patient's chronic conditions and co-morbidity symptoms are monitored and maintained or improved  Outcome: Progressing  Flowsheets (Taken 12/31/2024 1835)  Care Plan - Patient's Chronic Conditions and Co-Morbidity Symptoms are Monitored and Maintained or Improved:   Monitor and assess patient's chronic conditions and comorbid symptoms for stability, deterioration, or improvement   Collaborate with multidisciplinary team to address chronic and comorbid conditions and prevent exacerbation or deterioration     Problem: Skin/Tissue Integrity  Goal: Absence of new skin breakdown  Description: 1.  Monitor for areas of redness and/or skin breakdown  2.  Assess vascular access sites hourly  3.   Every 4-6 hours minimum:  Change oxygen saturation probe site  4.  Every 4-6 hours:  If on nasal continuous positive airway pressure, respiratory therapy assess nares and determine need for appliance change or resting period.  Outcome: Progressing     Problem: Respiratory - Adult  Goal: Achieves optimal ventilation and oxygenation  Outcome: Progressing  Flowsheets (Taken 12/31/2024 1835)  Achieves optimal ventilation and oxygenation:   Assess for changes in respiratory status   Assess for changes in mentation and behavior   Encourage broncho-pulmonary hygiene including cough, deep breathe, incentive spirometry   Respiratory therapy support as indicated   Position to facilitate oxygenation and minimize respiratory effort     Problem: Skin/Tissue Integrity - Adult  Goal: Incisions, wounds, or drain sites healing without S/S of infection  Outcome: Progressing  Flowsheets (Taken 12/31/2024 1835)  Incisions, Wounds, or Drain Sites Healing Without Sign and Symptoms of Infection:   ADMISSION and DAILY: Assess and document risk factors for pressure ulcer development   TWICE DAILY: Assess and document skin integrity   TWICE DAILY: Assess and document dressing/incision, wound bed, drain sites and surrounding tissue   Initiate pressure ulcer prevention bundle as indicated     Problem: Genitourinary - Adult  Goal: Urinary catheter remains patent  Outcome: Progressing  Flowsheets (Taken 12/31/2024 1835)  Urinary catheter remains patent: Assess patency of urinary catheter

## 2024-12-31 NOTE — ANESTHESIA PRE PROCEDURE
12/11/24 65.3 kg (144 lb)   11/13/24 64.4 kg (142 lb)     Body mass index is 18.99 kg/m².    CBC:   Lab Results   Component Value Date/Time    WBC 6.6 08/10/2024 05:17 AM    RBC 5.02 08/10/2024 05:17 AM    HGB 17.2 08/10/2024 05:17 AM    HCT 50.3 08/10/2024 05:17 AM    .2 08/10/2024 05:17 AM    RDW 13.9 08/10/2024 05:17 AM     08/10/2024 05:17 AM       CMP:   Lab Results   Component Value Date/Time     08/08/2024 05:28 PM    K 4.2 08/08/2024 05:28 PM    CL 98 08/08/2024 05:28 PM    CO2 30 08/08/2024 05:28 PM    BUN 8 08/08/2024 05:28 PM    CREATININE 0.6 11/26/2024 01:53 PM    CREATININE 0.8 08/08/2024 05:28 PM    GFRAA >60 10/13/2022 11:38 AM    AGRATIO 1.0 08/08/2024 05:28 PM    LABGLOM >90 11/26/2024 01:53 PM    LABGLOM >60 06/12/2023 01:17 PM    GLUCOSE 106 08/08/2024 05:28 PM    CALCIUM 8.9 08/08/2024 05:28 PM    BILITOT 0.8 08/08/2024 05:28 PM    ALKPHOS 84 08/08/2024 05:28 PM    AST 20 08/08/2024 05:28 PM    ALT 12 08/08/2024 05:28 PM       POC Tests: No results for input(s): \"POCGLU\", \"POCNA\", \"POCK\", \"POCCL\", \"POCBUN\", \"POCHEMO\", \"POCHCT\" in the last 72 hours.    Coags:   Lab Results   Component Value Date/Time    PROTIME 13.6 08/08/2024 05:28 PM    INR 1.01 08/08/2024 05:28 PM    APTT 28.5 08/08/2024 05:28 PM       HCG (If Applicable): No results found for: \"PREGTESTUR\", \"PREGSERUM\", \"HCG\", \"HCGQUANT\"     ABGs: No results found for: \"PHART\", \"PO2ART\", \"IGZ9VBB\", \"RKM5MPD\", \"BEART\", \"K0CKMNKX\"     Type & Screen (If Applicable):  Lab Results   Component Value Date    ABORH A NEG 11/08/2022    LABANTI NEG 11/08/2022       Drug/Infectious Status (If Applicable):  No results found for: \"HIV\", \"HEPCAB\"    COVID-19 Screening (If Applicable):   Lab Results   Component Value Date/Time    COVID19 Not Detected 05/30/2021 02:30 PM           Anesthesia Evaluation  Patient summary reviewed   history of anesthetic complications: PONV.  Airway: Mallampati: II  TM distance: >3 FB   Neck ROM:

## 2024-12-31 NOTE — PROGRESS NOTES
1200- patient to 1310 from OR at 1130. Connected to monitor, report from OR RN and CRNA. Sinus rhythm on monitor. Vital signs stable on 4L O2 per nasal cannula. Surgical sites at right groin and left popliteal are clean, dry, intact. Patient initially sedated and not responsive on arrival, now is able to follow commands, moves all extremities. Handoff given to Blanca at bedside, daughter-in-law Stefani at bedside. No complaints/distress noted.

## 2024-12-31 NOTE — PROGRESS NOTES
NAME:  Gary Martinez  YOB: 1960  MEDICAL RECORD NUMBER:  [id number]    Shift Summary: L popliteal artery repair with Dr. Traylor. Morphine and Oxy for pain. Bedrest through the night     Family updated: Yes:  DIL at bedside post op     Rhythm: Normal Sinus Rhythm / sinus vinita     Most recent vitals:   Visit Vitals  BP (!) 97/56   Pulse 52   Temp 97.5 °F (36.4 °C) (Axillary)   Resp 13   Ht 1.829 m (6')   Wt 63.5 kg (140 lb)   SpO2 99%   BMI 18.99 kg/m²           No data found.    No data found.      Respiratory support needed (if any):  - O2 - NC - 3 lpm    Admission weight Weight - Scale: 63.5 kg (140 lb) (12/30/24 1305)    Today's weight    Wt Readings from Last 1 Encounters:   12/30/24 63.5 kg (140 lb)        Oh need assessed each shift: YES -  - continue oh r/t - insert for procedure- bedrest throughout night  UOP >30ml/hr: YES since arrival to CVU  Last documented BM (in last 48 hrs):  No data found.             Restraints (in use currently or dc'd in last 12 hrs): No    Order current and documentation up to date? N/A    Lines/Drains reviewed @ bedside.  Peripheral IV 12/31/24 Right Forearm (Active)   Number of days: 0       Urinary Catheter 12/31/24 (Active)   Number of days: 0         Drip rates at handoff:    sodium chloride      sodium chloride 100 mL/hr at 12/31/24 1820       Lab Data:   CBC:   Recent Labs     12/31/24  0651 12/31/24  1200   WBC 9.3 8.9   HGB 20.1* 19.4*   HCT 58.0* 56.8*   .2* 102.4*   * 96*     BMP:    Recent Labs     12/31/24  0644 12/31/24  1200    137   K 4.0 4.5   CO2 26 24   BUN 9 9   CREATININE 0.9 0.9     LIVR: No results for input(s): \"AST\", \"ALT\" in the last 72 hours.  PT/INR:   Recent Labs     12/31/24  0645   INR 1.03     APTT:   Recent Labs     12/31/24  0646   APTT 28.2     ABG: No results for input(s): \"PHART\", \"CIR6VXG\", \"PO2ART\" in the last 72 hours.    Any consults during the shift? No    Any signed and held orders to be released?

## 2024-12-31 NOTE — BRIEF OP NOTE
Brief Postoperative Note      Patient: Gary Martinez  YOB: 1960  MRN: [id number]    Date of Procedure: 12/31/2024    Pre-Op Diagnosis Codes:      * Popliteal artery aneurysm (HCC) [I72.4]    Post-Op Diagnosis: Same       Procedure(s):  LEFT POPLITEAL ARTERY REPAIR WITH RIGHT GREATER SAPHENOUS VEIN HARVEST and interposition graft (posterior approach)    Surgeon(s):  Pacheco Traylor MD    Assistant:  Surgical Assistant: Glenna Goodman    Anesthesia: General    Estimated Blood Loss (mL): less than 100     Complications: None    Specimens:   * No specimens in log *    Implants:  * No implants in log *      Drains:   Urinary Catheter 12/31/24 (Active)   Catheter Indications Need for fluid volume management of the critically ill patient in a critical care setting 12/31/24 1135   Site Assessment No urethral drainage 12/31/24 1135   Urine Color Yellow 12/31/24 1135   Urine Appearance Clear 12/31/24 1135   Collection Container Standard 12/31/24 1135   Securement Method Securing device (Describe) 12/31/24 1135   Catheter Best Practices  Catheter secured to thigh;Tamper seal intact;Bag below bladder;Bag not on floor;Lack of dependent loop in tubing;Drainage bag less than half full 12/31/24 1135   Status Draining 12/31/24 1135       Findings:  Infection Present At Time Of Surgery (PATOS) (choose all levels that have infection present):  No infection present  Other Findings: as above    Electronically signed by Pacheco Traylor MD on 12/31/2024 at 12:15 PM

## 2024-12-31 NOTE — PROGRESS NOTES
4 Eyes Skin Assessment     NAME:  Gary Martinez  YOB: 1960  MEDICAL RECORD NUMBER:  [id number]    The patient is being assessed for  Post-Op Surgical    I agree that at least one RN has performed a thorough Head to Toe Skin Assessment on the patient. ALL assessment sites listed below have been assessed.      Areas assessed by both nurses:    {Pressure Areas Assessed:29703}        Does the Patient have a Wound? Yes wound(s) were present on assessment. LDA wound assessment was Initiated and completed by RN, redness noted to sacrum, right side of sacrum appears to samuel less than left, mepilex applied       Nick Prevention initiated by RN: Yes  Wound Care Orders initiated by RN: No    Pressure Injury (Stage 3,4, Unstageable, DTI, NWPT, and Complex wounds) if present, place Wound referral order by RN under : No    New Ostomies, if present place, Ostomy referral order under : No     Nurse 1 eSignature: Electronically signed by Franca Haley RN on 12/31/24 at 12:43 PM EST    **SHARE this note so that the co-signing nurse can place an eSignature**    Nurse 2 eSignature: {Esignature:[id number]}

## 2024-12-31 NOTE — ANESTHESIA POSTPROCEDURE EVALUATION
Department of Anesthesiology  Postprocedure Note    Patient: Gary Martinez  MRN: [id number]  YOB: 1960  Date of evaluation: 12/31/2024    Procedure Summary       Date: 12/31/24 Room / Location: [address]    Anesthesia Start: 0733 Anesthesia Stop: 1137    Procedure: LEFT POPLITEAL ARTERY REPAIR WITH RIGHT GREATER SAPHENOUS VEIN HARVEST (Left: Thigh) Diagnosis:       Popliteal artery aneurysm (HCC)      (Popliteal artery aneurysm (HCC) [I72.4])    Surgeons: Pacheco Traylor MD Responsible Provider: Efren Meza MD    Anesthesia Type: general ASA Status: 3            Anesthesia Type: No value filed.    Zac Phase I: Zac Score: 10    Zac Phase II:      Anesthesia Post Evaluation    Patient location during evaluation: ICU  Patient participation: complete - patient participated  Level of consciousness: awake  Airway patency: patent  Nausea & Vomiting: no nausea and no vomiting  Cardiovascular status: blood pressure returned to baseline  Respiratory status: acceptable  Hydration status: stable  Comments: Vital signs stable  OK to discharge from Stage I post anesthesia care.  Care transferred from Anesthesiology department on discharge from perioperative area   Multimodal analgesia pain management approach  Pain management: satisfactory to patient    No notable events documented.

## 2024-12-31 NOTE — H&P
Subjective:       Gary Martinez is a 64 y.o. male with asymptomatic large L popliteal aneurysm S/P LLE DVT.    Past Medical History:   Diagnosis Date    Anxiety     Cancer (HCC)     head/neck/tongue    Chronic back pain     Diabetes mellitus (HCC)     Erectile dysfunction     Headache(784.0)     Osteoarthritis     PONV (postoperative nausea and vomiting)     Restless legs syndrome     Urinary incontinence     Wears dentures      Past Surgical History:   Procedure Laterality Date    CHOLECYSTECTOMY, LAPAROSCOPIC N/A 08/10/2021    LAPAROSCOPIC CHOLECYSTECTOMY WITH CHOLANGIOGRAM performed by Ivan Jenkins MD at [facility] OR    COLECTOMY N/A 2022    ROBOTIC ASSISTED LAPAROSCOPIC SIGMOID COLECTOMY performed by Ivan Jenkins MD at [facility] OR    CYSTOSCOPY N/A 2022    CYSTOSCOPY, LEFT URETERAL CATHETER INSERTION performed by Ivan Jenkins MD at [facility] OR    PORTACATH PLACEMENT      TONSILLECTOMY Bilateral      Family History   Problem Relation Age of Onset    Heart Disease Mother     High Blood Pressure Mother     Heart Disease Father     High Blood Pressure Father      Social History     Socioeconomic History    Marital status: Single     Spouse name: None    Number of children: None    Years of education: None    Highest education level: None   Tobacco Use    Smoking status: Former     Current packs/day: 0.00     Average packs/day: 1 pack/day for 30.0 years (30.0 ttl pk-yrs)     Types: Cigarettes     Quit date: 10/1/2022     Years since quittin.2    Smokeless tobacco: Never   Vaping Use    Vaping status: Never Used   Substance and Sexual Activity    Alcohol use: No    Drug use: Yes     Types: Marijuana (Weed)     Comment: rarely- told to hold for surgery    Sexual activity: Never     Social Determinants of Health      Received from [facility], [facility]    Food Insecurities    [facility] from [facility]

## 2025-01-01 LAB
ANION GAP SERPL CALCULATED.3IONS-SCNC: 7 MMOL/L (ref 3–16)
BUN SERPL-MCNC: 10 MG/DL (ref 7–20)
CALCIUM SERPL-MCNC: 7.8 MG/DL (ref 8.3–10.6)
CHLORIDE SERPL-SCNC: 103 MMOL/L (ref 99–110)
CO2 SERPL-SCNC: 25 MMOL/L (ref 21–32)
CREAT SERPL-MCNC: 0.9 MG/DL (ref 0.8–1.3)
DEPRECATED RDW RBC AUTO: 14.6 % (ref 12.4–15.4)
GFR SERPLBLD CREATININE-BSD FMLA CKD-EPI: >90 ML/MIN/{1.73_M2}
GLUCOSE BLD-MCNC: 105 MG/DL (ref 70–99)
GLUCOSE BLD-MCNC: 115 MG/DL (ref 70–99)
GLUCOSE BLD-MCNC: 134 MG/DL (ref 70–99)
GLUCOSE BLD-MCNC: 138 MG/DL (ref 70–99)
GLUCOSE SERPL-MCNC: 134 MG/DL (ref 70–99)
HCT VFR BLD AUTO: 51.9 % (ref 40.5–52.5)
HGB BLD-MCNC: 17.2 G/DL (ref 13.5–17.5)
MCH RBC QN AUTO: 34.1 PG (ref 26–34)
MCHC RBC AUTO-ENTMCNC: 33.1 G/DL (ref 31–36)
MCV RBC AUTO: 103 FL (ref 80–100)
PERFORMED ON: ABNORMAL
PLATELET # BLD AUTO: 103 K/UL (ref 135–450)
PMV BLD AUTO: 8.9 FL (ref 5–10.5)
POTASSIUM SERPL-SCNC: 5 MMOL/L (ref 3.5–5.1)
RBC # BLD AUTO: 5.04 M/UL (ref 4.2–5.9)
SODIUM SERPL-SCNC: 135 MMOL/L (ref 136–145)
TSH SERPL DL<=0.005 MIU/L-ACNC: 1.61 UIU/ML (ref 0.27–4.2)
WBC # BLD AUTO: 14.9 K/UL (ref 4–11)

## 2025-01-01 PROCEDURE — 6360000002 HC RX W HCPCS: Performed by: SURGERY

## 2025-01-01 PROCEDURE — 80048 BASIC METABOLIC PNL TOTAL CA: CPT

## 2025-01-01 PROCEDURE — 6370000000 HC RX 637 (ALT 250 FOR IP): Performed by: SURGERY

## 2025-01-01 PROCEDURE — 2500000003 HC RX 250 WO HCPCS: Performed by: SURGERY

## 2025-01-01 PROCEDURE — 2580000003 HC RX 258: Performed by: SURGERY

## 2025-01-01 PROCEDURE — 97530 THERAPEUTIC ACTIVITIES: CPT

## 2025-01-01 PROCEDURE — 2100000000 HC CCU R&B

## 2025-01-01 PROCEDURE — 36415 COLL VENOUS BLD VENIPUNCTURE: CPT

## 2025-01-01 PROCEDURE — 97165 OT EVAL LOW COMPLEX 30 MIN: CPT

## 2025-01-01 PROCEDURE — 6370000000 HC RX 637 (ALT 250 FOR IP): Performed by: SPECIALIST

## 2025-01-01 PROCEDURE — 84443 ASSAY THYROID STIM HORMONE: CPT

## 2025-01-01 PROCEDURE — 97161 PT EVAL LOW COMPLEX 20 MIN: CPT

## 2025-01-01 PROCEDURE — 85027 COMPLETE CBC AUTOMATED: CPT

## 2025-01-01 RX ORDER — ALPRAZOLAM 0.5 MG
0.5 TABLET ORAL 2 TIMES DAILY PRN
Status: DISCONTINUED | OUTPATIENT
Start: 2025-01-01 | End: 2025-01-02 | Stop reason: HOSPADM

## 2025-01-01 RX ORDER — GLUCAGON 1 MG/ML
1 KIT INJECTION PRN
Status: DISCONTINUED | OUTPATIENT
Start: 2025-01-01 | End: 2025-01-01

## 2025-01-01 RX ORDER — ASPIRIN 81 MG/1
81 TABLET, CHEWABLE ORAL DAILY
Status: DISCONTINUED | OUTPATIENT
Start: 2025-01-01 | End: 2025-01-02 | Stop reason: HOSPADM

## 2025-01-01 RX ORDER — DEXTROSE MONOHYDRATE 100 MG/ML
INJECTION, SOLUTION INTRAVENOUS CONTINUOUS PRN
Status: DISCONTINUED | OUTPATIENT
Start: 2025-01-01 | End: 2025-01-02 | Stop reason: HOSPADM

## 2025-01-01 RX ORDER — INSULIN LISPRO 100 [IU]/ML
0-4 INJECTION, SOLUTION INTRAVENOUS; SUBCUTANEOUS
Status: DISCONTINUED | OUTPATIENT
Start: 2025-01-01 | End: 2025-01-02 | Stop reason: HOSPADM

## 2025-01-01 RX ORDER — ONDANSETRON 2 MG/ML
4 INJECTION INTRAMUSCULAR; INTRAVENOUS EVERY 6 HOURS PRN
Status: DISCONTINUED | OUTPATIENT
Start: 2025-01-01 | End: 2025-01-02 | Stop reason: HOSPADM

## 2025-01-01 RX ORDER — PROCHLORPERAZINE EDISYLATE 5 MG/ML
10 INJECTION INTRAMUSCULAR; INTRAVENOUS EVERY 6 HOURS PRN
Status: DISCONTINUED | OUTPATIENT
Start: 2025-01-01 | End: 2025-01-02 | Stop reason: HOSPADM

## 2025-01-01 RX ORDER — GLUCAGON 1 MG/ML
1 KIT INJECTION PRN
Status: DISCONTINUED | OUTPATIENT
Start: 2025-01-01 | End: 2025-01-02 | Stop reason: HOSPADM

## 2025-01-01 RX ADMIN — MORPHINE SULFATE 4 MG: 4 INJECTION, SOLUTION INTRAMUSCULAR; INTRAVENOUS at 20:57

## 2025-01-01 RX ADMIN — SODIUM CHLORIDE: 9 INJECTION, SOLUTION INTRAVENOUS at 06:51

## 2025-01-01 RX ADMIN — OXYCODONE 5 MG: 5 TABLET ORAL at 08:52

## 2025-01-01 RX ADMIN — MORPHINE SULFATE 4 MG: 4 INJECTION, SOLUTION INTRAMUSCULAR; INTRAVENOUS at 05:16

## 2025-01-01 RX ADMIN — OXYCODONE HYDROCHLORIDE 10 MG: 10 TABLET ORAL at 16:08

## 2025-01-01 RX ADMIN — ASPIRIN 81 MG: 81 TABLET, CHEWABLE ORAL at 10:20

## 2025-01-01 RX ADMIN — ALOGLIPTIN 25 MG: 25 TABLET, FILM COATED ORAL at 08:51

## 2025-01-01 RX ADMIN — LEVOTHYROXINE SODIUM 50 MCG: 0.05 TABLET ORAL at 05:17

## 2025-01-01 RX ADMIN — ALPRAZOLAM 0.5 MG: 0.5 TABLET ORAL at 23:30

## 2025-01-01 RX ADMIN — ONDANSETRON 4 MG: 2 INJECTION INTRAMUSCULAR; INTRAVENOUS at 22:06

## 2025-01-01 RX ADMIN — WATER 2000 MG: 1 INJECTION INTRAMUSCULAR; INTRAVENOUS; SUBCUTANEOUS at 00:03

## 2025-01-01 RX ADMIN — ONDANSETRON 4 MG: 4 TABLET, ORALLY DISINTEGRATING ORAL at 05:23

## 2025-01-01 RX ADMIN — Medication 10 ML: at 21:06

## 2025-01-01 RX ADMIN — ONDANSETRON 4 MG: 4 TABLET, ORALLY DISINTEGRATING ORAL at 20:57

## 2025-01-01 ASSESSMENT — PAIN DESCRIPTION - ORIENTATION
ORIENTATION: LEFT
ORIENTATION: RIGHT
ORIENTATION: LEFT
ORIENTATION: LEFT
ORIENTATION: LEFT;POSTERIOR
ORIENTATION: LEFT

## 2025-01-01 ASSESSMENT — PAIN DESCRIPTION - PAIN TYPE
TYPE: ACUTE PAIN;SURGICAL PAIN

## 2025-01-01 ASSESSMENT — PAIN DESCRIPTION - DESCRIPTORS
DESCRIPTORS: ACHING
DESCRIPTORS: ACHING;DISCOMFORT
DESCRIPTORS: ACHING
DESCRIPTORS: DISCOMFORT
DESCRIPTORS: ACHING

## 2025-01-01 ASSESSMENT — PAIN - FUNCTIONAL ASSESSMENT
PAIN_FUNCTIONAL_ASSESSMENT: ACTIVITIES ARE NOT PREVENTED
PAIN_FUNCTIONAL_ASSESSMENT: ACTIVITIES ARE NOT PREVENTED
PAIN_FUNCTIONAL_ASSESSMENT: PREVENTS OR INTERFERES SOME ACTIVE ACTIVITIES AND ADLS
PAIN_FUNCTIONAL_ASSESSMENT: ACTIVITIES ARE NOT PREVENTED
PAIN_FUNCTIONAL_ASSESSMENT: ACTIVITIES ARE NOT PREVENTED

## 2025-01-01 ASSESSMENT — PAIN DESCRIPTION - LOCATION
LOCATION: KNEE;LEG
LOCATION: LEG;KNEE
LOCATION: KNEE;LEG
LOCATION: KNEE
LOCATION: LEG
LOCATION: GROIN
LOCATION: KNEE;LEG
LOCATION: LEG;KNEE

## 2025-01-01 ASSESSMENT — PAIN SCALES - GENERAL
PAINLEVEL_OUTOF10: 3
PAINLEVEL_OUTOF10: 3
PAINLEVEL_OUTOF10: 7
PAINLEVEL_OUTOF10: 3
PAINLEVEL_OUTOF10: 5
PAINLEVEL_OUTOF10: 4
PAINLEVEL_OUTOF10: 8
PAINLEVEL_OUTOF10: 8
PAINLEVEL_OUTOF10: 4

## 2025-01-01 ASSESSMENT — PAIN DESCRIPTION - ONSET
ONSET: ON-GOING

## 2025-01-01 ASSESSMENT — PAIN DESCRIPTION - FREQUENCY
FREQUENCY: CONTINUOUS

## 2025-01-01 NOTE — PROGRESS NOTES
Vascular Surgery Progress Note      SUBJECTIVE:  No c/o this am    OBJECTIVE    Physical  CURRENT VITALS:  /73   Pulse (!) 49   Temp 97.4 °F (36.3 °C) (Oral)   Resp 15   Ht 1.829 m (6')   Wt 64.3 kg (141 lb 12.1 oz)   SpO2 94%   BMI 19.23 kg/m²   24 HR INTAKE/OUTPUT:    Intake/Output Summary (Last 24 hours) at 1/1/2025 0931  Last data filed at 1/1/2025 0600  Gross per 24 hour   Intake 3262.51 ml   Output 840 ml   Net 2422.51 ml     Incisions intact  Left foot warm  Palpable DP and PT pulse    Data  CBC:   Lab Results   Component Value Date/Time    WBC 14.9 01/01/2025 07:31 AM    RBC 5.04 01/01/2025 07:31 AM    HGB 17.2 01/01/2025 07:31 AM    HCT 51.9 01/01/2025 07:31 AM    .0 01/01/2025 07:31 AM    MCH 34.1 01/01/2025 07:31 AM    MCHC 33.1 01/01/2025 07:31 AM    RDW 14.6 01/01/2025 07:31 AM     01/01/2025 07:31 AM    MPV 8.9 01/01/2025 07:31 AM     BMP:    Lab Results   Component Value Date/Time     01/01/2025 07:31 AM    K 5.0 01/01/2025 07:31 AM    K 4.2 08/08/2024 05:28 PM     01/01/2025 07:31 AM    CO2 25 01/01/2025 07:31 AM    BUN 10 01/01/2025 07:31 AM    CREATININE 0.9 01/01/2025 07:31 AM    CALCIUM 7.8 01/01/2025 07:31 AM    GFRAA >60 10/13/2022 11:38 AM    LABGLOM >90 01/01/2025 07:31 AM    LABGLOM >60 06/12/2023 01:17 PM    GLUCOSE 134 01/01/2025 07:31 AM         ASSESSMENT AND PLAN    POD #1 Left Pop aneurysm repair with interposition vein graft   Polanco out   D/C IVF   Wean Oxygen off   Low dose ASA   Ambulate- PT/OT ordered.

## 2025-01-01 NOTE — PROGRESS NOTES
Occupational Therapy  [address]  Occupational Therapy Initial Assessment and Discharge    Name: Gary Martinez  : 1960  MRN: [id number]  Date of Service: 2025    Discharge Recommendations:  Home with assist PRN  OT Equipment Recommendations  Equipment Needed: No    Gary Martinez scored a 23/24 on the AM-PAC ADL Inpatient form.  At this time, no further OT is recommended upon discharge. Recommend patient returns to prior setting with prior services.            Past Medical History:  has a past medical history of Anxiety, Cancer (HCC), Chronic back pain, Diabetes mellitus (HCC), Erectile dysfunction, Headache(784.0), Osteoarthritis, PONV (postoperative nausea and vomiting), Restless legs syndrome, Urinary incontinence, and Wears dentures.  Past Surgical History:  has a past surgical history that includes Tonsillectomy (Bilateral); Cholecystectomy, laparoscopic (N/A, 08/10/2021); Portacath placement; colectomy (N/A, 2022); Cystoscopy (N/A, 2022); and Femoral-tibial Bypass Graft (Left, 2024).           Assessment  Assessment: Pt tolerated evaluation well, and does not require any additional acute OT. Pt completing ADLs and fxl mobility community distance in hallway with RW and no more than supervision. Pt reports anticipated d/c home tomorrow. Anticipate pt will be safe to return home with assist prn. No further acute OT services indicated, will discharge.  Prognosis: Good  Decision Making: Low Complexity  History: Social hx: Pt lives alone and independent ADLs, IADLs, and fxl mobility  No Skilled OT: Safe to return home;No OT goals identified  REQUIRES OT FOLLOW-UP: No  Activity Tolerance  Activity Tolerance: Patient Tolerated treatment well     Plan  Occupational Therapy Plan  Times Per Week: d/c acute OT    Restrictions  Restrictions/Precautions  Activity Level: Up as Tolerated    Subjective  General  Chart Reviewed: Yes  Patient assessed for rehabilitation

## 2025-01-01 NOTE — PROGRESS NOTES
NAME:  Gary Martinez  YOB: 1960  MEDICAL RECORD NUMBER:  [id number]    Shift Summary: PRN morphine x 2 and PRN zofran x 1 overnight.     Family updated: Yes:  at bedside last night    Rhythm: Normal Sinus Rhythm /SB    Most recent vitals:   Visit Vitals  BP 96/70   Pulse 55   Temp 97.4 °F (36.3 °C) (Oral)   Resp 15   Ht 1.829 m (6')   Wt 64.3 kg (141 lb 12.1 oz)   SpO2 93%   BMI 19.23 kg/m²           No data found.    No data found.      Respiratory support needed (if any):  - O2 - NC - 1 lpm    Admission weight Weight - Scale: 63.5 kg (140 lb) (12/30/24 1305)    Today's weight    Wt Readings from Last 1 Encounters:   01/01/25 64.3 kg (141 lb 12.1 oz)        Oh need assessed each shift: YES -  - continue oh r/t - strict I/O  UOP >30ml/hr: YES  Last documented BM (in last 48 hrs):  Patient Vitals for the past 48 hrs:   Last BM (including prior to admit)   12/31/24 2000 12/29/24                Restraints (in use currently or dc'd in last 12 hrs): No    Order current and documentation up to date? No    Lines/Drains reviewed @ bedside.  Peripheral IV 12/31/24 Right Forearm (Active)   Number of days: 1       Urinary Catheter 12/31/24 (Active)   Number of days: 0         Drip rates at handoff:    sodium chloride      sodium chloride 100 mL/hr at 01/01/25 0651       Lab Data:   CBC:   Recent Labs     12/31/24  0651 12/31/24  1200   WBC 9.3 8.9   HGB 20.1* 19.4*   HCT 58.0* 56.8*   .2* 102.4*   * 96*     BMP:    Recent Labs     12/31/24  0644 12/31/24  1200    137   K 4.0 4.5   CO2 26 24   BUN 9 9   CREATININE 0.9 0.9     LIVR: No results for input(s): \"AST\", \"ALT\" in the last 72 hours.  PT/INR:   Recent Labs     12/31/24  0645   INR 1.03     APTT:   Recent Labs     12/31/24  0646   APTT 28.2     ABG: No results for input(s): \"PHART\", \"AFN3BTS\", \"PO2ART\" in the last 72 hours.    Any consults during the shift? No    Any signed and held orders to be released?  No        4 Eyes Skin

## 2025-01-01 NOTE — PLAN OF CARE
Problem: Discharge Planning  Goal: Discharge to home or other facility with appropriate resources  12/31/2024 2127 by Juli Wallace RN  Outcome: Progressing     Problem: Pain  Goal: Verbalizes/displays adequate comfort level or baseline comfort level  12/31/2024 2127 by Juli Wallace RN  Outcome: Progressing     Problem: Chronic Conditions and Co-morbidities  Goal: Patient's chronic conditions and co-morbidity symptoms are monitored and maintained or improved  12/31/2024 2127 by Juli Wallace RN  Outcome: Progressing     Problem: Skin/Tissue Integrity  Goal: Absence of new skin breakdown  Description: 1.  Monitor for areas of redness and/or skin breakdown  2.  Assess vascular access sites hourly  3.  Every 4-6 hours minimum:  Change oxygen saturation probe site  4.  Every 4-6 hours:  If on nasal continuous positive airway pressure, respiratory therapy assess nares and determine need for appliance change or resting period.  12/31/2024 2127 by Juli Wallace RN  Outcome: Progressing     Problem: Respiratory - Adult  Goal: Achieves optimal ventilation and oxygenation  12/31/2024 2127 by Juli Wallace RN  Outcome: Progressing  Flowsheets (Taken 12/31/2024 2000)  Achieves optimal ventilation and oxygenation: Assess for changes in respiratory status     Problem: Skin/Tissue Integrity - Adult  Goal: Incisions, wounds, or drain sites healing without S/S of infection  12/31/2024 2127 by Juli Wallace RN  Outcome: Progressing     Problem: Genitourinary - Adult  Goal: Urinary catheter remains patent  12/31/2024 2127 by Juli Wallace RN  Outcome: Progressing  Flowsheets (Taken 12/31/2024 2000)  Urinary catheter remains patent: Assess patency of urinary catheter     Problem: ABCDS Injury Assessment  Goal: Absence of physical injury  Outcome: Progressing

## 2025-01-01 NOTE — PROGRESS NOTES
Late entry due to patient care     Dr. Gardiner at bedside.   Instructed to stop IVF and remove oh. Ambulate pt as tolerated.   Oh was removed without issue at 0930. Urinal provided to patient.  Updated pt on plan- will get pt up to chair within the hour, pt agreeable

## 2025-01-01 NOTE — PROGRESS NOTES
Physical Therapy  Facility/Department: [address]  Physical Therapy Initial Assessment    Name: Gary Martinez  : 1960  MRN: [id number]  Date of Service: 2025    Discharge Recommendations:  Home with assist PRN   PT Equipment Recommendations  Equipment Needed: No  Other: Has Cane, Rollator  Gary Martinez scored a 20/24 on the AM-PAC short mobility form.  At this time, no further PT is recommended upon discharge.           Assessment  Body Structures, Functions, Activity Limitations Requiring Skilled Therapeutic Intervention: Decreased functional mobility   Assessment: 63 y/o male admit 2024 with Asymptomatic L Pop Artery Aneurysm. 2024 S/P Repair of L Pop Artery Aneurysm via Post Approach with RSV Graft. PMH as noted including OA, Ca (Head/Neck/Tongue), Chronic Back Pain, Anxiety.  PTA pt living alone in multi level home with few steps to enter and stairlift to access 2nd floor bed/bath (no bath on main level); independent daily care and functional mobility (without assist device).  Pt currently able to move to eob, transfer/amb functional distances with Walker SBA/Supervision.  At this time, anticipate d/c home.  Therapy Prognosis: Good  Decision Making: Low Complexity  History: 63 y/o male admit 2024 with Asymptomatic L Pop Artery Aneurysm. 2024 S/P Repair of L Pop Artery Aneurysm via Post Approach with RSV Graft. PMH as noted including OA, Ca (Head/Neck/Tongue), Chronic Back Pain, Anxiety.  Exam: See above.  Clinical Presentation: See above.  Barriers to Learning: None.  Requires PT Follow-Up: Yes  Activity Tolerance  Activity Tolerance: Patient tolerated treatment well    Plan  Physical Therapy Plan  General Plan:  (1 further PT Rx.)  Current Treatment Recommendations: Therapeutic activities, Functional mobility training, Transfer training, Gait training, Stair training, Safety education & training, Patient/Caregiver education & training  Safety Devices  Type of Devices:  assist, Safe use of Walker.  Education Method: Verbal  Barriers to Learning: None  Education Outcome: Verbalized understanding;Continued education needed      Therapy Time   Individual Concurrent Group Co-treatment   Time In  1100         Time Out  1125         Minutes  25                 Anu Nettles, PT

## 2025-01-01 NOTE — PROGRESS NOTES
be released?  No        4 Eyes Skin Assessment       The patient is being assessed for  Shift Handoff    I agree that at least one RN has performed a thorough Head to Toe Skin Assessment on the patient. ALL assessment sites listed below have been assessed.      Areas assessed by both nurses: Head, Face, Ears, Shoulders, Back, Chest, Arms, Elbows, Hands, Sacrum. Buttock, Coccyx, Ischium, Legs. Feet and Heels, and Under Medical Devices         Does the Patient have a Wound? No noted wound(s)    Wound Care Orders initiated by RN: No       Nick Prevention initiated by RN: Yes    Pressure Injury (Stage 3,4, Unstageable, DTI, NWPT, and Complex wounds) if present, place Wound referral order by RN under : No    New Ostomies, if present place, Ostomy referral order under : No     Nurse 1 eSignature: Electronically signed by Blanca Brizuela RN on 1/1/25 at 6:35 PM EST    **SHARE this note so that the co-signing nurse can place an eSignature**    Nurse 2 eSignature: Electronically signed by Mierya Sanchez RN on 1/1/25 at 7:28 PM EST

## 2025-01-01 NOTE — PROGRESS NOTES
Department of Internal Medicine  General Internal Medicine  Attending Progress Note      SUBJECTIVE:  pt was admitted for L popliteal aneurysm repair by dr Traylor. Post op 1. Pt is diabetic, his BP is low, getting IVF. Has been taking chronic anxiety med for yrs.would like to resume it. Pt denies any sob, cough, fever,chills, pain is under controll.    OBJECTIVE      Medications    Current Facility-Administered Medications: ALPRAZolam (XANAX) tablet 0.5 mg, 0.5 mg, Oral, Q12H  insulin lispro (HUMALOG,ADMELOG) injection vial 0-4 Units, 0-4 Units, SubCUTAneous, 4x Daily AC & HS  glucose chewable tablet 16 g, 4 tablet, Oral, PRN  dextrose bolus 10% 125 mL, 125 mL, IntraVENous, PRN **OR** dextrose bolus 10% 250 mL, 250 mL, IntraVENous, PRN  glucagon injection 1 mg, 1 mg, SubCUTAneous, PRN  dextrose 10 % infusion, , IntraVENous, Continuous PRN  glucose chewable tablet 16 g, 4 tablet, Oral, PRN  dextrose bolus 10% 125 mL, 125 mL, IntraVENous, PRN **OR** dextrose bolus 10% 250 mL, 250 mL, IntraVENous, PRN  glucagon injection 1 mg, 1 mg, SubCUTAneous, PRN  dextrose 10 % infusion, , IntraVENous, Continuous PRN  scopolamine (TRANSDERM-SCOP) transdermal patch 1 patch, 1 patch, TransDERmal, Once  albuterol sulfate HFA (PROVENTIL;VENTOLIN;PROAIR) 108 (90 Base) MCG/ACT inhaler 2 puff, 2 puff, Inhalation, Q4H PRN  alogliptin (NESINA) tablet 25 mg, 25 mg, Oral, Daily  levothyroxine (SYNTHROID) tablet 50 mcg, 50 mcg, Oral, QAM  ondansetron (ZOFRAN-ODT) disintegrating tablet 4 mg, 4 mg, Oral, TID PRN  sodium chloride flush 0.9 % injection 5-40 mL, 5-40 mL, IntraVENous, 2 times per day  sodium chloride flush 0.9 % injection 5-40 mL, 5-40 mL, IntraVENous, PRN  0.9 % sodium chloride infusion, , IntraVENous, PRN  0.9 % sodium chloride infusion, , IntraVENous, Continuous  oxyCODONE (ROXICODONE) immediate release tablet 5 mg, 5 mg, Oral, Q4H PRN **OR** oxyCODONE HCl (OXY-IR) immediate release tablet 10 mg, 10 mg, Oral, Q4H PRN  morphine

## 2025-01-02 VITALS
RESPIRATION RATE: 16 BRPM | TEMPERATURE: 97.9 F | HEIGHT: 72 IN | SYSTOLIC BLOOD PRESSURE: 125 MMHG | OXYGEN SATURATION: 95 % | DIASTOLIC BLOOD PRESSURE: 84 MMHG | WEIGHT: 141.76 LBS | HEART RATE: 87 BPM | BODY MASS INDEX: 19.2 KG/M2

## 2025-01-02 LAB
GLUCOSE BLD-MCNC: 115 MG/DL (ref 70–99)
PERFORMED ON: ABNORMAL

## 2025-01-02 PROCEDURE — 6360000002 HC RX W HCPCS: Performed by: SURGERY

## 2025-01-02 PROCEDURE — 9990000010 HC NO CHARGE VISIT

## 2025-01-02 PROCEDURE — 6370000000 HC RX 637 (ALT 250 FOR IP): Performed by: SURGERY

## 2025-01-02 PROCEDURE — 99024 POSTOP FOLLOW-UP VISIT: CPT | Performed by: SURGERY

## 2025-01-02 RX ORDER — ASPIRIN 81 MG/1
81 TABLET, CHEWABLE ORAL DAILY
Qty: 30 TABLET | Refills: 3 | Status: SHIPPED | OUTPATIENT
Start: 2025-01-02

## 2025-01-02 RX ORDER — DUPILUMAB 300 MG/2ML
INJECTION, SOLUTION SUBCUTANEOUS
Qty: 4 ML | Refills: 6 | Status: SHIPPED | OUTPATIENT
Start: 2025-01-02

## 2025-01-02 RX ORDER — OXYCODONE HYDROCHLORIDE 5 MG/1
5 TABLET ORAL EVERY 6 HOURS PRN
Qty: 20 TABLET | Refills: 0 | Status: SHIPPED | OUTPATIENT
Start: 2025-01-02 | End: 2025-01-16

## 2025-01-02 RX ADMIN — PROCHLORPERAZINE EDISYLATE 10 MG: 5 INJECTION INTRAMUSCULAR; INTRAVENOUS at 06:04

## 2025-01-02 RX ADMIN — ALOGLIPTIN 25 MG: 25 TABLET, FILM COATED ORAL at 09:18

## 2025-01-02 RX ADMIN — OXYCODONE HYDROCHLORIDE 10 MG: 10 TABLET ORAL at 06:12

## 2025-01-02 RX ADMIN — ASPIRIN 81 MG: 81 TABLET, CHEWABLE ORAL at 09:18

## 2025-01-02 ASSESSMENT — PAIN DESCRIPTION - ORIENTATION: ORIENTATION: LEFT

## 2025-01-02 ASSESSMENT — PAIN DESCRIPTION - LOCATION: LOCATION: LEG

## 2025-01-02 ASSESSMENT — PAIN DESCRIPTION - DESCRIPTORS: DESCRIPTORS: ACHING

## 2025-01-02 ASSESSMENT — PAIN SCALES - GENERAL
PAINLEVEL_OUTOF10: 0
PAINLEVEL_OUTOF10: 0
PAINLEVEL_OUTOF10: 8

## 2025-01-02 NOTE — CARE COORDINATION
Discharge order noted.  Spoke with bedside rNBlanca  Chart reviewed.  No needs.  Has pcp visit recently, has insurance, son to transport home.  SANIYA Yao     Case Management   [phone]    1/2/2025  8:52 AM

## 2025-01-02 NOTE — PROGRESS NOTES
VASCULAR  POD#2    Comfortable. Pain well controlled. No foot numbness or leg weakness reported.    VSS Afeb  Incisions intact without hematomas  Strong L pedal pulses palpable    A/P: L popliteal aneurysm repair - patent   Doing well.   Dc home today with F/U in 2-3 weeks.      Ezekiel Traylor

## 2025-01-02 NOTE — FLOWSHEET NOTE
01/02/25 1020   AVS Reviewed   AVS & discharge instructions reviewed with patient and/or representative? Yes   Reviewed instructions with Patient   Level of Understanding Questions answered;Teach back completed;Verbalized understanding

## 2025-01-02 NOTE — DISCHARGE INSTRUCTIONS
May shower beginning Friday AM.  Walk as tolerated - no prolonged standing. Elevate foot when not walking.  No heavy lifting over 25-30 lbs.  May drive when not taking narcotics for pain.  Resume LLE stocking.  Resume all preadmission medications including Eliquis.

## 2025-01-02 NOTE — PROGRESS NOTES
Discharge instructions reviewed with patient and family member.  Patient and family verbalized understanding.  All home medications have been reviewed, questions answered and patient voiced understanding. All medication side effects reviewed and patient and family verbalized understanding. Follow up appointment(s) reviewed with patient and all attempts made to schedule within 7-10 days of discharge.  Patient given prescriptions, discharge instructions, and appointment times.  Patient discharged to home with family via private car.  Taken to lobby via wheelchair.

## 2025-01-02 NOTE — PROGRESS NOTES
CLINICAL PHARMACY NOTE: MEDS TO BEDS    Total # of Prescriptions Filled: 2   The following medications were delivered to the patient:  Current Discharge Medication List        START taking these medications    Details   oxyCODONE (ROXICODONE) 5 MG immediate release tablet Take 1 tablet by mouth every 6 hours as needed for Pain for up to 14 days. Max Daily Amount: 20 mg  Qty: 20 tablet, Refills: 0    Comments: Reduce doses taken as pain becomes manageable  Associated Diagnoses: Postoperative pain of extremity      aspirin 81 MG chewable tablet Take 1 tablet by mouth daily  Qty: 30 tablet, Refills: 3               Additional Documentation:  Medications delivered at bedside

## 2025-01-02 NOTE — PLAN OF CARE
Problem: Discharge Planning  Goal: Discharge to home or other facility with appropriate resources  Outcome: Progressing  Flowsheets (Taken 1/1/2025 2055)  Discharge to home or other facility with appropriate resources:   Identify barriers to discharge with patient and caregiver   Identify discharge learning needs (meds, wound care, etc)   Arrange for needed discharge resources and transportation as appropriate   Arrange for interpreters to assist at discharge as needed   Refer to discharge planning if patient needs post-hospital services based on physician order or complex needs related to functional status, cognitive ability or social support system     Problem: Pain  Goal: Verbalizes/displays adequate comfort level or baseline comfort level  Outcome: Progressing  Flowsheets  Taken 1/2/2025 0000  Verbalizes/displays adequate comfort level or baseline comfort level: Encourage patient to monitor pain and request assistance  Taken 1/1/2025 2100  Verbalizes/displays adequate comfort level or baseline comfort level:   Encourage patient to monitor pain and request assistance   Administer analgesics based on type and severity of pain and evaluate response   Assess pain using appropriate pain scale   Implement non-pharmacological measures as appropriate and evaluate response   Consider cultural and social influences on pain and pain management   Notify Licensed Independent Practitioner if interventions unsuccessful or patient reports new pain     Problem: Safety - Adult  Goal: Free from fall injury  Outcome: Progressing     Problem: Chronic Conditions and Co-morbidities  Goal: Patient's chronic conditions and co-morbidity symptoms are monitored and maintained or improved  Outcome: Progressing  Flowsheets (Taken 1/1/2025 2055)  Care Plan - Patient's Chronic Conditions and Co-Morbidity Symptoms are Monitored and Maintained or Improved:   Monitor and assess patient's chronic conditions and comorbid symptoms for stability,  and surrounding tissue   Implement wound care per orders   Initiate isolation precautions as appropriate   Initiate pressure ulcer prevention bundle as indicated     Problem: Genitourinary - Adult  Goal: Urinary catheter remains patent  Outcome: Progressing     Problem: ABCDS Injury Assessment  Goal: Absence of physical injury  Outcome: Progressing

## 2025-01-02 NOTE — PROGRESS NOTES
Physical Therapy  Pt reports up with Walker overnight; denies any d/c concerns.  Declines need for cont PT intervention at this time.  No d/c concerns or any equipt needs.  PT to sign off at this time.  Anu Nettles, PT

## 2025-01-02 NOTE — PLAN OF CARE
incentive spirometry   Initiate smoking cessation protocol as indicated   Assess the need for suctioning and aspirate as needed   Assess and instruct to report shortness of breath or any respiratory difficulty   Respiratory therapy support as indicated     Problem: Skin/Tissue Integrity - Adult  Goal: Incisions, wounds, or drain sites healing without S/S of infection  1/2/2025 1031 by Blanca Brizuela RN  Outcome: Completed  Flowsheets (Taken 1/1/2025 2055 by Mireya Sanchez RN)  Incisions, Wounds, or Drain Sites Healing Without Sign and Symptoms of Infection:   ADMISSION and DAILY: Assess and document risk factors for pressure ulcer development   TWICE DAILY: Assess and document skin integrity   TWICE DAILY: Assess and document dressing/incision, wound bed, drain sites and surrounding tissue   Implement wound care per orders   Initiate isolation precautions as appropriate   Initiate pressure ulcer prevention bundle as indicated  1/2/2025 0623 by Mireya Sanchez RN  Outcome: Progressing  Flowsheets (Taken 1/1/2025 2055)  Incisions, Wounds, or Drain Sites Healing Without Sign and Symptoms of Infection:   ADMISSION and DAILY: Assess and document risk factors for pressure ulcer development   TWICE DAILY: Assess and document skin integrity   TWICE DAILY: Assess and document dressing/incision, wound bed, drain sites and surrounding tissue   Implement wound care per orders   Initiate isolation precautions as appropriate   Initiate pressure ulcer prevention bundle as indicated     Problem: Genitourinary - Adult  Goal: Urinary catheter remains patent  1/2/2025 0623 by Mireya Sanchez RN  Outcome: Progressing     Problem: ABCDS Injury Assessment  Goal: Absence of physical injury  1/2/2025 1031 by Blanca Brizuela RN  Outcome: Completed  Flowsheets (Taken 1/2/2025 1031)  Absence of Physical Injury: Implement safety measures based on patient assessment  1/2/2025 0623 by Mireya Sanchez RN  Outcome: Progressing

## 2025-01-02 NOTE — PROGRESS NOTES
NAME:  Gary Martinez  YOB: 1960  MEDICAL RECORD NUMBER:  [id number]    Shift Summary: Some intermittent nausea overnight. Patient says it is not uncommon for him to be nauseous. However, is more nauseous than normal. PRN meds given. Pedal pulses palpable. VSS.     Family updated: No    Rhythm: Normal Sinus Rhythm     Most recent vitals:   Visit Vitals  BP (!) 150/95   Pulse 79   Temp 97.8 °F (36.6 °C) (Oral)   Resp 18   Ht 1.829 m (6')   Wt 64.3 kg (141 lb 12.1 oz)   SpO2 92%   BMI 19.23 kg/m²           No data found.    No data found.      Respiratory support needed (if any):  - RA    Admission weight Weight - Scale: 63.5 kg (140 lb) (12/30/24 1305)    Today's weight    Wt Readings from Last 1 Encounters:   01/01/25 64.3 kg (141 lb 12.1 oz)        Oh need assessed each shift: N/A - no oh present  UOP >30ml/hr: YES  Last documented BM (in last 48 hrs):  Patient Vitals for the past 48 hrs:   Last BM (including prior to admit)   12/31/24 2000 12/29/24 01/01/25 0830 12/29/24 01/01/25 2055 12/29/24                Restraints (in use currently or dc'd in last 12 hrs): No    Order current and documentation up to date? No    Lines/Drains reviewed @ bedside.  Peripheral IV 12/31/24 Right Forearm (Active)   Number of days: 1         Drip rates at handoff:    dextrose      dextrose      sodium chloride         Lab Data:   CBC:   Recent Labs     12/31/24 1200 01/01/25  0731   WBC 8.9 14.9*   HGB 19.4* 17.2   HCT 56.8* 51.9   .4* 103.0*   PLT 96* 103*     BMP:    Recent Labs     12/31/24 1200 01/01/25  0731    135*   K 4.5 5.0   CO2 24 25   BUN 9 10   CREATININE 0.9 0.9     LIVR: No results for input(s): \"AST\", \"ALT\" in the last 72 hours.  PT/INR:   Recent Labs     12/31/24  0645   INR 1.03     APTT:   Recent Labs     12/31/24  0646   APTT 28.2     ABG: No results for input(s): \"PHART\", \"WPI6WMK\", \"PO2ART\" in the last 72 hours.    Any consults during the shift? No    Any signed and held  orders to be released?  No        4 Eyes Skin Assessment       The patient is being assessed for  Shift Handoff    I agree that at least one RN has performed a thorough Head to Toe Skin Assessment on the patient. ALL assessment sites listed below have been assessed.      Areas assessed by both nurses: Head, Face, Ears, Shoulders, Back, Chest, Arms, Elbows, Hands, Sacrum. Buttock, Coccyx, Ischium, Legs. Feet and Heels, Under Medical Devices , and Other          Does the Patient have a Wound? No noted wound(s)    Wound Care Orders initiated by RN: No       Nick Prevention initiated by RN: Yes    Pressure Injury (Stage 3,4, Unstageable, DTI, NWPT, and Complex wounds) if present, place Wound referral order by RN under : No    New Ostomies, if present place, Ostomy referral order under : No     Nurse 1 eSignature: Electronically signed by Mireya Sanchez RN on 1/2/25 at 6:21 AM EST    **SHARE this note so that the co-signing nurse can place an eSignature**    Nurse 2 eSignature: Electronically signed by Blanca Brizuela RN on 1/2/25 at 7:01 AM EST

## 2025-01-10 ENCOUNTER — APPOINTMENT (OUTPATIENT)
Dept: CT IMAGING | Age: 65
End: 2025-01-10
Payer: MEDICAID

## 2025-01-10 ENCOUNTER — ANESTHESIA EVENT (OUTPATIENT)
Dept: INTERVENTIONAL RADIOLOGY/VASCULAR | Age: 65
End: 2025-01-10
Payer: MEDICAID

## 2025-01-10 ENCOUNTER — HOSPITAL ENCOUNTER (EMERGENCY)
Age: 65
Discharge: ANOTHER HOSPITAL | End: 2025-01-10
Attending: EMERGENCY MEDICINE
Payer: MEDICAID

## 2025-01-10 ENCOUNTER — APPOINTMENT (OUTPATIENT)
Dept: GENERAL RADIOLOGY | Age: 65
End: 2025-01-10
Payer: MEDICAID

## 2025-01-10 ENCOUNTER — HOSPITAL ENCOUNTER (INPATIENT)
Age: 65
LOS: 6 days | Discharge: HOME | End: 2025-01-16
Admitting: INTERNAL MEDICINE
Payer: MEDICAID

## 2025-01-10 ENCOUNTER — ANESTHESIA (OUTPATIENT)
Dept: INTERVENTIONAL RADIOLOGY/VASCULAR | Age: 65
End: 2025-01-10
Payer: MEDICAID

## 2025-01-10 VITALS
DIASTOLIC BLOOD PRESSURE: 97 MMHG | HEART RATE: 82 BPM | TEMPERATURE: 98.4 F | RESPIRATION RATE: 23 BRPM | OXYGEN SATURATION: 92 % | HEIGHT: 72 IN | WEIGHT: 151.24 LBS | BODY MASS INDEX: 20.48 KG/M2 | SYSTOLIC BLOOD PRESSURE: 146 MMHG

## 2025-01-10 DIAGNOSIS — I63.9 CEREBROVASCULAR ACCIDENT (CVA), UNSPECIFIED MECHANISM (HCC): Primary | ICD-10-CM

## 2025-01-10 DIAGNOSIS — I63.9 STROKE (HCC): ICD-10-CM

## 2025-01-10 PROBLEM — Z79.01 ON CONTINUOUS ORAL ANTICOAGULATION: Status: ACTIVE | Noted: 2025-01-10

## 2025-01-10 PROBLEM — I63.412 CEREBROVASCULAR ACCIDENT (CVA) DUE TO EMBOLISM OF LEFT MIDDLE CEREBRAL ARTERY (HCC): Status: ACTIVE | Noted: 2025-01-10

## 2025-01-10 LAB
ALBUMIN SERPL-MCNC: 3.8 G/DL (ref 3.4–5)
ALBUMIN/GLOB SERPL: 1.1 {RATIO} (ref 1.1–2.2)
ALP SERPL-CCNC: 98 U/L (ref 40–129)
ALT SERPL-CCNC: 22 U/L (ref 10–40)
ANION GAP SERPL CALCULATED.3IONS-SCNC: 12 MMOL/L (ref 3–16)
ANTI-XA UNFRAC HEPARIN: <0.1 IU/ML (ref 0.3–0.7)
APTT BLD: 27.8 SEC (ref 22.1–36.4)
APTT BLD: 32.3 SEC (ref 22.1–36.4)
AST SERPL-CCNC: 33 U/L (ref 15–37)
BASOPHILS # BLD: 0.1 K/UL (ref 0–0.2)
BASOPHILS NFR BLD: 1.3 %
BILIRUB SERPL-MCNC: 0.4 MG/DL (ref 0–1)
BUN SERPL-MCNC: 7 MG/DL (ref 7–20)
CALCIUM SERPL-MCNC: 8.9 MG/DL (ref 8.3–10.6)
CHLORIDE SERPL-SCNC: 92 MMOL/L (ref 99–110)
CHOLEST SERPL-MCNC: 118 MG/DL (ref 0–199)
CO2 SERPL-SCNC: 28 MMOL/L (ref 21–32)
CREAT SERPL-MCNC: 0.9 MG/DL (ref 0.8–1.3)
DEPRECATED RDW RBC AUTO: 13.8 % (ref 12.4–15.4)
DEPRECATED RDW RBC AUTO: 13.8 % (ref 12.4–15.4)
ECHO BSA: 1.87 M2
ECHO BSA: 1.87 M2
EKG ATRIAL RATE: 91 BPM
EKG DIAGNOSIS: NORMAL
EKG P AXIS: 86 DEGREES
EKG P-R INTERVAL: 158 MS
EKG Q-T INTERVAL: 392 MS
EKG QRS DURATION: 78 MS
EKG QTC CALCULATION (BAZETT): 482 MS
EKG R AXIS: -60 DEGREES
EKG T AXIS: 20 DEGREES
EKG VENTRICULAR RATE: 91 BPM
EOSINOPHIL # BLD: 0.2 K/UL (ref 0–0.6)
EOSINOPHIL NFR BLD: 2.3 %
ETHANOLAMINE SERPL-MCNC: NORMAL MG/DL (ref 0–0.08)
GFR SERPLBLD CREATININE-BSD FMLA CKD-EPI: >90 ML/MIN/{1.73_M2}
GLUCOSE SERPL-MCNC: 147 MG/DL (ref 70–99)
HCT VFR BLD AUTO: 52.7 % (ref 40.5–52.5)
HCT VFR BLD AUTO: 53.9 % (ref 40.5–52.5)
HDLC SERPL-MCNC: 28 MG/DL (ref 40–60)
HGB BLD-MCNC: 18 G/DL (ref 13.5–17.5)
HGB BLD-MCNC: 18.2 G/DL (ref 13.5–17.5)
INR PPP: 1.06 (ref 0.85–1.15)
INR PPP: 1.09 (ref 0.85–1.15)
LDLC SERPL CALC-MCNC: 67 MG/DL
LYMPHOCYTES # BLD: 2 K/UL (ref 1–5.1)
LYMPHOCYTES NFR BLD: 24.4 %
MCH RBC QN AUTO: 34.3 PG (ref 26–34)
MCH RBC QN AUTO: 34.9 PG (ref 26–34)
MCHC RBC AUTO-ENTMCNC: 33.4 G/DL (ref 31–36)
MCHC RBC AUTO-ENTMCNC: 34.5 G/DL (ref 31–36)
MCV RBC AUTO: 100.9 FL (ref 80–100)
MCV RBC AUTO: 102.5 FL (ref 80–100)
MONOCYTES # BLD: 0.9 K/UL (ref 0–1.3)
MONOCYTES NFR BLD: 10.8 %
NEUTROPHILS # BLD: 5 K/UL (ref 1.7–7.7)
NEUTROPHILS NFR BLD: 61.2 %
PLATELET # BLD AUTO: 254 K/UL (ref 135–450)
PLATELET # BLD AUTO: 269 K/UL (ref 135–450)
PMV BLD AUTO: 7.7 FL (ref 5–10.5)
PMV BLD AUTO: 7.9 FL (ref 5–10.5)
POTASSIUM SERPL-SCNC: 3.6 MMOL/L (ref 3.5–5.1)
PROT SERPL-MCNC: 7.2 G/DL (ref 6.4–8.2)
PROTHROMBIN TIME: 14 SEC (ref 11.9–14.9)
PROTHROMBIN TIME: 14.3 SEC (ref 11.9–14.9)
RBC # BLD AUTO: 5.22 M/UL (ref 4.2–5.9)
RBC # BLD AUTO: 5.26 M/UL (ref 4.2–5.9)
SODIUM SERPL-SCNC: 132 MMOL/L (ref 136–145)
TRIGL SERPL-MCNC: 117 MG/DL (ref 0–150)
TROPONIN, HIGH SENSITIVITY: 16 NG/L (ref 0–22)
VLDLC SERPL CALC-MCNC: 23 MG/DL
WBC # BLD AUTO: 8.2 K/UL (ref 4–11)
WBC # BLD AUTO: 8.5 K/UL (ref 4–11)

## 2025-01-10 PROCEDURE — 2709999900 HC NON-CHARGEABLE SUPPLY: Performed by: NEUROLOGICAL SURGERY

## 2025-01-10 PROCEDURE — 2500000003 HC RX 250 WO HCPCS

## 2025-01-10 PROCEDURE — 85610 PROTHROMBIN TIME: CPT

## 2025-01-10 PROCEDURE — 2580000003 HC RX 258: Performed by: ANESTHESIOLOGY

## 2025-01-10 PROCEDURE — 2000000000 HC ICU R&B

## 2025-01-10 PROCEDURE — 36224 PLACE CATH CAROTD ART: CPT

## 2025-01-10 PROCEDURE — 6370000000 HC RX 637 (ALT 250 FOR IP): Performed by: STUDENT IN AN ORGANIZED HEALTH CARE EDUCATION/TRAINING PROGRAM

## 2025-01-10 PROCEDURE — 93010 ELECTROCARDIOGRAM REPORT: CPT | Performed by: INTERNAL MEDICINE

## 2025-01-10 PROCEDURE — 93005 ELECTROCARDIOGRAM TRACING: CPT | Performed by: EMERGENCY MEDICINE

## 2025-01-10 PROCEDURE — 6370000000 HC RX 637 (ALT 250 FOR IP)

## 2025-01-10 PROCEDURE — B3181ZZ FLUOROSCOPY OF BILATERAL INTERNAL CAROTID ARTERIES USING LOW OSMOLAR CONTRAST: ICD-10-PCS | Performed by: NEUROLOGICAL SURGERY

## 2025-01-10 PROCEDURE — APPNB180 APP NON BILLABLE TIME > 60 MINS

## 2025-01-10 PROCEDURE — 2500000003 HC RX 250 WO HCPCS: Performed by: ANESTHESIOLOGY

## 2025-01-10 PROCEDURE — 36415 COLL VENOUS BLD VENIPUNCTURE: CPT

## 2025-01-10 PROCEDURE — 36226 PLACE CATH VERTEBRAL ART: CPT

## 2025-01-10 PROCEDURE — 2720000010 HC SURG SUPPLY STERILE: Performed by: NEUROLOGICAL SURGERY

## 2025-01-10 PROCEDURE — 3700000000 HC ANESTHESIA ATTENDED CARE

## 2025-01-10 PROCEDURE — 85730 THROMBOPLASTIN TIME PARTIAL: CPT

## 2025-01-10 PROCEDURE — 85027 COMPLETE CBC AUTOMATED: CPT

## 2025-01-10 PROCEDURE — 6360000002 HC RX W HCPCS

## 2025-01-10 PROCEDURE — 99153 MOD SED SAME PHYS/QHP EA: CPT | Performed by: NEUROLOGICAL SURGERY

## 2025-01-10 PROCEDURE — C1760 CLOSURE DEV, VASC: HCPCS | Performed by: NEUROLOGICAL SURGERY

## 2025-01-10 PROCEDURE — 99223 1ST HOSP IP/OBS HIGH 75: CPT | Performed by: INTERNAL MEDICINE

## 2025-01-10 PROCEDURE — 2709999900 HC NON-CHARGEABLE SUPPLY

## 2025-01-10 PROCEDURE — 70498 CT ANGIOGRAPHY NECK: CPT

## 2025-01-10 PROCEDURE — 83036 HEMOGLOBIN GLYCOSYLATED A1C: CPT

## 2025-01-10 PROCEDURE — C1757 CATH, THROMBECTOMY/EMBOLECT: HCPCS | Performed by: NEUROLOGICAL SURGERY

## 2025-01-10 PROCEDURE — 99285 EMERGENCY DEPT VISIT HI MDM: CPT

## 2025-01-10 PROCEDURE — 80061 LIPID PANEL: CPT

## 2025-01-10 PROCEDURE — B31G1ZZ FLUOROSCOPY OF BILATERAL VERTEBRAL ARTERIES USING LOW OSMOLAR CONTRAST: ICD-10-PCS | Performed by: NEUROLOGICAL SURGERY

## 2025-01-10 PROCEDURE — 70450 CT HEAD/BRAIN W/O DYE: CPT

## 2025-01-10 PROCEDURE — 6360000002 HC RX W HCPCS: Performed by: ANESTHESIOLOGY

## 2025-01-10 PROCEDURE — 99223 1ST HOSP IP/OBS HIGH 75: CPT | Performed by: PSYCHIATRY & NEUROLOGY

## 2025-01-10 PROCEDURE — C1769 GUIDE WIRE: HCPCS | Performed by: NEUROLOGICAL SURGERY

## 2025-01-10 PROCEDURE — 3700000001 HC ADD 15 MINUTES (ANESTHESIA)

## 2025-01-10 PROCEDURE — 6360000004 HC RX CONTRAST MEDICATION: Performed by: EMERGENCY MEDICINE

## 2025-01-10 PROCEDURE — C1887 CATHETER, GUIDING: HCPCS | Performed by: NEUROLOGICAL SURGERY

## 2025-01-10 PROCEDURE — 74018 RADEX ABDOMEN 1 VIEW: CPT

## 2025-01-10 PROCEDURE — 92610 EVALUATE SWALLOWING FUNCTION: CPT

## 2025-01-10 PROCEDURE — 99152 MOD SED SAME PHYS/QHP 5/>YRS: CPT | Performed by: NEUROLOGICAL SURGERY

## 2025-01-10 PROCEDURE — 85520 HEPARIN ASSAY: CPT

## 2025-01-10 PROCEDURE — B41F1ZZ FLUOROSCOPY OF RIGHT LOWER EXTREMITY ARTERIES USING LOW OSMOLAR CONTRAST: ICD-10-PCS | Performed by: NEUROLOGICAL SURGERY

## 2025-01-10 PROCEDURE — C1894 INTRO/SHEATH, NON-LASER: HCPCS | Performed by: NEUROLOGICAL SURGERY

## 2025-01-10 RX ORDER — LABETALOL HYDROCHLORIDE 5 MG/ML
10 INJECTION, SOLUTION INTRAVENOUS
Status: DISCONTINUED | OUTPATIENT
Start: 2025-01-10 | End: 2025-01-10 | Stop reason: ALTCHOICE

## 2025-01-10 RX ORDER — PROCHLORPERAZINE EDISYLATE 5 MG/ML
5 INJECTION INTRAMUSCULAR; INTRAVENOUS
Status: ACTIVE | OUTPATIENT
Start: 2025-01-10 | End: 2025-01-11

## 2025-01-10 RX ORDER — ATORVASTATIN CALCIUM 40 MG/1
80 TABLET, FILM COATED ORAL NIGHTLY
Status: CANCELLED | OUTPATIENT
Start: 2025-01-10

## 2025-01-10 RX ORDER — ONDANSETRON 2 MG/ML
4 INJECTION INTRAMUSCULAR; INTRAVENOUS EVERY 6 HOURS PRN
Status: CANCELLED | OUTPATIENT
Start: 2025-01-10

## 2025-01-10 RX ORDER — SODIUM CHLORIDE 9 MG/ML
INJECTION, SOLUTION INTRAVENOUS PRN
Status: CANCELLED | OUTPATIENT
Start: 2025-01-10

## 2025-01-10 RX ORDER — ENOXAPARIN SODIUM 100 MG/ML
40 INJECTION SUBCUTANEOUS DAILY
Status: DISCONTINUED | OUTPATIENT
Start: 2025-01-10 | End: 2025-01-10

## 2025-01-10 RX ORDER — SODIUM CHLORIDE 0.9 % (FLUSH) 0.9 %
5-40 SYRINGE (ML) INJECTION PRN
Status: CANCELLED | OUTPATIENT
Start: 2025-01-10

## 2025-01-10 RX ORDER — IPRATROPIUM BROMIDE AND ALBUTEROL SULFATE 2.5; .5 MG/3ML; MG/3ML
1 SOLUTION RESPIRATORY (INHALATION)
Status: ACTIVE | OUTPATIENT
Start: 2025-01-10 | End: 2025-01-11

## 2025-01-10 RX ORDER — LABETALOL HYDROCHLORIDE 5 MG/ML
10 INJECTION, SOLUTION INTRAVENOUS EVERY 10 MIN PRN
Status: CANCELLED | OUTPATIENT
Start: 2025-01-10

## 2025-01-10 RX ORDER — ASPIRIN 325 MG
325 TABLET ORAL DAILY
Status: CANCELLED | OUTPATIENT
Start: 2025-01-11

## 2025-01-10 RX ORDER — IOPAMIDOL 755 MG/ML
75 INJECTION, SOLUTION INTRAVASCULAR
Status: COMPLETED | OUTPATIENT
Start: 2025-01-10 | End: 2025-01-10

## 2025-01-10 RX ORDER — HYDROMORPHONE HYDROCHLORIDE 1 MG/ML
0.5 INJECTION, SOLUTION INTRAMUSCULAR; INTRAVENOUS; SUBCUTANEOUS EVERY 5 MIN PRN
Status: DISCONTINUED | OUTPATIENT
Start: 2025-01-10 | End: 2025-01-10 | Stop reason: ALTCHOICE

## 2025-01-10 RX ORDER — FENTANYL CITRATE 50 UG/ML
INJECTION, SOLUTION INTRAMUSCULAR; INTRAVENOUS
Status: DISCONTINUED | OUTPATIENT
Start: 2025-01-10 | End: 2025-01-10 | Stop reason: SDUPTHER

## 2025-01-10 RX ORDER — SODIUM CHLORIDE 0.9 % (FLUSH) 0.9 %
5-40 SYRINGE (ML) INJECTION PRN
Status: DISCONTINUED | OUTPATIENT
Start: 2025-01-10 | End: 2025-01-14

## 2025-01-10 RX ORDER — ONDANSETRON 4 MG/1
4 TABLET, ORALLY DISINTEGRATING ORAL EVERY 8 HOURS PRN
Status: DISCONTINUED | OUTPATIENT
Start: 2025-01-10 | End: 2025-01-16 | Stop reason: HOSPADM

## 2025-01-10 RX ORDER — LIDOCAINE HYDROCHLORIDE 20 MG/ML
INJECTION, SOLUTION EPIDURAL; INFILTRATION; INTRACAUDAL; PERINEURAL
Status: DISCONTINUED | OUTPATIENT
Start: 2025-01-10 | End: 2025-01-10 | Stop reason: SDUPTHER

## 2025-01-10 RX ORDER — HEPARIN SODIUM 10000 [USP'U]/100ML
5-30 INJECTION, SOLUTION INTRAVENOUS CONTINUOUS
Status: DISPENSED | OUTPATIENT
Start: 2025-01-10 | End: 2025-01-14

## 2025-01-10 RX ORDER — CASTOR OIL AND BALSAM, PERU 788; 87 MG/G; MG/G
OINTMENT TOPICAL 2 TIMES DAILY
Status: DISCONTINUED | OUTPATIENT
Start: 2025-01-10 | End: 2025-01-16 | Stop reason: HOSPADM

## 2025-01-10 RX ORDER — ATORVASTATIN CALCIUM 80 MG/1
80 TABLET, FILM COATED ORAL NIGHTLY
Status: DISCONTINUED | OUTPATIENT
Start: 2025-01-10 | End: 2025-01-16 | Stop reason: HOSPADM

## 2025-01-10 RX ORDER — SODIUM CHLORIDE 9 MG/ML
INJECTION, SOLUTION INTRAVENOUS PRN
Status: DISCONTINUED | OUTPATIENT
Start: 2025-01-10 | End: 2025-01-16 | Stop reason: HOSPADM

## 2025-01-10 RX ORDER — PANTOPRAZOLE SODIUM 40 MG/10ML
40 INJECTION, POWDER, LYOPHILIZED, FOR SOLUTION INTRAVENOUS ONCE
Status: COMPLETED | OUTPATIENT
Start: 2025-01-10 | End: 2025-01-10

## 2025-01-10 RX ORDER — ASPIRIN 300 MG/1
300 SUPPOSITORY RECTAL DAILY
Status: DISCONTINUED | OUTPATIENT
Start: 2025-01-10 | End: 2025-01-10

## 2025-01-10 RX ORDER — ASPIRIN 81 MG/1
81 TABLET, CHEWABLE ORAL DAILY
Status: DISCONTINUED | OUTPATIENT
Start: 2025-01-10 | End: 2025-01-10

## 2025-01-10 RX ORDER — HEPARIN SODIUM 10000 [USP'U]/100ML
5-30 INJECTION, SOLUTION INTRAVENOUS CONTINUOUS
Status: DISCONTINUED | OUTPATIENT
Start: 2025-01-10 | End: 2025-01-10

## 2025-01-10 RX ORDER — ASPIRIN 300 MG/1
300 SUPPOSITORY RECTAL DAILY
Status: CANCELLED | OUTPATIENT
Start: 2025-01-11

## 2025-01-10 RX ORDER — LIDOCAINE 4 G/G
1 PATCH TOPICAL EVERY 24 HOURS
Status: DISCONTINUED | OUTPATIENT
Start: 2025-01-10 | End: 2025-01-16 | Stop reason: HOSPADM

## 2025-01-10 RX ORDER — ONDANSETRON 4 MG/1
4 TABLET, ORALLY DISINTEGRATING ORAL EVERY 8 HOURS PRN
Status: CANCELLED | OUTPATIENT
Start: 2025-01-10

## 2025-01-10 RX ORDER — ACETAMINOPHEN 325 MG/1
650 TABLET ORAL EVERY 6 HOURS PRN
Status: DISCONTINUED | OUTPATIENT
Start: 2025-01-10 | End: 2025-01-16 | Stop reason: HOSPADM

## 2025-01-10 RX ORDER — OXYCODONE HYDROCHLORIDE 5 MG/1
5 TABLET ORAL EVERY 6 HOURS PRN
Status: DISCONTINUED | OUTPATIENT
Start: 2025-01-10 | End: 2025-01-16 | Stop reason: HOSPADM

## 2025-01-10 RX ORDER — ONDANSETRON 2 MG/ML
4 INJECTION INTRAMUSCULAR; INTRAVENOUS
Status: ACTIVE | OUTPATIENT
Start: 2025-01-10 | End: 2025-01-11

## 2025-01-10 RX ORDER — SODIUM CHLORIDE 9 MG/ML
INJECTION, SOLUTION INTRAVENOUS PRN
Status: DISCONTINUED | OUTPATIENT
Start: 2025-01-10 | End: 2025-01-14

## 2025-01-10 RX ORDER — SODIUM CHLORIDE 0.9 % (FLUSH) 0.9 %
5-40 SYRINGE (ML) INJECTION EVERY 12 HOURS SCHEDULED
Status: CANCELLED | OUTPATIENT
Start: 2025-01-10

## 2025-01-10 RX ORDER — SODIUM CHLORIDE 0.9 % (FLUSH) 0.9 %
5-40 SYRINGE (ML) INJECTION EVERY 12 HOURS SCHEDULED
Status: DISCONTINUED | OUTPATIENT
Start: 2025-01-10 | End: 2025-01-16 | Stop reason: HOSPADM

## 2025-01-10 RX ORDER — PANTOPRAZOLE SODIUM 40 MG/10ML
40 INJECTION, POWDER, LYOPHILIZED, FOR SOLUTION INTRAVENOUS DAILY
Status: DISCONTINUED | OUTPATIENT
Start: 2025-01-10 | End: 2025-01-16 | Stop reason: HOSPADM

## 2025-01-10 RX ORDER — SODIUM CHLORIDE 0.9 % (FLUSH) 0.9 %
5-40 SYRINGE (ML) INJECTION EVERY 12 HOURS SCHEDULED
Status: DISCONTINUED | OUTPATIENT
Start: 2025-01-10 | End: 2025-01-14

## 2025-01-10 RX ORDER — ACETAMINOPHEN 325 MG/1
650 TABLET ORAL
Status: DISPENSED | OUTPATIENT
Start: 2025-01-10 | End: 2025-01-11

## 2025-01-10 RX ORDER — FENTANYL CITRATE 50 UG/ML
25 INJECTION, SOLUTION INTRAMUSCULAR; INTRAVENOUS EVERY 5 MIN PRN
Status: DISCONTINUED | OUTPATIENT
Start: 2025-01-10 | End: 2025-01-10 | Stop reason: ALTCHOICE

## 2025-01-10 RX ORDER — ONDANSETRON 2 MG/ML
4 INJECTION INTRAMUSCULAR; INTRAVENOUS EVERY 6 HOURS PRN
Status: DISCONTINUED | OUTPATIENT
Start: 2025-01-10 | End: 2025-01-16 | Stop reason: HOSPADM

## 2025-01-10 RX ORDER — PROPOFOL 10 MG/ML
INJECTION, EMULSION INTRAVENOUS
Status: DISCONTINUED | OUTPATIENT
Start: 2025-01-10 | End: 2025-01-10 | Stop reason: SDUPTHER

## 2025-01-10 RX ORDER — LABETALOL HYDROCHLORIDE 5 MG/ML
10 INJECTION, SOLUTION INTRAVENOUS EVERY 4 HOURS PRN
Status: DISCONTINUED | OUTPATIENT
Start: 2025-01-10 | End: 2025-01-16 | Stop reason: HOSPADM

## 2025-01-10 RX ORDER — POLYETHYLENE GLYCOL 3350 17 G/17G
17 POWDER, FOR SOLUTION ORAL DAILY PRN
Status: CANCELLED | OUTPATIENT
Start: 2025-01-10

## 2025-01-10 RX ORDER — IOPAMIDOL 510 MG/ML
INJECTION, SOLUTION INTRAVASCULAR PRN
Status: DISCONTINUED | OUTPATIENT
Start: 2025-01-10 | End: 2025-01-10 | Stop reason: HOSPADM

## 2025-01-10 RX ORDER — SODIUM CHLORIDE 9 MG/ML
INJECTION, SOLUTION INTRAVENOUS
Status: DISCONTINUED | OUTPATIENT
Start: 2025-01-10 | End: 2025-01-10 | Stop reason: SDUPTHER

## 2025-01-10 RX ORDER — ONDANSETRON 2 MG/ML
INJECTION INTRAMUSCULAR; INTRAVENOUS
Status: DISCONTINUED | OUTPATIENT
Start: 2025-01-10 | End: 2025-01-10

## 2025-01-10 RX ORDER — LEVOTHYROXINE SODIUM 50 UG/1
50 TABLET ORAL EVERY MORNING
Status: DISCONTINUED | OUTPATIENT
Start: 2025-01-11 | End: 2025-01-16 | Stop reason: HOSPADM

## 2025-01-10 RX ORDER — NALOXONE HYDROCHLORIDE 0.4 MG/ML
INJECTION, SOLUTION INTRAMUSCULAR; INTRAVENOUS; SUBCUTANEOUS PRN
Status: DISCONTINUED | OUTPATIENT
Start: 2025-01-10 | End: 2025-01-10 | Stop reason: ALTCHOICE

## 2025-01-10 RX ORDER — ROCURONIUM BROMIDE 10 MG/ML
INJECTION, SOLUTION INTRAVENOUS
Status: DISCONTINUED | OUTPATIENT
Start: 2025-01-10 | End: 2025-01-10 | Stop reason: SDUPTHER

## 2025-01-10 RX ORDER — SODIUM CHLORIDE 0.9 % (FLUSH) 0.9 %
5-40 SYRINGE (ML) INJECTION PRN
Status: DISCONTINUED | OUTPATIENT
Start: 2025-01-10 | End: 2025-01-16 | Stop reason: HOSPADM

## 2025-01-10 RX ORDER — POLYETHYLENE GLYCOL 3350 17 G/17G
17 POWDER, FOR SOLUTION ORAL DAILY PRN
Status: DISCONTINUED | OUTPATIENT
Start: 2025-01-10 | End: 2025-01-16 | Stop reason: HOSPADM

## 2025-01-10 RX ADMIN — SODIUM CHLORIDE, PRESERVATIVE FREE 10 ML: 5 INJECTION INTRAVENOUS at 08:12

## 2025-01-10 RX ADMIN — OXYCODONE 5 MG: 5 TABLET ORAL at 20:34

## 2025-01-10 RX ADMIN — HEPARIN SODIUM 12 UNITS/KG/HR: 10000 INJECTION, SOLUTION INTRAVENOUS at 08:58

## 2025-01-10 RX ADMIN — PHENYLEPHRINE HYDROCHLORIDE 40 MCG/MIN: 10 INJECTION, SOLUTION INTRAVENOUS at 05:01

## 2025-01-10 RX ADMIN — ROCURONIUM BROMIDE 50 MG: 10 INJECTION, SOLUTION INTRAVENOUS at 05:05

## 2025-01-10 RX ADMIN — PROPOFOL 150 MG: 10 INJECTION, EMULSION INTRAVENOUS at 04:35

## 2025-01-10 RX ADMIN — LIDOCAINE HYDROCHLORIDE 80 MG: 20 INJECTION, SOLUTION EPIDURAL; INFILTRATION; INTRACAUDAL; PERINEURAL at 04:35

## 2025-01-10 RX ADMIN — PHENYLEPHRINE HYDROCHLORIDE 100 MCG: 10 INJECTION, SOLUTION INTRAVENOUS at 04:56

## 2025-01-10 RX ADMIN — Medication: at 20:26

## 2025-01-10 RX ADMIN — ACETAMINOPHEN 650 MG: 325 TABLET ORAL at 17:35

## 2025-01-10 RX ADMIN — ONDANSETRON 4 MG: 2 INJECTION INTRAMUSCULAR; INTRAVENOUS at 05:40

## 2025-01-10 RX ADMIN — IOPAMIDOL 75 ML: 755 INJECTION, SOLUTION INTRAVENOUS at 02:41

## 2025-01-10 RX ADMIN — SUGAMMADEX 200 MG: 100 INJECTION, SOLUTION INTRAVENOUS at 05:40

## 2025-01-10 RX ADMIN — SODIUM CHLORIDE: 9 INJECTION, SOLUTION INTRAVENOUS at 04:33

## 2025-01-10 RX ADMIN — ATORVASTATIN CALCIUM 80 MG: 80 TABLET, FILM COATED ORAL at 20:26

## 2025-01-10 RX ADMIN — ROCURONIUM BROMIDE 50 MG: 10 INJECTION, SOLUTION INTRAVENOUS at 04:35

## 2025-01-10 RX ADMIN — FENTANYL CITRATE 50 MCG: 50 INJECTION, SOLUTION INTRAMUSCULAR; INTRAVENOUS at 04:35

## 2025-01-10 RX ADMIN — PANTOPRAZOLE SODIUM 40 MG: 40 INJECTION, POWDER, FOR SOLUTION INTRAVENOUS at 08:05

## 2025-01-10 ASSESSMENT — LIFESTYLE VARIABLES
HOW OFTEN DO YOU HAVE A DRINK CONTAINING ALCOHOL: 2-3 TIMES A WEEK
HOW MANY STANDARD DRINKS CONTAINING ALCOHOL DO YOU HAVE ON A TYPICAL DAY: 1 OR 2

## 2025-01-10 ASSESSMENT — PAIN DESCRIPTION - ONSET
ONSET: ON-GOING
ONSET: ON-GOING

## 2025-01-10 ASSESSMENT — PAIN SCALES - GENERAL
PAINLEVEL_OUTOF10: 0
PAINLEVEL_OUTOF10: 7
PAINLEVEL_OUTOF10: 0
PAINLEVEL_OUTOF10: 4
PAINLEVEL_OUTOF10: 7
PAINLEVEL_OUTOF10: 4
PAINLEVEL_OUTOF10: 7
PAINLEVEL_OUTOF10: 4
PAINLEVEL_OUTOF10: 5
PAINLEVEL_OUTOF10: 7
PAINLEVEL_OUTOF10: 7

## 2025-01-10 ASSESSMENT — PAIN DESCRIPTION - DESCRIPTORS
DESCRIPTORS: SHARP

## 2025-01-10 ASSESSMENT — PAIN DESCRIPTION - ORIENTATION
ORIENTATION: LOWER;MID
ORIENTATION: MID;LOWER
ORIENTATION: MID;LOWER

## 2025-01-10 ASSESSMENT — PAIN - FUNCTIONAL ASSESSMENT
PAIN_FUNCTIONAL_ASSESSMENT: 0-10
PAIN_FUNCTIONAL_ASSESSMENT: PREVENTS OR INTERFERES SOME ACTIVE ACTIVITIES AND ADLS
PAIN_FUNCTIONAL_ASSESSMENT: ACTIVITIES ARE NOT PREVENTED
PAIN_FUNCTIONAL_ASSESSMENT: PREVENTS OR INTERFERES SOME ACTIVE ACTIVITIES AND ADLS

## 2025-01-10 ASSESSMENT — PAIN DESCRIPTION - LOCATION
LOCATION: BACK

## 2025-01-10 ASSESSMENT — PAIN DESCRIPTION - PAIN TYPE
TYPE: CHRONIC PAIN
TYPE: CHRONIC PAIN

## 2025-01-10 ASSESSMENT — PAIN DESCRIPTION - FREQUENCY
FREQUENCY: INTERMITTENT
FREQUENCY: INTERMITTENT

## 2025-01-10 NOTE — DISCHARGE INSTRUCTIONS
Please take warfarin 2.5 mg tonight along with 1 Lovenox shot in your belly  You will follow-up with [facility] as mentioned below for further adjustment and recommendations on your warfarin/Lovenox.  [facility] [id number]  at [phone] -- f/u on 1/17 at 1 pm             [facility] Stroke Program Survey  The [facility] values your feedback related to your recent hospital visit and admission. We strive to improve our Neuroscience program to promote better outcomes and recoveries for all our patients.  The anonymous survey below consists of a few questions that are related to your stay and around your Stroke diagnosis, treatment, and recovery. It is anonymous and has only a few questions.  The estimated length of time needed to complete this survey is 3 minutes or less. Thank you for completing this survey!

## 2025-01-10 NOTE — PROCEDURES
femoral artery to insure the proper size and patency prior to access. 1% lidocaine was administered subcutaneously for local anesthetic. Then with live ultrasound guidance arterial puncture of the right common femoral artery was accessed percutaneously using a single wall puncture technique. An 8-[place] arterial introducer sheath was placed at the puncture site prior to arterial catheterization and was connected to a pressurized heparin saline flush line.     The diagnostic catheter was advanced over an 0.035\" Glidewire into the aortic arch under the fluoroscopic guidance and used to selectively catheterize the vessels listed above. In each case, the vessel origin was visualized fluoroscopically by injection of contrast prior to selective catheterization. Biplane diagnostic cerebral angiograms were acquired at each of these catheterizations.  After reviewing the images, I determined that thrombectomy was not indicated and the catheter was removed. The groin sheath was exchanged over an 8-[place] Angioseal closure device and the arteriotomy was closed without difficulty with excellent hemostasis. The patient tolerated the procedure well and there were no complications.     FINDINGS:   LEFT CAROTID, INTRACRANIAL:  Adequate contrast enhancement of the carotid artery is seen. The ophthalmic artery is adequately patent. The carotid terminus is normal with adequate caliber on both A1 and M1 segments. The capillary and venous phases are within normal limits. There are no signs of fistulas, dissections or de liz aneurysms.     RIGHT CAROTID, INTRACRANIAL: The right internal carotid artery has irregularity along its cervical extent including an ulcerated plaque at the right ICA origin but no significant stenosis. It has a normal caliber over its entire course. The occlusion of the proximal M2 inferior division seen on CTA is not seen on these images. The inferior division M2 branch is patent as is the superior division. A  smaller M3 branch of the inferior division has a non-occlusive thrombus within it which does limit flow just to that branch. Overall the right MCA territory has a TICI grade of 2b. The right LOAN fills normally. Dominant right posterior communicating artery.    LEFT VERTEBRAL, INTRACRANIAL: Normal contrast enhancement of the vertebral artery is noted. It demonstrates a normal contour and caliber. The left posterior inferior cerebellar artery is adequately seen. The basilar artery appears normal course and caliber. There is normal filling of bilateral superior cerebellar arteries and bilateral posterior cerebral arteries.     RIGHT VERTEBRAL, INTRACRANIAL:  The right vertebral artery has a normal caliber as does the basilar artery. The right posterior inferior cerebellar artery appears normal. There is filling of bilateral anterior inferior cerebellar arteries, bilateral superior cerebellar arteries and bilateral posterior cerebellar arteries. There is no capillary filling defect or delay.     RIGHT COMMON FEMORAL ARTERY: The common, iliac, external iliac and common femoral arteries are normal in course and caliber. The common femoral artery bifurcation appears normal. The groin sheath was inserted within the common femoral artery above the bifurcation without evidence of any injury or thrombosis.    IMPRESSION:  Thrombus likely propagated distally to a right M3 branch where it is non-occlusive but does restrict flow. The overall MCA territory is a TICI 2b grade. As such thrombectomy was not indicated.     RECOMMENDATIONS:  I would recommend a neuro protocol heparin infusion for the non-occlusive thrombus in the right MCA M3 branch to help that open up. The patient did not get any EVT attempts and did not get TNK and most of the right MCA territory is open so any stroke burden would be expected to be small, so heparin infusion is likely very low risk.     Jorge Pickett MD  Neurovascular and Stroke  [facility]

## 2025-01-10 NOTE — PROGRESS NOTES
Occupational/ Physical Therapy  Hold   Orders reviewed and reviewed. Cerebral angiogram this AM. Will hold therapy evals at this time. Follow up later date and time.     Stefani Youssef, MOT, OTR/L, CNS  Bonnie Bush, PT

## 2025-01-10 NOTE — PROGRESS NOTES
Speech Language Pathology  Facility/Department:[facility] ICU  Bedside Swallow/Communication Evaluation                                                       Name: Gary Martinez  : 1960  MRN: [id number]    Patient Diagnosis(es):   Patient Active Problem List    Diagnosis Date Noted    S/P colectomy 2022    Sigmoid diverticulitis 2022    Colovesical fistula 2022    Ischemic stroke (HCC) 01/10/2025    Large vessel stroke (HCC) 01/10/2025    Peripheral vascular disease (HCC) 2024    Popliteal artery aneurysm (HCC) 2024    Popliteal aneurysm (HCC) 2024    Acute deep vein thrombosis (DVT) of femoral vein of left lower extremity (HCC) 2024    Aneurysm of left popliteal artery (HCC) 2024    Pulmonary embolism, bilateral (HCC) 2024    Cervical stenosis of spine 2024    Rotator cuff tendonitis, right 2024    Rotator cuff tendonitis, left 2024    Anterolisthesis of cervical spine 2024    Cervical spondylosis 2024    Acute on chronic cholecystitis 08/10/2021    Diverticulitis of intestine with abscess without bleeding     Moderate malnutrition (HCC) 06/10/2021    Diverticulitis 2021    Abdominal cramping 2015    Mucous in stools 2015    Elevated WBC count 2014    H/O tongue cancer 2014    Rash and nonspecific skin eruption 2014    Urinary incontinence 2014       Past Medical History:   Diagnosis Date    Anxiety     Cancer (HCC)     head/neck/tongue    Chronic back pain     Diabetes mellitus (HCC)     Erectile dysfunction     Headache(784.0)     Osteoarthritis     PONV (postoperative nausea and vomiting)     Restless legs syndrome     Urinary incontinence     Wears dentures      Past Surgical History:   Procedure Laterality Date    CHOLECYSTECTOMY, LAPAROSCOPIC N/A 08/10/2021    LAPAROSCOPIC CHOLECYSTECTOMY WITH CHOLANGIOGRAM performed by Ivan Jenkins MD at [facility] OR

## 2025-01-10 NOTE — CONSULTS
ICU CONSULT       PCP:  Tony Love MD          Admit Date:  1/10/2025                            Hospital Day:  ICU Day:       CC: Slurred speech, left facial droop, left sided weakness.  History obtained from:  chart review and the patient    SUBJECTIVE   HPI:    Mr. Gary Martinez is a 64 y.o. male with a medical hx significant for DVT (July 2024) on eliquis, left popliteal a. Aneurysm repair on 12/31, on baby ASA, hypothyroidism, diabetes otherwise as listed in the MHx table below, who transferred from [facility] ED after he found out to have M2 occlusion. Yesterday around between midnight and 2AM patient was at the station and started having sudden onset alteration of speech, his words kind of not making sense, and started having some left-sided weakness.  Patient called his neighbor to come and help him, they called EMS and they brought the patient to the [facility] ED.  On presentation at [facility] patient was having left facial weakness, having left upper and lower extremity weakness his NIH score at presentation was 9, initial CT head without contrast with no acute intracranial hemorrhage, however did show old infarct in posterior left frontal lobe.  CTA showed occlusion of posterior right M2 occlusion.  Patient underwent thrombectomy by Dr. Pickett, and his blood clot propagated to more distally in right M3 branch where it is nonocclusive, however restricting the flow, patient was started on heparin drip and transferred here at [facility] for close neurological monitoring.    This morning when I saw this patient, he was having left facial droop, left upper and lower extremity weakness and sensory deficits, patient's left sided hearing diminished compared to right.  Complaining of lower extremity and back pain.  After 2 hours patient's left-sided weakness and sensory deficits improved significantly, however still having some residual deficits on the left.  Labs with sodium of 132, however

## 2025-01-10 NOTE — PLAN OF CARE
Problem: SLP Adult - Impaired Swallowing  Goal: By Discharge: Advance to least restrictive diet without signs or symptoms of aspiration for planned discharge setting.  See evaluation for individualized goals.  1/10/2025 1129 by Darshana Ronquillo, SLP  Outcome: Progressing

## 2025-01-10 NOTE — NURSE NAVIGATOR
Patient's chart reviewed for Stroke Core Measures and additional needs:    [x]   VTE prophylaxis - Heparin gtt infusing, see eMAR    [x]   Antithrombotic (if applicable) - Heparin gtt infusing, see eMAR    [x]   Swallow screen prior to PO intake - Completed    [x]   Lipids / A1C ordered or resulted - Ordered; high intensity statin ordered, see eMAR    [x]   Therapy ordered - Patient seen by SLP; PT/OT ordered    [x]   Care plan and Education template - In progress     Verified educational Stroke booklet in room for patient and/or family to review. Patient's personal risk factors specific to stroke/TIA include: h/o DVT/PE (on Eliquis), DM, smoking     Navigator to continue to follow patient while admitted, to assist with follow up and discharge planning as needed.     Nurse eSignature: Electronically signed by Heidy Olivares RN on 1/10/25 at 4:23 PM EST - Neuroscience Navigator

## 2025-01-10 NOTE — H&P
ICU H&P      Hospital Day: 1  ICU Day:  1                                                        Code:Prior  Admit Date: 1/10/2025  PCP: Tony Love MD                                  CC: Stroke like symptoms    HISTORY OF PRESENT ILLNESS:   Gary Martinez 64 year old male with past medical history significant for DVT/PE on eliquis who presents with stroke like symptoms. The patient was at a gas station sometime between midnight and 2 am on the morning of 1/10/25 when he developed difficulty with speech and coordination and called EMS who brought the patient to [facility] ED. There the patient was seen after arrival at ~0230 with slurred speech, left facial weakness, and left upper and lower extremity weakness. NIHSS at that time was 9. CT revealed a right M2 occlusion. The patient was not a candidate for thrombolysis as he is on eliquis.    The decision was made to transfer the patient to [facility] for thrombectomy.     PAST HISTORY:     Past Medical History:   Diagnosis Date    Anxiety     Cancer (HCC)     head/neck/tongue    Chronic back pain     Diabetes mellitus (HCC)     Erectile dysfunction     Headache(784.0)     Osteoarthritis     PONV (postoperative nausea and vomiting)     Restless legs syndrome     Urinary incontinence     Wears dentures        Past Surgical History:   Procedure Laterality Date    CHOLECYSTECTOMY, LAPAROSCOPIC N/A 08/10/2021    LAPAROSCOPIC CHOLECYSTECTOMY WITH CHOLANGIOGRAM performed by Ivan Jenkins MD at [facility] OR    COLECTOMY N/A 11/08/2022    ROBOTIC ASSISTED LAPAROSCOPIC SIGMOID COLECTOMY performed by Ivan Jenkins MD at [facility] OR    CYSTOSCOPY N/A 11/08/2022    CYSTOSCOPY, LEFT URETERAL CATHETER INSERTION performed by Ivan Jenkins MD at [facility] OR    FEMORAL-TIBIAL BYPASS GRAFT Left 12/31/2024    LEFT POPLITEAL ARTERY REPAIR WITH RIGHT GREATER SAPHENOUS VEIN HARVEST performed by Pacheco Traylor MD at [facility] OR    PORTACATH PLACEMENT

## 2025-01-10 NOTE — ANESTHESIA PRE PROCEDURE
Department of Anesthesiology  Preprocedure Note       Name:  Gary Martinez   Age:  64 y.o.  :  1960                                          MRN:  [id number]         Date:  1/10/2025      Surgeon: Surgeon(s):  Emir Price MD    Procedure: Procedure(s):  ANGIOGRAM CEREBRAL (10583)    Medications prior to admission:   Prior to Admission medications    Medication Sig Start Date End Date Taking? Authorizing Provider   oxyCODONE (ROXICODONE) 5 MG immediate release tablet Take 1 tablet by mouth every 6 hours as needed for Pain for up to 14 days. Max Daily Amount: 20 mg 25  Pacheco Traylor MD   aspirin 81 MG chewable tablet Take 1 tablet by mouth daily 25   Pacheco Traylor MD   dupilumab (DUPIXENT) 300 MG/2ML SOSY injection INJECT 300MG (1 PEN) UNDER THE SKIN EVERY 14 DAYS 25   Heidy Anne MD   apixaban (ELIQUIS) 5 MG TABS tablet Take 1 tablet by mouth 2 times daily 8/10/24   Beatrice Guillen MD   ondansetron (ZOFRAN-ODT) 4 MG disintegrating tablet Take 1 tablet by mouth 3 times daily as needed for Nausea or Vomiting 23   Lashay Orlando APRN - CNP   ALPRAZolam (XANAX) 0.5 MG tablet Take 1 tablet by mouth as needed for Sleep.    ProviderBong MD   lactobacillus (CULTURELLE) CAPS capsule TAKE 1 CAPSULE BY MOUTH DAILY 21   Ivan Jenkins MD   JANUVIA 100 MG tablet Take 1 tablet by mouth every morning 21   ProviderBong MD   albuterol sulfate HFA (PROVENTIL;VENTOLIN;PROAIR) 108 (90 Base) MCG/ACT inhaler Inhale 2 puffs into the lungs 4 times daily as needed for Wheezing or Shortness of Breath    Bong Wilson MD   levothyroxine (SYNTHROID) 50 MCG tablet Take 1 tablet by mouth every morning    Bong Wilson MD       Current medications:    No current facility-administered medications for this encounter.     Current Outpatient Medications   Medication Sig Dispense Refill   • oxyCODONE (ROXICODONE) 5 MG immediate  Rec'd a call from Elo with [facility], she states she cannot find an active coverage for pt's insurance, she tried to call the patient and had to leave a VM. She called our office wondering if imaging could be rescheduled, I did inform her that the patient could not be rescheduled any further out than 7-10 days from today per Provider. I also tried to call and reach the patient, left a detailed message (okay per VR)    Patient should call Elo, direct line is [phone]   Statement Selected

## 2025-01-10 NOTE — CONSULTS
Chart reviewed, events noted, and I have personally performed a face-to-face diagnostic evaluation on this patient. I have personally reviewed CNP's note and confirmed the documented past medical history, family history, social history, allergies, home medications, review of systems, vital signs, laboratory values, and hospital medications via my own chart review, in person with the patient, and/or in person with his family members as appropriate.  My findings are as follows:    Assessment  63yo man with DVT/PE on apixaban x1mo presented to ER with abrupt onset of dysarthria and left-sided weakness found to have right M2 occlusion on CTA but resolved on angiogram but with small M3 branch nonocclusive thrombus, on heparin gtt  Mechanism of embolus is in question  He has been on apixaban for one month for his DVT/PE and reports being very compliant  He is unsure the etiology of the DVT/PE  With him being on anticoagulation, this should control any other potential underlying causes such as afib, hypercoagulability, PFO, etc  Seems less likely that just because he has only been on apixaban for one month that this would not be sufficient  Will continue with workup but may need to consider an alternate anticoagulant other than apixaban    Recommendations  Continue heparin gtt  No ASA for now since on heparin gtt  High intensity statin and LDL < 70  Echo  A1c < 7.0  SBP < 130 and DBP < 90  PT/OT/Speech    History of Present Illness:  Gary Martinez is a 64 y.o. man with with DVT/PE on apixaban; DM; hypothyroidism; smoking.  Presented to ER with abrupt onset of dysarthria, difficulty with word finding, bilateral hand/numbness, and left sided weakness. He was found to have a right M2 occlusion on CTA and old left frontal stroke and right lacunar stroke on head CT. He was unable to get TNK due to being on apixaban. He was transferred here for EVT but angiogram revealed no M2 occlusion but a smaller M3 branch of the

## 2025-01-10 NOTE — ANESTHESIA POSTPROCEDURE EVALUATION
Department of Anesthesiology  Postprocedure Note    Patient: Gary Martinez  MRN: [id number]  YOB: 1960  Date of evaluation: 1/10/2025    Procedure Summary       Date: 01/10/25 Room / Location: [facility] HYBRID OR / [facility] CARDIAC CATH LAB    Anesthesia Start: 0430 Anesthesia Stop: 0603    Procedure: ANGIOGRAM CEREBRAL (72124) Diagnosis:       Stroke (HCC)      (Stroke (HCC) [I63.9])    Providers: Emir Price MD Responsible Provider: Jarrett Tierney MD    Anesthesia Type: general ASA Status: 3            Anesthesia Type: No value filed.    Zac Phase I: Zac Score: 10    Zac Phase II:      Anesthesia Post Evaluation    Patient location during evaluation: ICU  Patient participation: complete - patient participated  Level of consciousness: awake  Pain score: 0  Nausea & Vomiting: no nausea and no vomiting  Cardiovascular status: blood pressure returned to baseline  Respiratory status: acceptable  Hydration status: euvolemic  Pain management: adequate    No notable events documented.

## 2025-01-10 NOTE — PROGRESS NOTES
Originally from [facility]. Transferred to [facility].    Patient arrived to ICU from Cath Lab.   R groin incision site assessed. Old drainage noted, no hematoma.   Patient is tired, eyes closed. Answers questions.Oriented to person, place, time, and situation. Follows commands.     CHG bath given.    R AC PIV in place. Saline locked.     Bed rest. Flat.  Belongings placed inside closet.  Call light within reach.   Standard Safety Precautions in place.

## 2025-01-10 NOTE — ED NOTES
Per Cher, [facility] transfer center states air care is not a possibility, MICU will be in here in roughly 25 minutes to transfer pt

## 2025-01-10 NOTE — PROGRESS NOTES
4 Eyes Skin Assessment     NAME:  Gary Martinez  YOB: 1960  MEDICAL RECORD NUMBER:  [id number]    The patient is being assessed for  Admission cath lab post op     I agree that at least one RN has performed a thorough Head to Toe Skin Assessment on the patient. ALL assessment sites listed below have been assessed.      Areas assessed by both nurses:    Head, Face, Ears, Shoulders, Back, Chest, Arms, Elbows, Hands, Sacrum. Buttock, Coccyx, Ischium, Legs. Feet and Heels, and Under Medical Devices         Does the Patient have a Wound? Yes wound(s) were present on assessment. LDA wound assessment was Initiated and completed by RN       R fem puncture site with closure device (angioseal) dry intact, old drainage  Healing R fem puncture site from previous surgery.   Healing L popliteal incision from previous surgery  Bilateral heels ~ blanchable redness  Bilateral elbows ~ blanchable redness  Between knees ~ blanchable redness   Upper back ~ abrasions      Mepilex placed on bilateral elbows, bilateral heels, between knees, and sacrum       Nick Prevention initiated by RN: Yes  Wound Care Orders initiated by RN: Yes venelex ordered     Pressure Injury (Stage 3,4, Unstageable, DTI, NWPT, and Complex wounds) if present, place Wound referral order by RN under : No    New Ostomies, if present place, Ostomy referral order under : No     Nurse 1 eSignature: Electronically signed by Zuleika Cruz RN on 1/10/25 at 5:18 PM EST    **SHARE this note so that the co-signing nurse can place an eSignature**    Nurse 2 eSignature: Electronically signed by Palak Costa RN on 1/10/25 at 8:07 PM EST

## 2025-01-10 NOTE — ED NOTES
Pt arrived via [facility] from [employer] for c/o slurred speech and left-sided facial droop onset exactly unknown - pt is guessing sometime between 0000 and 0200. EDMD at bedside during triage. Pt taken to CT at 0235. Pt awake, alert, and oriented x3. Pt presents with left-sided facial droop, left arm and leg weakness, and slurred speech. Skin warm, dry, and color appropriate for ethnicity. Respirations easy and unlabored. Upon arrival back to the room pt placed on cardiac monitor. Stroke team ipad set up and stroke MD speaking with pt at 0258. Stroke MD states at this time that pt should not get TNK d/t him currently taking 10 mg eliquis daily for about 2 months. He states he will talk with a colleague so the pt can potentially get a thrombectomy

## 2025-01-10 NOTE — ED PROVIDER NOTES
[facility]    Housing/Utilities     Worried about losing home: Not on file     Stayed outside house: Not on file     Unable to get utilities: Not on file     No current facility-administered medications for this encounter.     Current Outpatient Medications   Medication Sig Dispense Refill    oxyCODONE (ROXICODONE) 5 MG immediate release tablet Take 1 tablet by mouth every 6 hours as needed for Pain for up to 14 days. Max Daily Amount: 20 mg 20 tablet 0    aspirin 81 MG chewable tablet Take 1 tablet by mouth daily 30 tablet 3    dupilumab (DUPIXENT) 300 MG/2ML SOSY injection INJECT 300MG (1 PEN) UNDER THE SKIN EVERY 14 DAYS 4 mL 6    apixaban (ELIQUIS) 5 MG TABS tablet Take 1 tablet by mouth 2 times daily 60 tablet 0    ondansetron (ZOFRAN-ODT) 4 MG disintegrating tablet Take 1 tablet by mouth 3 times daily as needed for Nausea or Vomiting 21 tablet 0    ALPRAZolam (XANAX) 0.5 MG tablet Take 1 tablet by mouth as needed for Sleep.      lactobacillus (CULTURELLE) CAPS capsule TAKE 1 CAPSULE BY MOUTH DAILY 30 capsule 0    JANUVIA 100 MG tablet Take 1 tablet by mouth every morning      albuterol sulfate HFA (PROVENTIL;VENTOLIN;PROAIR) 108 (90 Base) MCG/ACT inhaler Inhale 2 puffs into the lungs 4 times daily as needed for Wheezing or Shortness of Breath      levothyroxine (SYNTHROID) 50 MCG tablet Take 1 tablet by mouth every morning       Allergies   Allergen Reactions    Ativan [Lorazepam] Other (See Comments)     Post op overdose- caused heart to stop, CPR administered    Zoloft [Sertraline Hcl]      diarrhea and stomach distress per PT    Doxycycline Rash       REVIEW OF SYSTEMS  10 systems reviewed, pertinent positives and negatives per HPI, otherwise noted to be negative.    PHYSICAL EXAM  BP (!) 161/98   Pulse 86   Temp 98.4 °F (36.9 °C) (Oral)   Resp 25   Ht 1.829 m (6')   Wt 68.6 kg (151 lb 3.8 oz)   SpO2 93%   BMI 20.51 kg/m²    General appearance: Awake and alert.

## 2025-01-10 NOTE — CONSULTS
Called for recs regarding acute neurologic deficits.     LKW around midnight as pt was entering a convenience store and experienced onset of symptoms. He texted a friend about what was happening, didn't get a response, and through a series of phone calls between himself and other friends was finally able to make it to the hospital around 0230.    Reports a prior history of stroke many years ago without residual deficits. Had a surgery for a popliteal artery aneurysm about ten days ago and is taking apixaban.    On my eval over telemed NIHSS was 9:    1A: Level of consciousness --> 0 = Alert; keenly responsive  1B: Ask month and age --> 0 = Both questions right  1C: 'Blink eyes' & 'squeeze hands' --> 0 = Performs both tasks  2: Horizontal extraocular movements --> 1 = Partial gaze palsy: can be overcome  3: Visual fields --> 1 = Partial hemianopia  4: Facial palsy --> 2 = Partial paralysis (lower face)  5A: Left arm motor drift --> 1 = Drift, but doesn't hit bed  5B: Right arm motor drift --> 0 = No drift for 10 seconds  6A: Left leg motor drift --> 1 = Drift, but doesn't hit bed  6B: Right leg motor drift --> 0 = No drift for 5 seconds  7: Limb Ataxia --> 0 = No ataxia  8: Sensation --> 2 = Complete loss: cannot sense being touched at all  9: Language/aphasia --> 0 = Normal; no aphasia  10: Dysarthria --> 1 = Mild-moderate dysarthria: slurring but can be understood  11: Extinction/inattention --> 0 = No abnormality     Reviewed imaging: CT head old left frontal stroke, old right lacunar stroke, no acute ischemia or ICH. CTA right M2 thrombus.     Pt experiencing disabling symptoms, however his DOAC use is a contraindication to thrombolytic therapy.    Discussed right M2 thrombus with neurointerventional, would like for pt to be transferred to [facility] as quickly as possible for angiography and possible endovascular intervention.    Recs:  - Autoregulate SBP < 220/110  - NPO until speech/swallow eval  -

## 2025-01-10 NOTE — PROGRESS NOTES
Current NIHSS 8     Nursing Core Measures for Stroke:   [x]   Education template documentation (STROKE/TIA). Please select only risk factors that are applicable to patient when selecting risk factors.  [x]   Care Plan template documentation (Physiologic Instability - Neurosensory). Selecting this will add care plan rows to the flowsheet under the Neuro section of Head to Toe.  []   Verified Swallow Screen completed prior to PO intake of food, drink, medications>          Please verify correct medication route prior to administration for intubated patients, patients who can not swallow or have alternative routes of intake (NG, OG, VA), etc  [x]   VTE Prophylaxis: SCDs ordered/addressed; SCDs:  SCDs contraindicated, Hx DVTs, PE. Hep gtt to start.             (As a reminder, ASA, Plavix, and TPA/TNK are not VTE prophylaxis.)    Reviewed the Following Education with Patient and/or Family:   - Personalized risk factors for patient, along with changes, modifications that will help prevent stroke.  - Signs and Symptoms of Stroke: (Facial droop, weakness/numbness especially on one side, speech difficulty, sudden confusion, sudden loss of vision, sudden severe headache, sudden loss of balance or having difficulty walking, syncope, or seizure)  - How to activate EMS (911)   - Importance of Follow Up Appointments at Discharge   - Importance of Compliance with Medications Prescribed at Discharge  - Available community resources and stroke advocacy groups if needed    Patient and/or family member: verbalized understanding.     Stroke Education booklet given to patient/family (or verified, if given already), which reviews above information. yes         Electronically signed by Adrian Rai RN on 1/10/2025 at 7:58 AM

## 2025-01-10 NOTE — ED NOTES
Handoff report given to  MIC[facility] transport team. Pt transported to [facility] via stretcher at this time. Handoff report given to BRYANNA Campbell at [facility] room 4508 to assume care of pt. All questions answered.

## 2025-01-10 NOTE — CARE COORDINATION
Case Management Assessment  Initial Evaluation    Date/Time of Evaluation: 1/10/2025 12:22 PM  Assessment Completed by: Nasir Horner RN    If patient is discharged prior to next notation, then this note serves as note for discharge by case management.    Patient Name: Gary Martinez                   YOB: 1960  Diagnosis: Stroke (HCC) [I63.9]  Stroke (HCC) [I63.9]  Ischemic stroke (HCC) [I63.9]  Large vessel stroke (HCC) [I63.9]                   Date / Time: 1/10/2025  6:21 AM    Patient Admission Status: Inpatient   Readmission Risk (Low < 19, Mod (19-27), High > 27): Readmission Risk Score: 16.8    Current PCP: Tony Love MD  PCP verified by ? Yes    Chart Reviewed: Yes      History Provided by: Patient, Medical Record  Patient Orientation: Alert and Oriented    Patient Cognition: Alert    Hospitalization in the last 30 days (Readmission):  Yes    If yes, Readmission Assessment in  Navigator will be completed and shown below.  Readmission Assessment  Number of Days since last admission?: 8-30 days  Previous Disposition: Home Alone  Who is being Interviewed: Patient  What was the patient's/caregiver's perception as to why they think they needed to return back to the hospital?: Other (Comment) (new stroke)  Did you visit your Primary Care Physician after you left the hospital, before you returned this time?: Yes  Did you see a specialist, such as Cardiac, Pulmonary, Orthopedic Physician, etc. after you left the hospital?: No  Who advised the patient to return to the hospital?: Self-referral  Does the patient report anything that got in the way of taking their medications?: No  In our efforts to provide the best possible care to you and others like you, can you think of anything that we could have done to help you after you left the hospital the first time, so that you might not have needed to return so soon?: Other (Comment) (NA)       Advance Directives:      Code Status: Full Code

## 2025-01-11 ENCOUNTER — APPOINTMENT (OUTPATIENT)
Dept: MRI IMAGING | Age: 65
End: 2025-01-11
Payer: MEDICAID

## 2025-01-11 LAB
ANION GAP SERPL CALCULATED.3IONS-SCNC: 8 MMOL/L (ref 3–16)
APTT BLD: 34.8 SEC (ref 22.1–36.4)
APTT BLD: 42.1 SEC (ref 22.1–36.4)
APTT BLD: 97.4 SEC (ref 22.1–36.4)
BUN SERPL-MCNC: 5 MG/DL (ref 7–20)
CALCIUM SERPL-MCNC: 8.5 MG/DL (ref 8.3–10.6)
CHLORIDE SERPL-SCNC: 100 MMOL/L (ref 99–110)
CO2 SERPL-SCNC: 28 MMOL/L (ref 21–32)
CREAT SERPL-MCNC: 0.7 MG/DL (ref 0.8–1.3)
DEPRECATED RDW RBC AUTO: 13.5 % (ref 12.4–15.4)
GFR SERPLBLD CREATININE-BSD FMLA CKD-EPI: >90 ML/MIN/{1.73_M2}
GLUCOSE SERPL-MCNC: 161 MG/DL (ref 70–99)
HCT VFR BLD AUTO: 50.5 % (ref 40.5–52.5)
HEMOCCULT STL QL: NORMAL
HGB BLD-MCNC: 17.2 G/DL (ref 13.5–17.5)
MAGNESIUM SERPL-MCNC: 1.78 MG/DL (ref 1.8–2.4)
MCH RBC QN AUTO: 34.4 PG (ref 26–34)
MCHC RBC AUTO-ENTMCNC: 34.1 G/DL (ref 31–36)
MCV RBC AUTO: 100.9 FL (ref 80–100)
PLATELET # BLD AUTO: 249 K/UL (ref 135–450)
PMV BLD AUTO: 8.5 FL (ref 5–10.5)
POTASSIUM SERPL-SCNC: 3.4 MMOL/L (ref 3.5–5.1)
RBC # BLD AUTO: 5 M/UL (ref 4.2–5.9)
SODIUM SERPL-SCNC: 136 MMOL/L (ref 136–145)
WBC # BLD AUTO: 9.5 K/UL (ref 4–11)

## 2025-01-11 PROCEDURE — 97162 PT EVAL MOD COMPLEX 30 MIN: CPT

## 2025-01-11 PROCEDURE — 83735 ASSAY OF MAGNESIUM: CPT

## 2025-01-11 PROCEDURE — 6360000002 HC RX W HCPCS

## 2025-01-11 PROCEDURE — 97530 THERAPEUTIC ACTIVITIES: CPT

## 2025-01-11 PROCEDURE — 80048 BASIC METABOLIC PNL TOTAL CA: CPT

## 2025-01-11 PROCEDURE — 99233 SBSQ HOSP IP/OBS HIGH 50: CPT

## 2025-01-11 PROCEDURE — 82270 OCCULT BLOOD FECES: CPT

## 2025-01-11 PROCEDURE — 2500000003 HC RX 250 WO HCPCS

## 2025-01-11 PROCEDURE — 2500000003 HC RX 250 WO HCPCS: Performed by: ANESTHESIOLOGY

## 2025-01-11 PROCEDURE — 97166 OT EVAL MOD COMPLEX 45 MIN: CPT

## 2025-01-11 PROCEDURE — 6370000000 HC RX 637 (ALT 250 FOR IP)

## 2025-01-11 PROCEDURE — 2000000000 HC ICU R&B

## 2025-01-11 PROCEDURE — 99231 SBSQ HOSP IP/OBS SF/LOW 25: CPT | Performed by: INTERNAL MEDICINE

## 2025-01-11 PROCEDURE — 87328 CRYPTOSPORIDIUM AG IA: CPT

## 2025-01-11 PROCEDURE — 6360000002 HC RX W HCPCS: Performed by: STUDENT IN AN ORGANIZED HEALTH CARE EDUCATION/TRAINING PROGRAM

## 2025-01-11 PROCEDURE — 97116 GAIT TRAINING THERAPY: CPT

## 2025-01-11 PROCEDURE — 85027 COMPLETE CBC AUTOMATED: CPT

## 2025-01-11 PROCEDURE — 85730 THROMBOPLASTIN TIME PARTIAL: CPT

## 2025-01-11 PROCEDURE — 36415 COLL VENOUS BLD VENIPUNCTURE: CPT

## 2025-01-11 PROCEDURE — 97535 SELF CARE MNGMENT TRAINING: CPT

## 2025-01-11 PROCEDURE — 87506 IADNA-DNA/RNA PROBE TQ 6-11: CPT

## 2025-01-11 PROCEDURE — 70551 MRI BRAIN STEM W/O DYE: CPT

## 2025-01-11 RX ORDER — ALPRAZOLAM 0.5 MG
0.5 TABLET ORAL PRN
Status: DISCONTINUED | OUTPATIENT
Start: 2025-01-11 | End: 2025-01-12

## 2025-01-11 RX ORDER — MAGNESIUM SULFATE IN WATER 40 MG/ML
2000 INJECTION, SOLUTION INTRAVENOUS ONCE
Status: COMPLETED | OUTPATIENT
Start: 2025-01-11 | End: 2025-01-11

## 2025-01-11 RX ADMIN — OXYCODONE 5 MG: 5 TABLET ORAL at 18:09

## 2025-01-11 RX ADMIN — HEPARIN SODIUM 20 UNITS/KG/HR: 10000 INJECTION, SOLUTION INTRAVENOUS at 07:44

## 2025-01-11 RX ADMIN — ATORVASTATIN CALCIUM 80 MG: 80 TABLET, FILM COATED ORAL at 20:18

## 2025-01-11 RX ADMIN — OXYCODONE 5 MG: 5 TABLET ORAL at 08:22

## 2025-01-11 RX ADMIN — MAGNESIUM SULFATE HEPTAHYDRATE 2000 MG: 40 INJECTION, SOLUTION INTRAVENOUS at 05:50

## 2025-01-11 RX ADMIN — ALTEPLASE 2 MG: 2.2 INJECTION, POWDER, LYOPHILIZED, FOR SOLUTION INTRAVENOUS at 18:38

## 2025-01-11 RX ADMIN — ALPRAZOLAM 0.5 MG: 0.5 TABLET ORAL at 05:42

## 2025-01-11 RX ADMIN — OXYCODONE 5 MG: 5 TABLET ORAL at 02:21

## 2025-01-11 RX ADMIN — SODIUM CHLORIDE, PRESERVATIVE FREE 10 ML: 5 INJECTION INTRAVENOUS at 22:15

## 2025-01-11 RX ADMIN — ONDANSETRON 4 MG: 2 INJECTION INTRAMUSCULAR; INTRAVENOUS at 09:43

## 2025-01-11 RX ADMIN — PANTOPRAZOLE SODIUM 40 MG: 40 INJECTION, POWDER, FOR SOLUTION INTRAVENOUS at 08:13

## 2025-01-11 RX ADMIN — SODIUM CHLORIDE, PRESERVATIVE FREE 10 ML: 5 INJECTION INTRAVENOUS at 08:13

## 2025-01-11 RX ADMIN — Medication: at 22:14

## 2025-01-11 RX ADMIN — Medication: at 08:14

## 2025-01-11 RX ADMIN — SODIUM CHLORIDE, PRESERVATIVE FREE 10 ML: 5 INJECTION INTRAVENOUS at 08:14

## 2025-01-11 RX ADMIN — LEVOTHYROXINE SODIUM 50 MCG: 0.05 TABLET ORAL at 05:42

## 2025-01-11 RX ADMIN — ALPRAZOLAM 0.5 MG: 0.5 TABLET ORAL at 13:04

## 2025-01-11 RX ADMIN — POTASSIUM BICARBONATE 40 MEQ: 782 TABLET, EFFERVESCENT ORAL at 05:42

## 2025-01-11 ASSESSMENT — PAIN SCALES - GENERAL
PAINLEVEL_OUTOF10: 4
PAINLEVEL_OUTOF10: 8
PAINLEVEL_OUTOF10: 4
PAINLEVEL_OUTOF10: 8
PAINLEVEL_OUTOF10: 4
PAINLEVEL_OUTOF10: 0
PAINLEVEL_OUTOF10: 0
PAINLEVEL_OUTOF10: 7

## 2025-01-11 ASSESSMENT — PAIN DESCRIPTION - LOCATION
LOCATION: BACK
LOCATION: BACK;NECK
LOCATION: BACK
LOCATION: BACK;NECK
LOCATION: BACK

## 2025-01-11 ASSESSMENT — PAIN - FUNCTIONAL ASSESSMENT
PAIN_FUNCTIONAL_ASSESSMENT: PREVENTS OR INTERFERES SOME ACTIVE ACTIVITIES AND ADLS
PAIN_FUNCTIONAL_ASSESSMENT: ACTIVITIES ARE NOT PREVENTED
PAIN_FUNCTIONAL_ASSESSMENT: PREVENTS OR INTERFERES SOME ACTIVE ACTIVITIES AND ADLS
PAIN_FUNCTIONAL_ASSESSMENT: ACTIVITIES ARE NOT PREVENTED

## 2025-01-11 ASSESSMENT — PAIN DESCRIPTION - PAIN TYPE
TYPE: CHRONIC PAIN
TYPE: ACUTE PAIN

## 2025-01-11 ASSESSMENT — PAIN DESCRIPTION - DESCRIPTORS
DESCRIPTORS: ACHING
DESCRIPTORS: ACHING
DESCRIPTORS: SHARP

## 2025-01-11 ASSESSMENT — PAIN DESCRIPTION - ORIENTATION
ORIENTATION: MID
ORIENTATION: LOWER;POSTERIOR
ORIENTATION: MID
ORIENTATION: LOWER;POSTERIOR

## 2025-01-11 ASSESSMENT — PAIN DESCRIPTION - FREQUENCY: FREQUENCY: CONTINUOUS

## 2025-01-11 ASSESSMENT — PAIN DESCRIPTION - ONSET: ONSET: ON-GOING

## 2025-01-11 NOTE — PROGRESS NOTES
Current NIHSS 5      Nursing Core Measures for Stroke:   [x]   Education template documentation (STROKE/TIA). Please select only risk factors that are applicable to patient when selecting risk factors.  [x]   Care Plan template documentation (Physiologic Instability - Neurosensory). Selecting this will add care plan rows to the flowsheet under the Neuro section of Head to Toe.  [x]   Verified Swallow Screen completed prior to PO intake of food, drink, medications>          Please verify correct medication route prior to administration for intubated patients, patients who can not swallow or have alternative routes of intake (NG, OG, WA), etc  [x]   VTE Prophylaxis: SCDs ordered/addressed; SCDs:  Heparin gtt            (As a reminder, ASA, Plavix, and TPA/TNK are not VTE prophylaxis.)    Reviewed the Following Education with Patient and/or Family:   - Personalized risk factors for patient, along with changes, modifications that will help prevent stroke.  - Signs and Symptoms of Stroke: (Facial droop, weakness/numbness especially on one side, speech difficulty, sudden confusion, sudden loss of vision, sudden severe headache, sudden loss of balance or having difficulty walking, syncope, or seizure)  - How to activate EMS (911)   - Importance of Follow Up Appointments at Discharge   - Importance of Compliance with Medications Prescribed at Discharge  - Available community resources and stroke advocacy groups if needed    Patient and/or family member: verbalized understanding.     Stroke Education booklet given to patient/family (or verified, if given already), which reviews above information. yes         Electronically signed by Erika Tamayo RN on 1/11/2025 at 1:05 PM

## 2025-01-11 NOTE — PROGRESS NOTES
Occupational Therapy  Facility/Department: [facility] ICU  Occupational Therapy Initial Assessment    Name: Gary Martinez  : 1960  MRN: [id number]  Date of Service: 2025    Discharge Recommendations:  Home independently  OT Equipment Recommendations  Equipment Needed: No       Patient Diagnosis(es): The primary encounter diagnosis was Cerebrovascular accident (CVA), unspecified mechanism (HCC). A diagnosis of Stroke (HCC) was also pertinent to this visit.  Past Medical History:  has a past medical history of Anxiety, Cancer (HCC), Chronic back pain, Diabetes mellitus (HCC), Erectile dysfunction, Headache(784.0), Osteoarthritis, PONV (postoperative nausea and vomiting), Restless legs syndrome, Urinary incontinence, and Wears dentures.  Past Surgical History:  has a past surgical history that includes Tonsillectomy (Bilateral); Cholecystectomy, laparoscopic (N/A, 08/10/2021); Portacath placement; colectomy (N/A, 2022); Cystoscopy (N/A, 2022); and Femoral-tibial Bypass Graft (Left, 2024).           Assessment  Assessment: Pt is a 64 yr old male presenting near baseline following CVA. Pt completed bed mobility and ambulation with RW without assistance. Pt stood at sink to wash hands without difficutly. Pt reports resolved symptoms of weakness and numbness of hands. Pt shows good and equal strength in BUE. No acute OT needs at this time. Reorder if new concerns arise.  Prognosis: Good  Decision Making: Low Complexity  No Skilled OT: Independent with ADL's  REQUIRES OT FOLLOW-UP: No  Activity Tolerance  Activity Tolerance: Patient Tolerated treatment well            Restrictions  Restrictions/Precautions  Activity Level: Up as Tolerated    Subjective  General  Chart Reviewed: Yes  Patient assessed for rehabilitation services?: Yes  Additional Pertinent Hx: 64 year old male with past medical history significant for DVT/PE on eliquis who presents with stroke like symptoms  Family / Caregiver  eating meals?: None  AM-PAC Inpatient Daily Activity Raw Score: 22  AM-PAC Inpatient ADL T-Scale Score : 47.1  ADL Inpatient CMS 0-100% Score: 25.8  ADL Inpatient CMS G-Code Modifier : CJ        Goals  Short Term Goals  Time Frame for Short Term Goals: DC  Short Term Goal 1: Pt will complete      Therapy Time   Individual Concurrent Group Co-treatment   Time In 1520         Time Out 1558         Minutes 38             Timed code tx minutes:23  Total tx minutes:38      Shawna Kyle, OT

## 2025-01-11 NOTE — PROGRESS NOTES
RN tried to pull back on port to get lab draw, unable to draw back and really hard flush, RN de accessed and re accessed port, still unable to draw or flush. RN called [facility] charge to come and access d/t small and complicated size. [facility] charge RN accessed port, got blood back with slow flush, but sluggish draw back for blood. RN called Lab at 1630 to come get labs for Q6 APTT, said they will send someone. RN talked to ICU residents about ordering CATHFLO waiting on order verification.

## 2025-01-11 NOTE — PROGRESS NOTES
Brief note  Patient seen and examined    Exam  -Mental status: A&O x4  -Memory: Recent & remote memory intact  -Attention: Normal  -Fund of Knowledge: Good  -Speech & Language: no aphasia; no dysarthria. Follows commands with ease.  -Cranial nerves: pupils 4mm->3mm bilaterally; EOMI, no nystagmus; sensation intact V1, V2, V3; no sig facial asymmetry; hearing intact to conversation; SCMs & trapezii intact bilaterally  -Sensory: intact to lt touch throughout; has sensory neglect on the left w/ simultaneous stimulation   -Motor: no pronator drift bilaterally   RUE: 5/5  RLE: 5/5  LUE: 5/5  LLE: 5/5   -Tone: Normal throughout  -Coordination: FNF intact on left, slightly clumsy on left  -Gait & Station: deferred for pt safety    Plan  - Continue low dose, no bolus heparin gtt  - Echo  - BP goal less than 130/80    Beatriz Liz NP  NCC

## 2025-01-11 NOTE — PROGRESS NOTES
Current NIHSS 5     Nursing Core Measures for Stroke:   [x]   Education template documentation (STROKE/TIA). Please select only risk factors that are applicable to patient when selecting risk factors.  [x]   Care Plan template documentation (Physiologic Instability - Neurosensory). Selecting this will add care plan rows to the flowsheet under the Neuro section of Head to Toe.  [x]   Verified Swallow Screen completed prior to PO intake of food, drink, medications  [x]   VTE Prophylaxis: SCDs ordered/addressed; SCDs: N/A Heparin Gtt, SCDs contraindicated           (As a reminder, ASA, Plavix, and TPA/TNK are not VTE prophylaxis.)    Reviewed the Following Education with Patient and/or Family:   - Personalized risk factors for patient, along with changes, modifications that will help prevent stroke.  - Signs and Symptoms of Stroke: (Facial droop, weakness/numbness especially on one side, speech difficulty, sudden confusion, sudden loss of vision, sudden severe headache, sudden loss of balance or having difficulty walking, syncope, or seizure)  - How to activate EMS (911)   - Importance of Follow Up Appointments at Discharge   - Importance of Compliance with Medications Prescribed at Discharge  - Available community resources and stroke advocacy groups if needed    Patient and/or family member: verbalized understanding.     Stroke Education booklet given to patient/family (or verified, if given already), which reviews above information. yes         Electronically signed by Adrian Rai RN on 1/10/2025 at 9:12 PM

## 2025-01-11 NOTE — PROGRESS NOTES
NEUROCRITICAL CARE PROGRESS NOTE       Patient Name: Gary Martinez YOB: 1960   Sex: Male Age: 64 yrs     CC / Reason for Consult: R MCA stroke    Changes over last 24 hours:   Heparin gtt started 1/10, aPTT back this AM, supratherapeutic  Planning for MRI Brain    ROS: Feels well, no complaints    ASSESSMENT & RECOMMENDATIONS   Assessment:  63 yo patient with DVT/PE on apixaban for 1 month who presented to the ER with acute onset dysarthria and L sided weakness found to have R M2 occlusion on CTA which on angiogram was resolved but with small M3 branch nonocclusive thrombus, on a heparin gtt. Unclear the mechanism of embolus, as he was compliant with his eliquis. The etiology of his DVT/PE are unclear. He does relay to me that he had some vascular procedure on his leg at the beginning of the year (Repair of left popliteal artery aneurysm via posterior approach with interpositioned reverse greater saphenous vein graft). Will need further evaluation with ECHO with bubble study and consideration of possible eliquis failure.    Plan:  - Continue heparin gtt, check aPTT per orderset (aPTTs d/t previous anticoagulant use)  - Will review MRI Brain when completed  - Once therapeutic on heparin, will tentatively plan for CT head (unless MRI Brain sufficient)  - Will plan for repeat CTA in coming days  - Q1 hour neuro checks until therapeutic  - ECHO with bubble study  - No ASA for now since on heparin gtt  - Continue statin  - Maintain good secondary prevention of stroke measures including normotension (SBP < 130 mmHg AND DBP < 90 mmHg); HbA1c <7%; LDL < 70 mg/dL  - PT/OT/speech    Case discussed with Dr. Marquis, bedside RN, patient, ICU team  CHRISTY Qureshi - CNP   NEUROCRITICAL CARE  1/11/2025 9:56 AM  [facility]: [facility] NEUROCRITICAL CARE  HISTORY   Presented on 1/10 with L sided weakness and word finding difficulty  Initial HPI: Gary Martinez is a 64 y.o. man with with DVT/PE on apixaban; DM;  strength on shoulder shrug  CN12: Tongue midline with protrusion    Motor Exam:   R  L    Deltoid 5 5   Biceps 5 4+   Triceps 5 5   Wrist extension  5 5   Interossei 5 4+      R  L    Hip flexion  5 4+   Hip extension  5 5   Knee flexion  5 5   Knee extension  5 5   Ankle dorsiflexion  5 5   Ankle plantar flexion  5 5     Sensory: light touch intact and symmetric in all 4 extremities.  No sensory extinction on bilateral simultaneous stimulation  Cerebellar/coordination: finger nose finger normal without ataxia  Tone: normal in all 4 extremities  Gait: Held for patient safety    OTHER SYSTEMS:  Cardiovascular: Warm, appears well perfused   Respiratory: Easy, non-labored respiratory pattern   Abdominal: Abdomen is without distention   Extremities: Upper and lower extremities are atraumatic in appearance without deformity. No swelling or erythema

## 2025-01-11 NOTE — PROGRESS NOTES
V2.0    [facility] [place]      Name:  Gary Martinez /Age/Sex: 1960  (64 y.o. male)   MRN & CSN:  [id number] & [id number] Encounter Date/Time: 2025 10:55 AM EST   Location:  [facility] PCP: Tony Love MD     Attending:Nik Pimentel MD       Hospital Day: 2    Assessment and Recommendations   Gary Martinez is a 64 y.o. male with pmh of rior DVT, L popliteal anueyrsm s/p repair, hypothyroidism, Type II DM  who presents with Cerebrovascular accident (CVA) due to embolism of left middle cerebral artery (HCC)    #Acute CVA due to right M2 occlusion  Presents with slurred speech, left facial weakness, and left upper and lower extremity weakness. NIHSS 9. CTA right M2 thrombus. Not candidate for thrombolysis as patient on eliquis. No EVT as most of the MCM territory is open as thrombus likely propagated distally to a right M3 branch where it is non-occlusive but does restrict flow.  - PT/OT  - speech eval  - NCC consulted  - f/u MRI brain  - A1c and lipid panel  - lipitor 80, hold ASA  - pantoprazole 40mg IV qd  - Q1 hour neuro check  - SBP goal < 180              - PRN labetalol  - cont. heparin drip     #Hx of DVT LLE  - Pt with provoked DVT in 2024, after long distance driving   - On Eliquis since then  - Hold on Eliquis as of now     #Hypothyroidism  - Cont. Home synthroid      Diet ADULT DIET; Regular   DVT Prophylaxis [] Lovenox, []  Heparin, [] SCDs, [] Ambulation,  [] Eliquis, [] Xarelto  [] Coumadin   Code Status Full Code   Disposition From: Home  Expected Disposition: TBD  Estimated Date of Discharge: 1-2 days  Patient requires continued admission due to neuro monitoring   Surrogate Decision Maker/ SAVANNAH Baptiste     Personally reviewed Lab Studies and Imaging       Subjective:     Chief Complaint: Weakness    Gary Martinez is a 64 y.o. male with PMH of prior DVT, L popliteal anueyrsm s/p repair, hypothyroidism, Type II DM who presented with L hemiplegia, dysarthria. Found to  12:29 PM    COLORU ORANGE 06/12/2023 12:29 PM    PHUR 5.5 06/12/2023 12:29 PM    WBCUA 412 06/12/2023 12:29 PM    RBCUA 1 06/12/2023 12:29 PM    BACTERIA 4+ 06/12/2023 12:29 PM    CLARITYU TURBID 06/12/2023 12:29 PM    LEUKOCYTESUR LARGE 06/12/2023 12:29 PM    UROBILINOGEN 1.0 06/12/2023 12:29 PM    BILIRUBINUR SMALL 06/12/2023 12:29 PM    BLOODU TRACE 06/12/2023 12:29 PM    GLUCOSEU Negative 06/12/2023 12:29 PM    KETUA TRACE 06/12/2023 12:29 PM     Urine Cultures:   Lab Results   Component Value Date/Time    LABURIN >100,000 CFU/ml 06/12/2023 12:29 PM     Blood Cultures:   Lab Results   Component Value Date/Time    BC No Growth after 4 days of incubation. 05/30/2021 03:23 PM     Lab Results   Component Value Date/Time    BLOODCULT2 No Growth after 4 days of incubation. 05/30/2021 04:06 PM     Organism:   Lab Results   Component Value Date/Time    ORG Klebsiella pneumoniae 06/12/2023 12:29 PM         Electronically signed by Nik Pimentel MD on 1/11/2025 at 10:55 AM

## 2025-01-11 NOTE — PROGRESS NOTES
ICU Progress Note    Admit Date: 1/10/2025  Day: 2  Vent Day: None  Diet: ADULT DIET; Regular    CC: Slurred speech, left facial droop, left sided weakness.     Interval history:   Patient seen and examined at bedside this morning.  Patient complaining of some nausea this morning but otherwise does not have any other acute complaints.    HPI:   Mr. Gary Martinez is a 64 y.o. male with a medical hx significant for DVT (July 2024) on eliquis, left popliteal a. Aneurysm repair on 12/31, on baby ASA, hypothyroidism, diabetes otherwise as listed in the MHx table below, who transferred from [facility] ED after he found out to have M2 occlusion. Yesterday around between midnight and 2AM patient was at the station and started having sudden onset alteration of speech, his words kind of not making sense, and started having some left-sided weakness.  Patient called his neighbor to come and help him, they called EMS and they brought the patient to the [facility] ED.  On presentation at [facility] patient was having left facial weakness, having left upper and lower extremity weakness his NIH score at presentation was 9, initial CT head without contrast with no acute intracranial hemorrhage, however did show old infarct in posterior left frontal lobe.  CTA showed occlusion of posterior right M2 occlusion.  Patient underwent thrombectomy by Dr. Pickett, and his blood clot propagated to more distally in right M3 branch where it is nonocclusive, however restricting the flow, patient was started on heparin drip and transferred [facility] at [facility] for close neurological monitoring.     This morning when I saw this patient, he was having left facial droop, left upper and lower extremity weakness and sensory deficits, patient's left sided hearing diminished compared to right.  Complaining of lower extremity and back pain.  After 2 hours patient's left-sided weakness and sensory deficits improved significantly, however still having    Nonspecific intestinal gas pattern.            Electronically signed by MD Joey Tolliver      MRI BRAIN WO CONTRAST    (Results Pending)         Assessment/Plan:   Mr. Gary Martinez is a 64 y.o. male with a medical hx significant for DVT (July 2024) on eliquis, left popliteal a. Aneurysm repair on 12/31, on baby ASA, hypothyroidism, diabetes otherwise as listed in the MHx table below, who transferred from [facility] ED after he found out to have M2 occlusion.     #Acute right M2 occlusion  Presents with slurred speech, left facial weakness, and left upper and lower extremity weakness. NIHSS 9. CTA right M2 thrombus. Not candidate for thrombolysis as patient on eliquis. No EVT as most of the MCM territory is open as thrombus likely propagated distally to a right M3 branch where it is non-occlusive but does restrict flow.  - PT/OT  - speech eval  - NCC consulted  - f/u MRI brain  - A1c and lipid panel  - lipitor 80, hold ASA  - pantoprazole 40mg IV qd  - Q1 hour neuro check  - SBP goal < 180              - PRN labetalol  - cont. heparin drip     #Hx of DVT LLE  - Pt with provoked DVT in July 2024, after long distance driving   - On Eliquis since then  - Hold on Eliquis as of now     #Hypothyroidism  - Cont. Home synthroid        Code Status:  Full Code  Disposition:   Prior to admission:  From Full  Current:  ICU  At discharge:  TBD    Estee Acuña MD, PGY-3  01/11/25  9:48 AM    This patient has been staffed and discussed with Dr Olivarez     Pulmonary & Critical Care    Patient seen and examined. I agree with Dr. Acuña's history, physical, lab findings, assessment and plan.    Gary remains on a heparin drip without difficulty  He is having some nausea  MRI brain performed earlier and read pending  Neurochecks and timing of disposition from ICU per neurocritical care  Current blood pressure is 119/84 which is below goal of 180    Jad Olivarez MD

## 2025-01-11 NOTE — PROGRESS NOTES
Current NIHSS 5    Nursing Core Measures for Stroke:   [x]   Education template documentation (STROKE/TIA).   .  [x]   Care Plan template documentation     [x]   Verified Swallow Screen completed prior to PO intake of food, drink, medications>            SLP eval recommended regular and thin liquids. Regular diet ordered, pt tolerating well    [x]   VTE Prophylaxis: SCDs ordered/addressed; SCDs: N/A Heparin           (As a reminder, ASA, Plavix, and TPA/TNK are not VTE prophylaxis.)    Reviewed the Following Education with Patient and/or Family:   - Personalized risk factors for patient, along with changes, modifications that will help prevent stroke.  - Signs and Symptoms of Stroke: (Facial droop, weakness/numbness especially on one side, speech difficulty, sudden confusion, sudden loss of vision, sudden severe headache, sudden loss of balance or having difficulty walking, syncope, or seizure)  - How to activate EMS (911)   - Importance of Follow Up Appointments at Discharge   - Importance of Compliance with Medications Prescribed at Discharge  - Available community resources and stroke advocacy groups if needed    Patient and/or family member: verbalized understanding.     Stroke Education booklet given to patient/family (or verified, if given already), which reviews above information. yes         Electronically signed by Zuleika Cruz RN on 1/10/2025 at 8:08 PM

## 2025-01-11 NOTE — PROGRESS NOTES
RN screened patient's swallowing by administering the [place] Swallow Protocol. The patient consumed 3 ounces of water by cup in sequential swallows without signs/symptoms of aspiration.

## 2025-01-11 NOTE — PLAN OF CARE
Problem: Chronic Conditions and Co-morbidities  Goal: Patient's chronic conditions and co-morbidity symptoms are monitored and maintained or improved  Outcome: Progressing  Flowsheets  Taken 1/10/2025 1900 by Zuleika Cruz RN  Care Plan - Patient's Chronic Conditions and Co-Morbidity Symptoms are Monitored and Maintained or Improved:   Monitor and assess patient's chronic conditions and comorbid symptoms for stability, deterioration, or improvement   Collaborate with multidisciplinary team to address chronic and comorbid conditions and prevent exacerbation or deterioration   Update acute care plan with appropriate goals if chronic or comorbid symptoms are exacerbated and prevent overall improvement and discharge  Problem: Discharge Planning  Goal: Discharge to home or other facility with appropriate resources  Outcome: Progressing  Flowsheets  Taken 1/10/2025 1900 by Zuleika Cruz RN  Discharge to home or other facility with appropriate resources:   Identify barriers to discharge with patient and caregiver   Identify discharge learning needs (meds, wound care, etc)   Arrange for interpreters to assist at discharge as needed   Arrange for needed discharge resources and transportation as appropriate   Refer to discharge planning if patient needs post-hospital services based on physician order or complex needs related to functional status, cognitive ability or social support system      Problem: Neurosensory - Adult  Goal: Achieves stable or improved neurological status  Outcome: Progressing  Flowsheets (Taken 1/10/2025 1900)  Achieves stable or improved neurological status:   Assess for and report changes in neurological status   Initiate measures to prevent increased intracranial pressure   Maintain blood pressure and fluid volume within ordered parameters to optimize cerebral perfusion and minimize risk of hemorrhage   Monitor temperature, glucose, and sodium. Initiate appropriate interventions as

## 2025-01-11 NOTE — PROGRESS NOTES
Physical Therapy  Facility/Department: [facility] ICU  Physical Therapy Initial Assessment    Name: Gary Martinez  : 1960  MRN: [id number]  Date of Service: 2025    Discharge Recommendations:  Home independently, No therapy recommended at discharge   PT Equipment Recommendations  Equipment Needed: No (pt already owns rollator and cane)      Patient Diagnosis(es): The primary encounter diagnosis was Cerebrovascular accident (CVA), unspecified mechanism (HCC). A diagnosis of Stroke (HCC) was also pertinent to this visit.  Past Medical History:  has a past medical history of Anxiety, Cancer (HCC), Chronic back pain, Diabetes mellitus (HCC), Erectile dysfunction, Headache(784.0), Osteoarthritis, PONV (postoperative nausea and vomiting), Restless legs syndrome, Urinary incontinence, and Wears dentures.  Past Surgical History:  has a past surgical history that includes Tonsillectomy (Bilateral); Cholecystectomy, laparoscopic (N/A, 08/10/2021); Portacath placement; colectomy (N/A, 2022); Cystoscopy (N/A, 2022); and Femoral-tibial Bypass Graft (Left, 2024).    Assessment  Body Structures, Functions, Activity Limitations Requiring Skilled Therapeutic Intervention: Decreased balance  Assessment: Pt was modified independent with use of cane vs rollator for mobility. On evaluation, patient was independent for bed mobility, sit <> stand and ambulation with RW. Pt does not require continued acute PT services at this time. At discarge, recommending patient return home without therapy services.  Treatment Diagnosis: decreased balance  Therapy Prognosis: Good  Decision Making: Medium Complexity  No Skilled PT: At baseline function  Requires PT Follow-Up: No  Activity Tolerance  Activity Tolerance: Patient tolerated evaluation without incident    Based upon information gathered in this visit, the patient's preadmission Modified Rosaline Score (mRS) is: 0- No symptoms at all     Plan  Physical Therapy

## 2025-01-12 ENCOUNTER — APPOINTMENT (OUTPATIENT)
Dept: CT IMAGING | Age: 65
End: 2025-01-12
Payer: MEDICAID

## 2025-01-12 LAB
ANION GAP SERPL CALCULATED.3IONS-SCNC: 7 MMOL/L (ref 3–16)
ANTI-XA UNFRAC HEPARIN: 0.13 IU/ML (ref 0.3–0.7)
ANTI-XA UNFRAC HEPARIN: 1.05 IU/ML (ref 0.3–0.7)
ANTI-XA UNFRAC HEPARIN: <0.1 IU/ML (ref 0.3–0.7)
ANTI-XA UNFRAC HEPARIN: >1.1 IU/ML (ref 0.3–0.7)
APTT BLD: 28.4 SEC (ref 22.1–36.4)
APTT BLD: >180 SEC (ref 22.1–36.4)
APTT BLD: >180 SEC (ref 22.1–36.4)
BUN SERPL-MCNC: 4 MG/DL (ref 7–20)
CALCIUM SERPL-MCNC: 8.2 MG/DL (ref 8.3–10.6)
CHLORIDE SERPL-SCNC: 100 MMOL/L (ref 99–110)
CO2 SERPL-SCNC: 30 MMOL/L (ref 21–32)
CREAT SERPL-MCNC: 0.6 MG/DL (ref 0.8–1.3)
CRYPTOSP AG STL QL IA: NORMAL
DEPRECATED RDW RBC AUTO: 13.8 % (ref 12.4–15.4)
G LAMBLIA AG STL QL IA: NORMAL
GFR SERPLBLD CREATININE-BSD FMLA CKD-EPI: >90 ML/MIN/{1.73_M2}
GLUCOSE SERPL-MCNC: 130 MG/DL (ref 70–99)
HCT VFR BLD AUTO: 49.4 % (ref 40.5–52.5)
HGB BLD-MCNC: 16.6 G/DL (ref 13.5–17.5)
MCH RBC QN AUTO: 33.9 PG (ref 26–34)
MCHC RBC AUTO-ENTMCNC: 33.6 G/DL (ref 31–36)
MCV RBC AUTO: 100.8 FL (ref 80–100)
PLATELET # BLD AUTO: 244 K/UL (ref 135–450)
PMV BLD AUTO: 8.3 FL (ref 5–10.5)
POTASSIUM SERPL-SCNC: 3.6 MMOL/L (ref 3.5–5.1)
RBC # BLD AUTO: 4.9 M/UL (ref 4.2–5.9)
SODIUM SERPL-SCNC: 137 MMOL/L (ref 136–145)
WBC # BLD AUTO: 8.5 K/UL (ref 4–11)

## 2025-01-12 PROCEDURE — 6370000000 HC RX 637 (ALT 250 FOR IP)

## 2025-01-12 PROCEDURE — 6360000004 HC RX CONTRAST MEDICATION

## 2025-01-12 PROCEDURE — 85730 THROMBOPLASTIN TIME PARTIAL: CPT

## 2025-01-12 PROCEDURE — 71260 CT THORAX DX C+: CPT

## 2025-01-12 PROCEDURE — 99232 SBSQ HOSP IP/OBS MODERATE 35: CPT | Performed by: INTERNAL MEDICINE

## 2025-01-12 PROCEDURE — 85027 COMPLETE CBC AUTOMATED: CPT

## 2025-01-12 PROCEDURE — 2500000003 HC RX 250 WO HCPCS: Performed by: ANESTHESIOLOGY

## 2025-01-12 PROCEDURE — 2000000000 HC ICU R&B

## 2025-01-12 PROCEDURE — 70450 CT HEAD/BRAIN W/O DYE: CPT

## 2025-01-12 PROCEDURE — 80048 BASIC METABOLIC PNL TOTAL CA: CPT

## 2025-01-12 PROCEDURE — 6360000002 HC RX W HCPCS

## 2025-01-12 PROCEDURE — 85520 HEPARIN ASSAY: CPT

## 2025-01-12 PROCEDURE — 2500000003 HC RX 250 WO HCPCS

## 2025-01-12 PROCEDURE — 99233 SBSQ HOSP IP/OBS HIGH 50: CPT

## 2025-01-12 RX ORDER — IOPAMIDOL 755 MG/ML
75 INJECTION, SOLUTION INTRAVASCULAR
Status: COMPLETED | OUTPATIENT
Start: 2025-01-12 | End: 2025-01-12

## 2025-01-12 RX ORDER — FLUTICASONE PROPIONATE 50 MCG
1 SPRAY, SUSPENSION (ML) NASAL DAILY PRN
Status: DISCONTINUED | OUTPATIENT
Start: 2025-01-12 | End: 2025-01-16 | Stop reason: HOSPADM

## 2025-01-12 RX ORDER — ALPRAZOLAM 0.5 MG
0.5 TABLET ORAL 2 TIMES DAILY PRN
Status: DISCONTINUED | OUTPATIENT
Start: 2025-01-12 | End: 2025-01-12

## 2025-01-12 RX ORDER — ALPRAZOLAM 0.5 MG
0.5 TABLET ORAL 2 TIMES DAILY PRN
Status: DISCONTINUED | OUTPATIENT
Start: 2025-01-12 | End: 2025-01-16 | Stop reason: HOSPADM

## 2025-01-12 RX ADMIN — ALPRAZOLAM 0.5 MG: 0.5 TABLET ORAL at 00:54

## 2025-01-12 RX ADMIN — PANTOPRAZOLE SODIUM 40 MG: 40 INJECTION, POWDER, FOR SOLUTION INTRAVENOUS at 07:43

## 2025-01-12 RX ADMIN — FLUTICASONE PROPIONATE 1 SPRAY: 50 SPRAY, METERED NASAL at 04:13

## 2025-01-12 RX ADMIN — SODIUM CHLORIDE, PRESERVATIVE FREE 10 ML: 5 INJECTION INTRAVENOUS at 07:43

## 2025-01-12 RX ADMIN — OXYCODONE 5 MG: 5 TABLET ORAL at 17:39

## 2025-01-12 RX ADMIN — SODIUM CHLORIDE, PRESERVATIVE FREE 10 ML: 5 INJECTION INTRAVENOUS at 20:24

## 2025-01-12 RX ADMIN — OXYCODONE 5 MG: 5 TABLET ORAL at 06:46

## 2025-01-12 RX ADMIN — OXYCODONE 5 MG: 5 TABLET ORAL at 00:40

## 2025-01-12 RX ADMIN — Medication: at 20:23

## 2025-01-12 RX ADMIN — Medication: at 07:43

## 2025-01-12 RX ADMIN — SODIUM CHLORIDE, PRESERVATIVE FREE 10 ML: 5 INJECTION INTRAVENOUS at 20:25

## 2025-01-12 RX ADMIN — ALPRAZOLAM 0.5 MG: 0.5 TABLET ORAL at 18:48

## 2025-01-12 RX ADMIN — ATORVASTATIN CALCIUM 80 MG: 80 TABLET, FILM COATED ORAL at 20:21

## 2025-01-12 RX ADMIN — LEVOTHYROXINE SODIUM 50 MCG: 0.05 TABLET ORAL at 06:46

## 2025-01-12 RX ADMIN — ACETAMINOPHEN 650 MG: 325 TABLET ORAL at 00:43

## 2025-01-12 RX ADMIN — HEPARIN SODIUM 23 UNITS/KG/HR: 10000 INJECTION, SOLUTION INTRAVENOUS at 09:12

## 2025-01-12 RX ADMIN — SODIUM CHLORIDE, PRESERVATIVE FREE 10 ML: 5 INJECTION INTRAVENOUS at 07:46

## 2025-01-12 RX ADMIN — HEPARIN SODIUM 20 UNITS/KG/HR: 10000 INJECTION, SOLUTION INTRAVENOUS at 15:50

## 2025-01-12 RX ADMIN — ONDANSETRON 4 MG: 2 INJECTION INTRAMUSCULAR; INTRAVENOUS at 17:39

## 2025-01-12 RX ADMIN — IOPAMIDOL 75 ML: 755 INJECTION, SOLUTION INTRAVENOUS at 15:37

## 2025-01-12 RX ADMIN — HEPARIN SODIUM 26 UNITS/KG/HR: 10000 INJECTION, SOLUTION INTRAVENOUS at 03:51

## 2025-01-12 RX ADMIN — ALPRAZOLAM 0.5 MG: 0.5 TABLET ORAL at 09:35

## 2025-01-12 ASSESSMENT — PAIN SCALES - GENERAL
PAINLEVEL_OUTOF10: 3
PAINLEVEL_OUTOF10: 7
PAINLEVEL_OUTOF10: 0
PAINLEVEL_OUTOF10: 7
PAINLEVEL_OUTOF10: 0
PAINLEVEL_OUTOF10: 3
PAINLEVEL_OUTOF10: 7

## 2025-01-12 ASSESSMENT — PAIN DESCRIPTION - FREQUENCY
FREQUENCY: CONTINUOUS

## 2025-01-12 ASSESSMENT — PAIN DESCRIPTION - DESCRIPTORS
DESCRIPTORS: ACHING

## 2025-01-12 ASSESSMENT — PAIN DESCRIPTION - ORIENTATION
ORIENTATION: MID
ORIENTATION: MID
ORIENTATION: MID;LOWER

## 2025-01-12 ASSESSMENT — PAIN DESCRIPTION - PAIN TYPE
TYPE: CHRONIC PAIN
TYPE: ACUTE PAIN
TYPE: ACUTE PAIN

## 2025-01-12 ASSESSMENT — PAIN DESCRIPTION - LOCATION
LOCATION: BACK;NECK
LOCATION: BACK
LOCATION: BACK;NECK
LOCATION: BACK
LOCATION: BACK

## 2025-01-12 ASSESSMENT — PAIN - FUNCTIONAL ASSESSMENT
PAIN_FUNCTIONAL_ASSESSMENT: ACTIVITIES ARE NOT PREVENTED

## 2025-01-12 ASSESSMENT — PAIN DESCRIPTION - ONSET
ONSET: ON-GOING

## 2025-01-12 NOTE — PROGRESS NOTES
[facility] - Clinical Pharmacy Note    Patient is on Heparin low dose no bolus weight based infusion, which is being monitored & adjusted using aPTT as pt received an oral Factor-Xa inhibitor (Apixaban) within 72 hrs of starting heparin infusion, which interacts with Anti-Xa monitoring of heparin.     It has now been >48 hours since heparin infusion was initiated (and with good renal function, Apixaban should be washed out), and the oral Factor-Xa inhibitor interaction with Anti-Xa levels is eliminated.  Heparin infusion will now be monitored & adjusted using Anti-Xa levels per the usual [facility] protocol.    **Use original weight used to start heparin for all adjustments**    AntiXa < 0.10 Units/mL   NO BOLUS   Increase infusion by 4 units/kg/hr  0.1-0.29  Units/mL           NO BOLUS   Increase infusion by 2 units/kg/hr  0.3-0.5    Units/mL           No bolus        No change  0.51-0.99 Units/mL          No bolus        Decrease infusion by 2 units/kg/hr  1.0 Units/mL or greater    Hold heparin for 1 hour   Decrease infusion by 3 units/kg/hr    Hang infusion immediately after drawing initial labs.   Do NOT use ANY anti-Xa drawn prior to initiation of infusion for titration.     Anti Xa levels 6 hours after any rate change  When 2 successive Anti Xa's are at goal   monitor Anti Xa level at least daily    Next Anti-Xa scheduled for 15:00 today (6 hrs after heparin drip was restarted this AM).  Attempted to call ICU RN to discuss plan - no answer.  Spoke with Kyleigh (Neuro NP) - she had already spoken with pt's RN about this plan to change to Anti-Xa monitoring.    Please call with questions--  Thanks--  Liseth Nino, PharmD, BCPS, BCGP  [id number] (\[facility] [place] [facility]\"")   1/12/2025 9:21 AM

## 2025-01-12 NOTE — PROGRESS NOTES
Pt resting in bed. Complaints of lower back pain and anxiety. PRNs given. Neuro NP Kyleigh at bedside this AM, pt switched to Q2 neuro checks with a plan for CT later today.

## 2025-01-12 NOTE — PROGRESS NOTES
NEUROCRITICAL CARE PROGRESS NOTE       Patient Name: Gary Martinez YOB: 1960   Sex: Male Age: 64 yrs     CC / Reason for Consult: R MCA stroke    Changes over last 24 hours:   PTT has been bouncing between subtherapeutic and supratherapeutic  MRI Brain completed 1/11, with patchy areas of acute infarct in the R parietotemporal lobe with chronic infarcts in the L lateral frontal lobe and small infarcts in bilateral cerebellum    ROS: Feels well, no complaints    ASSESSMENT & RECOMMENDATIONS   Assessment:  63 yo patient with DVT/PE on apixaban for 1 month who presented to the ER with acute onset dysarthria and L sided weakness found to have R M2 occlusion on CTA which on angiogram was resolved but with small M3 branch nonocclusive thrombus, on a heparin gtt. Unclear the mechanism of embolus, as he was compliant with his eliquis. The etiology of his DVT/PE are unclear. He does relay to me that he had some vascular procedure on his leg at the beginning of the year (Repair of left popliteal artery aneurysm via posterior approach with interpositioned reverse greater saphenous vein graft). Will need further evaluation with ECHO with bubble study and consideration of possible eliquis failure.  Additionally, has a remote history of squamous cell carcinoma of tongue, so may need malignancy work up considering he has had DVT, acute stroke, and evidence of bilateral previous stroke.    Plan:  - Continue heparin gtt, discussed with pharmacy and adjusting to Anti Xa today given aPTT variability  - MRI Brain with acute stroke, no hemorrhage  - CT head today given supratherapeutic level, and obtain CT C/A/P at that time  - Will plan for repeat CTA in coming days  - Q2 hour neuro checks until CT, then can consider Q4 hour checks  - ECHO with bubble study  - No ASA for now since on heparin gtt  - Continue statin  - Maintain good secondary prevention of stroke measures including normotension (SBP < 130 mmHg AND

## 2025-01-12 NOTE — PROGRESS NOTES
Current NIHSS 1    Nursing Core Measures for Stroke:   [x]   Education template documentation (STROKE/TIA). Please select only risk factors that are applicable to patient when selecting risk factors.  [x]   Care Plan template documentation (Physiologic Instability - Neurosensory). Selecting this will add care plan rows to the flowsheet under the Neuro section of Head to Toe.  [x]   Verified Swallow Screen completed prior to PO intake of food, drink, medications>          Please verify correct medication route prior to administration for intubated patients, patients who can not swallow or have alternative routes of intake (NG, OG, NH), etc  [x]   VTE Prophylaxis: SCDs ordered/addressed; SCDs: N/A Heparin           (As a reminder, ASA, Plavix, and TPA/TNK are not VTE prophylaxis.)    Reviewed the Following Education with Patient and/or Family:   - Personalized risk factors for patient, along with changes, modifications that will help prevent stroke.  - Signs and Symptoms of Stroke: (Facial droop, weakness/numbness especially on one side, speech difficulty, sudden confusion, sudden loss of vision, sudden severe headache, sudden loss of balance or having difficulty walking, syncope, or seizure)  - How to activate EMS (911)   - Importance of Follow Up Appointments at Discharge   - Importance of Compliance with Medications Prescribed at Discharge  - Available community resources and stroke advocacy groups if needed    Patient and/or family member: verbalized understanding.     Stroke Education booklet given to patient/family (or verified, if given already), which reviews above information. yes         Electronically signed by Justen Sim RN on 1/12/2025 at 12:04 AM

## 2025-01-12 NOTE — PROGRESS NOTES
V2.0    [facility] [place] Progress Note      Name:  Gary Martinez /Age/Sex: 1960  (64 y.o. male)   MRN & CSN:  [id number] & [id number] Encounter Date/Time: 2025 10:55 AM EST   Location:  [facility]/4508-01 PCP: Tony Love MD     Attending:Nik Pimentel MD       Hospital Day: 3    Assessment and Recommendations   Gary Martinez is a 64 y.o. male with pmh of rior DVT, L popliteal anueyrsm s/p repair, hypothyroidism, Type II DM  who presents with Cerebrovascular accident (CVA) due to embolism of left middle cerebral artery (HCC)    #Acute CVA due to right M2 occlusion  Presents with slurred speech, left facial weakness, and left upper and lower extremity weakness. NIHSS 9. CTA right M2 thrombus. Not candidate for thrombolysis as patient on eliquis. No EVT as most of the MCM territory is open as thrombus likely propagated distally to a right M3 branch where it is non-occlusive but does restrict flow.  - PT/OT  - speech eval  - NCC consulted  - MRI brain completed  - A1c and lipid panel  - LDL 67  - cont. heparin drip  - repeat Head imaging per Neuro  -Neuro checks  - SBP goal < 180              - PRN labetalol       #Hx of DVT LLE  - Pt with provoked DVT in 2024, after long distance driving   - On Eliquis since then  - Hold on Eliquis as of now     #Hypothyroidism  - Cont. Home synthroid      Diet ADULT DIET; Regular   DVT Prophylaxis [] Lovenox, []  Heparin, [] SCDs, [] Ambulation,  [] Eliquis, [] Xarelto  [] Coumadin   Code Status Full Code   Disposition From: Home  Expected Disposition: TBD  Estimated Date of Discharge: 1-2 days  Patient requires continued admission due to neuro monitoring   Surrogate Decision Maker/ SAVANNAH Baptiste     Personally reviewed Lab Studies and Imaging       Subjective:     Chief Complaint: Weakness    Gary Martinez is a 64 y.o. male with PMH of prior DVT, L popliteal anueyrsm s/p repair, hypothyroidism, Type II DM who presented with L hemiplegia, dysarthria. Found to have CVA

## 2025-01-12 NOTE — PLAN OF CARE
Problem: Discharge Planning  Goal: Discharge to home or other facility with appropriate resources  Outcome: Progressing     Problem: Safety - Adult  Goal: Free from fall injury  Outcome: Progressing  Flowsheets (Taken 1/11/2025 1435 by Erika Tamaoy RN)  Free From Fall Injury:   Instruct family/caregiver on patient safety   Based on caregiver fall risk screen, instruct family/caregiver to ask for assistance with transferring infant if caregiver noted to have fall risk factors     Problem: Neurosensory - Adult  Goal: Achieves stable or improved neurological status  Outcome: Progressing  Goal: Achieves maximal functionality and self care  Outcome: Progressing     Problem: Skin/Tissue Integrity  Goal: Absence of new skin breakdown  Description: 1.  Monitor for areas of redness and/or skin breakdown  2.  Assess vascular access sites hourly  3.  Every 4-6 hours minimum:  Change oxygen saturation probe site  4.  Every 4-6 hours:  If on nasal continuous positive airway pressure, respiratory therapy assess nares and determine need for appliance change or resting period.  Outcome: Progressing     Problem: Pain  Goal: Verbalizes/displays adequate comfort level or baseline comfort level  Outcome: Progressing

## 2025-01-12 NOTE — PLAN OF CARE
Problem: Chronic Conditions and Co-morbidities  Goal: Patient's chronic conditions and co-morbidity symptoms are monitored and maintained or improved  Outcome: Progressing  Flowsheets (Taken 1/12/2025 0800)  Care Plan - Patient's Chronic Conditions and Co-Morbidity Symptoms are Monitored and Maintained or Improved:   Monitor and assess patient's chronic conditions and comorbid symptoms for stability, deterioration, or improvement   Collaborate with multidisciplinary team to address chronic and comorbid conditions and prevent exacerbation or deterioration     Problem: Discharge Planning  Goal: Discharge to home or other facility with appropriate resources  Outcome: Progressing  Flowsheets (Taken 1/12/2025 0800)  Discharge to home or other facility with appropriate resources:   Arrange for needed discharge resources and transportation as appropriate   Identify discharge learning needs (meds, wound care, etc)     Problem: Safety - Adult  Goal: Free from fall injury  Outcome: Progressing  Flowsheets (Taken 1/12/2025 0832)  Free From Fall Injury:   Instruct family/caregiver on patient safety   Based on caregiver fall risk screen, instruct family/caregiver to ask for assistance with transferring infant if caregiver noted to have fall risk factors     Problem: Neurosensory - Adult  Goal: Achieves stable or improved neurological status  Outcome: Progressing  Flowsheets (Taken 1/12/2025 0800)  Achieves stable or improved neurological status:   Assess for and report changes in neurological status   Initiate measures to prevent increased intracranial pressure  Goal: Achieves maximal functionality and self care  Outcome: Progressing     Problem: Skin/Tissue Integrity  Goal: Absence of new skin breakdown  Description: 1.  Monitor for areas of redness and/or skin breakdown  2.  Assess vascular access sites hourly  3.  Every 4-6 hours minimum:  Change oxygen saturation probe site  4.  Every 4-6 hours:  If on nasal continuous

## 2025-01-12 NOTE — PROGRESS NOTES
ICU Progress Note    Admit Date: 1/10/2025  Day: 2  Vent Day: None  Diet: ADULT DIET; Regular    CC: Slurred speech, left facial droop, left sided weakness.     Interval history:   No overnight events. /98 this morning. BP goal is <130/90. Pt only having PRN Labetalol on board. Elevated BP can be due to anxiety, back pain and headache that patent is having. Patient take Xanax BID at home for anxiety, yesterday he got that last night. Will continue bid xanax. His headache 4/10 in severity.  PRN tylenol on board for pain control. Plan for getting CT head later today. Echo pending. Neurological exam non focal.  Patient not having any diarrhea, had a 1 formed stool yesterday, DC'd contact precaution.    HPI:   Mr. Gary Martinez is a 64 y.o. male with a medical hx significant for DVT (July 2024) on eliquis, left popliteal a. Aneurysm repair on 12/31, on baby ASA, hypothyroidism, diabetes otherwise as listed in the MHx table below, who transferred from [facility] ED after he found out to have M2 occlusion. Yesterday around between midnight and 2AM patient was at the station and started having sudden onset alteration of speech, his words kind of not making sense, and started having some left-sided weakness.  Patient called his neighbor to come and help him, they called EMS and they brought the patient to the [facility] ED.  On presentation at [facility] patient was having left facial weakness, having left upper and lower extremity weakness his NIH score at presentation was 9, initial CT head without contrast with no acute intracranial hemorrhage, however did show old infarct in posterior left frontal lobe.  CTA showed occlusion of posterior right M2 occlusion.  Patient underwent thrombectomy by Dr. Pickett, and his blood clot propagated to more distally in right M3 branch where it is nonocclusive, however restricting the flow, patient was started on heparin drip and transferred here at [facility] for close

## 2025-01-12 NOTE — PROGRESS NOTES
Brief note  Patient seen and examined  No complaints, doing well this evening    aPTT 34.8    Exam  -Mental status: A&O x4  -Memory: Recent & remote memory intact  -Attention: Normal  -Fund of Knowledge: Good  -Speech & Language: no aphasia; no dysarthria. Follows commands with ease.  -Cranial nerves: pupils 4mm->3mm bilaterally; EOMI, no nystagmus; sensation intact V1, V2, V3; no sig facial asymmetry; hearing intact to conversation; SCMs & trapezii intact bilaterally  -Sensory: intact to lt touch throughout; has sensory neglect on the left w/ simultaneous stimulation   -Motor: no pronator drift bilaterally   RUE: 5/5  RLE: 5/5  LUE: 5/5  LLE: 5/5   -Tone: Normal throughout  -Coordination: FNF intact on left, slightly clumsy on left  -Gait & Station: deferred for pt safety    Plan  - Continue low dose, no bolus heparin gtt  - Echo  - BP goal less than 130/80  - Will check anti Xa with next aPTT    Beatriz Liz NP  NCC

## 2025-01-12 NOTE — PROGRESS NOTES
Current NIHSS 1    Nursing Core Measures for Stroke:   [x]   Education template documentation (STROKE/TIA). Please select only risk factors that are applicable to patient when selecting risk factors.  [x]   Care Plan template documentation (Physiologic Instability - Neurosensory). Selecting this will add care plan rows to the flowsheet under the Neuro section of Head to Toe.  [x]   Verified Swallow Screen completed prior to PO intake of food, drink, medications>          Please verify correct medication route prior to administration for intubated patients, patients who can not swallow or have alternative routes of intake (NG, OG, TX), etc  [x]   VTE Prophylaxis: SCDs ordered/addressed; SCDs:  Heparin gtt            (As a reminder, ASA, Plavix, and TPA/TNK are not VTE prophylaxis.)    Reviewed the Following Education with Patient and/or Family:   - Personalized risk factors for patient, along with changes, modifications that will help prevent stroke.  - Signs and Symptoms of Stroke: (Facial droop, weakness/numbness especially on one side, speech difficulty, sudden confusion, sudden loss of vision, sudden severe headache, sudden loss of balance or having difficulty walking, syncope, or seizure)  - How to activate EMS (911)   - Importance of Follow Up Appointments at Discharge   - Importance of Compliance with Medications Prescribed at Discharge  - Available community resources and stroke advocacy groups if needed    Patient and/or family member: verbalized understanding.     Stroke Education booklet given to patient/family (or verified, if given already), which reviews above information. yes         Electronically signed by Erika Tamayo RN on 1/12/2025 at 6:06 PM

## 2025-01-13 LAB
ANION GAP SERPL CALCULATED.3IONS-SCNC: 11 MMOL/L (ref 3–16)
ANTI-XA UNFRAC HEPARIN: 0.61 IU/ML (ref 0.3–0.7)
ANTI-XA UNFRAC HEPARIN: <0.1 IU/ML (ref 0.3–0.7)
ANTI-XA UNFRAC HEPARIN: <0.1 IU/ML (ref 0.3–0.7)
ANTI-XA UNFRAC HEPARIN: >1.1 IU/ML (ref 0.3–0.7)
BUN SERPL-MCNC: 5 MG/DL (ref 7–20)
CALCIUM SERPL-MCNC: 8.7 MG/DL (ref 8.3–10.6)
CHLORIDE SERPL-SCNC: 98 MMOL/L (ref 99–110)
CO2 SERPL-SCNC: 26 MMOL/L (ref 21–32)
CREAT SERPL-MCNC: 0.8 MG/DL (ref 0.8–1.3)
DEPRECATED RDW RBC AUTO: 13.8 % (ref 12.4–15.4)
EST. AVERAGE GLUCOSE BLD GHB EST-MCNC: 122.6 MG/DL
GFR SERPLBLD CREATININE-BSD FMLA CKD-EPI: >90 ML/MIN/{1.73_M2}
GI PATHOGENS PNL STL NAA+PROBE: NORMAL
GLUCOSE SERPL-MCNC: 154 MG/DL (ref 70–99)
HBA1C MFR BLD: 5.9 %
HCT VFR BLD AUTO: 49.7 % (ref 40.5–52.5)
HGB BLD-MCNC: 16.8 G/DL (ref 13.5–17.5)
MCH RBC QN AUTO: 34.2 PG (ref 26–34)
MCHC RBC AUTO-ENTMCNC: 33.8 G/DL (ref 31–36)
MCV RBC AUTO: 101.1 FL (ref 80–100)
PLATELET # BLD AUTO: 250 K/UL (ref 135–450)
PMV BLD AUTO: 8.6 FL (ref 5–10.5)
POTASSIUM SERPL-SCNC: 3.6 MMOL/L (ref 3.5–5.1)
RBC # BLD AUTO: 4.92 M/UL (ref 4.2–5.9)
SODIUM SERPL-SCNC: 135 MMOL/L (ref 136–145)
WBC # BLD AUTO: 8.4 K/UL (ref 4–11)

## 2025-01-13 PROCEDURE — 6360000002 HC RX W HCPCS

## 2025-01-13 PROCEDURE — 2500000003 HC RX 250 WO HCPCS

## 2025-01-13 PROCEDURE — 6370000000 HC RX 637 (ALT 250 FOR IP)

## 2025-01-13 PROCEDURE — 85520 HEPARIN ASSAY: CPT

## 2025-01-13 PROCEDURE — 99291 CRITICAL CARE FIRST HOUR: CPT | Performed by: STUDENT IN AN ORGANIZED HEALTH CARE EDUCATION/TRAINING PROGRAM

## 2025-01-13 PROCEDURE — 85027 COMPLETE CBC AUTOMATED: CPT

## 2025-01-13 PROCEDURE — 80048 BASIC METABOLIC PNL TOTAL CA: CPT

## 2025-01-13 PROCEDURE — 2500000003 HC RX 250 WO HCPCS: Performed by: ANESTHESIOLOGY

## 2025-01-13 PROCEDURE — 1200000000 HC SEMI PRIVATE

## 2025-01-13 PROCEDURE — 36415 COLL VENOUS BLD VENIPUNCTURE: CPT

## 2025-01-13 RX ADMIN — SODIUM CHLORIDE, PRESERVATIVE FREE 10 ML: 5 INJECTION INTRAVENOUS at 19:25

## 2025-01-13 RX ADMIN — HEPARIN SODIUM 22 UNITS/KG/HR: 10000 INJECTION, SOLUTION INTRAVENOUS at 02:30

## 2025-01-13 RX ADMIN — OXYCODONE 5 MG: 5 TABLET ORAL at 16:36

## 2025-01-13 RX ADMIN — HEPARIN SODIUM 25 UNITS/KG/HR: 10000 INJECTION, SOLUTION INTRAVENOUS at 20:03

## 2025-01-13 RX ADMIN — SODIUM CHLORIDE, PRESERVATIVE FREE 10 ML: 5 INJECTION INTRAVENOUS at 07:49

## 2025-01-13 RX ADMIN — Medication: at 07:50

## 2025-01-13 RX ADMIN — ATORVASTATIN CALCIUM 80 MG: 80 TABLET, FILM COATED ORAL at 19:24

## 2025-01-13 RX ADMIN — ALPRAZOLAM 0.5 MG: 0.5 TABLET ORAL at 22:23

## 2025-01-13 RX ADMIN — LEVOTHYROXINE SODIUM 50 MCG: 0.05 TABLET ORAL at 07:49

## 2025-01-13 RX ADMIN — PANTOPRAZOLE SODIUM 40 MG: 40 INJECTION, POWDER, FOR SOLUTION INTRAVENOUS at 07:49

## 2025-01-13 RX ADMIN — Medication: at 19:25

## 2025-01-13 RX ADMIN — ALPRAZOLAM 0.5 MG: 0.5 TABLET ORAL at 10:39

## 2025-01-13 RX ADMIN — ACETAMINOPHEN 650 MG: 325 TABLET ORAL at 03:44

## 2025-01-13 RX ADMIN — ACETAMINOPHEN 650 MG: 325 TABLET ORAL at 19:24

## 2025-01-13 RX ADMIN — OXYCODONE 5 MG: 5 TABLET ORAL at 03:44

## 2025-01-13 ASSESSMENT — PAIN DESCRIPTION - LOCATION
LOCATION: HEAD
LOCATION: BACK
LOCATION: BACK;HEAD
LOCATION: BACK

## 2025-01-13 ASSESSMENT — PAIN - FUNCTIONAL ASSESSMENT
PAIN_FUNCTIONAL_ASSESSMENT: ACTIVITIES ARE NOT PREVENTED

## 2025-01-13 ASSESSMENT — PAIN SCALES - GENERAL
PAINLEVEL_OUTOF10: 7
PAINLEVEL_OUTOF10: 0
PAINLEVEL_OUTOF10: 3
PAINLEVEL_OUTOF10: 0
PAINLEVEL_OUTOF10: 3
PAINLEVEL_OUTOF10: 0
PAINLEVEL_OUTOF10: 0
PAINLEVEL_OUTOF10: 4
PAINLEVEL_OUTOF10: 8
PAINLEVEL_OUTOF10: 1

## 2025-01-13 ASSESSMENT — PAIN DESCRIPTION - DESCRIPTORS
DESCRIPTORS: ACHING
DESCRIPTORS: ACHING;DISCOMFORT
DESCRIPTORS: ACHING
DESCRIPTORS: ACHING

## 2025-01-13 ASSESSMENT — PAIN DESCRIPTION - ORIENTATION
ORIENTATION: MID;LOWER
ORIENTATION: MID;LOWER
ORIENTATION: RIGHT
ORIENTATION: MID

## 2025-01-13 ASSESSMENT — PAIN DESCRIPTION - FREQUENCY
FREQUENCY: CONTINUOUS
FREQUENCY: CONTINUOUS

## 2025-01-13 ASSESSMENT — PAIN DESCRIPTION - ONSET
ONSET: ON-GOING
ONSET: ON-GOING

## 2025-01-13 ASSESSMENT — PAIN DESCRIPTION - PAIN TYPE
TYPE: CHRONIC PAIN
TYPE: ACUTE PAIN
TYPE: ACUTE PAIN

## 2025-01-13 NOTE — CARE COORDINATION
Case Management Assessment           Daily Note                 Date/ Time of Note: 1/13/2025 3:18 PM         Note completed by: Nasir Horner RN    Patient Name: Gary Martinez  YOB: 1960    Diagnosis:Stroke (HCC) [I63.9]  Stroke (HCC) [I63.9]  Ischemic stroke (HCC) [I63.9]  Large vessel stroke (HCC) [I63.9]  Patient Admission Status: Inpatient  Date of Admission:1/10/2025  6:21 AM    Length of Stay: 3 GLOS: GMLOS: 2.8 Readmission Risk Score: Readmission Risk Score: 16.2    Current Plan of Care: nsgy consult, tte pending. Heparin gtt,  ________________________________________________________________________________________  PT AM-PAC: 24 / 24 per last evaluation on: 1/11    OT AM-PAC: 22 / 24 per last evaluation on: 1/11    DME Needs for discharge: none    ________________________________________________________________________________________  Discharge Plan: Home  Pre-cert required for SNF: Not Applicable  COVID Result:    Lab Results   Component Value Date/Time    COVID19 Not Detected 05/30/2021 02:30 PM       Transportation PLAN for discharge: family    Tentative discharge date: tbd    Potential assistance Purchasing Medications: Potential Assistance Purchasing Medications: No  Does Patient want to participate in local refill/ meds to beds program?:      Current barriers to discharge: Medical complications    Referrals completed:     Resources/ information provided:   ________________________________________________________________________________________  Case Management Notes:     Gary and his family were provided with choice of provider; he and his family are in agreement with the discharge plan.    Emergency Contacts:  Extended Emergency Contact Information  Primary Emergency Contact: Jeferson Baptiste  Home Phone: [phone]  Relation: Step Child    Care Transition Patient: No    IMM Status:      Nasir Horner RN  [facility]  Case Management Department  Ph: [phone]  Fax:

## 2025-01-13 NOTE — PROGRESS NOTES
Current NIHSS 1    Nursing Core Measures for Stroke:   [x]   Education template documentation (STROKE/TIA). Please select only risk factors that are applicable to patient when selecting risk factors.  [x]   Care Plan template documentation (Physiologic Instability - Neurosensory). Selecting this will add care plan rows to the flowsheet under the Neuro section of Head to Toe.  [x]   Verified Swallow Screen completed prior to PO intake of food, drink, medications>          Please verify correct medication route prior to administration for intubated patients, patients who can not swallow or have alternative routes of intake (NG, OG, NM), etc  [x]   VTE Prophylaxis: SCDs ordered/addressed; SCDs: N/A Heparin           (As a reminder, ASA, Plavix, and TPA/TNK are not VTE prophylaxis.)    Reviewed the Following Education with Patient and/or Family:   - Personalized risk factors for patient, along with changes, modifications that will help prevent stroke.  - Signs and Symptoms of Stroke: (Facial droop, weakness/numbness especially on one side, speech difficulty, sudden confusion, sudden loss of vision, sudden severe headache, sudden loss of balance or having difficulty walking, syncope, or seizure)  - How to activate EMS (911)   - Importance of Follow Up Appointments at Discharge   - Importance of Compliance with Medications Prescribed at Discharge  - Available community resources and stroke advocacy groups if needed    Patient and/or family member: verbalized understanding.     Stroke Education booklet given to patient/family (or verified, if given already), which reviews above information. yes         Electronically signed by Justen Sim RN on 1/12/2025 at 8:44 PM

## 2025-01-13 NOTE — PROGRESS NOTES
ICU Progress Note    Admit Date: 1/10/2025  Day: 3  Vent Day: 0  IV Access:Peripheral  IV Fluids:None  Vasopressors:None                Antibiotics: none  Diet: ADULT DIET; Regular    CC: slurred speech    Interval history:     S: feels well, at baseline. Ambulating w/o difficulty.   O:  no neuro deficits,   Labs: WBC 8.4, HgB 16.8. Xa subtherapeutic  A/P: Likely d/c from ICU. Reports to be at baseline. Will need further w/u for etiology of stroke per neuro.       HPI:     Mr. Gary Martinez is a 64 y.o. male with a medical hx significant for DVT (July 2024) on eliquis, left popliteal a. Aneurysm repair on 12/31, on baby ASA, hypothyroidism, diabetes otherwise as listed in the MHx table below, who transferred from [facility] ED after he found out to have M2 occlusion. Yesterday around between midnight and 2AM patient was at the station and started having sudden onset alteration of speech, his words kind of not making sense, and started having some left-sided weakness.  Patient called his neighbor to come and help him, they called EMS and they brought the patient to the [facility] ED.  On presentation at [facility] patient was having left facial weakness, having left upper and lower extremity weakness his NIH score at presentation was 9, initial CT head without contrast with no acute intracranial hemorrhage, however did show old infarct in posterior left frontal lobe.  CTA showed occlusion of posterior right M2 occlusion.  Patient underwent thrombectomy by Dr. Pickett, and his blood clot propagated to more distally in right M3 branch where it is nonocclusive, however restricting the flow, patient was started on heparin drip and transferred here at [facility] for close neurological monitoring.     On 1/10 morning, he was having left facial droop, left upper and lower extremity weakness and sensory deficits, patient's left sided hearing diminished compared to right.  Complaining of lower extremity and back

## 2025-01-13 NOTE — PROGRESS NOTES
V2.0    [facility] Progress Note      Name:  Gary Martinez /Age/Sex: 1960  (64 y.o. male)   MRN & CSN:  [id number] & [id number] Encounter Date/Time: 2025 10:55 AM EST   Location:  [facility]/4508-01 PCP: Tony Love MD     Attending:Nik Pimentel MD       Hospital Day: 4    Assessment and Recommendations   Gary Martinez is a 64 y.o. male with pmh of rior DVT, L popliteal anueyrsm s/p repair, hypothyroidism, Type II DM  who presents with Cerebrovascular accident (CVA) due to embolism of left middle cerebral artery (HCC)    #Acute CVA due to right M2 occlusion  Presents with slurred speech, left facial weakness, and left upper and lower extremity weakness. NIHSS 9. CTA right M2 thrombus. Not candidate for thrombolysis as patient on eliquis. No EVT as most of the MCA territory is open as thrombus likely propagated distally to a right M3 branch where it is non-occlusive but does restrict flow.  - NSGY consulted  - NCC consulted  - MRI brain completed confirmed acute CVA in MCA territory without hemorrhage  - TTE w/ bubble study pending  - A1c 5.9%, LDL 67  - cont. heparin drip  -- repeat CTA Head/Neck in coming days per Neuro rec  -Neuro checks  - SBP goal < 180              - PRN labetalol       #Hx of DVT LLE  - Pt with provoked DVT in 2024, after long distance driving   - On Eliquis since then  - Hold on Eliquis while on heparin gtt     #Hypothyroidism  - Cont. Home synthroid      Diet ADULT DIET; Regular   DVT Prophylaxis [] Lovenox, []  Heparin, [] SCDs, [] Ambulation,  [] Eliquis, [] Xarelto  [] Coumadin   Code Status Full Code   Disposition From: Home  Expected Disposition: TBD  Estimated Date of Discharge: ~2 days  Patient requires continued admission due to neuro monitoring   Surrogate Decision Maker/ SAVANNAH Baptiste     Personally reviewed Lab Studies and Imaging       Subjective:     Chief Complaint: Weakness    Gary Martinez is a 64 y.o. male with PMH of prior DVT, L popliteal anueyrsm  alert\" been called?->Yes Reason for exam:->Stroke Decision Support Exception - unselect if not a suspected or confirmed emergency medical condition->Emergency Medical Condition (MA) FINDINGS: CTA NECK: AORTIC ARCH/ARCH VESSELS: No dissection or arterial injury.  No significant stenosis of the brachiocephalic or subclavian arteries. CAROTID ARTERIES: No dissection, arterial injury, or hemodynamically significant stenosis by NASCET criteria. VERTEBRAL ARTERIES: No dissection, arterial injury, or significant stenosis. SOFT TISSUES: Centrilobular emphysema in both lungs more prominent in the left upper lobe.  The.  No cervical or superior mediastinal lymphadenopathy. The larynx and pharynx are unremarkable.  No acute abnormality of the salivary and thyroid glands. BONES: No acute osseous abnormality. CTA HEAD: ANTERIOR CIRCULATION: There is occlusion of the posterior M2 division of the right MCA. POSTERIOR CIRCULATION: No significant stenosis of the basilar or posterior cerebral arteries. No aneurysm. OTHER: No dural venous sinus thrombosis on this non-dedicated study. BRAIN: No mass effect or midline shift. No extra-axial fluid collection. The gray-white differentiation is maintained.     1. Occlusion of the posterior M2 division of the right MCA. 2. Centrilobular emphysema in both lungs more prominent in the left upper lobe.     CT HEAD WO CONTRAST    Addendum Date: 1/10/2025    ADDENDUM: Preliminary results were relayed to Dr. Yarelis Sepulveda by Kelsy Link at 2:59 a.m. on 01/10/2025.     Result Date: 1/10/2025  EXAMINATION: CT OF THE HEAD WITHOUT CONTRAST  1/10/2025 2:40 am TECHNIQUE: CT of the head was performed without the administration of intravenous contrast. Automated exposure control, iterative reconstruction, and/or weight based adjustment of the mA/kV was utilized to reduce the radiation dose to as low as reasonably achievable. COMPARISON: 01/19/2010 HISTORY: ORDERING SYSTEM PROVIDED HISTORY: Stroke

## 2025-01-13 NOTE — PROGRESS NOTES
Speech-Language Pathology    Attempted to see patient for therapy follow-up, however pt fatigued and trying to nap. D/w RN; reports pt has been tolerating diet well, no new concerns. Will plan to re-attempt at later time as able.    Darshana Ronquillo, SLP, [id number]  Ph. # [phone]

## 2025-01-13 NOTE — PROGRESS NOTES
Brief note  Patient seen and examined  No complaints, doing well this evening    Anti Xa > 1.10    Head CT 1/12/25 15:38  1. Acute ischemic infarct is identified within the posterior cortex of the right insular lobe.  2. No evidence of any intracranial hemorrhage.  3. Remote cortical infarct is present within the left frontal lobe.    CT C/A/P  Colonic diverticulosis without diverticulitis.  2.  Fatty infiltration of the liver.  3. Apical emphysema. No acute infiltrates.  4. Air within the urinary bladder uncertain significance.    Exam  -Mental status: A&O x4  -Memory: Recent & remote memory intact  -Attention: Normal  -Fund of Knowledge: Good  -Speech & Language: no aphasia; no dysarthria. Follows commands with ease.  -Cranial nerves: pupils 4mm->3mm bilaterally; EOMI, no nystagmus; no sig facial asymmetry; hearing intact to conversatio  -Sensory: intact to lt touch throughout; has sensory neglect on the left w/ simultaneous stimulation   -Motor: no pronator drift bilaterally   RUE: 5/5  RLE: 5/5  LUE: 5/5  LLE: 5/5   -Tone: Normal throughout  -Coordination: FNF intact on left, slightly clumsy on left  -Gait & Station: deferred for pt safety    Plan  - Continue low dose, no bolus heparin gtt  - Echo  - BP goal less than 130/80 mmHg  - OK for q4h neuro checks   - CT-A later this week    Beatriz Liz NP  NCC

## 2025-01-13 NOTE — PLAN OF CARE
Problem: Chronic Conditions and Co-morbidities  Goal: Patient's chronic conditions and co-morbidity symptoms are monitored and maintained or improved  1/12/2025 2319 by Justen Sim RN  Outcome: Progressing  1/12/2025 1806 by Erika Tamayo RN  Outcome: Progressing  Flowsheets (Taken 1/12/2025 0800)  Care Plan - Patient's Chronic Conditions and Co-Morbidity Symptoms are Monitored and Maintained or Improved:   Monitor and assess patient's chronic conditions and comorbid symptoms for stability, deterioration, or improvement   Collaborate with multidisciplinary team to address chronic and comorbid conditions and prevent exacerbation or deterioration     Problem: Discharge Planning  Goal: Discharge to home or other facility with appropriate resources  1/12/2025 2319 by Justen Sim RN  Outcome: Progressing  1/12/2025 1806 by Erika Tamayo RN  Outcome: Progressing  Flowsheets (Taken 1/12/2025 0800)  Discharge to home or other facility with appropriate resources:   Arrange for needed discharge resources and transportation as appropriate   Identify discharge learning needs (meds, wound care, etc)     Problem: Safety - Adult  Goal: Free from fall injury  1/12/2025 2319 by Justen Sim RN  Outcome: Progressing  1/12/2025 1806 by Erika Tamayo RN  Outcome: Progressing  Flowsheets (Taken 1/12/2025 0832)  Free From Fall Injury:   Instruct family/caregiver on patient safety   Based on caregiver fall risk screen, instruct family/caregiver to ask for assistance with transferring infant if caregiver noted to have fall risk factors     Problem: Neurosensory - Adult  Goal: Achieves stable or improved neurological status  1/12/2025 2319 by Justen Sim RN  Outcome: Progressing  1/12/2025 1806 by Erika Tamayo RN  Outcome: Progressing  Flowsheets (Taken 1/12/2025 0800)  Achieves stable or improved neurological status:   Assess for and report changes in neurological status   Initiate measures to prevent increased

## 2025-01-13 NOTE — CONSULTS
[facility]    Consult Note      Requesting Physician:  Kwaku Delcid     CHIEF COMPLAINT:  stroke       HISTORY OF PRESENT ILLNESS:    Mr. Martinez  is a 64 y.o. male we are seeing in consultation for Eliquis Failure.      He has a history of what was felt to be a provoked DVT/PE following a car trip to [place] in August 2024. He was initially treated with Heparin gtt and transitioned to Eliquis.     The patient was at a gas station sometime between midnight and 2 am on the morning of 1/10/25 when he developed difficulty with speech and coordination and called EMS who brought the patient to [facility] ED. There the patient was seen after arrival at ~0230 with slurred speech, left facial weakness, and left upper and lower extremity weakness. NIHSS at that time was 9. CT revealed a right M2 occlusion. He did not receive thrombolysis due to being on Eliquis and he was then transferred to [facility] for thrombectomy.     He was recently off of his Eliquis x 48 hours due to L popliteal aneurysm repair by Dr. Traylor on 12/31/24. Following the procedure he was on a Heparin gtt and Lovenox . He tells me that he resumed his Eliquis and he thinks he has been taking this twice daily. He states \"I'm pretty good with my medications.\"     He is feeling much better since admission.          Past Medical History:  Past Medical History:   Diagnosis Date    Anxiety     Cancer (HCC)     head/neck/tongue    Chronic back pain     Diabetes mellitus (HCC)     Erectile dysfunction     Headache(784.0)     Osteoarthritis     PONV (postoperative nausea and vomiting)     Restless legs syndrome     Urinary incontinence     Wears dentures        Past Surgical History:  Past Surgical History:   Procedure Laterality Date    CHOLECYSTECTOMY, LAPAROSCOPIC N/A 08/10/2021    LAPAROSCOPIC CHOLECYSTECTOMY WITH CHOLANGIOGRAM  Quit date: 10/1/2022     Years since quittin.2    Smokeless tobacco: Never   Vaping Use    Vaping status: Never Used   Substance and Sexual Activity    Alcohol use: Yes     Comment: few beers/week    Drug use: Yes     Types: Marijuana (Weed)     Comment: rarely- told to hold for surgery    Sexual activity: Never   Other Topics Concern    Not on file   Social History Narrative    Not on file     Social Determinants of Health     Financial Resource Strain: Not on file   Food Insecurity: No Food Insecurity (1/10/2025)    Hunger Vital Sign     Worried About Running Out of Food in the Last Year: Never true     Ran Out of Food in the Last Year: Never true   Transportation Needs: Unmet Transportation Needs (1/10/2025)    PRAPARE - Transportation     Lack of Transportation (Medical): Yes     Lack of Transportation (Non-Medical): Yes   Physical Activity: Not on file   Stress: Not on file   Social Connections: Not on file   Intimate Partner Violence: Unknown (2024)    Received from [employer] [facility] [employer] [facility] [employer]    Interpersonal Safety     Feel physically or emotionally unsafe where currently live: Not on file     Harm by anyone: Not on file     Emotionally Harmed: Not on file   Housing Stability: Unknown (1/10/2025)    Housing Stability Vital Sign     Unable to Pay for Housing in the Last Year: Patient unable to answer     Number of Times Moved in the Last Year: 1     Homeless in the Last Year: No          Family History:  Family History   Problem Relation Age of Onset    Heart Disease Mother     High Blood Pressure Mother     Heart Disease Father     High Blood Pressure Father        REVIEW OF SYSTEMS:    Review of Systems   Constitutional:  Positive for fatigue.         PHYSICAL EXAM:      Vitals:  Patient Vitals for the past 24 hrs:   BP Temp Temp src Pulse Resp SpO2 Weight   25 1000 125/84 -- -- 72 -- 90 % --   25 0900 130/78 -- -- 68 -- 92 % --

## 2025-01-13 NOTE — PROGRESS NOTES
Current NIHSS 1     Nursing Core Measures for Stroke:   [x]   Education template documentation (STROKE/TIA). Please select only risk factors that are applicable to patient when selecting risk factors.  [x]   Care Plan template documentation (Physiologic Instability - Neurosensory). Selecting this will add care plan rows to the flowsheet under the Neuro section of Head to Toe.  [x]   Verified Swallow Screen completed prior to PO intake of food, drink, medications>          Please verify correct medication route prior to administration for intubated patients, patients who can not swallow or have alternative routes of intake (NG, OG, MN), etc  [x]   VTE Prophylaxis: SCDs ordered/addressed; SCDs: N/A Heparin           (As a reminder, ASA, Plavix, and TPA/TNK are not VTE prophylaxis.)    Reviewed the Following Education with Patient and/or Family:   - Personalized risk factors for patient, along with changes, modifications that will help prevent stroke.  - Signs and Symptoms of Stroke: (Facial droop, weakness/numbness especially on one side, speech difficulty, sudden confusion, sudden loss of vision, sudden severe headache, sudden loss of balance or having difficulty walking, syncope, or seizure)  - How to activate EMS (911)   - Importance of Follow Up Appointments at Discharge   - Importance of Compliance with Medications Prescribed at Discharge  - Available community resources and stroke advocacy groups if needed    Patient and/or family member: verbalized understanding.     Stroke Education booklet given to patient/family (or verified, if given already), which reviews above information. yes         Electronically signed by Nico Mendoza RN on 1/13/2025 at 6:53 PM

## 2025-01-13 NOTE — PROGRESS NOTES
Received a critical lab result from hematology lab. Anti XA >1.1. ICU MD made aware. Another Anti XA sample sent for confirmation (taken from venipuncture). Heparin gtt held for 60 minutes as per order.

## 2025-01-13 NOTE — PROGRESS NOTES
NEUROCRITICAL CARE PROGRESS NOTE       Patient Name: Gary Martinez YOB: 1960   Sex: Male Age: 64 yrs     CC / Reason for Consult: R MCA stroke    Changes over last 24 hours:   PTT has been bouncing between subtherapeutic and supratherapeutic  MRI Brain completed 1/11, with patchy areas of acute infarct in the R parietotemporal lobe with chronic infarcts in the L lateral frontal lobe and small infarcts in bilateral cerebellum    ROS: Feels well, no complaints    ASSESSMENT & RECOMMENDATIONS   Assessment:  63 yo patient with DVT/PE on apixaban for 1 month who presented to the ER with acute onset dysarthria and L sided weakness found to have R M2 occlusion on CTA which on angiogram was resolved but with small M3 branch nonocclusive thrombus, on a heparin gtt. Unclear the mechanism of embolus, as he was compliant with his eliquis. The etiology of his DVT/PE are unclear. He does relay to me that he had some vascular procedure on his leg at the beginning of the year (Repair of left popliteal artery aneurysm via posterior approach with interpositioned reverse greater saphenous vein graft). Will need further evaluation with ECHO with bubble study and consideration of possible eliquis failure.    Patient stated he has been taking Eliquis at home, his aXa and INR was normal. Confirmed with nurse at bedside that previous Coag labs were obtained from the port. Coag obtained from peripherals were under  < 0.1. Suspect patient has been subtherapeutic for the majority time.       Plan:  - Continue heparin gtt   MRI Brain with acute stroke, no hemorrhage  -Neuro q1   -HaemOnc Consult for Eliquis failure   - ECHO with bubble study  - No ASA for now since on heparin gtt  - Continue statin  - Maintain good secondary prevention of stroke measures including normotension (SBP < 130 mmHg AND DBP < 90 mmHg); HbA1c <7%; LDL < 70 mg/dL  - PT/OT/speech    Case discussed with Dr. Marquis, bedside RN, patient, ICU  team  Mariano Avitia DO   NEUROCRITICAL CARE  1/13/2025 12:53 PM  [facility]: [facility] NEUROCRITICAL CARE  HISTORY   Presented on 1/10 with L sided weakness and word finding difficulty  Initial HPI: Gary Martinez is a 64 y.o. man with with DVT/PE on apixaban; DM; hypothyroidism; smoking.  Presented to ER with abrupt onset of dysarthria, difficulty with word finding, bilateral hand/numbness, and left sided weakness. He was found to have a right M2 occlusion on CTA and old left frontal stroke and right lacunar stroke on head CT. He was unable to get TNK due to being on apixaban. He was transferred here for EVT but angiogram revealed no M2 occlusion but a smaller M3 branch of the inferior division with a nonocclusive thrombus. This was not amenable to EVT. He was therefore started on heparin gtt.     Currently, he feels that he still has some weakness on his left side but does not feel his speech/language is still a problem.     Pertinent home medications:  Eliquis    Interval history:  As above    PMH Past Medical History:   Diagnosis Date    Anxiety     Cancer (HCC)     head/neck/tongue    Chronic back pain     Diabetes mellitus (HCC)     Erectile dysfunction     Headache(784.0)     Osteoarthritis     PONV (postoperative nausea and vomiting)     Restless legs syndrome     Urinary incontinence     Wears dentures       Allergies Allergies   Allergen Reactions    Ativan [Lorazepam] Other (See Comments)     Post op overdose-caused heart to stop, CPR administered    Doxycycline Rash    Zoloft [Sertraline Hcl] Diarrhea, Nausea And Vomiting and Other (See Comments)     diarrhea and stomach distress per pt      Diet ADULT DIET; Regular   Isolation Airborne, Contact, Droplet, Neutropenic, Contact and Airborne, Airborne, Droplet and Contact , Droplet Plus, Enteric Contact, Contact and Droplet     CURRENT SCHEDULED MEDICATIONS   Inpatient Medications     sodium chloride flush, 5-40 mL, IntraVENous, 2 times per day

## 2025-01-13 NOTE — PLAN OF CARE
Problem: Chronic Conditions and Co-morbidities  Goal: Patient's chronic conditions and co-morbidity symptoms are monitored and maintained or improved  Outcome: Progressing     Problem: Discharge Planning  Goal: Discharge to home or other facility with appropriate resources  Outcome: Progressing     Problem: Safety - Adult  Goal: Free from fall injury  Outcome: Progressing     Problem: Neurosensory - Adult  Goal: Achieves stable or improved neurological status  Outcome: Progressing     Problem: Neurosensory - Adult  Goal: Achieves maximal functionality and self care  Outcome: Progressing     Problem: Skin/Tissue Integrity  Goal: Absence of new skin breakdown  Description: 1.  Monitor for areas of redness and/or skin breakdown  2.  Assess vascular access sites hourly  3.  Every 4-6 hours minimum:  Change oxygen saturation probe site  4.  Every 4-6 hours:  If on nasal continuous positive airway pressure, respiratory therapy assess nares and determine need for appliance change or resting period.  Outcome: Progressing     Problem: ABCDS Injury Assessment  Goal: Absence of physical injury  Outcome: Progressing     Problem: Pain  Goal: Verbalizes/displays adequate comfort level or baseline comfort level  Outcome: Progressing  Flowsheets (Taken 1/13/2025 0800)  Verbalizes/displays adequate comfort level or baseline comfort level: Assess pain using appropriate pain scale

## 2025-01-14 ENCOUNTER — APPOINTMENT (OUTPATIENT)
Dept: CT IMAGING | Age: 65
End: 2025-01-14
Payer: MEDICAID

## 2025-01-14 ENCOUNTER — APPOINTMENT (OUTPATIENT)
Age: 65
End: 2025-01-14
Payer: MEDICAID

## 2025-01-14 LAB
ANTI-XA UNFRAC HEPARIN: 0.35 IU/ML (ref 0.3–0.7)
ANTI-XA UNFRAC HEPARIN: 0.49 IU/ML (ref 0.3–0.7)
ANTI-XA UNFRAC HEPARIN: 0.69 IU/ML (ref 0.3–0.7)
ECHO AO ASC DIAM: 3.6 CM
ECHO AO ASCENDING AORTA INDEX: 2.02 CM/M2
ECHO AO ROOT DIAM: 3.5 CM
ECHO AO ROOT INDEX: 1.97 CM/M2
ECHO AV AREA PEAK VELOCITY: 2.1 CM2
ECHO AV AREA VTI: 2.2 CM2
ECHO AV AREA/BSA PEAK VELOCITY: 1.2 CM2/M2
ECHO AV AREA/BSA VTI: 1.2 CM2/M2
ECHO AV MEAN GRADIENT: 4 MMHG
ECHO AV MEAN VELOCITY: 1 M/S
ECHO AV PEAK GRADIENT: 8 MMHG
ECHO AV PEAK VELOCITY: 1.4 M/S
ECHO AV VELOCITY RATIO: 0.64
ECHO AV VTI: 25.7 CM
ECHO BSA: 1.77 M2
ECHO IVC PROX: 1.7 CM
ECHO LA AREA 2C: 14.2 CM2
ECHO LA AREA 4C: 13.8 CM2
ECHO LA DIAMETER INDEX: 1.97 CM/M2
ECHO LA DIAMETER: 3.5 CM
ECHO LA MAJOR AXIS: 4.7 CM
ECHO LA MINOR AXIS: 5.1 CM
ECHO LA TO AORTIC ROOT RATIO: 1
ECHO LA VOL BP: 32 ML (ref 18–58)
ECHO LA VOL MOD A2C: 31 ML (ref 18–58)
ECHO LA VOL MOD A4C: 30 ML (ref 18–58)
ECHO LA VOL/BSA BIPLANE: 18 ML/M2 (ref 16–34)
ECHO LA VOLUME INDEX MOD A2C: 17 ML/M2 (ref 16–34)
ECHO LA VOLUME INDEX MOD A4C: 17 ML/M2 (ref 16–34)
ECHO LV E' LATERAL VELOCITY: 8.59 CM/S
ECHO LV E' SEPTAL VELOCITY: 7.4 CM/S
ECHO LV EDV A2C: 46 ML
ECHO LV EDV A4C: 80 ML
ECHO LV EDV INDEX A4C: 45 ML/M2
ECHO LV EDV NDEX A2C: 26 ML/M2
ECHO LV EF PHYSICIAN: 55 %
ECHO LV EJECTION FRACTION A4C: 57 %
ECHO LV ESV A4C: 35 ML
ECHO LV ESV INDEX A4C: 20 ML/M2
ECHO LV FRACTIONAL SHORTENING: 24 % (ref 28–44)
ECHO LV INTERNAL DIMENSION DIASTOLE INDEX: 2.53 CM/M2
ECHO LV INTERNAL DIMENSION DIASTOLIC: 4.5 CM (ref 4.2–5.9)
ECHO LV INTERNAL DIMENSION SYSTOLIC INDEX: 1.91 CM/M2
ECHO LV INTERNAL DIMENSION SYSTOLIC: 3.4 CM
ECHO LV IVSD: 1 CM (ref 0.6–1)
ECHO LV MASS 2D: 164 G (ref 88–224)
ECHO LV MASS INDEX 2D: 92.1 G/M2 (ref 49–115)
ECHO LV POSTERIOR WALL DIASTOLIC: 1.1 CM (ref 0.6–1)
ECHO LV RELATIVE WALL THICKNESS RATIO: 0.49
ECHO LVOT AREA: 3.5 CM2
ECHO LVOT AV VTI INDEX: 0.63
ECHO LVOT DIAM: 2.1 CM
ECHO LVOT MEAN GRADIENT: 1 MMHG
ECHO LVOT PEAK GRADIENT: 3 MMHG
ECHO LVOT PEAK VELOCITY: 0.9 M/S
ECHO LVOT STROKE VOLUME INDEX: 31.7 ML/M2
ECHO LVOT SV: 56.4 ML
ECHO LVOT VTI: 16.3 CM
ECHO MV A VELOCITY: 0.98 M/S
ECHO MV E DECELERATION TIME (DT): 354 MS
ECHO MV E VELOCITY: 0.66 M/S
ECHO MV E/A RATIO: 0.67
ECHO MV E/E' LATERAL: 7.68
ECHO MV E/E' RATIO (AVERAGED): 8.3
ECHO MV E/E' SEPTAL: 8.92
ECHO RV FREE WALL PEAK S': 11.9 CM/S
ECHO RV INTERNAL DIMENSION: 2.7 CM
ECHO RV TAPSE: 1.8 CM (ref 1.7–?)

## 2025-01-14 PROCEDURE — 93306 TTE W/DOPPLER COMPLETE: CPT | Performed by: INTERNAL MEDICINE

## 2025-01-14 PROCEDURE — 93306 TTE W/DOPPLER COMPLETE: CPT

## 2025-01-14 PROCEDURE — 6370000000 HC RX 637 (ALT 250 FOR IP)

## 2025-01-14 PROCEDURE — 36415 COLL VENOUS BLD VENIPUNCTURE: CPT

## 2025-01-14 PROCEDURE — 70450 CT HEAD/BRAIN W/O DYE: CPT

## 2025-01-14 PROCEDURE — 6360000002 HC RX W HCPCS

## 2025-01-14 PROCEDURE — 6360000002 HC RX W HCPCS: Performed by: STUDENT IN AN ORGANIZED HEALTH CARE EDUCATION/TRAINING PROGRAM

## 2025-01-14 PROCEDURE — 85520 HEPARIN ASSAY: CPT

## 2025-01-14 PROCEDURE — 2060000000 HC ICU INTERMEDIATE R&B

## 2025-01-14 PROCEDURE — 2500000003 HC RX 250 WO HCPCS

## 2025-01-14 PROCEDURE — 92523 SPEECH SOUND LANG COMPREHEN: CPT

## 2025-01-14 PROCEDURE — 92507 TX SP LANG VOICE COMM INDIV: CPT

## 2025-01-14 RX ORDER — ENOXAPARIN SODIUM 100 MG/ML
60 INJECTION SUBCUTANEOUS 2 TIMES DAILY
Status: DISCONTINUED | OUTPATIENT
Start: 2025-01-14 | End: 2025-01-16 | Stop reason: HOSPADM

## 2025-01-14 RX ORDER — WARFARIN SODIUM 5 MG/1
5 TABLET ORAL DAILY
Status: DISCONTINUED | OUTPATIENT
Start: 2025-01-14 | End: 2025-01-16 | Stop reason: DRUGHIGH

## 2025-01-14 RX ADMIN — ALPRAZOLAM 0.5 MG: 0.5 TABLET ORAL at 09:09

## 2025-01-14 RX ADMIN — ATORVASTATIN CALCIUM 80 MG: 80 TABLET, FILM COATED ORAL at 20:41

## 2025-01-14 RX ADMIN — OXYCODONE 5 MG: 5 TABLET ORAL at 16:34

## 2025-01-14 RX ADMIN — PANTOPRAZOLE SODIUM 40 MG: 40 INJECTION, POWDER, FOR SOLUTION INTRAVENOUS at 09:02

## 2025-01-14 RX ADMIN — ENOXAPARIN SODIUM 60 MG: 100 INJECTION SUBCUTANEOUS at 20:41

## 2025-01-14 RX ADMIN — ONDANSETRON 4 MG: 4 TABLET, ORALLY DISINTEGRATING ORAL at 07:08

## 2025-01-14 RX ADMIN — WARFARIN SODIUM 5 MG: 5 TABLET ORAL at 18:37

## 2025-01-14 RX ADMIN — LEVOTHYROXINE SODIUM 50 MCG: 0.05 TABLET ORAL at 07:39

## 2025-01-14 RX ADMIN — Medication: at 09:10

## 2025-01-14 RX ADMIN — SODIUM CHLORIDE, PRESERVATIVE FREE 10 ML: 5 INJECTION INTRAVENOUS at 21:26

## 2025-01-14 RX ADMIN — HEPARIN SODIUM 23 UNITS/KG/HR: 10000 INJECTION, SOLUTION INTRAVENOUS at 12:48

## 2025-01-14 RX ADMIN — Medication: at 21:25

## 2025-01-14 RX ADMIN — ALPRAZOLAM 0.5 MG: 0.5 TABLET ORAL at 18:38

## 2025-01-14 ASSESSMENT — PAIN SCALES - GENERAL
PAINLEVEL_OUTOF10: 2
PAINLEVEL_OUTOF10: 4
PAINLEVEL_OUTOF10: 0
PAINLEVEL_OUTOF10: 7

## 2025-01-14 ASSESSMENT — PAIN DESCRIPTION - ORIENTATION
ORIENTATION: MID;LOWER
ORIENTATION: MID;LOWER

## 2025-01-14 ASSESSMENT — PAIN DESCRIPTION - DESCRIPTORS
DESCRIPTORS: ACHING;DISCOMFORT
DESCRIPTORS: ACHING;DISCOMFORT

## 2025-01-14 ASSESSMENT — PAIN DESCRIPTION - LOCATION
LOCATION: BACK
LOCATION: BACK

## 2025-01-14 ASSESSMENT — PAIN DESCRIPTION - PAIN TYPE: TYPE: CHRONIC PAIN

## 2025-01-14 NOTE — PROGRESS NOTES
Speech Language Pathology  Facility/Department:[facility] ICU  Dysphagia Treatment / Cognitive Communication Assessment &  DISCHARGE                                                     Name: Gary Martinez  : 1960  MRN: [id number]    Patient Diagnosis(es):   Patient Active Problem List    Diagnosis Date Noted    S/P colectomy 2022    Sigmoid diverticulitis 2022    Colovesical fistula 2022    Cerebrovascular accident (CVA) due to embolism of left middle cerebral artery ([facility]) 01/10/2025    On continuous oral anticoagulation 01/10/2025    Cerebrovascular accident (CVA) ([facility]) 01/10/2025    Peripheral vascular disease ([facility]) 2024    Popliteal artery aneurysm ([facility]) 2024    Popliteal aneurysm ([facility]) 2024    Acute deep vein thrombosis (DVT) of femoral vein of left lower extremity ([facility]) 2024    Aneurysm of left popliteal artery ([facility]) 2024    Pulmonary embolism, bilateral ([facility]) 2024    Cervical stenosis of spine 2024    Rotator cuff tendonitis, right 2024    Rotator cuff tendonitis, left 2024    Anterolisthesis of cervical spine 2024    Cervical spondylosis 2024    Acute on chronic cholecystitis 08/10/2021    Diverticulitis of intestine with abscess without bleeding     Moderate malnutrition ([facility]) 06/10/2021    Diverticulitis 2021    Abdominal cramping 2015    Mucous in stools 2015    Elevated WBC count 2014    H/O tongue cancer 2014    Rash and nonspecific skin eruption 2014    Urinary incontinence 2014     Past Medical History:   Diagnosis Date    Anxiety     Cancer (HCC)     head/neck/tongue    Chronic back pain     Diabetes mellitus (HCC)     Erectile dysfunction     Headache(784.0)     Osteoarthritis     PONV (postoperative nausea and vomiting)     Restless legs syndrome     Urinary incontinence     Wears dentures      Past Surgical History:   Procedure Laterality Date    CHOLECYSTECTOMY,

## 2025-01-14 NOTE — PLAN OF CARE
Problem: Chronic Conditions and Co-morbidities  Goal: Patient's chronic conditions and co-morbidity symptoms are monitored and maintained or improved  1/13/2025 2044 by Kaylyn Greene, RN  Outcome: Progressing  Flowsheets (Taken 1/13/2025 2000)  Care Plan - Patient's Chronic Conditions and Co-Morbidity Symptoms are Monitored and Maintained or Improved:   Monitor and assess patient's chronic conditions and comorbid symptoms for stability, deterioration, or improvement   Collaborate with multidisciplinary team to address chronic and comorbid conditions and prevent exacerbation or deterioration   Update acute care plan with appropriate goals if chronic or comorbid symptoms are exacerbated and prevent overall improvement and discharge     Problem: Discharge Planning  Goal: Discharge to home or other facility with appropriate resources  1/13/2025 2044 by Kaylyn Greene, RN  Outcome: Progressing  Flowsheets (Taken 1/13/2025 2000)  Discharge to home or other facility with appropriate resources:   Identify barriers to discharge with patient and caregiver   Arrange for needed discharge resources and transportation as appropriate   Identify discharge learning needs (meds, wound care, etc)   Refer to discharge planning if patient needs post-hospital services based on physician order or complex needs related to functional status, cognitive ability or social support system     Problem: Safety - Adult  Goal: Free from fall injury  1/13/2025 2044 by Kaylyn Greene, RN  Outcome: Progressing  Flowsheets (Taken 1/13/2025 2041)  Free From Fall Injury: Instruct family/caregiver on patient safety     Problem: Neurosensory - Adult  Goal: Achieves stable or improved neurological status  1/13/2025 2044 by Kaylyn Greene, RN  Outcome: Progressing  Flowsheets (Taken 1/13/2025 2000)  Achieves stable or improved neurological status:   Assess for and report changes in neurological status   Initiate measures to prevent increased

## 2025-01-14 NOTE — PLAN OF CARE
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 Unclear the mechanism of embolus, as he was compliant with his eliquis. The etiology of his DVT/PE are unclear. He does relay to me that he had some vascular procedure on his leg at the beginning of the year (Repair of left popliteal artery aneurysm via posterior approach with interpositioned reverse greater saphenous vein graft). Will need further evaluation with ECHO with bubble study and consideration of possible eliquis failure.     Patient stated he has been taking Eliquis at home, his aXa and INR was normal. Confirmed with nurse at bedside that previous Coag labs were obtained from the port. Coag obtained from peripherals were under  < 0.1. Suspect patient has been subtherapeutic for the majority time.       No complaints at time of exam  On heparin gtt  Most recent anti Xa 0.61    PHYSICAL EXAM:  Vitals:    01/13/25 1804 01/13/25 1834 01/13/25 1914 01/13/25 2000   BP: 139/89  (!) 147/95 126/83   Pulse: 65 65 61 56   Resp:   19    Temp:    97.5 °F (36.4 °C)   TempSrc:    Oral   SpO2: 94%  100% 95%   Weight:       Height:             General: Alert, no distress, well-nourished  Neurologic  Mental status:   orientation to person, place, time, situation   Attention intact as able to attend well to the exam     Language fluent in conversation   Comprehension intact; follows simple commands    Cranial nerves:   CN2: Visual fields full w/o extinction on confrontational testing   CN 3,4,6: Pupils equal and reactive to light, extraocular muscles intact  CN5: Facial sensation symmetric   CN7: Face symmetric  CN8: Hearing symmetric to spoken voice  CN9: Palate elevated symmetrically  CN11: Traps full strength on shoulder shrug  CN12: Tongue midline with

## 2025-01-14 NOTE — PLAN OF CARE
Problem: Chronic Conditions and Co-morbidities  Goal: Patient's chronic conditions and co-morbidity symptoms are monitored and maintained or improved  Outcome: Progressing     Problem: Discharge Planning  Goal: Discharge to home or other facility with appropriate resources  Outcome: Progressing     Problem: Safety - Adult  Goal: Free from fall injury  Outcome: Progressing     Problem: Neurosensory - Adult  Goal: Achieves stable or improved neurological status  Outcome: Progressing     Problem: Neurosensory - Adult  Goal: Achieves maximal functionality and self care  Outcome: Progressing     Problem: Skin/Tissue Integrity  Goal: Absence of new skin breakdown  Description: 1.  Monitor for areas of redness and/or skin breakdown  2.  Assess vascular access sites hourly  3.  Every 4-6 hours minimum:  Change oxygen saturation probe site  4.  Every 4-6 hours:  If on nasal continuous positive airway pressure, respiratory therapy assess nares and determine need for appliance change or resting period.  Outcome: Progressing     Problem: ABCDS Injury Assessment  Goal: Absence of physical injury  Outcome: Progressing     Problem: Pain  Goal: Verbalizes/displays adequate comfort level or baseline comfort level  Outcome: Progressing  Flowsheets  Taken 1/14/2025 1200  Verbalizes/displays adequate comfort level or baseline comfort level: Assess pain using appropriate pain scale  Taken 1/14/2025 0800  Verbalizes/displays adequate comfort level or baseline comfort level: Assess pain using appropriate pain scale

## 2025-01-14 NOTE — PROGRESS NOTES
Current NIHSS 1    Nursing Core Measures for Stroke:   [x]   Education template documentation (STROKE/TIA). Please select only risk factors that are applicable to patient when selecting risk factors.  [x]   Care Plan template documentation (Physiologic Instability - Neurosensory). Selecting this will add care plan rows to the flowsheet under the Neuro section of Head to Toe.  [x]   Verified Swallow Screen completed prior to PO intake of food, drink, medications>          Please verify correct medication route prior to administration for intubated patients, patients who can not swallow or have alternative routes of intake (NG, OG, MT), etc  [x]   VTE Prophylaxis: SCDs ordered/addressed; SCDs: N/A Heparin           (As a reminder, ASA, Plavix, and TPA/TNK are not VTE prophylaxis.)    Reviewed the Following Education with Patient and/or Family:   - Personalized risk factors for patient, along with changes, modifications that will help prevent stroke.  - Signs and Symptoms of Stroke: (Facial droop, weakness/numbness especially on one side, speech difficulty, sudden confusion, sudden loss of vision, sudden severe headache, sudden loss of balance or having difficulty walking, syncope, or seizure)  - How to activate EMS (911)   - Importance of Follow Up Appointments at Discharge   - Importance of Compliance with Medications Prescribed at Discharge  - Available community resources and stroke advocacy groups if needed    Patient and/or family member: verbalized understanding.     Stroke Education booklet given to patient/family (or verified, if given already), which reviews above information. yes         Electronically signed by Kaylyn Greene RN on 1/14/2025 at 1:18 AM

## 2025-01-14 NOTE — PROGRESS NOTES
NEUROCRITICAL CARE PROGRESS NOTE       Patient Name: Gary Martinez YOB: 1960   Sex: Male Age: 64 yrs     CC / Reason for Consult: R MCA stroke    Changes over last 24 hours:   Anti-Xa therapeutic from peripheral sticks overnight. CT head shows no hemorrhage. 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 Unclear the mechanism of embolus, as he was compliant with his eliquis. The etiology of his DVT/PE are unclear. He does relay to me that he had some vascular procedure on his leg at the beginning of the year (Repair of left popliteal artery aneurysm via posterior approach with interpositioned reverse greater saphenous vein graft). Will need further evaluation with ECHO with bubble study and consideration of possible eliquis failure.    Patient stated he has been taking Eliquis at home, his aXa and INR was normal. Confirmed with nurse at bedside that previous Coag labs were obtained from the port. Coag obtained from peripherals were under  < 0.1. Suspect patient has been subtherapeutic for the majority time.       Plan:  - stop heparin gtt at 1900 this evening, start lovenox 60mg BID at 2100 tonight, and bridge to coumadin  - pharmacy consult for coumadin dosing and bridge assistance.  -q4h neuro checks   - no ASA  - Continue statin  - Maintain good secondary prevention of stroke measures including normotension (SBP < 130 mmHg AND DBP < 90 mmHg); HbA1c <7%; LDL < 70 mg/dL  - PT/OT/speech  - follow up with outpatient neurology on discharge  - NCC will sign off at this time. Please call with any questions      CHRISTY Rey - CNP   NEUROCRITICAL CARE  1/14/2025 10:02

## 2025-01-14 NOTE — PROGRESS NOTES
V2.0    [facility] [place]      Name:  Gary Martinez /Age/Sex: 1960  (64 y.o. male)   MRN & CSN:  [id number] & [id number] Encounter Date/Time: 2025 10:55 AM EST   Location:  [facility]/4508-01 PCP: Tony Love MD     Attending:Any Rodriguez MD       Hospital Day: 5    Assessment and Recommendations   Gary Martinez is a 64 y.o. male with pmh of rior DVT, L popliteal anueyrsm s/p repair, hypothyroidism, Type II DM  who presents with Cerebrovascular accident (CVA) due to embolism of left middle cerebral artery (HCC)    #Acute CVA due to right M2 occlusion  Presents with slurred speech, left facial weakness, and left upper and lower extremity weakness. NIHSS 9. CTA right M2 thrombus. Not candidate for thrombolysis as patient on eliquis. No EVT as most of the MCA territory is open as thrombus likely propagated distally to a right M3 branch where it is non-occlusive but does restrict flow.  - NSGY consulted  - NCC consulted  - MRI brain completed confirmed acute CVA in MCA territory without hemorrhage  - TTE w/ bubble study --negative for shunt  - A1c 5.9%, LDL 67  - cont. heparin drip  -Neuro checks  - SBP goal < 180              - PRN labetalol  -Repeat CT head without contrast 2025-stable signs of evolving recent infarctions involving right parietal temporal lobe, stable left frontal lobe cortex chronic infarction in right thalamus chronic lacunar infarction  -Remains on heparin drip.  Plan to switch to Lovenox 2025 evening and initiation of warfarin       #Hx of DVT LLE  - Pt with provoked DVT in 2024, after long distance driving   - On Eliquis since then  - Hold on Eliquis while on heparin gtt     #Hypothyroidism  - Cont. Home synthroid      Diet ADULT DIET; Regular   DVT Prophylaxis [] Lovenox, []  Heparin, [] SCDs, [] Ambulation,  [] Eliquis, [] Xarelto  [] Coumadin   Code Status Full Code   Disposition From: Home  Expected Disposition: TBD  Estimated Date of Discharge: ~2

## 2025-01-14 NOTE — CONSULTS
[facility] -  Clinical Pharmacy Note    Warfarin - Management by Pharmacy    Consult Date(s): 1/14/25  Consulting Provider(s): CHRISTY Mancini    Assessment / Plan  DVT/PE, acute CVA - Warfarin  Goal INR: 2 - 3  Concurrent Anticoagulants / Antiplatelets:   Currently on heparin gtt, which will end tonight @ 1900.    Plan to start therapeutic enoxaparin @ 2100 as bridge to warfarin.  Interactions: No significant interactions noted.  Current Regimen / Plan:   Patient is newly starting warfarin therapy.    Will begin 5mg PO daily.    Expect ~5-7 days to reach therapeutic range.    Will monitor pt's clinical status and INR daily, and make dose adjustments as needed.    Thank you for consulting pharmacy,    Dayna Dodd PharmD., BCPS   1/14/2025 1:26 PM  Wireless: [id number]        Interval update:  therapy initiation     Subjective/Objective:   Gary Martinez is a 64 y.o. male with a PMHx significant for DVT/PE (diagnosed in Aug 2024) on apixaban, DM type 2, head/neck/tongue cancer, anxiety, restless leg syndrome, and hypothyroidism who is admitted with acute CVA despite apixaban tx.  Patient ineligible for TNK as well as CVA not amenable to EVT.  Started on heparin gtt; plan to transition to therapeutic enoxaparin as bridge to warfarin per Neuro.      Pharmacy is consulted to dose warfarin.    Ht Readings from Last 1 Encounters:   01/10/25 1.829 m (6')     Wt Readings from Last 1 Encounters:   01/14/25 62.6 kg (138 lb 0.1 oz)     Prior / Home Warfarin Regimen:  New start to warfarin.  Patient developed acute CVA while on apixaban 5mg BID (compliant per notes).  Baseline INR (1/10/25) = 1.09    Date INR Warfarin   1/10 1.09    1/14  Ordered 5mg                 Recent Labs     01/12/25  0620 01/13/25  0447   HGB 16.6 16.8    250   CREATININE 0.6* 0.8

## 2025-01-15 LAB
ANION GAP SERPL CALCULATED.3IONS-SCNC: 7 MMOL/L (ref 3–16)
ANTI-XA UNFRAC HEPARIN: 0.35 IU/ML (ref 0.3–0.7)
ANTI-XA UNFRAC HEPARIN: >1.1 IU/ML (ref 0.3–0.7)
BUN SERPL-MCNC: 5 MG/DL (ref 7–20)
CALCIUM SERPL-MCNC: 8.2 MG/DL (ref 8.3–10.6)
CHLORIDE SERPL-SCNC: 100 MMOL/L (ref 99–110)
CO2 SERPL-SCNC: 30 MMOL/L (ref 21–32)
CREAT SERPL-MCNC: 0.7 MG/DL (ref 0.8–1.3)
DEPRECATED RDW RBC AUTO: 13.9 % (ref 12.4–15.4)
GFR SERPLBLD CREATININE-BSD FMLA CKD-EPI: >90 ML/MIN/{1.73_M2}
GLUCOSE BLD-MCNC: 102 MG/DL (ref 70–99)
GLUCOSE BLD-MCNC: 131 MG/DL (ref 70–99)
GLUCOSE BLD-MCNC: 151 MG/DL (ref 70–99)
GLUCOSE BLD-MCNC: 193 MG/DL (ref 70–99)
GLUCOSE SERPL-MCNC: 121 MG/DL (ref 70–99)
HCT VFR BLD AUTO: 50 % (ref 40.5–52.5)
HGB BLD-MCNC: 16.8 G/DL (ref 13.5–17.5)
INR PPP: 1.23 (ref 0.85–1.15)
INR PPP: 2.92 (ref 0.85–1.15)
MCH RBC QN AUTO: 33.8 PG (ref 26–34)
MCHC RBC AUTO-ENTMCNC: 33.7 G/DL (ref 31–36)
MCV RBC AUTO: 100.5 FL (ref 80–100)
PERFORMED ON: ABNORMAL
PLATELET # BLD AUTO: 190 K/UL (ref 135–450)
PMV BLD AUTO: 8.2 FL (ref 5–10.5)
POTASSIUM SERPL-SCNC: 3.7 MMOL/L (ref 3.5–5.1)
PROTHROMBIN TIME: 15.7 SEC (ref 11.9–14.9)
PROTHROMBIN TIME: 30.3 SEC (ref 11.9–14.9)
RBC # BLD AUTO: 4.98 M/UL (ref 4.2–5.9)
SODIUM SERPL-SCNC: 137 MMOL/L (ref 136–145)
WBC # BLD AUTO: 7.1 K/UL (ref 4–11)

## 2025-01-15 PROCEDURE — 6370000000 HC RX 637 (ALT 250 FOR IP)

## 2025-01-15 PROCEDURE — 85027 COMPLETE CBC AUTOMATED: CPT

## 2025-01-15 PROCEDURE — 36592 COLLECT BLOOD FROM PICC: CPT

## 2025-01-15 PROCEDURE — 6360000002 HC RX W HCPCS

## 2025-01-15 PROCEDURE — 6360000002 HC RX W HCPCS: Performed by: STUDENT IN AN ORGANIZED HEALTH CARE EDUCATION/TRAINING PROGRAM

## 2025-01-15 PROCEDURE — 85520 HEPARIN ASSAY: CPT

## 2025-01-15 PROCEDURE — 6370000000 HC RX 637 (ALT 250 FOR IP): Performed by: INTERNAL MEDICINE

## 2025-01-15 PROCEDURE — 80048 BASIC METABOLIC PNL TOTAL CA: CPT

## 2025-01-15 PROCEDURE — 2060000000 HC ICU INTERMEDIATE R&B

## 2025-01-15 PROCEDURE — 2500000003 HC RX 250 WO HCPCS

## 2025-01-15 PROCEDURE — 85610 PROTHROMBIN TIME: CPT

## 2025-01-15 RX ORDER — DEXTROSE MONOHYDRATE 100 MG/ML
INJECTION, SOLUTION INTRAVENOUS CONTINUOUS PRN
Status: DISCONTINUED | OUTPATIENT
Start: 2025-01-15 | End: 2025-01-16 | Stop reason: HOSPADM

## 2025-01-15 RX ORDER — INSULIN LISPRO 100 [IU]/ML
0-4 INJECTION, SOLUTION INTRAVENOUS; SUBCUTANEOUS
Status: DISCONTINUED | OUTPATIENT
Start: 2025-01-15 | End: 2025-01-16 | Stop reason: HOSPADM

## 2025-01-15 RX ORDER — GLUCAGON 1 MG/ML
1 KIT INJECTION PRN
Status: DISCONTINUED | OUTPATIENT
Start: 2025-01-15 | End: 2025-01-16 | Stop reason: HOSPADM

## 2025-01-15 RX ADMIN — ONDANSETRON 4 MG: 2 INJECTION INTRAMUSCULAR; INTRAVENOUS at 09:10

## 2025-01-15 RX ADMIN — WARFARIN SODIUM 5 MG: 5 TABLET ORAL at 18:15

## 2025-01-15 RX ADMIN — OXYCODONE 5 MG: 5 TABLET ORAL at 01:32

## 2025-01-15 RX ADMIN — SODIUM CHLORIDE, PRESERVATIVE FREE 10 ML: 5 INJECTION INTRAVENOUS at 21:10

## 2025-01-15 RX ADMIN — ENOXAPARIN SODIUM 60 MG: 100 INJECTION SUBCUTANEOUS at 21:06

## 2025-01-15 RX ADMIN — INSULIN LISPRO 1 UNITS: 100 INJECTION, SOLUTION INTRAVENOUS; SUBCUTANEOUS at 16:43

## 2025-01-15 RX ADMIN — LEVOTHYROXINE SODIUM 50 MCG: 0.05 TABLET ORAL at 06:40

## 2025-01-15 RX ADMIN — Medication: at 21:12

## 2025-01-15 RX ADMIN — OXYCODONE 5 MG: 5 TABLET ORAL at 18:15

## 2025-01-15 RX ADMIN — Medication: at 09:05

## 2025-01-15 RX ADMIN — ENOXAPARIN SODIUM 60 MG: 100 INJECTION SUBCUTANEOUS at 09:10

## 2025-01-15 RX ADMIN — OXYCODONE 5 MG: 5 TABLET ORAL at 09:34

## 2025-01-15 RX ADMIN — ALPRAZOLAM 0.5 MG: 0.5 TABLET ORAL at 11:22

## 2025-01-15 RX ADMIN — ALPRAZOLAM 0.5 MG: 0.5 TABLET ORAL at 22:23

## 2025-01-15 RX ADMIN — PANTOPRAZOLE SODIUM 40 MG: 40 INJECTION, POWDER, FOR SOLUTION INTRAVENOUS at 09:05

## 2025-01-15 RX ADMIN — SODIUM CHLORIDE, PRESERVATIVE FREE 10 ML: 5 INJECTION INTRAVENOUS at 09:05

## 2025-01-15 RX ADMIN — ATORVASTATIN CALCIUM 80 MG: 80 TABLET, FILM COATED ORAL at 21:06

## 2025-01-15 ASSESSMENT — PAIN DESCRIPTION - PAIN TYPE
TYPE: ACUTE PAIN;CHRONIC PAIN
TYPE: ACUTE PAIN
TYPE: ACUTE PAIN;CHRONIC PAIN
TYPE: ACUTE PAIN;CHRONIC PAIN

## 2025-01-15 ASSESSMENT — PAIN SCALES - WONG BAKER
WONGBAKER_NUMERICALRESPONSE: NO HURT
WONGBAKER_NUMERICALRESPONSE: NO HURT

## 2025-01-15 ASSESSMENT — PAIN DESCRIPTION - LOCATION
LOCATION: BACK

## 2025-01-15 ASSESSMENT — PAIN SCALES - GENERAL
PAINLEVEL_OUTOF10: 6
PAINLEVEL_OUTOF10: 6
PAINLEVEL_OUTOF10: 0
PAINLEVEL_OUTOF10: 5
PAINLEVEL_OUTOF10: 0
PAINLEVEL_OUTOF10: 9
PAINLEVEL_OUTOF10: 0
PAINLEVEL_OUTOF10: 0
PAINLEVEL_OUTOF10: 7
PAINLEVEL_OUTOF10: 2

## 2025-01-15 ASSESSMENT — PAIN - FUNCTIONAL ASSESSMENT
PAIN_FUNCTIONAL_ASSESSMENT: ACTIVITIES ARE NOT PREVENTED

## 2025-01-15 ASSESSMENT — PAIN DESCRIPTION - DESCRIPTORS
DESCRIPTORS: ACHING

## 2025-01-15 ASSESSMENT — PAIN DESCRIPTION - ORIENTATION
ORIENTATION: MID;POSTERIOR
ORIENTATION: LOWER
ORIENTATION: MID;POSTERIOR
ORIENTATION: MID;POSTERIOR

## 2025-01-15 ASSESSMENT — PAIN DESCRIPTION - FREQUENCY
FREQUENCY: INTERMITTENT

## 2025-01-15 ASSESSMENT — PAIN DESCRIPTION - ONSET
ONSET: ON-GOING

## 2025-01-15 NOTE — PROGRESS NOTES
Pt transferred to unit from ICU. VSS and assessment completed. Oriented pt to room and unit protocols. Educated pt on importance of calling for help when going to the bathroom.  Bed alarm activated. Call light within reach. Denies further needs at this time.

## 2025-01-15 NOTE — CONSULTS
[facility] -  Clinical Pharmacy Note    Warfarin - Management by Pharmacy    Consult Date(s): 1/14/25  Consulting Provider(s): CHRISTY Mancini    Assessment / Plan  DVT/PE, acute CVA - Warfarin  Goal INR: 2 - 3  Concurrent Anticoagulants / Antiplatelets:   Heparin drip ended last night.    Therapeutic Lovenox initiated last night as bridge to warfarin.  Interactions: No significant interactions noted.  Current Regimen / Plan:   Patient is newly starting warfarin therapy.    Warfarin initiated at 5mg PO daily.    INR this morning = 1.23 (after first dose). Will continue warfarin 5 mg daily.  Expect ~5-7 days to reach therapeutic range.    Will monitor pt's clinical status and INR daily, and make dose adjustments as needed.    Thank you for consulting pharmacy,    Silvano Nascimento PharmD BCOP 1/15/2025 10:30 AM      Interval update: Started warfarin 5 mg last night. INR already up to 1.23 probably from the effect of heparin drip.    Subjective/Objective:   Gary Martinez is a 64 y.o. male with a PMHx significant for DVT/PE (diagnosed in Aug 2024) on apixaban, DM type 2, head/neck/tongue cancer, anxiety, restless leg syndrome, and hypothyroidism who is admitted with acute CVA despite apixaban tx.  Patient ineligible for TNK (d/t being on apixaban) as well as CVA not amenable to EVT.  Started on heparin gtt; plan to transition to therapeutic enoxaparin as bridge to warfarin per Neuro.      Pharmacy is consulted to dose warfarin.    Ht Readings from Last 1 Encounters:   01/10/25 1.829 m (6')     Wt Readings from Last 1 Encounters:   01/14/25 62.6 kg (138 lb 0.1 oz)     Prior / Home Warfarin Regimen:  New start to warfarin.  Patient developed acute CVA while on apixaban 5mg BID (compliant per notes).  Baseline INR (1/10/25) = 1.09    Date INR Warfarin   1/10 1.09    1/14  5 mg   1/15 1.23 5 mg   (active order)            Recent Labs     01/13/25  0447 01/15/25  0340 01/15/25  0500   INR  --  2.92* 1.23*   HGB 16.8

## 2025-01-15 NOTE — PLAN OF CARE
Problem: Chronic Conditions and Co-morbidities  Goal: Patient's chronic conditions and co-morbidity symptoms are monitored and maintained or improved  1/14/2025 1544 by Nico Mendoza RN  Outcome: Progressing     Problem: Discharge Planning  Goal: Discharge to home or other facility with appropriate resources  1/15/2025 0435 by Eugene Tuttle RN  Outcome: Progressing     Problem: Safety - Adult  Goal: Free from fall injury  1/15/2025 0435 by Eugene Tuttle RN  Outcome: Progressing  Note: High Fall Risk per SANCHEZ/ABCDS: Explained fall risk precautions to pt and family and rationale behind their use to keep the patient safe. Pt bed is in low position, side rails up, call light and belongings are in reach. Fall wristband applied and present on pts wrist.  Bed alarm on.  Pt encouraged to call for assistance. Will continue with hourly rounds for PO intake, pain needs, toileting and repositioning as needed.        Problem: Pain  Goal: Verbalizes/displays adequate comfort level or baseline comfort level  1/15/2025 0435 by Eugene Tuttle RN  Outcome: Progressing  Note: Pt complained of back pain this shift, PRN oxy given.

## 2025-01-15 NOTE — CARE COORDINATION
Addendum at 3:30pm: Received call from Rosibel at the [facility] who states the provider needs to send a referral to their office which message was sent to the MD via [employer]. She scheduled a follow up apt for 1/17/25 at 1:00pm.    Addendum at 3:00pm: Charge RN said that RN staff doesn't schedule the coumadin clinic appt's.     Called [facility] at [phone] and left a message requesting call back    Addendum at 12:55pm: Received call from MD earlier stating pt needs follow up at the Coumadin Clinic and he was open to [facility] or [facility] at [facility].     Spoke with pt at bedside. He states he would like to go to [facility] and his schedule is open. He said he can get there with no barriers.     Spoke with Charge RN who will see who needs to schedule the follow up    10:22am: Spoke with pt at bedside this AM. He confirmed he will return home when discharged and will have a ride. He denied needs for skilled home care for RN/PT/OT and all SW/CM needs.     SW will follow but anticipates no needs.     Ayanna HIRSCH, YOUSIF   for [facility] ([facility])  [employer] Mobile: [phone]

## 2025-01-15 NOTE — PROGRESS NOTES
V2.0    [facility] [place]      Name:  Gary Martinez /Age/Sex: 1960  (64 y.o. male)   MRN & CSN:  [id number] & [id number] Encounter Date/Time: 1/15/2025 10:55 AM EST   Location:  [facility]- PCP: Tony Love MD     Attending:Any Rodriguez MD       Hospital Day: 6    Assessment and Recommendations   Gary Martinez is a 64 y.o. male with pmh of rior DVT, L popliteal anueyrsm s/p repair, hypothyroidism, Type II DM  who presents with Cerebrovascular accident (CVA) due to embolism of left middle cerebral artery (HCC)    #Acute CVA due to right M2 occlusion  Presents with slurred speech, left facial weakness, and left upper and lower extremity weakness. NIHSS 9. CTA right M2 thrombus. Not candidate for thrombolysis as patient on eliquis. No EVT as most of the MCA territory is open as thrombus likely propagated distally to a right M3 branch where it is non-occlusive but does restrict flow.  - NSGY consulted  - NCC consulted  - MRI brain completed confirmed acute CVA in MCA territory without hemorrhage  - TTE w/ bubble study --negative for shunt  - A1c 5.9%, LDL 67  - cont. heparin drip  -Neuro checks  - SBP goal < 180              - PRN labetalol  -Repeat CT head without contrast 2025-stable signs of evolving recent infarctions involving right parietal temporal lobe, stable left frontal lobe cortex chronic infarction in right thalamus chronic lacunar infarction  -Currently on Lovenox bridge.  Discussed with NCC.  Will keep him here today to make sure his INR is uptrending.  He will follow-up with Coumadin clinic upon discharge.       #Hx of DVT LLE  - Pt with provoked DVT in 2024, after long distance driving   - On Eliquis since then  -Switch to warfarin.  Currently on Lovenox bridging.     #Hypothyroidism  - Cont. Home synthroid      Diet ADULT DIET; Regular   DVT Prophylaxis [] Lovenox, []  Heparin, [] SCDs, [] Ambulation,  [] Eliquis, [] Xarelto  [] Coumadin   Code Status Full Code    GLUCOSE 154* 121*     Hepatic:   No results for input(s): \"AST\", \"ALT\", \"BILITOT\", \"ALKPHOS\" in the last 72 hours.    Invalid input(s): \"ALB\"    Lipids:   Lab Results   Component Value Date/Time    CHOL 118 01/10/2025 07:32 AM    HDL 28 01/10/2025 07:32 AM    TRIG 117 01/10/2025 07:32 AM     Hemoglobin A1C:   Lab Results   Component Value Date/Time    LABA1C 5.9 01/10/2025 07:32 AM     TSH:   Lab Results   Component Value Date/Time    TSH 2.10 04/04/2014 10:32 AM     Troponin: No results found for: \"TROPONINT\"  Lactic Acid: No results for input(s): \"LACTA\" in the last 72 hours.  BNP: No results for input(s): \"PROBNP\" in the last 72 hours.  UA:  Lab Results   Component Value Date/Time    NITRU POSITIVE 06/12/2023 12:29 PM    COLORU ORANGE 06/12/2023 12:29 PM    PHUR 5.5 06/12/2023 12:29 PM    WBCUA 412 06/12/2023 12:29 PM    RBCUA 1 06/12/2023 12:29 PM    BACTERIA 4+ 06/12/2023 12:29 PM    CLARITYU TURBID 06/12/2023 12:29 PM    LEUKOCYTESUR LARGE 06/12/2023 12:29 PM    UROBILINOGEN 1.0 06/12/2023 12:29 PM    BILIRUBINUR SMALL 06/12/2023 12:29 PM    BLOODU TRACE 06/12/2023 12:29 PM    GLUCOSEU Negative 06/12/2023 12:29 PM    KETUA TRACE 06/12/2023 12:29 PM     Urine Cultures:   Lab Results   Component Value Date/Time    LABURIN >100,000 CFU/ml 06/12/2023 12:29 PM     Blood Cultures:   Lab Results   Component Value Date/Time    BC No Growth after 4 days of incubation. 05/30/2021 03:23 PM     Lab Results   Component Value Date/Time    BLOODCULT2 No Growth after 4 days of incubation. 05/30/2021 04:06 PM     Organism:   Lab Results   Component Value Date/Time    ORG Klebsiella pneumoniae 06/12/2023 12:29 PM         Electronically signed by Any Rodriguez MD on 1/15/2025 at 11:23 AM

## 2025-01-15 NOTE — PROGRESS NOTES
4 Eyes Skin Assessment     NAME:  Gary Martinez  YOB: 1960  MEDICAL RECORD NUMBER:  [id number]    The patient is being assessed for  Transfer to New Unit    I agree that at least one RN has performed a thorough Head to Toe Skin Assessment on the patient. ALL assessment sites listed below have been assessed.      Areas assessed by both nurses:    Head, Face, Ears, Shoulders, Back, Chest, Arms, Elbows, Hands, Sacrum. Buttock, Coccyx, Ischium, Legs. Feet and Heels, and Under Medical Devices         Does the Patient have a Wound? No noted wound(s)       Nick Prevention initiated by RN: No  Wound Care Orders initiated by RN: No    Pressure Injury (Stage 3,4, Unstageable, DTI, NWPT, and Complex wounds) if present, place Wound referral order by RN under : No    New Ostomies, if present place, Ostomy referral order under : No     Nurse 1 eSignature: Electronically signed by Eugene Tuttle RN on 1/14/25 at 9:04 PM EST    **SHARE this note so that the co-signing nurse can place an eSignature**    Nurse 2 eSignature: {Esignature:[id number]}

## 2025-01-15 NOTE — PLAN OF CARE
Problem: Discharge Planning  Goal: Discharge to home or other facility with appropriate resources  1/15/2025 1414 by Netta Ascencio RN  Outcome: Progressing  1/15/2025 0435 by Eugene Tuttle RN  Outcome: Progressing   Patient is aware of plan of care with recent MD rounds stating that the goal is to become therapeutic on his warfarin/ lovenox dosing and then once stable he can discharge home on such therapies.   Problem: Safety - Adult  Goal: Free from fall injury  1/15/2025 1414 by Netta Ascencio RN  Outcome: Progressing    Outcome: Progressing  Note: Med Fall Risk per SANCHEZ/ABCDS: Explained fall risk precautions to pt and family and rationale behind their use to keep the patient safe. Pt bed is in low position, side rails up, call light and belongings are in reach. Fall wristband applied and present on pts wrist.  Bed alarm on.  Pt encouraged to call for assistance. Will continue with hourly rounds for PO intake, pain needs, toileting and repositioning as needed.

## 2025-01-16 VITALS
SYSTOLIC BLOOD PRESSURE: 134 MMHG | RESPIRATION RATE: 18 BRPM | BODY MASS INDEX: 17.64 KG/M2 | WEIGHT: 130.2 LBS | TEMPERATURE: 97.9 F | DIASTOLIC BLOOD PRESSURE: 89 MMHG | OXYGEN SATURATION: 96 % | HEART RATE: 76 BPM | HEIGHT: 72 IN

## 2025-01-16 LAB
ANION GAP SERPL CALCULATED.3IONS-SCNC: 6 MMOL/L (ref 3–16)
ANTI-XA UNFRAC HEPARIN: 0.49 IU/ML (ref 0.3–0.7)
BUN SERPL-MCNC: 5 MG/DL (ref 7–20)
CALCIUM SERPL-MCNC: 8.9 MG/DL (ref 8.3–10.6)
CHLORIDE SERPL-SCNC: 101 MMOL/L (ref 99–110)
CO2 SERPL-SCNC: 32 MMOL/L (ref 21–32)
CREAT SERPL-MCNC: 0.9 MG/DL (ref 0.8–1.3)
DEPRECATED RDW RBC AUTO: 13.7 % (ref 12.4–15.4)
GFR SERPLBLD CREATININE-BSD FMLA CKD-EPI: >90 ML/MIN/{1.73_M2}
GLUCOSE BLD-MCNC: 169 MG/DL (ref 70–99)
GLUCOSE SERPL-MCNC: 140 MG/DL (ref 70–99)
HCT VFR BLD AUTO: 51.6 % (ref 40.5–52.5)
HGB BLD-MCNC: 17.4 G/DL (ref 13.5–17.5)
INR PPP: 2.1 (ref 0.85–1.15)
MCH RBC QN AUTO: 34.2 PG (ref 26–34)
MCHC RBC AUTO-ENTMCNC: 33.7 G/DL (ref 31–36)
MCV RBC AUTO: 101.3 FL (ref 80–100)
PERFORMED ON: ABNORMAL
PLATELET # BLD AUTO: 195 K/UL (ref 135–450)
PMV BLD AUTO: 8.2 FL (ref 5–10.5)
POTASSIUM SERPL-SCNC: 4.2 MMOL/L (ref 3.5–5.1)
PROTHROMBIN TIME: 23.4 SEC (ref 11.9–14.9)
RBC # BLD AUTO: 5.1 M/UL (ref 4.2–5.9)
SODIUM SERPL-SCNC: 139 MMOL/L (ref 136–145)
WBC # BLD AUTO: 8.9 K/UL (ref 4–11)

## 2025-01-16 PROCEDURE — 80048 BASIC METABOLIC PNL TOTAL CA: CPT

## 2025-01-16 PROCEDURE — 6370000000 HC RX 637 (ALT 250 FOR IP)

## 2025-01-16 PROCEDURE — 97116 GAIT TRAINING THERAPY: CPT

## 2025-01-16 PROCEDURE — 36415 COLL VENOUS BLD VENIPUNCTURE: CPT

## 2025-01-16 PROCEDURE — 6360000002 HC RX W HCPCS: Performed by: STUDENT IN AN ORGANIZED HEALTH CARE EDUCATION/TRAINING PROGRAM

## 2025-01-16 PROCEDURE — 85610 PROTHROMBIN TIME: CPT

## 2025-01-16 PROCEDURE — 97110 THERAPEUTIC EXERCISES: CPT

## 2025-01-16 PROCEDURE — 97164 PT RE-EVAL EST PLAN CARE: CPT

## 2025-01-16 PROCEDURE — 6360000002 HC RX W HCPCS

## 2025-01-16 PROCEDURE — 97168 OT RE-EVAL EST PLAN CARE: CPT

## 2025-01-16 PROCEDURE — 2500000003 HC RX 250 WO HCPCS

## 2025-01-16 PROCEDURE — 85520 HEPARIN ASSAY: CPT

## 2025-01-16 PROCEDURE — 85027 COMPLETE CBC AUTOMATED: CPT

## 2025-01-16 RX ORDER — WARFARIN SODIUM 5 MG/1
TABLET ORAL
Qty: 15 TABLET | Refills: 0 | Status: SHIPPED | OUTPATIENT
Start: 2025-01-16 | End: 2025-01-16 | Stop reason: HOSPADM

## 2025-01-16 RX ORDER — WARFARIN SODIUM 2.5 MG/1
2.5 TABLET ORAL NIGHTLY
Qty: 10 TABLET | Refills: 0 | Status: SHIPPED | OUTPATIENT
Start: 2025-01-16

## 2025-01-16 RX ORDER — ENOXAPARIN SODIUM 100 MG/ML
60 INJECTION SUBCUTANEOUS 2 TIMES DAILY
Qty: 2 EACH | Refills: 0 | Status: SHIPPED | OUTPATIENT
Start: 2025-01-16

## 2025-01-16 RX ORDER — WARFARIN SODIUM 2.5 MG/1
2.5 TABLET ORAL DAILY
Status: DISCONTINUED | OUTPATIENT
Start: 2025-01-16 | End: 2025-01-16 | Stop reason: HOSPADM

## 2025-01-16 RX ORDER — ATORVASTATIN CALCIUM 80 MG/1
80 TABLET, FILM COATED ORAL NIGHTLY
Qty: 90 TABLET | Refills: 1 | Status: SHIPPED | OUTPATIENT
Start: 2025-01-16

## 2025-01-16 RX ADMIN — Medication: at 07:41

## 2025-01-16 RX ADMIN — OXYCODONE 5 MG: 5 TABLET ORAL at 05:29

## 2025-01-16 RX ADMIN — ENOXAPARIN SODIUM 60 MG: 100 INJECTION SUBCUTANEOUS at 09:20

## 2025-01-16 RX ADMIN — LEVOTHYROXINE SODIUM 50 MCG: 0.05 TABLET ORAL at 05:29

## 2025-01-16 RX ADMIN — ALPRAZOLAM 0.5 MG: 0.5 TABLET ORAL at 10:57

## 2025-01-16 RX ADMIN — PANTOPRAZOLE SODIUM 40 MG: 40 INJECTION, POWDER, FOR SOLUTION INTRAVENOUS at 07:38

## 2025-01-16 RX ADMIN — SODIUM CHLORIDE, PRESERVATIVE FREE 10 ML: 5 INJECTION INTRAVENOUS at 07:40

## 2025-01-16 ASSESSMENT — PAIN SCALES - GENERAL
PAINLEVEL_OUTOF10: 5
PAINLEVEL_OUTOF10: 0
PAINLEVEL_OUTOF10: 6
PAINLEVEL_OUTOF10: 7
PAINLEVEL_OUTOF10: 0

## 2025-01-16 ASSESSMENT — PAIN DESCRIPTION - LOCATION: LOCATION: BACK

## 2025-01-16 ASSESSMENT — PAIN - FUNCTIONAL ASSESSMENT: PAIN_FUNCTIONAL_ASSESSMENT: ACTIVITIES ARE NOT PREVENTED

## 2025-01-16 ASSESSMENT — PAIN DESCRIPTION - DESCRIPTORS: DESCRIPTORS: ACHING

## 2025-01-16 ASSESSMENT — PAIN DESCRIPTION - ORIENTATION: ORIENTATION: LOWER

## 2025-01-16 NOTE — PROGRESS NOTES
Occupational Therapy  Facility/Department: [address]  Occupational Therapy Re-Evaluation/Treatment/Discharge    Name: Gary Martinez  : 1960  MRN: [id number]  Date of Service: 2025    Discharge Recommendations:  Home with assist PRN  OT Equipment Recommendations  Equipment Needed: No  Other: Pt provided with blue therapy putty       Patient Diagnosis(es): The primary encounter diagnosis was Cerebrovascular accident (CVA), unspecified mechanism (HCC). A diagnosis of Stroke (HCC) was also pertinent to this visit.  Past Medical History:  has a past medical history of Anxiety, Cancer (HCC), Chronic back pain, Diabetes mellitus (HCC), Erectile dysfunction, Headache(784.0), Osteoarthritis, PONV (postoperative nausea and vomiting), Restless legs syndrome, Urinary incontinence, and Wears dentures.  Past Surgical History:  has a past surgical history that includes Tonsillectomy (Bilateral); Cholecystectomy, laparoscopic (N/A, 08/10/2021); Portacath placement; colectomy (N/A, 2022); Cystoscopy (N/A, 2022); Femoral-tibial Bypass Graft (Left, 2024); neurovascular procedure (N/A, 1/10/2025); and neurovascular procedure (N/A, 1/10/2025).           Assessment  Assessment: Pt asked for re-evaluation prior to discharge.  Pt originally assessed 25 and no acute OT needs found.  Pt cont to demonstrate good function for ADL and functional mobility.  Pt endorses cont Lft hand weakness, but reports it has improved and he has been squeezing his sheets.  Privided pt with blue therapy putty and instruction for  and pinch strengthening.  Prognosis: Good  Decision Making: Low Complexity  No Skilled OT: Safe to return home;No OT goals identified  REQUIRES OT FOLLOW-UP: No  Activity Tolerance  Activity Tolerance: Patient Tolerated treatment well     Plan  Occupational Therapy Plan  Additional Comments: Discharge pt from acute OT    Restrictions  Restrictions/Precautions  Activity Level: Up  as Tolerated    Subjective  General  Chart Reviewed: Yes  Patient assessed for rehabilitation services?: Yes  Additional Pertinent Hx: 64 year old male with past medical history significant for DVT/PE on eliquis who presents with stroke like symptoms  Family / Caregiver Present: No  Referring Practitioner: Blumberg, Jonathan, MD  Diagnosis: stroke  Subjective  Subjective: Pt sitting edge of bed upon entry.   Pt reports improvement in Lft hand.   Pain: 0/10     Social/Functional History  Social/Functional History  Lives With: Alone  Home Layout: Bed/Bath upstairs, Two level, Laundry in basement (stair lift to upstairs, no bathroom on main level, sleeps on couch)  Home Access: Stairs to enter with rails  Entrance Stairs - Number of Steps: 4  Entrance Stairs - Rails: Both  Bathroom Shower/Tub: Tub/Shower unit  Bathroom Equipment: Grab bars in shower, Toilet raiser  Home Equipment: Cane, Walker - 4-Wheeled, Adjustable bed (often sleeps on couch on main level)  Has the patient had two or more falls in the past year or any fall with injury in the past year?: No  Prior Level of Assist for ADLs: Independent  Prior Level of Assist for Homemaking: Independent  Homemaking Responsibilities: Yes  Prior Level of Assist for Ambulation: Independent household ambulator, with or without device, Independent community ambulator, with or without device (uses cane)  Prior Level of Assist for Transfers: Independent  Active : Yes  Occupation: Retired  Type of Occupation: construction  Leisure & Hobbies: welding art, powder coating  Additional Comments: Has family/friends who could assist him if needed    Objective  Vision  Vision: Within Functional Limits  Hearing  Hearing: Within functional limits          Safety Devices  Type of Devices: Left in chair;Call light within reach;Chair alarm in place;Nurse notified     Toilet Transfers  Toilet - Technique: Ambulating  Equipment Used: Standard toilet  Toilet Transfer: Supervision  AROM:

## 2025-01-16 NOTE — CARE COORDINATION
Case Management Assessment            Discharge Note                    Date / Time of Note: 1/16/2025 10:38 AM                  Discharge Note Completed by: YOUSIF Balderas   for [facility] ([facility])  [employer] Mobile: [phone]    Patient Name: Gary Martinez   YOB: 1960  Diagnosis: Stroke (HCC) [I63.9]  Stroke (HCC) [I63.9]  Ischemic stroke (HCC) [I63.9]  Large vessel stroke (HCC) [I63.9]   Date / Time: 1/10/2025  6:21 AM    Current PCP: Tony Love MD  Clinic patient: No    Hospitalization in the last 30 days: Yes  Readmission Assessment  Number of Days since last admission?: 8-30 days  Previous Disposition: Home Alone  Who is being Interviewed: Patient  What was the patient's/caregiver's perception as to why they think they needed to return back to the hospital?: Other (Comment) (new stroke)  Did you visit your Primary Care Physician after you left the hospital, before you returned this time?: Yes  Did you see a specialist, such as Cardiac, Pulmonary, Orthopedic Physician, etc. after you left the hospital?: No  Who advised the patient to return to the hospital?: Self-referral  Does the patient report anything that got in the way of taking their medications?: No  In our efforts to provide the best possible care to you and others like you, can you think of anything that we could have done to help you after you left the hospital the first time, so that you might not have needed to return so soon?: Other (Comment) (NA)    Advance Directives:  Code Status: Full Code  [place] DNR form completed and on chart: No    Financial:  Payor: [facility] / Plan: [facility] [place] / Product Type: *No Product type* /      Pharmacy:    [id number] [place], [place] - [address] - P [phone] - F [phone]  [address]  [facility] [address]  Phone: [phone] Fax:  Care:  Home Care ordered at discharge: Not Indicated    Durable Medical Equipment:  DME Provider: n/a OT note indicates that pt was given blue therapy putty    Home Oxygen and Respiratory Equipment:  Oxygen needed at discharge?: Not Indicated; 94% on room air per vitals in epic    Referrals made at DISCHARGE for outpatient continued care:  Other: [facility] 1/17/25    Additional CM Notes: Discharge order noted.     Spoke with pt at bedside. He confirmed he is returning home today and has already called for his ride. He said the MD told him about his appt at [facility] at 11am and pt confirmed he is able to get to this appt. He denied needs for skilled home care for RN/PT/OT and stated PT/OT cleared him this AM. He denied all needs from writer.     Spoke with PT/OT who states no needs.     Scot GOULD aware of the above. No needs identified.     COVID Result:    Lab Results   Component Value Date/Time    COVID19 Not Detected 05/30/2021 02:30 PM       The Plan for Transition of Care is related to the following treatment goals of Stroke (HCC) [I63.9]  Stroke (HCC) [I63.9]  Ischemic stroke (HCC) [I63.9]  Large vessel stroke (HCC) [I63.9]    Ayanna HIRSCH, KYLER-S   for [facility] ([facility])  [employer] Mobile: [phone]

## 2025-01-16 NOTE — PROGRESS NOTES
Physical Therapy  Facility/Department: [address]  Physical Therapy Re-evaluation and Treatment  Name: Gary Martinez  : 1960  MRN: [id number]  Date of Service: 2025    Discharge Recommendations:  Home with assist PRN   PT Equipment Recommendations  Equipment Needed: No      Patient Diagnosis(es): The primary encounter diagnosis was Cerebrovascular accident (CVA), unspecified mechanism (HCC). A diagnosis of Stroke (HCC) was also pertinent to this visit.  Past Medical History:  has a past medical history of Anxiety, Cancer (HCC), Chronic back pain, Diabetes mellitus (HCC), Erectile dysfunction, Headache(784.0), Osteoarthritis, PONV (postoperative nausea and vomiting), Restless legs syndrome, Urinary incontinence, and Wears dentures.  Past Surgical History:  has a past surgical history that includes Tonsillectomy (Bilateral); Cholecystectomy, laparoscopic (N/A, 08/10/2021); Portacath placement; colectomy (N/A, 2022); Cystoscopy (N/A, 2022); Femoral-tibial Bypass Graft (Left, 2024); neurovascular procedure (N/A, 1/10/2025); and neurovascular procedure (N/A, 1/10/2025).    Assessment  Body Structures, Functions, Activity Limitations Requiring Skilled Therapeutic Intervention: Decreased balance  Assessment: Pt close to baseline function.  Reports came in with L sided weakness in UE which is resolving.  Pt normally independent with mobility.  Currently needing SBA for transfers and ambulation.  Occas unsteadiness noted but self-recovers.  Should progress well with gradual increase in activity.  Pt plans to return home at d/c.  Has no concerns about managing.  Reports he has assist if needed.  Will follow to maximize mobility and independence.  Treatment Diagnosis: decreased balance  Requires PT Follow-Up: Yes    Plan  Physical Therapy Plan  General Plan:  (2-5)  Current Treatment Recommendations: Balance training, Functional mobility training, Endurance training, Gait

## 2025-01-16 NOTE — PLAN OF CARE
Problem: Chronic Conditions and Co-morbidities  Goal: Patient's chronic conditions and co-morbidity symptoms are monitored and maintained or improved  Outcome: Progressing  Flowsheets (Taken 1/13/2025 2000 by Kaylyn Greene, RN)  Care Plan - Patient's Chronic Conditions and Co-Morbidity Symptoms are Monitored and Maintained or Improved:   Monitor and assess patient's chronic conditions and comorbid symptoms for stability, deterioration, or improvement   Collaborate with multidisciplinary team to address chronic and comorbid conditions and prevent exacerbation or deterioration   Update acute care plan with appropriate goals if chronic or comorbid symptoms are exacerbated and prevent overall improvement and discharge     Problem: Discharge Planning  Goal: Discharge to home or other facility with appropriate resources  1/15/2025 2340 by Carlos Andres RN  Outcome: Progressing  Flowsheets (Taken 1/13/2025 2000 by Kaylyn Greene, RN)  Discharge to home or other facility with appropriate resources:   Identify barriers to discharge with patient and caregiver   Arrange for needed discharge resources and transportation as appropriate   Identify discharge learning needs (meds, wound care, etc)   Refer to discharge planning if patient needs post-hospital services based on physician order or complex needs related to functional status, cognitive ability or social support system

## 2025-01-16 NOTE — DISCHARGE SUMMARY
Discharge Summary    Name:  Gary Martinez /Age/Sex: 1960  (64 y.o. male)   MRN & CSN:  [id number] & [id number] Admission Date/Time: 1/10/2025  6:21 AM   Attending:  Any Rodriguez MD Discharging Physician: Any Rodriguez MD       Discharge Diagnosis:  Acute CVA  History of DVT  Hypothyroidism        Discharge Exam  Physical Exam  Vitals:    25 0559 25 0735 25 0959 25 1054   BP:  110/79  134/89   Pulse:  86  76   Resp:   18   Temp:  98.1 °F (36.7 °C)  97.9 °F (36.6 °C)   TempSrc:  Oral  Oral   SpO2:  94%  96%   Weight:   59.1 kg (130 lb 3.2 oz)    Height:          General: NAD  Eyes: EOMI  ENT: neck supple  Cardiovascular: Regular rate.  Respiratory: Clear to auscultation  Gastrointestinal: Soft, non tender  Genitourinary: no suprapubic tenderness  Musculoskeletal: No edema  Skin: warm, dry  Neuro: Alert. No gross focal deficits  Psych: Mood appropriate.     Hospital Course:   Gary Martinez is a 64 y.o.  male  who presents with Cerebrovascular accident (CVA) due to embolism of left middle cerebral artery (HCC)      #Acute CVA due to right M2 occlusion  Presents with slurred speech, left facial weakness, and left upper and lower extremity weakness. NIHSS 9. CTA right M2 thrombus. Not candidate for thrombolysis as patient on eliquis. No EVT as most of the MCA territory is open as thrombus likely propagated distally to a right M3 branch where it is non-occlusive but does restrict flow.  - MRI brain completed confirmed acute CVA in MCA territory without hemorrhage  - TTE w/ bubble study --negative for shunt  - A1c 5.9%, LDL 67  -Repeat CT head without contrast 2025-stable signs of evolving recent infarctions involving right parietal temporal lobe, stable left frontal lobe cortex chronic infarction in right thalamus chronic lacunar infarction  -Undergoing Lovenox increasing.  INR 2.1 at the time of discharge.  Patient being discharged with warfarin 2.5 mg nightly as well  as 2 doses of Lovenox.  Will follow-up at [facility] tomorrow afternoon for further adjustments in warfarin/Lovenox.        #Hx of DVT LLE  - Pt with provoked DVT in July 2024, after long distance driving   - On Eliquis since then  -Lovenox last warfarin as above     #Hypothyroidism  - Cont. Home synthroid    The patient expressed appropriate understanding of and agreement with the discharge recommendations, medications, and plan.     Consults this admission:  IP CONSULT TO CASE MANAGEMENT  IP CONSULT TO NEUROCRITICAL CARE  IP CONSULT TO HEMATOLOGY  IP CONSULT TO PHARMACY      Discharge Instruction:   Handoff to PCP:     Follow up appointments: Coumadin Clinic, neurology  Primary care physician:     Diet:  regular diet   Activity: activity as tolerated  Disposition: Discharged to:   [x]Home, []C, []SNF, []Acute Rehab, []Hospice   Condition on discharge: Stable    Discharge Medications:        Medication List        START taking these medications      atorvastatin 80 MG tablet  Commonly known as: LIPITOR  Take 1 tablet by mouth nightly     enoxaparin 60 MG/0.6ML  Commonly known as: LOVENOX  Inject 0.6 mLs into the skin 2 times daily     warfarin 2.5 MG tablet  Commonly known as: COUMADIN  Take 1 tablet by mouth at bedtime            CONTINUE taking these medications      albuterol sulfate  (90 Base) MCG/ACT inhaler  Commonly known as: PROVENTIL;VENTOLIN;PROAIR     ALPRAZolam 0.5 MG tablet  Commonly known as: XANAX     aspirin 81 MG chewable tablet  Take 1 tablet by mouth daily     Dupixent 300 MG/2ML Sosy injection  Generic drug: dupilumab  INJECT 300MG (1 PEN) UNDER THE SKIN EVERY 14 DAYS     Januvia 100 MG tablet  Generic drug: SITagliptin     lactobacillus Caps capsule  TAKE 1 CAPSULE BY MOUTH DAILY     levothyroxine 50 MCG tablet  Commonly known as: SYNTHROID     ondansetron 4 MG disintegrating tablet  Commonly known as: ZOFRAN-ODT  Take 1 tablet by mouth 3 times daily as needed for

## 2025-01-16 NOTE — PROGRESS NOTES
Reviewed discharge instructions with patient. Reviewed discharge medications including dosing, schedule, indication, and adverse reactions.  Reviewed which medications were already taken today and next dosage due for each medication.      Reviewed signs and symptoms that prompt a call to the physician and appropriate phone numbers. Reviewed follow up appointments that have been made.    Patient is being discharged with IV access d/t need for ongoing therapy:      Type:  PAC                          Patient verbalized understanding of all instructions and questions were answered to his. satisfaction.  Signed discharge instructions were given to the patient and a copy placed in the paper-lite chart.  Patient discharged to home per wheelchair with family members.    STEPHANIE JONAS RN

## 2025-01-16 NOTE — PLAN OF CARE
Problem: Chronic Conditions and Co-morbidities  Goal: Patient's chronic conditions and co-morbidity symptoms are monitored and maintained or improved  1/16/2025 0808 by Scot Devlin RN  Outcome: Progressing     Problem: Discharge Planning  Goal: Discharge to home or other facility with appropriate resources  1/16/2025 0808 by Scot Devlin, RN  Outcome: Progressing     Problem: Safety - Adult  Goal: Free from fall injury  Outcome: Progressing     Problem: Neurosensory - Adult  Goal: Achieves stable or improved neurological status  Outcome: Progressing     Problem: Neurosensory - Adult  Goal: Achieves maximal functionality and self care  Outcome: Progressing     Problem: Skin/Tissue Integrity  Goal: Absence of new skin breakdown  Description: 1.  Monitor for areas of redness and/or skin breakdown  2.  Assess vascular access sites hourly  3.  Every 4-6 hours minimum:  Change oxygen saturation probe site  4.  Every 4-6 hours:  If on nasal continuous positive airway pressure, respiratory therapy assess nares and determine need for appliance change or resting period.  Outcome: Progressing

## 2025-01-17 ENCOUNTER — ANTI-COAG VISIT (OUTPATIENT)
Dept: PHARMACY | Age: 65
End: 2025-01-17
Payer: MEDICAID

## 2025-01-17 DIAGNOSIS — I26.99 PULMONARY EMBOLISM, BILATERAL (HCC): Primary | ICD-10-CM

## 2025-01-17 DIAGNOSIS — I63.412 CEREBROVASCULAR ACCIDENT (CVA) DUE TO EMBOLISM OF LEFT MIDDLE CEREBRAL ARTERY (HCC): ICD-10-CM

## 2025-01-17 DIAGNOSIS — I82.412 ACUTE DEEP VEIN THROMBOSIS (DVT) OF FEMORAL VEIN OF LEFT LOWER EXTREMITY (HCC): ICD-10-CM

## 2025-01-17 LAB
INTERNATIONAL NORMALIZATION RATIO, POC: 3.6
PROTHROMBIN TIME, POC: 0

## 2025-01-17 PROCEDURE — 85610 PROTHROMBIN TIME: CPT

## 2025-01-17 PROCEDURE — 99213 OFFICE O/P EST LOW 20 MIN: CPT

## 2025-01-17 NOTE — PROGRESS NOTES
Gary Martinez is a 64 y.o. here for warfarin management.  Gary had an INR test today. Results were reviewed and appropriate warfarin management was completed.     Patient verifies current warfarin dosing regimen: Yes     Warfarin medication reviewed and updated on the patient 's home medication list: Yes   All other medications reviewed and updated on the patient 's home medication list: Yes     Lab Results   Component Value Date    INR 3.6 01/17/2025    INR 2.10 (H) 01/16/2025    INR 1.23 (H) 01/15/2025     Anticoagulation Summary  As of 1/17/2025      INR goal:  2.0-3.0   TTR:  --   INR used for dosing:  3.6 (1/17/2025)   Warfarin maintenance plan:  0 mg every Mon, Fri; 2.5 mg (2.5 mg x 1) all other days   Weekly warfarin total:  12.5 mg   Plan last modified:  Edith Kaufman RP (1/17/2025)   Next INR check:  1/20/2025   Target end date:  Indefinite    Indications    Pulmonary embolism  bilateral (HCC) [I26.99]  Acute deep vein thrombosis (DVT) of femoral vein of left lower extremity (HCC) [I82.412]  Cerebrovascular accident (CVA) due to embolism of left middle cerebral artery (HCC) [I63.412]                 Anticoagulation Episode Summary       INR check location:  --    Preferred lab:  --    Send INR reminders to:  [facility]    Comments:  DVT/ PE 7/2024. CVA 1/10/25 while on Eliquis.           Anticoagulation Care Providers       Provider Role Specialty Phone number    Tony Love MD Referring Internal Medicine [phone]          There were no vitals taken for this visit.    Warfarin assessment / plan:     Appears well  Supra-therapeutic INR.     Denies signs and symptoms of bleeding/bruising.     Gary is new to our anticoagulation center. He was recently admitted 1/10-1/16/25 with an acute CVA. He was on Eliquis at that time for history of DVT/PE 7/2024. I did see where he had a procedure 12/31/24 and Eliquis was held 48 hours prior. He was anticoagulated during that stay

## 2025-01-20 ENCOUNTER — TELEPHONE (OUTPATIENT)
Dept: PHARMACY | Age: 65
End: 2025-01-20

## 2025-01-20 ENCOUNTER — APPOINTMENT (OUTPATIENT)
Dept: PHARMACY | Age: 65
End: 2025-01-20
Payer: MEDICAID

## 2025-01-20 NOTE — TELEPHONE ENCOUNTER
Follow up for repeat blood test and US in 1 week.     Pt called to cancel anticoagulation appt today w/ [facility] stated he had a bad night unable to get motivated to come in today for appt. Pt is new to warfarin, he agreed to r/s for tomorrow.

## 2025-01-21 ENCOUNTER — ANTI-COAG VISIT (OUTPATIENT)
Dept: PHARMACY | Age: 65
End: 2025-01-21
Payer: MEDICAID

## 2025-01-21 ENCOUNTER — TELEPHONE (OUTPATIENT)
Dept: PHARMACY | Age: 65
End: 2025-01-21

## 2025-01-21 DIAGNOSIS — I63.412 CEREBROVASCULAR ACCIDENT (CVA) DUE TO EMBOLISM OF LEFT MIDDLE CEREBRAL ARTERY (HCC): ICD-10-CM

## 2025-01-21 DIAGNOSIS — I26.99 PULMONARY EMBOLISM, BILATERAL (HCC): Primary | ICD-10-CM

## 2025-01-21 DIAGNOSIS — I82.412 ACUTE DEEP VEIN THROMBOSIS (DVT) OF FEMORAL VEIN OF LEFT LOWER EXTREMITY (HCC): ICD-10-CM

## 2025-01-21 LAB
INTERNATIONAL NORMALIZATION RATIO, POC: 3.5
PROTHROMBIN TIME, POC: 0

## 2025-01-21 PROCEDURE — 85610 PROTHROMBIN TIME: CPT

## 2025-01-21 PROCEDURE — 99211 OFF/OP EST MAY X REQ PHY/QHP: CPT

## 2025-01-21 NOTE — PROGRESS NOTES
Gary Martinez is a 64 y.o. here for warfarin management.  Gary had an INR test today. Results were reviewed and appropriate warfarin management was completed.     Patient verifies current warfarin dosing regimen: Yes     Warfarin medication reviewed and updated on the patient 's home medication list: Yes   All other medications reviewed and updated on the patient 's home medication list: No new medications     Lab Results   Component Value Date    INR 3.5 01/21/2025    INR 3.6 01/17/2025    INR 2.10 (H) 01/16/2025       Anticoagulation Summary  As of 1/21/2025      INR goal:  2.0-3.0   TTR:  --   INR used for dosing:  3.5 (1/21/2025)   Warfarin maintenance plan:  0 mg every Mon, Fri; 2.5 mg (2.5 mg x 1) all other days   Weekly warfarin total:  12.5 mg   Plan last modified:  Edith Kaufman RPH (1/17/2025)   Next INR check:  1/28/2025   Target end date:  Indefinite    Indications    Pulmonary embolism  bilateral (HCC) [I26.99]  Acute deep vein thrombosis (DVT) of femoral vein of left lower extremity (HCC) [I82.412]  Cerebrovascular accident (CVA) due to embolism of left middle cerebral artery (HCC) [I63.412]                 Anticoagulation Episode Summary       INR check location:  --    Preferred lab:  --    Send INR reminders to:  [facility] STAFF    Comments:  DVT/ PE 7/2024. CVA 1/10/25 while on Eliquis.           Anticoagulation Care Providers       Provider Role Specialty Phone number    Tony Love MD Referring Internal Medicine [phone]          There were no vitals taken for this visit.    Warfarin assessment / plan:     Appears well  Supra-therapeutic INR.     Denies signs and symptoms of bleeding/bruising.  Denies medication changes.  Denies extra warfarin doses.  Denies change in appetite.  Denies decrease in vitamin K intake.     Supra-therapeutic INR today = 3.5 after a one day hold and one day skipping dose.  Still determining dose.     No warfarin on 1-21-25  Take

## 2025-01-21 NOTE — TELEPHONE ENCOUNTER
[facility] #[id number] - [facility] [address] - P [phone] - F [phone] (Pharmacy)     Spoke with:  recorded on prescription voice mail line.        Warfarin 2.5 mg Tablets      # 20 (30 days supply)    Sig: take warfarin 2.5 mg daily except NO warfarin on Mondays and Fridays or as directed by the clinic.    MR x 1     Dr. Tony Love   [facility]# [id number]    [place] [facility] [place]  [facility]  Anticoagulation Clinic  [address]  [place], [place] [address] [phone]      For Pharmacy Admin Tracking Only    Intervention Detail: New Rx: 1, reason: Needs Additional Therapy  Total # of Interventions Recommended: 1  Total # of Interventions Accepted: 1  Time Spent (min): 10        Imiquimod Pregnancy And Lactation Text: This medication is Pregnancy Category C. It is unknown if this medication is excreted in breast milk.

## 2025-01-28 ENCOUNTER — TELEPHONE (OUTPATIENT)
Dept: PHARMACY | Age: 65
End: 2025-01-28

## 2025-01-28 ENCOUNTER — ANTI-COAG VISIT (OUTPATIENT)
Dept: PHARMACY | Age: 65
End: 2025-01-28
Payer: MEDICAID

## 2025-01-28 DIAGNOSIS — I82.412 ACUTE DEEP VEIN THROMBOSIS (DVT) OF FEMORAL VEIN OF LEFT LOWER EXTREMITY (HCC): ICD-10-CM

## 2025-01-28 DIAGNOSIS — I63.412 CEREBROVASCULAR ACCIDENT (CVA) DUE TO EMBOLISM OF LEFT MIDDLE CEREBRAL ARTERY (HCC): ICD-10-CM

## 2025-01-28 DIAGNOSIS — I26.99 PULMONARY EMBOLISM, BILATERAL (HCC): Primary | ICD-10-CM

## 2025-01-28 LAB
INTERNATIONAL NORMALIZATION RATIO, POC: 1.2
PROTHROMBIN TIME, POC: 0

## 2025-01-28 RX ORDER — WARFARIN SODIUM 2.5 MG/1
2.5 TABLET ORAL DAILY
Qty: 25 TABLET | Refills: 5 | OUTPATIENT
Start: 2025-01-28

## 2025-01-28 NOTE — TELEPHONE ENCOUNTER
Orders Placed This Encounter   Medications    warfarin (COUMADIN) 2.5 MG tablet     Sig: Take 1 tablet by mouth daily EXCEPT 1.25mg (half tablet) every Monday, Wednesday and Friday or as directed by [facility] [phone]     Dispense:  25 tablet     Refill:  5     Gary called and prescription wa snot at [facility].  I call this in.     Edith Kaufman PharmD  [facility]  Anticoagulation  [phone]    For Pharmacy Admin Tracking Only    Intervention Detail: New Rx: 1, reason: Needs Additional Therapy  Total # of Interventions Recommended: 1  Total # of Interventions Accepted: 1  Time Spent (min): 10

## 2025-02-02 RX ORDER — CLOBETASOL PROPIONATE 0.5 MG/G
OINTMENT TOPICAL
Qty: 60 G | Refills: 2 | Status: SHIPPED | OUTPATIENT
Start: 2025-02-02

## 2025-02-02 RX ORDER — KETOCONAZOLE 20 MG/G
CREAM TOPICAL
Qty: 60 G | Refills: 2 | Status: SHIPPED | OUTPATIENT
Start: 2025-02-02

## 2025-02-03 RX ORDER — CLOBETASOL PROPIONATE 0.5 MG/G
CREAM TOPICAL
Qty: 60 G | Refills: 2 | Status: SHIPPED | OUTPATIENT
Start: 2025-02-03

## 2025-02-04 ENCOUNTER — ANTI-COAG VISIT (OUTPATIENT)
Dept: PHARMACY | Age: 65
End: 2025-02-04
Payer: MEDICAID

## 2025-02-04 DIAGNOSIS — I26.99 PULMONARY EMBOLISM, BILATERAL (HCC): Primary | ICD-10-CM

## 2025-02-04 DIAGNOSIS — I63.412 CEREBROVASCULAR ACCIDENT (CVA) DUE TO EMBOLISM OF LEFT MIDDLE CEREBRAL ARTERY (HCC): ICD-10-CM

## 2025-02-04 DIAGNOSIS — I82.412 ACUTE DEEP VEIN THROMBOSIS (DVT) OF FEMORAL VEIN OF LEFT LOWER EXTREMITY (HCC): ICD-10-CM

## 2025-02-04 LAB
INTERNATIONAL NORMALIZATION RATIO, POC: 1.1
PROTHROMBIN TIME, POC: 0

## 2025-02-04 PROCEDURE — 99211 OFF/OP EST MAY X REQ PHY/QHP: CPT

## 2025-02-04 PROCEDURE — 85610 PROTHROMBIN TIME: CPT

## 2025-02-04 NOTE — PROGRESS NOTES
Gary Martinez is a 64 y.o. here for warfarin management.  Gary had an INR test today. Results were reviewed and appropriate warfarin management was completed.     Patient verifies current warfarin dosing regimen: Yes     Warfarin medication reviewed and updated on the patient 's home medication list: Yes   All other medications reviewed and updated on the patient 's home medication list: No new medications     Lab Results   Component Value Date    INR 1.1 2025    INR 1.2 2025    INR 3.5 2025     Patient Findings       Negatives:  Signs/symptoms of thrombosis, Signs/symptoms of bleeding, Change in health, Missed doses, Change in medications, Change in diet/appetite, Bruising    Comments:  Weight = 140 lbs. Can not get weight above 140 lbs.  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  Aversion to taste and smell of food          Anticoagulation Summary  As of 2025      INR goal:  2.0-3.0   TTR:  0.0% (1.1 wk)   INR used for dosin.1 (2025)   Warfarin maintenance plan:  2.5 mg (2.5 mg x 1) every day   Weekly warfarin total:  17.5 mg   Plan last modified:  [place] RPH (2025)   Next INR check:  2025   Target end date:  Indefinite    Indications    Pulmonary embolism  bilateral (HCC) [I26.99]  Acute deep vein thrombosis (DVT) of femoral vein of left lower extremity (HCC) [I82.412]  Cerebrovascular accident (CVA) due to embolism of left middle cerebral artery (HCC) [I63.412]                 Anticoagulation Episode Summary       INR check location:  --    Preferred lab:  --    Send INR reminders to:  [facility]    Comments:  DVT/ PE 2024. CVA 1/10/25 while on Eliquis.           Anticoagulation Care Providers       Provider Role Specialty Phone number    Tony Love MD Referring Internal Medicine [phone]          There were no vitals taken for this visit.    Warfarin assessment / plan:     Appears well  Sub-therapeutic INR     Denies missed

## 2025-02-06 NOTE — DISCHARGE SUMMARY
Physician Discharge Summary     Patient ID:  Gary Martinez  [id number]  64 y.o.  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 Planned replacement    Hospital Course: Admitted day of surgery and underwent uneventful repair of popliteal artery aneurysm.  Discharged home on 1/2/2025    Consults: none    Significant Diagnostic Studies: standard periop    Outstanding Order Results       No orders found from 12/2/2024 to 1/1/2025.            Treatments: surgery: Repair of left popliteal artery aneurysm with interposition vein graft through posterior approach    Discharge Exam:  See chart    Disposition: home    Patient Instructions:   [unfilled]  Activity: no lifting, Driving, or Strenuous exercise for 3 weeks  Diet: regular diet  Wound Care: keep wound clean and dry    Follow-up with [facility] in 4 weeks.    Signed:  Pacheco Traylor MD  2/6/2025  12:06 PM

## 2025-02-11 ENCOUNTER — ANTI-COAG VISIT (OUTPATIENT)
Dept: PHARMACY | Age: 65
End: 2025-02-11
Payer: MEDICAID

## 2025-02-11 DIAGNOSIS — I63.412 CEREBROVASCULAR ACCIDENT (CVA) DUE TO EMBOLISM OF LEFT MIDDLE CEREBRAL ARTERY (HCC): ICD-10-CM

## 2025-02-11 DIAGNOSIS — I82.412 ACUTE DEEP VEIN THROMBOSIS (DVT) OF FEMORAL VEIN OF LEFT LOWER EXTREMITY (HCC): ICD-10-CM

## 2025-02-11 DIAGNOSIS — I26.99 PULMONARY EMBOLISM, BILATERAL (HCC): Primary | ICD-10-CM

## 2025-02-11 LAB
INTERNATIONAL NORMALIZATION RATIO, POC: 1.2
PROTHROMBIN TIME, POC: 0

## 2025-02-11 PROCEDURE — 99211 OFF/OP EST MAY X REQ PHY/QHP: CPT

## 2025-02-11 PROCEDURE — 85610 PROTHROMBIN TIME: CPT

## 2025-02-11 NOTE — PROGRESS NOTES
Gary Martinez is a 64 y.o. here for warfarin management.  Gary had an INR test today. Results were reviewed and appropriate warfarin management was completed.     Patient verifies current warfarin dosing regimen: Yes     Warfarin medication reviewed and updated on the patient 's home medication list: Yes   All other medications reviewed and updated on the patient 's home medication list: No new medications     Lab Results   Component Value Date    INR 1.2 2025    INR 1.1 2025    INR 1.2 2025     Patient Findings       Negatives:  Signs/symptoms of thrombosis, Signs/symptoms of bleeding, Change in health, Missed doses, Change in medications, Change in diet/appetite, Bruising          Anticoagulation Summary  As of 2025      INR goal:  2.0-3.0   TTR:  0.0% (2.1 wk)   INR used for dosin.2 (2025)   Warfarin maintenance plan:  5 mg (2.5 mg x 2) every Tue, Thu, Sat; 2.5 mg (2.5 mg x 1) all other days   Weekly warfarin total:  25 mg   Plan last modified:  [place] RPH (2025)   Next INR check:  2025   Target end date:  Indefinite    Indications    Pulmonary embolism  bilateral (HCC) [I26.99]  Acute deep vein thrombosis (DVT) of femoral vein of left lower extremity (HCC) [I82.412]  Cerebrovascular accident (CVA) due to embolism of left middle cerebral artery (HCC) [I63.412]                 Anticoagulation Episode Summary       INR check location:  --    Preferred lab:  --    Send INR reminders to:  [facility]    Comments:  DVT/ PE 2024. CVA 1/10/25 while on Eliquis.           Anticoagulation Care Providers       Provider Role Specialty Phone number    Tony Love MD Referring Internal Medicine [phone]          There were no vitals taken for this visit.    Warfarin assessment / plan:     Appears well  Sub-therapeutic INR     Denies missed doses.  Denies increased vitamin K intake.  Denies medication changes.  Denies signs or symptoms

## 2025-02-18 ENCOUNTER — ANTI-COAG VISIT (OUTPATIENT)
Dept: PHARMACY | Age: 65
End: 2025-02-18
Payer: MEDICAID

## 2025-02-18 DIAGNOSIS — I82.412 ACUTE DEEP VEIN THROMBOSIS (DVT) OF FEMORAL VEIN OF LEFT LOWER EXTREMITY (HCC): ICD-10-CM

## 2025-02-18 DIAGNOSIS — I26.99 PULMONARY EMBOLISM, BILATERAL (HCC): Primary | ICD-10-CM

## 2025-02-18 DIAGNOSIS — I63.412 CEREBROVASCULAR ACCIDENT (CVA) DUE TO EMBOLISM OF LEFT MIDDLE CEREBRAL ARTERY (HCC): ICD-10-CM

## 2025-02-18 LAB
INTERNATIONAL NORMALIZATION RATIO, POC: 2.6
PROTHROMBIN TIME, POC: 0

## 2025-02-18 PROCEDURE — 99211 OFF/OP EST MAY X REQ PHY/QHP: CPT

## 2025-02-18 PROCEDURE — 85610 PROTHROMBIN TIME: CPT

## 2025-02-18 NOTE — PROGRESS NOTES
Gary Martinez is a 64 y.o. here for warfarin management.  Gary had an INR test today. Results were reviewed and appropriate warfarin management was completed.     Patient verifies current warfarin dosing regimen: Yes     Warfarin medication reviewed and updated on the patient 's home medication list: Yes   All other medications reviewed and updated on the patient 's home medication list: No new medications     Lab Results   Component Value Date    INR 2.6 2025    INR 1.2 2025    INR 1.1 2025     Patient Findings       Negatives:  Signs/symptoms of thrombosis, Signs/symptoms of bleeding, Change in health, Missed doses, Change in medications, Change in diet/appetite, Bruising          Anticoagulation Summary  As of 2025      INR goal:  2.0-3.0   TTR:  13.3% (3.1 wk)   INR used for dosin.6 (2025)   Warfarin maintenance plan:  5 mg (2.5 mg x 2) every Tue, Thu, Sat; 2.5 mg (2.5 mg x 1) all other days   Weekly warfarin total:  25 mg   Plan last modified:  [place] RPH (2025)   Next INR check:  2025   Target end date:  Indefinite    Indications    Pulmonary embolism  bilateral (HCC) [I26.99]  Acute deep vein thrombosis (DVT) of femoral vein of left lower extremity (HCC) [I82.412]  Cerebrovascular accident (CVA) due to embolism of left middle cerebral artery (HCC) [I63.412]                 Anticoagulation Episode Summary       INR check location:  --    Preferred lab:  --    Send INR reminders to:  [facility]    Comments:  DVT/ PE 2024. CVA 1/10/25 while on Eliquis.           Anticoagulation Care Providers       Provider Role Specialty Phone number    Tony Love MD Referring Internal Medicine [phone]          There were no vitals taken for this visit.    Warfarin assessment / plan:     Patient appears well today.      No changes affecting warfarin therapy were noted.   No acute findings with regards to warfarin therapy.  INR today

## 2025-02-25 ENCOUNTER — APPOINTMENT (OUTPATIENT)
Dept: PHARMACY | Age: 65
End: 2025-02-25
Payer: MEDICAID

## 2025-02-27 ENCOUNTER — ANTI-COAG VISIT (OUTPATIENT)
Dept: PHARMACY | Age: 65
End: 2025-02-27
Payer: MEDICAID

## 2025-02-27 DIAGNOSIS — I63.412 CEREBROVASCULAR ACCIDENT (CVA) DUE TO EMBOLISM OF LEFT MIDDLE CEREBRAL ARTERY (HCC): ICD-10-CM

## 2025-02-27 DIAGNOSIS — I26.99 PULMONARY EMBOLISM, BILATERAL (HCC): Primary | ICD-10-CM

## 2025-02-27 DIAGNOSIS — I82.412 ACUTE DEEP VEIN THROMBOSIS (DVT) OF FEMORAL VEIN OF LEFT LOWER EXTREMITY (HCC): ICD-10-CM

## 2025-02-27 LAB
INTERNATIONAL NORMALIZATION RATIO, POC: 1.4
PROTHROMBIN TIME, POC: 0

## 2025-02-27 PROCEDURE — 99211 OFF/OP EST MAY X REQ PHY/QHP: CPT

## 2025-02-27 PROCEDURE — 85610 PROTHROMBIN TIME: CPT

## 2025-02-27 NOTE — PROGRESS NOTES
Gary Martinez is a 64 y.o. here for warfarin management.  Gary had an INR test today. Results were reviewed and appropriate warfarin management was completed.     Patient verifies current warfarin dosing regimen: Yes     Warfarin medication reviewed and updated on the patient 's home medication list: Yes   All other medications reviewed and updated on the patient 's home medication list: No new medications     Lab Results   Component Value Date    INR 1.4 2025    INR 2.6 2025    INR 1.2 2025     Patient Findings       Positives:  Change in health, Change in diet/appetite    Negatives:  Signs/symptoms of thrombosis, Signs/symptoms of bleeding, Missed doses, Change in medications, Bruising    Comments:  [facility]  Weight down to 130 lbs.   Diarrhea          Anticoagulation Summary  As of 2025      INR goal:  2.0-3.0   TTR:  23.6% (1 mo)   INR used for dosin.4 (2025)   Warfarin maintenance plan:  5 mg (2.5 mg x 2) every Tue, Thu, Sat; 2.5 mg (2.5 mg x 1) all other days   Weekly warfarin total:  25 mg   Plan last modified:  [place] RPH (2025)   Next INR check:  3/4/2025   Target end date:  Indefinite    Indications    Pulmonary embolism  bilateral (HCC) [I26.99]  Acute deep vein thrombosis (DVT) of femoral vein of left lower extremity (HCC) [I82.412]  Cerebrovascular accident (CVA) due to embolism of left middle cerebral artery (HCC) [I63.412]                 Anticoagulation Episode Summary       INR check location:  --    Preferred lab:  --    Send INR reminders to:  [facility]    Comments:  DVT/ PE 2024. CVA 1/10/25 while on Eliquis.           Anticoagulation Care Providers       Provider Role Specialty Phone number    Tony Love MD Referring Internal Medicine [phone]          There were no vitals taken for this visit.    Warfarin assessment / plan:     Appears well  Sub-therapeutic INR     Denies

## 2025-03-04 ENCOUNTER — ANTI-COAG VISIT (OUTPATIENT)
Dept: PHARMACY | Age: 65
End: 2025-03-04
Payer: MEDICAID

## 2025-03-04 DIAGNOSIS — I26.99 PULMONARY EMBOLISM, BILATERAL (HCC): Primary | ICD-10-CM

## 2025-03-04 DIAGNOSIS — I63.412 CEREBROVASCULAR ACCIDENT (CVA) DUE TO EMBOLISM OF LEFT MIDDLE CEREBRAL ARTERY (HCC): ICD-10-CM

## 2025-03-04 DIAGNOSIS — I82.412 ACUTE DEEP VEIN THROMBOSIS (DVT) OF FEMORAL VEIN OF LEFT LOWER EXTREMITY (HCC): ICD-10-CM

## 2025-03-04 LAB
INTERNATIONAL NORMALIZATION RATIO, POC: 1.4
PROTHROMBIN TIME, POC: 0

## 2025-03-04 PROCEDURE — 99211 OFF/OP EST MAY X REQ PHY/QHP: CPT

## 2025-03-04 PROCEDURE — 85610 PROTHROMBIN TIME: CPT

## 2025-03-04 NOTE — PROGRESS NOTES
Gary Martinez is a 64 y.o. here for warfarin management.  Gary had an INR test today. Results were reviewed and appropriate warfarin management was completed.     Patient verifies current warfarin dosing regimen: Yes     Warfarin medication reviewed and updated on the patient 's home medication list: Yes   All other medications reviewed and updated on the patient 's home medication list: No new medications     Lab Results   Component Value Date    INR 1.4 2025    INR 1.4 2025    INR 2.6 2025     Patient Findings       Negatives:  Signs/symptoms of thrombosis, Signs/symptoms of bleeding, Change in health, Missed doses, Change in medications, Change in diet/appetite, Bruising          Anticoagulation Summary  As of 3/4/2025      INR goal:  2.0-3.0   TTR:  20.3% (1.2 mo)   INR used for dosin.4 (3/4/2025)   Warfarin maintenance plan:  2.5 mg (2.5 mg x 1) every Mon, Thu; 5 mg (2.5 mg x 2) all other days   Weekly warfarin total:  30 mg   Plan last modified:  [place] RPH (3/4/2025)   Next INR check:  3/11/2025   Target end date:  Indefinite    Indications    Pulmonary embolism  bilateral (HCC) [I26.99]  Acute deep vein thrombosis (DVT) of femoral vein of left lower extremity (HCC) [I82.412]  Cerebrovascular accident (CVA) due to embolism of left middle cerebral artery (HCC) [I63.412]                 Anticoagulation Episode Summary       INR check location:  --    Preferred lab:  --    Send INR reminders to:  [facility]    Comments:  DVT/ PE 2024. CVA 1/10/25 while on Eliquis.           Anticoagulation Care Providers       Provider Role Specialty Phone number    Tony Love MD Referring Internal Medicine [phone]          There were no vitals taken for this visit.    Warfarin assessment / plan:     Appears well  Sub-therapeutic INR     Denies missed doses.  Denies increased vitamin K intake.  Denies medication changes.  Denies signs or symptoms of

## 2025-03-06 ENCOUNTER — TELEPHONE (OUTPATIENT)
Dept: PHARMACY | Age: 65
End: 2025-03-06

## 2025-03-06 NOTE — TELEPHONE ENCOUNTER
[facility] ([phone][facility] about the patient getting a PEG tube place on Monday 3/10. They want his INR 1.8 or lower. We will reduce his dose beforehand and then have his INR checked on Thursday 3/13.    Recommended dosing schedule:  3/6 2.5mg   3/7 2.5mg   3/8 5mg   3/9 2.5mg   3/10 5mg (day of procedure, up to MD when he restarts)  3/11 5mg   3/12 5mg  3/13 INR check    Heidy Berg, PharmD 3/6/2025 3:31 PM   [facility]  [phone]

## 2025-03-10 ENCOUNTER — TELEPHONE (OUTPATIENT)
Dept: PHARMACY | Age: 65
End: 2025-03-10

## 2025-03-10 NOTE — TELEPHONE ENCOUNTER
[facility], would like pharmacist to go over dosing instructions prior to procedure on 3/10. S/w pt on 3/7 explained that I s/w Los (pharmacist) who instructed him to take 2.5 mgs of warfarin on F/S/S? Pharmacist would like him to start taking 5 mgs of warfarin evening of procedure if ok by MD to restart. Pt verbalized understanding. Appt scheduled w/ [facility] on 3/13

## 2025-03-14 ENCOUNTER — TELEPHONE (OUTPATIENT)
Dept: PHARMACY | Age: 65
End: 2025-03-14

## 2025-03-14 NOTE — TELEPHONE ENCOUNTER
I attempted to call Gary regarding their missed appointment on 3/13 with the [facility]ticoagulation [facility].  There was no answer and I left a message requesting a return call to reschedule.      Rosibel Byrnes, PharmD  [facility]  Anticoagulation Clinic  [phone]

## 2025-03-17 ENCOUNTER — TELEPHONE (OUTPATIENT)
Dept: PHARMACY | Age: 65
End: 2025-03-17

## 2025-04-14 ENCOUNTER — TELEPHONE (OUTPATIENT)
Dept: PHARMACY | Age: 65
End: 2025-04-14

## 2025-04-14 NOTE — TELEPHONE ENCOUNTER
GIAN Vega to please return our call to schedule a follow up for warfarin management.     Edith Kaufman, PharmD  [facility]  Anticoagulation  [phone]    For Pharmacy Admin Tracking Only    Intervention Detail:   Total # of Interventions Recommended:   Total # of Interventions Accepted:   Time Spent (min): 5

## 2025-04-29 ENCOUNTER — ANTI-COAG VISIT (OUTPATIENT)
Dept: PHARMACY | Age: 65
End: 2025-04-29

## 2025-04-29 PROBLEM — I82.412 ACUTE DEEP VEIN THROMBOSIS (DVT) OF FEMORAL VEIN OF LEFT LOWER EXTREMITY (HCC): Status: RESOLVED | Noted: 2024-08-09 | Resolved: 2025-04-29

## 2025-04-29 PROBLEM — I63.412 CEREBROVASCULAR ACCIDENT (CVA) DUE TO EMBOLISM OF LEFT MIDDLE CEREBRAL ARTERY (HCC): Status: RESOLVED | Noted: 2025-01-10 | Resolved: 2025-04-29

## 2025-04-29 PROBLEM — I26.99 PULMONARY EMBOLISM, BILATERAL (HCC): Status: RESOLVED | Noted: 2024-08-08 | Resolved: 2025-04-29

## 2025-04-29 NOTE — PROGRESS NOTES
Switched to Eliquis.  Closed referral and resolved anticoagulation episode. Updated med list with this change.

## 2025-05-18 NOTE — PROGRESS NOTES
Pt here for NBUVB treatment, tolerating treatments well, no signs of burning. Pt shields face. Decreased dose per protocol.
English

## 2025-06-30 ENCOUNTER — TELEPHONE (OUTPATIENT)
Age: 65
End: 2025-06-30

## 2025-06-30 NOTE — TELEPHONE ENCOUNTER
Good morning,  This patient is due for a prior authorization renewal. His prior authorization was denied due his chart notes not being up to date, the plan needs to see some form of improvement listed. I can resubmit the prior authorization once new chart notes are completed.  Thank you!

## 2025-07-17 ENCOUNTER — OFFICE VISIT (OUTPATIENT)
Age: 65
End: 2025-07-17
Payer: MEDICAID

## 2025-07-17 DIAGNOSIS — L28.2 PRURIGO PAPULE: ICD-10-CM

## 2025-07-17 DIAGNOSIS — L84 CORN OF FOOT: ICD-10-CM

## 2025-07-17 DIAGNOSIS — L29.9 PRURITUS OF SKIN: Primary | ICD-10-CM

## 2025-07-17 DIAGNOSIS — Z79.899 HIGH RISK MEDICATION USE: ICD-10-CM

## 2025-07-17 DIAGNOSIS — L72.0 EPIDERMAL CYST: ICD-10-CM

## 2025-07-17 PROCEDURE — G8427 DOCREV CUR MEDS BY ELIG CLIN: HCPCS | Performed by: DERMATOLOGY

## 2025-07-17 PROCEDURE — 99213 OFFICE O/P EST LOW 20 MIN: CPT | Performed by: DERMATOLOGY

## 2025-07-17 PROCEDURE — 3017F COLORECTAL CA SCREEN DOC REV: CPT | Performed by: DERMATOLOGY

## 2025-07-17 PROCEDURE — 1036F TOBACCO NON-USER: CPT | Performed by: DERMATOLOGY

## 2025-07-17 PROCEDURE — G8419 CALC BMI OUT NRM PARAM NOF/U: HCPCS | Performed by: DERMATOLOGY

## 2025-07-17 PROCEDURE — 99214 OFFICE O/P EST MOD 30 MIN: CPT | Performed by: DERMATOLOGY

## 2025-07-17 RX ORDER — NYSTATIN 100000 [USP'U]/ML
SUSPENSION ORAL
COMMUNITY
Start: 2025-03-02

## 2025-07-17 RX ORDER — OXYCODONE AND ACETAMINOPHEN 5; 325 MG/1; MG/1
1 TABLET ORAL 2 TIMES DAILY
COMMUNITY

## 2025-07-17 RX ORDER — FOLIC ACID 1 MG/1
1 TABLET ORAL DAILY
COMMUNITY
Start: 2025-03-05

## 2025-07-17 RX ORDER — DUPILUMAB 300 MG/2ML
INJECTION, SOLUTION SUBCUTANEOUS
Qty: 4 ML | Refills: 11 | Status: SHIPPED | OUTPATIENT
Start: 2025-07-17

## 2025-07-17 RX ORDER — GABAPENTIN 100 MG/1
1 CAPSULE ORAL 2 TIMES DAILY
COMMUNITY
Start: 2025-03-06

## 2025-07-17 NOTE — PROGRESS NOTES
[facility]  Heidy Anne M.D.  [phone]       Gary Martinez  1960    65 y.o. male     Date of Visit: 7/17/2025    Chief Complaint:   Chief Complaint   Patient presents with    Skin Lesion        I was asked to see this patient by Dr. Diaz ref. provider found.    History of Present Illness:  1.  Patient presents today for follow-up.  Has been on Dupixent since 10/23 for widespread prurigo nodules/generalized pruritus.  Briefly off of Dupixent January 2024-March 2024 when he was ill with RSV.  Restarted and recaptured excellent control-then was off Dupixent when he was in the hospital after a stroke and popliteal aneurysm winter 2025, did flare-restarted when he was home from the hospital and again recaptured control.  Ran out of his medicine about a month ago because he had not been in due to multiple medical problems.  Pruritus and prurigo nodules have restarted.    Also has a raised papule behind his left ear.  Occasionally tender pruritic but usually asymptomatic    Tender papule right mid plantar foot.  Feels like he is walking on a pebble.  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  Treated with narrowband UVB phototherapy, intralesional Kenalog, clobetasol ointment.  Bought home UVB hand unit     5/23 long history of pruritus of skin and secondary prurigo nodules.  Treated with narrowband UVB phototherapy [id number].  Also treated with intralesional Kenalog, clobetasol ointment.  Does have a home hand unit that he can use for moderate and localized areas.  Was then doing well in 2019, has had no treatment for pruritus since then-pruritus and prurigo nodules slowly recurred over the last 1 to 2 years.  Started Dupixent 10/23     Wife was sick and subsequently succumbed to pancreatic cancer about 5 months ago.    Review of

## (undated) DEVICE — SUTURE PROL SZ 7-0 L18IN NONABSORBABLE BLU L9.3MM BV-1 3/8 8701H

## (undated) DEVICE — SUTURE VCRL SZ 4-0 L18IN ABSRB UD L19MM PS-2 3/8 CIR PRIM J496H

## (undated) DEVICE — BASIC SINGLE BASIN 1-LF: Brand: MEDLINE INDUSTRIES, INC.

## (undated) DEVICE — ANGIO-SEAL VIP VASCULAR CLOSURE DEVICE: Brand: ANGIO-SEAL

## (undated) DEVICE — STAPLER INT L75MM CUT LN L73MM STPL LN L77MM BLU B FRM 8

## (undated) DEVICE — SUTURE PERMAHAND SZ 4-0 L18IN NONABSORBABLE BLK SILK BRAID A183H

## (undated) DEVICE — PENCIL ES L3M BTTN SWCH S STL HEX LOK BLDE ELECTRD HOLSTER

## (undated) DEVICE — ROBOTIC GENERAL: Brand: MEDLINE INDUSTRIES, INC.

## (undated) DEVICE — APPLIER CLP L9.38IN M LIG TI DISP STR RNG HNDL LIGACLP

## (undated) DEVICE — SUTURE PROL SZ 6-0 L24IN NONABSORBABLE BLU L13MM C-1 3/8 8726H

## (undated) DEVICE — SYRINGE 20ML LL S/C 50

## (undated) DEVICE — TROCAR: Brand: KII SHIELDED BLADED ACCESS SYSTEM

## (undated) DEVICE — SUTURE PERMAHAND SZ 2-0 L12X18IN NONABSORBABLE BLK SILK A185H

## (undated) DEVICE — TRANSFER SET 3": Brand: MEDLINE INDUSTRIES, INC.

## (undated) DEVICE — APPLIER CLP L9.375IN APER 2.1MM CLS L3.8MM 20 SM TI CLP

## (undated) DEVICE — PADDING,UNDERCAST,COTTON, 4"X4YD STERILE: Brand: MEDLINE

## (undated) DEVICE — INSUFFLATION NEEDLE TO ESTABLISH PNEUMOPERITONEUM.: Brand: INSUFFLATION NEEDLE

## (undated) DEVICE — SPONGE LAP W18XL18IN WHT COT 4 PLY FLD STRUNG RADPQ DISP ST 2 PER PACK

## (undated) DEVICE — SYRINGE IRRIG 60ML SFT PLIABLE BLB EZ TO GRP 1 HND USE W/

## (undated) DEVICE — STAPLER INT DIA29MM CLS STPL H1.5-2.2MM OPN LEG L5.2MM 26

## (undated) DEVICE — BANDAGE,GAUZE,BULKEE II,4.5"X4.1YD,STRL: Brand: MEDLINE

## (undated) DEVICE — RELOAD STPL L75MM OPN H3.8MM CLS 1.5MM WIRE DIA0.2MM REG

## (undated) DEVICE — SHEET, T, LAPAROTOMY, STERILE: Brand: MEDLINE

## (undated) DEVICE — SOLUTION IV IRRIG POUR BRL 0.9% SODIUM CHL 2F7124

## (undated) DEVICE — SUTURE VICRYL + SZ 2-0 L27IN ABSRB CLR CT-1 1/2 CIR TAPERCUT VCP259H

## (undated) DEVICE — PREMIUM DRY TRAY LF: Brand: MEDLINE INDUSTRIES, INC.

## (undated) DEVICE — ADTEC SINGLE USE HOOK SCISSORS, SHAFT ONLY, MONOPOLAR, STRAIGHT, WORKING LENGTH: 12 1/4", (310 MM), DIAM. 5 MM, BLUNT/BLUNT, INSULATED, SINGLE ACTION, STERILE, DISPOSABLE, PACKAGE OF 10 PIECES: Brand: AESCULAP

## (undated) DEVICE — STRIP,CLOSURE,WOUND,MEDI-STRIP,1/2X4: Brand: MEDLINE

## (undated) DEVICE — CATH LAB PACK: Brand: MEDLINE INDUSTRIES, INC.

## (undated) DEVICE — ELECTRODE PT RET AD L9FT HI MOIST COND ADH HYDRGEL CORDED

## (undated) DEVICE — DRAPE,REIN 53X77,STERILE: Brand: MEDLINE

## (undated) DEVICE — TOWEL,OR,DSP,ST,BLUE,STD,4/PK,20PK/CS: Brand: MEDLINE

## (undated) DEVICE — CANISTER SUCT 1200ML W/ INTERMED TBNG RIPTIDE

## (undated) DEVICE — GUIDEWIRES: Brand: TRAXCESS EX GUIDEWIRE

## (undated) DEVICE — 1LYRTR 16FR10ML100%SIL UMS SNP: Brand: MEDLINE INDUSTRIES, INC.

## (undated) DEVICE — SUTURE ABSORBABLE MONOFILAMENT 0 CTX 60 IN VIO PDS + PDP990G

## (undated) DEVICE — ADHESIVE SKIN CLOSURE TOP 36 CC HI VISC DERMBND MINI

## (undated) DEVICE — GARMENT COMPR STD FOR 17IN CALF UNIF THER FLOTRN

## (undated) DEVICE — SUTURE PERMA-HAND SZ 2-0 L30IN NONABSORBABLE BLK L26MM SH K833H

## (undated) DEVICE — TROCAR: Brand: KII SLEEVE

## (undated) DEVICE — SUTURE VICRYL + SZ 4-0 L27IN ABSRB WHT FS-2 3/8 CIR REV CUT VCP422H

## (undated) DEVICE — TOTAL TRAY, 16FR 10ML SIL FOLEY, URN: Brand: MEDLINE

## (undated) DEVICE — GOWN SIRUS NONREIN XL W/TWL: Brand: MEDLINE INDUSTRIES, INC.

## (undated) DEVICE — GAUZE,SPONGE,4"X4",16PLY,XRAY,STRL,LF: Brand: MEDLINE

## (undated) DEVICE — EXTENSION SET, MALE LUER LOCK ADAPTER

## (undated) DEVICE — CATHETER GUID STR 038 8 FRX90 CM 6 FRX125 CM BERN BMX 96 SEL

## (undated) DEVICE — Device

## (undated) DEVICE — GLOVE SURG SZ 7 CRM LTX FREE POLYISOPRENE POLYMER BEAD ANTI

## (undated) DEVICE — ZOOM 035 158 CM RC: Brand: ZOOM™ REPERFUSION CATHETER

## (undated) DEVICE — 30" EXT SET W/CLAMP, ROTATING LUER W/FILTER CAP: Brand: ICU MEDICAL

## (undated) DEVICE — COVER,TABLE,77X90,STERILE: Brand: MEDLINE

## (undated) DEVICE — GENERAL LAPAROSCOPY: Brand: MEDLINE INDUSTRIES, INC.

## (undated) DEVICE — OPEN-END FLEXI-TIP URETERAL CATHETER: Brand: FLEXI-TIP

## (undated) DEVICE — ZOOM 071 137 CM RC: Brand: ZOOM™ REPERFUSION CATHETER

## (undated) DEVICE — DRAPE,MINOR PROC,6X6 FEN, STER: Brand: MEDLINE

## (undated) DEVICE — TUBING, SUCTION, 1/4" X 12', STRAIGHT: Brand: MEDLINE

## (undated) DEVICE — PAD, DEFIB, ADULT, RADIOTRANS, PHYSIO: Brand: MEDLINE

## (undated) DEVICE — BLADELESS OBTURATOR: Brand: WECK VISTA

## (undated) DEVICE — WOUND RETRACTOR AND PROTECTOR: Brand: ALEXIS O WOUND PROTECTOR-RETRACTOR

## (undated) DEVICE — SYRINGE MED 10ML LUERLOCK TIP W/O SFTY DISP

## (undated) DEVICE — STAPLER 60 RELOAD BLUE: Brand: SUREFORM

## (undated) DEVICE — TISSUE RETRIEVAL SYSTEM: Brand: INZII RETRIEVAL SYSTEM

## (undated) DEVICE — MAJOR VASCULAR: Brand: MEDLINE INDUSTRIES, INC.

## (undated) DEVICE — VESSEL SEALER EXTEND: Brand: ENDOWRIST

## (undated) DEVICE — SUTURE NONABSORBABLE MONOFILAMENT 5-0 C-1 1X24 IN PROLENE 8725H

## (undated) DEVICE — COVER,MAYO STAND,STERILE: Brand: MEDLINE

## (undated) DEVICE — BANDAGE COMPR W6INXL15FT BGE E SGL LAYERED CLP CLSR

## (undated) DEVICE — LOOP VES W1.3MM THK0.9MM MINI YEL SIL FLD REPELLENT

## (undated) DEVICE — STAPLER 60: Brand: SUREFORM

## (undated) DEVICE — SYRINGE,TOOMEY,IRRIGATION,70CC,STERILE: Brand: MEDLINE

## (undated) DEVICE — COVER LT HNDL BLU PLAS

## (undated) DEVICE — GOWN,AURORA,NONREINF,RAGLAN,XXL,STERILE: Brand: MEDLINE

## (undated) DEVICE — SUTURE PERMAHAND SZ 2-0 L18IN NONABSORBABLE BLK L26MM SH C012D

## (undated) DEVICE — BANDAGE COMPR W4INXL15FT BGE E SGL LAYERED CLP CLSR

## (undated) DEVICE — TROCAR: Brand: KII FIOS FIRST ENTRY

## (undated) DEVICE — ARM DRAPE

## (undated) DEVICE — SYRINGE MED 30ML STD CLR PLAS LUERLOCK TIP N CTRL DISP

## (undated) DEVICE — SUTURE PERMAHAND SZ 2-0 L30IN NONABSORBABLE BLK SILK W/O A305H

## (undated) DEVICE — SUTURE SZ 0 27IN 5/8 CIR UR-6  TAPER PT VIOLET ABSRB VICRYL J603H

## (undated) DEVICE — RADIFOCUS GLIDECATH: Brand: GLIDECATH

## (undated) DEVICE — AGENT HEMOSTATIC SURG ORIGINAL ABS 4X8IN LOOSE KNIT 12/CA

## (undated) DEVICE — ZOOM 045 144 CM RC: Brand: ZOOM™ REPERFUSION CATHETER

## (undated) DEVICE — SOLUTION IV 1000ML 0.9% SOD CHL PH 5 INJ USP VIAFLX PLAS

## (undated) DEVICE — SUTURE ETHILON SZ 3-0 L30IN NONABSORBABLE BLK PSLX L36MM 3/8 1683H

## (undated) DEVICE — SUTURE PDS + SZ 0 L27IN ABSRB VLT L36MM CT 1 1 2 CIR PDP340H

## (undated) DEVICE — YANKAUER,BULB TIP,W/O VENT,RIGID,STERILE: Brand: MEDLINE

## (undated) DEVICE — FOGARTY - HYDRAGRIP SURGICAL - CLAMP INSERTS: Brand: FOGARTY SOFTJAW

## (undated) DEVICE — PINNACLE R/O II INTRODUCER SHEATH WITH RADIOPAQUE MARKER: Brand: PINNACLE

## (undated) DEVICE — MASTISOL ADHESIVE LIQ 2/3ML

## (undated) DEVICE — SUTURE VICRYL + SZ 3-0 L36IN ABSRB UD L36MM CT-1 1/2 CIR VCP944H

## (undated) DEVICE — CANNULA SEAL

## (undated) DEVICE — APPLIER CLP M L L11.4IN DIA10MM ENDOSCP ROT MULT FOR LIG

## (undated) DEVICE — MERCY HEALTH WEST TURNOVER: Brand: MEDLINE INDUSTRIES, INC.

## (undated) DEVICE — LOOP VES W25MM THK1MM MAXI RED SIL FLD REPELLENT 100 PER

## (undated) DEVICE — STAPLER INT L60MM REG TISS BLU B FRM 8 FIRING 2 ROW AUTO

## (undated) DEVICE — SUTURE PERMAHAND SZ 3-0 L18IN NONABSORBABLE BLK L26MM SH C013D

## (undated) DEVICE — TIP COVER ACCESSORY

## (undated) DEVICE — TUBE ASPIR ZOOM POD

## (undated) DEVICE — 3M™ TEGADERM™ TRANSPARENT FILM DRESSING FRAME STYLE, 1626W, 4 IN X 4-3/4 IN (10 CM X 12 CM), 50/CT 4CT/CASE: Brand: 3M™ TEGADERM™

## (undated) DEVICE — GLOVE SURG SZ 8 L12IN FNGR THK79MIL GRN LTX FREE

## (undated) DEVICE — GLOVE ORANGE PI 7 1/2   MSG9075

## (undated) DEVICE — RADIFOCUS GLIDEWIRE: Brand: GLIDEWIRE

## (undated) DEVICE — SET INSUF TUBE HEAT ISO CONN DISP

## (undated) DEVICE — BANDAGE COMPR W6INXL12FT SMOOTH FOR LIMB EXSANG ESMARCH